# Patient Record
Sex: FEMALE | Race: BLACK OR AFRICAN AMERICAN | NOT HISPANIC OR LATINO | Employment: UNEMPLOYED | URBAN - METROPOLITAN AREA
[De-identification: names, ages, dates, MRNs, and addresses within clinical notes are randomized per-mention and may not be internally consistent; named-entity substitution may affect disease eponyms.]

---

## 2017-01-09 ENCOUNTER — TELEPHONE (OUTPATIENT)
Dept: INTERNAL MEDICINE | Facility: CLINIC | Age: 37
End: 2017-01-09

## 2017-01-09 NOTE — TELEPHONE ENCOUNTER
Please see message below. Patient states she was sick that day and it should have been billed as a sick visit??

## 2017-01-09 NOTE — TELEPHONE ENCOUNTER
Billed as 87908, which was appropriate for care she was given that day. Blood pressure was elevated and also had to be addressed.

## 2017-01-09 NOTE — TELEPHONE ENCOUNTER
----- Message from Jahaira Ely sent at 1/9/2017  2:23 PM CST -----  Contact: Pt  Pt request call from nurse regarding date of service on 12-02-16 that pt states was coded wrong and I referred pt to billing  and pt states billing transferred her back to us, please contact pt at [phone]

## 2017-02-18 ENCOUNTER — OFFICE VISIT (OUTPATIENT)
Dept: URGENT CARE | Facility: CLINIC | Age: 37
End: 2017-02-18
Payer: COMMERCIAL

## 2017-02-18 VITALS
BODY MASS INDEX: 34.75 KG/M2 | TEMPERATURE: 100 F | DIASTOLIC BLOOD PRESSURE: 80 MMHG | SYSTOLIC BLOOD PRESSURE: 126 MMHG | WEIGHT: 208.56 LBS | HEIGHT: 65 IN

## 2017-02-18 DIAGNOSIS — B34.9 ACUTE VIRAL SYNDROME: Primary | ICD-10-CM

## 2017-02-18 DIAGNOSIS — R59.1 LYMPHADENOPATHY: ICD-10-CM

## 2017-02-18 DIAGNOSIS — R50.9 FEVER AND CHILLS: ICD-10-CM

## 2017-02-18 DIAGNOSIS — R52 ACHES: ICD-10-CM

## 2017-02-18 LAB
CTP QC/QA: YES
CTP QC/QA: YES
FLUAV AG NPH QL: NEGATIVE
FLUBV AG NPH QL: NEGATIVE
S PYO RRNA THROAT QL PROBE: NEGATIVE

## 2017-02-18 PROCEDURE — 87880 STREP A ASSAY W/OPTIC: CPT | Mod: QW,S$GLB,, | Performed by: REGISTERED NURSE

## 2017-02-18 PROCEDURE — 3074F SYST BP LT 130 MM HG: CPT | Mod: S$GLB,,, | Performed by: REGISTERED NURSE

## 2017-02-18 PROCEDURE — 87804 INFLUENZA ASSAY W/OPTIC: CPT | Mod: QW,S$GLB,, | Performed by: REGISTERED NURSE

## 2017-02-18 PROCEDURE — 99213 OFFICE O/P EST LOW 20 MIN: CPT | Mod: 25,S$GLB,, | Performed by: REGISTERED NURSE

## 2017-02-18 PROCEDURE — 3079F DIAST BP 80-89 MM HG: CPT | Mod: S$GLB,,, | Performed by: REGISTERED NURSE

## 2017-02-18 PROCEDURE — 99999 PR PBB SHADOW E&M-EST. PATIENT-LVL III: CPT | Mod: PBBFAC,,, | Performed by: REGISTERED NURSE

## 2017-02-18 PROCEDURE — 96372 THER/PROPH/DIAG INJ SC/IM: CPT | Mod: S$GLB,,, | Performed by: REGISTERED NURSE

## 2017-02-18 RX ORDER — BETAMETHASONE SODIUM PHOSPHATE AND BETAMETHASONE ACETATE 3; 3 MG/ML; MG/ML
12 INJECTION, SUSPENSION INTRA-ARTICULAR; INTRALESIONAL; INTRAMUSCULAR; SOFT TISSUE
Status: COMPLETED | OUTPATIENT
Start: 2017-02-18 | End: 2017-02-18

## 2017-02-18 RX ADMIN — BETAMETHASONE SODIUM PHOSPHATE AND BETAMETHASONE ACETATE 12 MG: 3; 3 INJECTION, SUSPENSION INTRA-ARTICULAR; INTRALESIONAL; INTRAMUSCULAR; SOFT TISSUE at 11:02

## 2017-02-18 NOTE — PROGRESS NOTES
Per written order from Eleazar Wolfe, NP, pt given Celestone 12mg IM in the Left Ventrogluteal. Pt tolerated well. Pt instructed to wait 20 mins in the lobby. Pt stated an understanding.

## 2017-02-18 NOTE — MR AVS SNAPSHOT
"    [facility]  [address] [address] [place] [address]  Phone: [phone]  Fax: [phone]                  Bianca Amato   2017 10:00 AM   Office Visit    Description:  Female : 1980   Provider:  Eleazar Wolfe NP   Department:  [facility]           Reason for Visit     Cough           Diagnoses this Visit        Comments    Acute viral syndrome    -  Primary     Fever and chills         Lymphadenopathy         Aches                To Do List           Goals (5 Years of Data)     None      [facility]     [facility] Care [facility] -  Assistance  Registered nurses in the [facility] Center provide clinical advisement, health education, appointment booking, and other advisory services.  Call for this free service at [phone].             Medications           These medications were administered today        Dose Freq    betamethasone acetate-betamethasone sodium phosphate injection 12 mg 12 mg Clinic/HOD 1 time    Sig: Inject 2 mLs (12 mg total) into the muscle one time.    Class: Normal    Route: Intramuscular           Verify that the below list of medications is an accurate representation of the medications you are currently taking.  If none reported, the list may be blank. If incorrect, please contact your healthcare provider. Carry this list with you in case of emergency.           Current Medications     albuterol (PROVENTIL HFA) 90 mcg/actuation inhaler Inhale 2 puffs into the lungs every 6 (six) hours as needed.    amlodipine (NORVASC) 5 MG tablet Take 1 tablet (5 mg total) by mouth once daily.    methylPREDNISolone (MEDROL DOSEPACK) 4 mg tablet use as directed    benzonatate (TESSALON) 200 MG capsule Take 1 capsule (200 mg total) by mouth 3 (three) times daily as needed for Cough.           Clinical Reference Information           Your Vitals Were     BP Temp Height Weight BMI    126/80 100.2 °F (37.9 °C) 5' 5" (1.651 m) 94.6 kg (208 lb 8.9 " oz) 34.71 kg/m2      Blood Pressure          Most Recent Value    BP  126/80      Allergies as of 2/18/2017     Latex, Natural Rubber    Pcn [Penicillins]    Sulfa (Sulfonamide Antibiotics)      Immunizations Administered on Date of Encounter - 2/18/2017     None      Orders Placed During Today's Visit      Normal Orders This Visit    POCT Influenza A/B     POCT Rapid Strep A          2/18/2017 11:13 AM - Dami French LPN      Component Results     Component Value Flag Ref Range Units Status    Rapid Influenza A Ag Negative  Negative  Final    Rapid Influenza B Ag Negative  Negative  Final     Acceptable Yes    Final         2/18/2017 11:08 AM - Dami French LPN      Component Results     Component Value Flag Ref Range Units Status    Rapid Strep A Screen Negative  Negative  Final     Acceptable Yes    Final            [facility] Sign-Up     Activating your [facility] account is as easy as 1-2-3!     1) Visit [email or web address], select Sign Up Now, enter this activation code and your date of birth, then select Next.  [email or web address]  Expires: 4/4/2017 11:18 AM      2) Create a username and password to use when you visit [facility] in the future and select a security question in case you lose your password and select Next.    3) Enter your e-mail address and click Sign Up!    Additional Information  If you have questions, please e-mail [email or web address] or call [phone] to talk to our [facility] staff. Remember, [facility] is NOT to be used for urgent needs. For medical emergencies, dial 911.         Language Assistance Services     ATTENTION: Language assistance services are available, free of charge. Please call [phone].      ATENCIÓN: Si habla español, tiene a chiang disposición servicios gratuitos de asistencia lingüística. Llame al [phone].     CHÚ Ý: N?u b?n nói Ti?ng Vi?t, có các d?ch v? h? tr? ngôn ng? mi?n phí dành cho b?n. G?i s? [phone].          [facility] complies with applicable Federal civil rights laws and does not discriminate on the basis of race, color, national origin, age, disability, or sex.

## 2017-02-18 NOTE — PROGRESS NOTES
"Subjective:       Patient ID: Bianca Amato is a 37 y.o. female.    Chief Complaint: Cough (congestion/runnynose/chills)    URGENT CARE VISIT    HPI     Bianca is here today with c/o illness since yesterday, worse today.  Reports sweats, chills, aches and fatigue.  No meds taken.  Reports RN, NC, PND, nosebleed RT nare, ear pain/pressure, and sore lymph glands in neck.    Review of Systems   Constitutional: Positive for chills, diaphoresis, fatigue and fever.   HENT: Positive for congestion, ear pain, nosebleeds, postnasal drip, rhinorrhea and sore throat. Negative for sinus pressure and sneezing.    Eyes: Negative.    Respiratory: Negative.    Cardiovascular: Negative.    Gastrointestinal: Negative for abdominal pain, nausea and vomiting.   Musculoskeletal: Positive for myalgias.   Allergic/Immunologic: Negative for environmental allergies.   Neurological: Negative.    Hematological: Positive for adenopathy.       Objective:         Vitals:    02/18/17 1027   BP: 126/80   Temp: 100.2 °F (37.9 °C)   Weight: 94.6 kg (208 lb 8.9 oz)   Height: 5' 5" (1.651 m)       Physical Exam   Constitutional: She is oriented to person, place, and time. She appears well-developed and well-nourished. No distress.   Febrile.   HENT:   Head: Normocephalic and atraumatic.   Right Ear: Tympanic membrane, external ear and ear canal normal.   Left Ear: Tympanic membrane, external ear and ear canal normal.   Nose: Mucosal edema and rhinorrhea present. No sinus tenderness. No epistaxis. Right sinus exhibits no maxillary sinus tenderness and no frontal sinus tenderness. Left sinus exhibits no maxillary sinus tenderness and no frontal sinus tenderness.   Mouth/Throat: Oropharynx is clear and moist. No oropharyngeal exudate, posterior oropharyngeal edema or posterior oropharyngeal erythema.   Eyes: Conjunctivae and EOM are normal. Pupils are equal, round, and reactive to light. Right eye exhibits no discharge. Left eye exhibits no discharge. No " scleral icterus.   Cardiovascular: Normal rate, regular rhythm and normal heart sounds.  Exam reveals no gallop and no friction rub.    No murmur heard.  Pulmonary/Chest: Effort normal and breath sounds normal. No respiratory distress. She has no wheezes. She exhibits no tenderness.   Lymphadenopathy:     She has cervical adenopathy.   Neurological: She is alert and oriented to person, place, and time.   Skin: She is not diaphoretic.   Vitals reviewed.        Component      Latest Ref Rng & Units 2/18/2017   Rapid Influenza A Ag      Negative Negative   Rapid Influenza B Ag      Negative Negative    Acceptable       Yes       Component      Latest Ref Rng & Units 2/18/2017   Rapid Strep A Screen      Negative Negative    Acceptable       Yes       Assessment:       1. Acute viral syndrome    2. Fever and chills    3. Lymphadenopathy    4. Aches        Plan:       Bianca was seen today for cough.    Diagnoses and all orders for this visit:    Acute viral syndrome  -     betamethasone acetate-betamethasone sodium phosphate injection 12 mg; Inject 2 mLs (12 mg total) into the muscle one time.    Fever and chills  -     POCT Influenza A/B  -     POCT Rapid Strep A  -     betamethasone acetate-betamethasone sodium phosphate injection 12 mg; Inject 2 mLs (12 mg total) into the muscle one time.    Lymphadenopathy  -     POCT Influenza A/B  -     POCT Rapid Strep A  -     betamethasone acetate-betamethasone sodium phosphate injection 12 mg; Inject 2 mLs (12 mg total) into the muscle one time.    Aches  -     POCT Influenza A/B  -     POCT Rapid Strep A  -     betamethasone acetate-betamethasone sodium phosphate injection 12 mg; Inject 2 mLs (12 mg total) into the muscle one time.      Symptomatic care, rest, fluids, hydration.  Follow-up in UC or with Dr. Chadn in 2 to 3 days if symptoms worsen or persist.  Work note given to return on Monday 2/20/2017.

## 2017-11-29 ENCOUNTER — HOSPITAL ENCOUNTER (OUTPATIENT)
Dept: RADIOLOGY | Facility: HOSPITAL | Age: 37
Discharge: HOME | End: 2017-11-29
Attending: NURSE PRACTITIONER
Payer: COMMERCIAL

## 2017-11-29 ENCOUNTER — OFFICE VISIT (OUTPATIENT)
Dept: INTERNAL MEDICINE | Facility: CLINIC | Age: 37
End: 2017-11-29
Payer: COMMERCIAL

## 2017-11-29 VITALS
BODY MASS INDEX: 35.11 KG/M2 | TEMPERATURE: 98 F | OXYGEN SATURATION: 98 % | SYSTOLIC BLOOD PRESSURE: 142 MMHG | HEART RATE: 95 BPM | HEIGHT: 65 IN | WEIGHT: 210.75 LBS | DIASTOLIC BLOOD PRESSURE: 82 MMHG

## 2017-11-29 DIAGNOSIS — M79.89 PAIN AND SWELLING OF LOWER LEG, RIGHT: Primary | ICD-10-CM

## 2017-11-29 DIAGNOSIS — M79.661 PAIN AND SWELLING OF LOWER LEG, RIGHT: Primary | ICD-10-CM

## 2017-11-29 DIAGNOSIS — M79.89 PAIN AND SWELLING OF LOWER LEG, RIGHT: ICD-10-CM

## 2017-11-29 DIAGNOSIS — M79.661 PAIN AND SWELLING OF LOWER LEG, RIGHT: ICD-10-CM

## 2017-11-29 PROCEDURE — 73590 X-RAY EXAM OF LOWER LEG: CPT | Mod: 50,TC,PO

## 2017-11-29 PROCEDURE — 99214 OFFICE O/P EST MOD 30 MIN: CPT | Mod: 25,S$GLB,, | Performed by: NURSE PRACTITIONER

## 2017-11-29 PROCEDURE — 99999 PR PBB SHADOW E&M-EST. PATIENT-LVL IV: CPT | Mod: PBBFAC,,, | Performed by: NURSE PRACTITIONER

## 2017-11-29 PROCEDURE — 96372 THER/PROPH/DIAG INJ SC/IM: CPT | Mod: S$GLB,,, | Performed by: FAMILY MEDICINE

## 2017-11-29 PROCEDURE — 73590 X-RAY EXAM OF LOWER LEG: CPT | Mod: 26,50,, | Performed by: RADIOLOGY

## 2017-11-29 RX ORDER — NAPROXEN 500 MG/1
500 TABLET ORAL 2 TIMES DAILY WITH MEALS
Qty: 14 TABLET | Refills: 0 | Status: SHIPPED | OUTPATIENT
Start: 2017-11-29 | End: 2017-12-09

## 2017-11-29 RX ORDER — NAPROXEN 500 MG/1
500 TABLET ORAL 2 TIMES DAILY WITH MEALS
Qty: 14 TABLET | Refills: 0 | Status: SHIPPED | OUTPATIENT
Start: 2017-11-29 | End: 2017-11-29

## 2017-11-29 RX ORDER — KETOROLAC TROMETHAMINE 30 MG/ML
30 INJECTION, SOLUTION INTRAMUSCULAR; INTRAVENOUS
Status: COMPLETED | OUTPATIENT
Start: 2017-11-29 | End: 2017-11-29

## 2017-11-29 RX ADMIN — KETOROLAC TROMETHAMINE 30 MG: 30 INJECTION, SOLUTION INTRAMUSCULAR; INTRAVENOUS at 11:11

## 2017-11-29 NOTE — PATIENT INSTRUCTIONS
R.I.C.E.    R.I.C.E. stands for Rest, Ice, Compression, and Elevation. Doing these things helps limit pain and swelling after an injury. R.I.C.E. also helps injuries heal faster. Use R.I.C.E. for sprains, strains, and severe bruises or bumps. Follow the tips on this handout and begin R.I.C.E. as soon as possible after an injury.  ? Rest  Pain is your bodys way of telling you to rest an injured area. Whether you have hurt an elbow, hand, foot, or knee, limiting its use will prevent further injury and help you heal.  ? Ice  Applying ice right after an injury helps prevent swelling and reduce pain. Dont place ice directly on your skin.  · Wrap a cold pack or bag of ice in a thin cloth. Place it over the injured area.  · Ice for 10 minutes every 3 hours. Dont ice for more than 20 minutes at a time.  ? Compression  Putting pressure (compression) on an injury helps prevent swelling and provides support.  · Wrap the injured area firmly with an elastic bandage. If your hand or foot tingles, becomes discolored, or feels cold to the touch, the bandage may be too tight. Rewrap it more loosely.  · If your bandage becomes too loose, rewrap it.  · Do not wear an elastic bandage overnight.  ? Elevation  Keeping an injury elevated helps reduce swelling, pain, and throbbing. Elevation is most effective when the injury is kept elevated higher than the heart.     Call your healthcare provider if you notice any of the following:  · Skin looks shiny or tight  · Pain, swelling, or bruising worsens and is not improved with elevation   Date Last Reviewed: 9/3/2015  © 3677-9697 "[employer]. [address] [place] [address], [place] [address]. All rights reserved. This information is not intended as a substitute for professional medical care. Always follow your healthcare professional's instructions.

## 2017-11-30 NOTE — PROGRESS NOTES
"Subjective:       Patient ID: Bianca Amato is a 37 y.o. female.    Chief Complaint: Edema (right leg) and Hypertension (hasnt taken her medication in 2 days)    Pt presents with right lower anterior leg pain x 1-2 days. She does not recall any inciting events. She reports that "it just starting hurting and I was limping all yesterday". 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 She denies falls, fever, foot pain, numbness/tingling in the extremity, calf pain, or leg weakness. No hx of DVT.       Review of Systems   Constitutional: Positive for activity change. Negative for chills, fatigue and fever.   HENT: Negative for congestion, ear pain, postnasal drip, rhinorrhea, sinus pressure, sneezing and sore throat.    Eyes: Negative for photophobia, pain and visual disturbance.   Respiratory: Negative for cough, chest tightness and shortness of breath.    Cardiovascular: Negative for chest pain, palpitations and leg swelling.   Gastrointestinal: Negative for abdominal pain, constipation, diarrhea, nausea and vomiting.   Genitourinary: Negative for dysuria and frequency.   Musculoskeletal: Positive for gait problem and myalgias (right lower leg). Negative for arthralgias.   Neurological: Negative for dizziness, light-headedness and headaches.   Psychiatric/Behavioral: Negative for sleep disturbance.       Objective:      Physical Exam   Constitutional: She is oriented to person, place, and time.   Musculoskeletal:        Legs:  Limping noted    Neurological: She is alert and oriented to person, place, and time.   Skin: Skin is warm and dry.   Psychiatric: She has a normal mood and affect.       Assessment:       1. Pain and swelling of lower leg, right        Plan:   1. Xray of affected area with review to follow  2. RICE  3. toradol IM now, toradol PRN PO for home use- monitor BP  4. Work excuse x 2 days given  5. F/u with PCP if pain worsens or fails to improve within the next 3-4 days    "

## 2017-12-20 DIAGNOSIS — I10 ESSENTIAL HYPERTENSION: ICD-10-CM

## 2017-12-20 RX ORDER — AMLODIPINE BESYLATE 5 MG/1
TABLET ORAL
Qty: 30 TABLET | Refills: 0 | Status: SHIPPED | OUTPATIENT
Start: 2017-12-20 | End: 2018-08-24 | Stop reason: SDUPTHER

## 2018-01-04 ENCOUNTER — PATIENT OUTREACH (OUTPATIENT)
Dept: ADMINISTRATIVE | Facility: HOSPITAL | Age: 38
End: 2018-01-04

## 2018-01-17 ENCOUNTER — TELEPHONE (OUTPATIENT)
Dept: INTERNAL MEDICINE | Facility: CLINIC | Age: 38
End: 2018-01-17

## 2018-01-17 NOTE — TELEPHONE ENCOUNTER
01/17/2018. No answer, unable to leave message re: rescheduling// the clinic will be closed until 1 on 1.18.2018.

## 2018-02-15 DIAGNOSIS — I10 ESSENTIAL HYPERTENSION: ICD-10-CM

## 2018-02-15 RX ORDER — AMLODIPINE BESYLATE 5 MG/1
TABLET ORAL
Qty: 30 TABLET | Refills: 0 | OUTPATIENT
Start: 2018-02-15

## 2018-05-09 ENCOUNTER — PATIENT OUTREACH (OUTPATIENT)
Dept: ADMINISTRATIVE | Facility: HOSPITAL | Age: 38
End: 2018-05-09

## 2018-05-09 NOTE — PROGRESS NOTES
I have attempted without success to contact this patient to schedule an appointment for blood pressure recheck and other health maintenance. Patient not available, left voicemail.

## 2018-05-11 DIAGNOSIS — Z12.39 BREAST CANCER SCREENING: ICD-10-CM

## 2018-05-16 ENCOUNTER — NURSE TRIAGE (OUTPATIENT)
Dept: ADMINISTRATIVE | Facility: CLINIC | Age: 38
End: 2018-05-16

## 2018-05-16 NOTE — TELEPHONE ENCOUNTER
"    Reason for Disposition   Weakness of a leg or foot (e.g., unable to bear weight, dragging foot)     Per [facility] triage protocol, recommended ED now for evaluation of left leg weakness that has begun since last week, when Bianca began having left flank pain.  She states she is unable to lift that leg without a great deal of pain, and it is weak.  She states she has to work tonight.  Strongly recommended evaluation tonight.  Says she will try.  Message to Vivi Chand , pcp.  Please contact caller directly with any additional care advice.    Answer Assessment - Initial Assessment Questions  1. LOCATION: "Where does it hurt?" (e.g., left, right)      Left side    2. ONSET: "When did the pain start?"      Began Friday.    3. SEVERITY: "How bad is the pain?" (e.g., Scale 1-10; mild, moderate, or severe)    - MILD (1-3): doesn't interfere with normal activities     - MODERATE (4-7): interferes with normal activities or awakens from sleep     - SEVERE (8-10): excruciating pain and patient unable to do normal activities (stays in bed)        Pain 6-7 of 10.    4. PATTERN: "Does the pain come and go, or is it constant?"       It is constant.    5. CAUSE: "What do you think is causing the pain?"      I don't know.    6. OTHER SYMPTOMS:  "Do you have any other symptoms?" (e.g., fever, abdominal pain, vomiting, leg weakness, burning with urination, blood in urine)      No to all.    7. PREGNANCY:  "Is there any chance you are pregnant?" "When was your last menstrual period?"      No.  Tubes tied.    Protocols used: ST FLANK PAIN-A-AH      "

## 2018-08-24 ENCOUNTER — OFFICE VISIT (OUTPATIENT)
Dept: INTERNAL MEDICINE | Facility: CLINIC | Age: 38
End: 2018-08-24
Payer: COMMERCIAL

## 2018-08-24 ENCOUNTER — LAB VISIT (OUTPATIENT)
Dept: LAB | Facility: HOSPITAL | Age: 38
End: 2018-08-24
Attending: FAMILY MEDICINE
Payer: COMMERCIAL

## 2018-08-24 VITALS
TEMPERATURE: 98 F | DIASTOLIC BLOOD PRESSURE: 90 MMHG | BODY MASS INDEX: 32.28 KG/M2 | HEIGHT: 67 IN | HEART RATE: 89 BPM | SYSTOLIC BLOOD PRESSURE: 140 MMHG | WEIGHT: 205.69 LBS | OXYGEN SATURATION: 97 %

## 2018-08-24 DIAGNOSIS — Z13.29 THYROID DISORDER SCREEN: ICD-10-CM

## 2018-08-24 DIAGNOSIS — Z23 NEED FOR TDAP VACCINATION: ICD-10-CM

## 2018-08-24 DIAGNOSIS — D50.9 IRON DEFICIENCY ANEMIA, UNSPECIFIED IRON DEFICIENCY ANEMIA TYPE: ICD-10-CM

## 2018-08-24 DIAGNOSIS — I10 ESSENTIAL HYPERTENSION: ICD-10-CM

## 2018-08-24 DIAGNOSIS — J45.20 MILD INTERMITTENT ASTHMA WITHOUT COMPLICATION: ICD-10-CM

## 2018-08-24 DIAGNOSIS — Z13.220 SCREENING FOR LIPOID DISORDERS: ICD-10-CM

## 2018-08-24 DIAGNOSIS — Z00.00 ROUTINE GENERAL MEDICAL EXAMINATION AT A HEALTH CARE FACILITY: Primary | ICD-10-CM

## 2018-08-24 DIAGNOSIS — Z00.00 ROUTINE GENERAL MEDICAL EXAMINATION AT A HEALTH CARE FACILITY: ICD-10-CM

## 2018-08-24 DIAGNOSIS — B35.1 ONYCHOMYCOSIS OF TOENAIL: ICD-10-CM

## 2018-08-24 LAB
ALBUMIN SERPL BCP-MCNC: 3.7 G/DL
ALP SERPL-CCNC: 88 U/L
ALT SERPL W/O P-5'-P-CCNC: 13 U/L
ANION GAP SERPL CALC-SCNC: 7 MMOL/L
AST SERPL-CCNC: 17 U/L
BASOPHILS # BLD AUTO: 0.03 K/UL
BASOPHILS NFR BLD: 0.4 %
BILIRUB SERPL-MCNC: 0.4 MG/DL
BUN SERPL-MCNC: 11 MG/DL
CALCIUM SERPL-MCNC: 9.5 MG/DL
CHLORIDE SERPL-SCNC: 107 MMOL/L
CHOLEST SERPL-MCNC: 180 MG/DL
CHOLEST/HDLC SERPL: 3.8 {RATIO}
CO2 SERPL-SCNC: 25 MMOL/L
CREAT SERPL-MCNC: 0.7 MG/DL
DIFFERENTIAL METHOD: ABNORMAL
EOSINOPHIL # BLD AUTO: 0.2 K/UL
EOSINOPHIL NFR BLD: 2.6 %
ERYTHROCYTE [DISTWIDTH] IN BLOOD BY AUTOMATED COUNT: 16 %
EST. GFR  (AFRICAN AMERICAN): >60 ML/MIN/1.73 M^2
EST. GFR  (NON AFRICAN AMERICAN): >60 ML/MIN/1.73 M^2
FERRITIN SERPL-MCNC: 16 NG/ML
GLUCOSE SERPL-MCNC: 80 MG/DL
HCT VFR BLD AUTO: 37.6 %
HDLC SERPL-MCNC: 48 MG/DL
HDLC SERPL: 26.7 %
HGB BLD-MCNC: 11.7 G/DL
IMM GRANULOCYTES # BLD AUTO: 0.02 K/UL
IMM GRANULOCYTES NFR BLD AUTO: 0.3 %
IRON SERPL-MCNC: 46 UG/DL
LDLC SERPL CALC-MCNC: 102.6 MG/DL
LYMPHOCYTES # BLD AUTO: 3.2 K/UL
LYMPHOCYTES NFR BLD: 41.1 %
MCH RBC QN AUTO: 25.5 PG
MCHC RBC AUTO-ENTMCNC: 31.1 G/DL
MCV RBC AUTO: 82 FL
MONOCYTES # BLD AUTO: 0.5 K/UL
MONOCYTES NFR BLD: 5.8 %
NEUTROPHILS # BLD AUTO: 3.9 K/UL
NEUTROPHILS NFR BLD: 49.8 %
NONHDLC SERPL-MCNC: 132 MG/DL
NRBC BLD-RTO: 0 /100 WBC
PLATELET # BLD AUTO: 538 K/UL
PMV BLD AUTO: 9.7 FL
POTASSIUM SERPL-SCNC: 3.6 MMOL/L
PROT SERPL-MCNC: 7.1 G/DL
RBC # BLD AUTO: 4.58 M/UL
SATURATED IRON: 11 %
SODIUM SERPL-SCNC: 139 MMOL/L
T4 FREE SERPL-MCNC: 1 NG/DL
TOTAL IRON BINDING CAPACITY: 425 UG/DL
TRANSFERRIN SERPL-MCNC: 287 MG/DL
TRIGL SERPL-MCNC: 147 MG/DL
TSH SERPL DL<=0.005 MIU/L-ACNC: 0.2 UIU/ML
WBC # BLD AUTO: 7.73 K/UL

## 2018-08-24 PROCEDURE — 3080F DIAST BP >= 90 MM HG: CPT | Mod: CPTII,S$GLB,, | Performed by: FAMILY MEDICINE

## 2018-08-24 PROCEDURE — 85025 COMPLETE CBC W/AUTO DIFF WBC: CPT

## 2018-08-24 PROCEDURE — 80061 LIPID PANEL: CPT

## 2018-08-24 PROCEDURE — 84443 ASSAY THYROID STIM HORMONE: CPT

## 2018-08-24 PROCEDURE — 36415 COLL VENOUS BLD VENIPUNCTURE: CPT

## 2018-08-24 PROCEDURE — 90715 TDAP VACCINE 7 YRS/> IM: CPT | Mod: S$GLB,,, | Performed by: FAMILY MEDICINE

## 2018-08-24 PROCEDURE — 90471 IMMUNIZATION ADMIN: CPT | Mod: S$GLB,,, | Performed by: FAMILY MEDICINE

## 2018-08-24 PROCEDURE — 99999 PR PBB SHADOW E&M-EST. PATIENT-LVL IV: CPT | Mod: PBBFAC,,, | Performed by: FAMILY MEDICINE

## 2018-08-24 PROCEDURE — 99396 PREV VISIT EST AGE 40-64: CPT | Mod: 25,S$GLB,, | Performed by: FAMILY MEDICINE

## 2018-08-24 PROCEDURE — 82728 ASSAY OF FERRITIN: CPT

## 2018-08-24 PROCEDURE — 80053 COMPREHEN METABOLIC PANEL: CPT

## 2018-08-24 PROCEDURE — 3077F SYST BP >= 140 MM HG: CPT | Mod: CPTII,S$GLB,, | Performed by: FAMILY MEDICINE

## 2018-08-24 PROCEDURE — 83540 ASSAY OF IRON: CPT

## 2018-08-24 PROCEDURE — 84439 ASSAY OF FREE THYROXINE: CPT

## 2018-08-24 RX ORDER — AMLODIPINE BESYLATE 5 MG/1
5 TABLET ORAL DAILY
Qty: 90 TABLET | Refills: 1 | Status: SHIPPED | OUTPATIENT
Start: 2018-08-24 | End: 2018-12-14

## 2018-08-24 NOTE — PROGRESS NOTES
Subjective:       Patient ID: Bianca Amato is a 38 y.o. female.    Chief Complaint: Annual Exam    Patient presents to clinic today for annual physical exam. Patient has not been taking norvasc. She reports 5 lb weight loss with diet modifications. She requests referral to address toenail fungus.      Review of Systems   Constitutional: Negative for chills, fatigue, fever and unexpected weight change.   HENT: Negative for congestion, dental problem, ear pain, hearing loss, rhinorrhea and trouble swallowing.    Eyes: Negative for pain and visual disturbance.   Respiratory: Negative for cough and shortness of breath.    Cardiovascular: Negative for chest pain, palpitations and leg swelling.   Gastrointestinal: Negative for abdominal distention, abdominal pain, blood in stool, constipation, diarrhea, nausea and vomiting.   Genitourinary: Negative for difficulty urinating and vaginal discharge.   Musculoskeletal: Negative for arthralgias and myalgias.   Skin: Negative for rash.   Neurological: Negative for dizziness, weakness, numbness and headaches.   Hematological: Negative for adenopathy. Does not bruise/bleed easily.   Psychiatric/Behavioral: Negative for dysphoric mood and sleep disturbance. The patient is not nervous/anxious.        Objective:      Physical Exam   Constitutional: She is oriented to person, place, and time. Vital signs are normal. She appears well-developed and well-nourished. No distress.   HENT:   Head: Normocephalic and atraumatic.   Right Ear: Tympanic membrane, external ear and ear canal normal.   Left Ear: Tympanic membrane, external ear and ear canal normal.   Nose: Nose normal. No mucosal edema or rhinorrhea.   Mouth/Throat: Uvula is midline, oropharynx is clear and moist and mucous membranes are normal.   Eyes: Conjunctivae, EOM and lids are normal. Pupils are equal, round, and reactive to light.   Neck: Normal range of motion. Neck supple. No thyromegaly present.   Cardiovascular:  Normal rate and regular rhythm. Exam reveals no gallop and no friction rub.   No murmur heard.  Pulmonary/Chest: Effort normal and breath sounds normal. She has no wheezes. She has no rhonchi. She has no rales.   Abdominal: Soft. Normal appearance and bowel sounds are normal. She exhibits no distension and no mass. There is no hepatosplenomegaly. There is no tenderness.   Musculoskeletal: Normal range of motion.   Lymphadenopathy:     She has no cervical adenopathy.   Neurological: She is alert and oriented to person, place, and time. She has normal strength. No cranial nerve deficit or sensory deficit. Gait normal.   Reflex Scores:       Patellar reflexes are 2+ on the right side and 2+ on the left side.  Skin: Skin is warm and dry. No lesion and no rash noted. No cyanosis. Nails show no clubbing.   Psychiatric: She has a normal mood and affect.   Vitals reviewed.      Assessment:       1. Routine general medical examination at a health care facility    2. Essential hypertension    3. Iron deficiency anemia, unspecified iron deficiency anemia type    4. Mild intermittent asthma without complication    5. Screening for lipoid disorders    6. Thyroid disorder screen    7. Need for Tdap vaccination    8. Onychomycosis of toenail        Plan:     Problem List Items Addressed This Visit     Mild intermittent asthma without complication    Hypertension    Relevant Medications    amLODIPine (NORVASC) 5 MG tablet    Iron deficiency anemia    Relevant Orders    CBC auto differential    Iron and TIBC    Ferritin      Other Visit Diagnoses     Routine general medical examination at a health care facility    -  Primary    Relevant Orders    Comprehensive metabolic panel    Screening for lipoid disorders        Relevant Orders    Lipid panel    Thyroid disorder screen        Relevant Orders    TSH    Need for Tdap vaccination        Relevant Orders    Tdap Vaccine    Onychomycosis of toenail        Relevant Orders    Ambulatory  referral to Podiatry          Health Maintenance reviewed/updated. Advised to obtain flu vaccine this Fall.  Female health screenings per OB/GYN- Dr. Manish Gandhi, Release signed for records. She has appointment today.

## 2018-08-27 ENCOUNTER — TELEPHONE (OUTPATIENT)
Dept: INTERNAL MEDICINE | Facility: CLINIC | Age: 38
End: 2018-08-27

## 2018-08-27 DIAGNOSIS — D50.9 IRON DEFICIENCY ANEMIA, UNSPECIFIED IRON DEFICIENCY ANEMIA TYPE: Primary | ICD-10-CM

## 2018-08-27 DIAGNOSIS — R79.89 ELEVATED PLATELET COUNT: ICD-10-CM

## 2018-08-30 ENCOUNTER — OFFICE VISIT (OUTPATIENT)
Dept: PODIATRY | Facility: CLINIC | Age: 38
End: 2018-08-30
Payer: COMMERCIAL

## 2018-08-30 VITALS
WEIGHT: 205.69 LBS | BODY MASS INDEX: 32.28 KG/M2 | RESPIRATION RATE: 18 BRPM | HEART RATE: 97 BPM | HEIGHT: 67 IN | DIASTOLIC BLOOD PRESSURE: 94 MMHG | SYSTOLIC BLOOD PRESSURE: 142 MMHG

## 2018-08-30 DIAGNOSIS — B35.1 ONYCHOMYCOSIS: ICD-10-CM

## 2018-08-30 DIAGNOSIS — B35.1 ONYCHOMYCOSIS DUE TO DERMATOPHYTE: Primary | ICD-10-CM

## 2018-08-30 DIAGNOSIS — L74.513 HYPERHIDROSIS OF FEET: ICD-10-CM

## 2018-08-30 DIAGNOSIS — B35.3 TINEA PEDIS OF BOTH FEET: Primary | ICD-10-CM

## 2018-08-30 DIAGNOSIS — B35.1 ONYCHOMYCOSIS DUE TO DERMATOPHYTE: ICD-10-CM

## 2018-08-30 PROCEDURE — 99243 OFF/OP CNSLTJ NEW/EST LOW 30: CPT | Mod: S$GLB,,, | Performed by: PODIATRIST

## 2018-08-30 PROCEDURE — 99999 PR PBB SHADOW E&M-EST. PATIENT-LVL IV: CPT | Mod: PBBFAC,,, | Performed by: PODIATRIST

## 2018-08-30 PROCEDURE — 87107 FUNGI IDENTIFICATION MOLD: CPT

## 2018-08-30 PROCEDURE — 87101 SKIN FUNGI CULTURE: CPT

## 2018-08-30 RX ORDER — TERBINAFINE HYDROCHLORIDE 250 MG/1
250 TABLET ORAL DAILY
Qty: 21 TABLET | Refills: 0 | Status: SHIPPED | OUTPATIENT
Start: 2018-08-30 | End: 2018-09-20

## 2018-08-30 RX ORDER — KETOCONAZOLE 20 MG/G
CREAM TOPICAL 2 TIMES DAILY
Qty: 30 G | Refills: 2 | Status: SHIPPED | OUTPATIENT
Start: 2018-08-30 | End: 2020-01-03

## 2018-08-30 NOTE — LETTER
August 30, 2018      Vivi Chand MD  [address] [place] [address] [address] [place] [id number]           [facility]  [address] [place] [address] [place] [address]  Phone: [phone]  Fax: [phone]          Patient: Bianca Amato   MR Number: [id number]   YOB: 1980   Date of Visit: 8/30/2018       Dear Dr. Vivi Chand:    Thank you for referring Bianca Amato to me for evaluation. Attached you will find relevant portions of my assessment and plan of care.    If you have questions, please do not hesitate to call me. I look forward to following Bianca Amato along with you.    Sincerely,    Raffi Garcia, NOLAN    Enclosure  CC:  No Recipients    If you would like to receive this communication electronically, please contact [email or web address] or [phone] to request more information on [employer] [facility] access.    For providers and/or their staff who would like to refer a patient to [facility], please contact us through our one-stop-shop provider referral line, [facility], at [phone].    If you feel you have received this communication in error or would no longer like to receive these types of communications, please e-mail [email or web address]

## 2018-08-30 NOTE — PROGRESS NOTES
Subjective:       Patient ID: Bianca Amato is a 38 y.o. female.    Chief Complaint: Nail Problem (possible fungus on great toe, c/o soreness,  pain level 2/10, PCP Dr. Chand. )    HPI: Bianca Amato presents to the clinic today with the chief complaint of persistent pruritus and burning to the left and right foot at the webspaces. Patient states these symptoms have been on going for several weeks to a month or so. Patient has not tried OTC topical medications. States no prior evaluation by MD//DPM. 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 States malodor to the foot at days end, and/or after wearing socks/shoes/sneakers/boots for several hours. States red, dry and flaky skin as well. She does also state thickened, discolored, and malodorous bilateral great toenail plates.    Review of patient's allergies indicates:   Allergen Reactions    Latex, natural rubber     Pcn [penicillins]     Sulfa (sulfonamide antibiotics)        Past Medical History:   Diagnosis Date    Hypertension     Iron deficiency anemia     Mild intermittent asthma        Family History   Problem Relation Age of Onset    Diabetes Mother     Sarcoidosis Mother     COPD Father     Hypertension Father        Social History     Socioeconomic History    Marital status: Single     Spouse name: Not on file    Number of children: 4    Years of education: Not on file    Highest education level: Not on file   Social Needs    Financial resource strain: Not on file    Food insecurity - worry: Not on file    Food insecurity - inability: Not on file    Transportation needs - medical: Not on file    Transportation needs - non-medical: Not on file   Occupational History    Occupation:      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   Tobacco Use    Smoking status: Never Smoker    Smokeless tobacco: Never Used   Substance and Sexual Activity    Alcohol use: No    Drug use: No    Sexual activity: Yes     Partners: Male  "  Other Topics Concern    Not on file   Social History Narrative    Not on file       Past Surgical History:   Procedure Laterality Date    TUBAL LIGATION         Review of Systems   Constitutional: Negative for chills, fatigue and fever.   HENT: Negative for hearing loss.    Eyes: Negative for photophobia and visual disturbance.   Respiratory: Negative for cough, chest tightness, shortness of breath and wheezing.    Cardiovascular: Negative for chest pain and palpitations.   Gastrointestinal: Negative for constipation, diarrhea, nausea and vomiting.   Endocrine: Negative for cold intolerance and heat intolerance.   Genitourinary: Negative for flank pain.   Musculoskeletal: Negative for gait problem, neck pain and neck stiffness.   Skin: Positive for color change and rash.   Neurological: Negative for light-headedness, numbness and headaches.   Psychiatric/Behavioral: Negative for sleep disturbance.          Objective:   BP (!) 142/94   Pulse 97   Resp 18   Ht 5' 7" (1.702 m)   Wt 93.3 kg (205 lb 11 oz)   LMP 08/03/2018   BMI 32.22 kg/m²     LOWER EXTREMITY PHYSICAL EXAMINATION    Physical Exam    DERMATOLOGY:  Substantial flaky, erythematous, xerotic like lesions are noted to the bilateral plantar aspect of the foot as well as the 1st and 2nd web spaces bilaterally. There is substantial interdigital maceration malodor noted. As per the patient, the aforementioned areas are moderately pruritic. There are nail changes which are consistent with onychomycosis on the left and right foot. On the left and the right foot, the great toe nails are thickened, are dystrophic, with yellow discoloration, and with malodorous subungual debris. These thickened and dystrophic nails are painful to touch and palpation. The remaining nail plates are elongated, and are not suggestive of onychomycosis.      NEUROLOGY: Sensation to light touch is intact. Proprioception is intact. Upon palpation of the interspaces, there are no " neurological sensations stated that radiate proximal or distal. Upon compression of the metatarsal heads from medial to lateral, no neurological sensations or symptoms are stated.    ORTHOPEDIC: Manual Muscle Testing is 5/5 in all planes on the left and right, without pains, with and without resistance. No pains to palpation of the medial or lateral ankle ligaments. No discomfort to palpation of the posterior tibial tendon, peroneal tendon, Achilles tendon or the anterior ankle tendons.  Rectus foot type is noted. No pain upon range of motion of the midfoot or hindfoot joints. No crepitus upon range of motion and midfoot or hindfoot joints. No ankle pathology is noted. Gait pattern is non-antalgic.    VASCULAR: Pulses are palpable to the B/L lower extremity. The right dorsalis pedis pulse is 2/4 and the posterior tibial pulse is 2/4. The left dorsalis pedis pulse is 2/4 and the posterior tibial pulse is 2/4. Hair growth is noted on the dorsal foot and digits. Proximal to distal, warm to warm. Capillary refill time is WNL at less than 3s.    Assessment:     1. Tinea pedis of both feet    2. Hyperhidrosis of feet    3. Onychomycosis    4. Onychomycosis due to dermatophyte        Plan:     Tinea pedis of both feet  Hyperhidrosis of feet  Terbinafine HCl (LAMISIL) 250 mg tablet; Take 1 tablet (250 mg total) by mouth once daily. for 21 days  Dispense: 21 tablet; Refill: 0  Ketoconazole (NIZORAL) 2 % cream; Apply topically 2 (two) times daily.  Dispense: 30 g; Refill: 2    Recommend moisture wicking socks to alleviate the hyperhidrosis which makes one prone to recurrent tinea pedis.  I also recommend to alternate shoe gear, meaning, Monday, Wednesday, Friday, Saturday/Sunday and Tuesday, Thursday, Saturday/Sunday.  I do also recommend Tinactin antifungal spray to shoes daily.  After the aforementioned, put the shoes aside and allow to dry overnight and/or one full day.  Patient may purchase OTC Gold Bond Powder and use to  his/her shoes daily prior to putting them on. He/She may also sprinkle a slight amount of powder to the foot prior to putting on socks.    Onychomycosis  Onychomycosis due to dermatophyte  Fungal culture , skin, hair, or nails.    The nails as outlined in the examination portion of this note, which appear to be thickened, dystrophic, discolored and with subungual debris are biopsied and sampled. These nail clippings will be sent to the Pathologist for determination if fungal (onychomycotic) elements are present, and which genus is causative. Patient will follow up within in week for results and/or I or the RN will do a phone consultation/review.        Future Appointments   Date Time Provider Department Center   9/10/2018  9:00 AM INTERNAL MEDICINE NURSE, [facility]   9/17/2018 10:00 AM Rico Vazquez Jr., MD [facility] HEM [facility]   9/20/2018  1:30 PM Raffi Garcia DPM [facility]

## 2018-08-31 ENCOUNTER — OFFICE VISIT (OUTPATIENT)
Dept: INTERNAL MEDICINE | Facility: CLINIC | Age: 38
End: 2018-08-31
Payer: COMMERCIAL

## 2018-08-31 VITALS
WEIGHT: 205.25 LBS | SYSTOLIC BLOOD PRESSURE: 148 MMHG | HEIGHT: 67 IN | BODY MASS INDEX: 32.21 KG/M2 | OXYGEN SATURATION: 97 % | HEART RATE: 96 BPM | TEMPERATURE: 98 F | DIASTOLIC BLOOD PRESSURE: 90 MMHG

## 2018-08-31 DIAGNOSIS — R30.0 DYSURIA: ICD-10-CM

## 2018-08-31 DIAGNOSIS — R92.8 ABNORMAL MAMMOGRAM: Primary | ICD-10-CM

## 2018-08-31 LAB
BILIRUB UR QL STRIP: NEGATIVE
CLARITY UR: CLEAR
COLOR UR: YELLOW
GLUCOSE UR QL STRIP: NEGATIVE
HGB UR QL STRIP: NEGATIVE
KETONES UR QL STRIP: NEGATIVE
LEUKOCYTE ESTERASE UR QL STRIP: NEGATIVE
NITRITE UR QL STRIP: NEGATIVE
PH UR STRIP: 7 [PH] (ref 5–8)
PROT UR QL STRIP: NEGATIVE
SP GR UR STRIP: 1.01 (ref 1–1.03)
URN SPEC COLLECT METH UR: NORMAL

## 2018-08-31 PROCEDURE — 99999 PR PBB SHADOW E&M-EST. PATIENT-LVL IV: CPT | Mod: PBBFAC,,, | Performed by: NURSE PRACTITIONER

## 2018-08-31 PROCEDURE — 81002 URINALYSIS NONAUTO W/O SCOPE: CPT

## 2018-08-31 PROCEDURE — 3077F SYST BP >= 140 MM HG: CPT | Mod: CPTII,S$GLB,, | Performed by: NURSE PRACTITIONER

## 2018-08-31 PROCEDURE — 99214 OFFICE O/P EST MOD 30 MIN: CPT | Mod: S$GLB,,, | Performed by: NURSE PRACTITIONER

## 2018-08-31 PROCEDURE — 87086 URINE CULTURE/COLONY COUNT: CPT

## 2018-08-31 PROCEDURE — 3008F BODY MASS INDEX DOCD: CPT | Mod: CPTII,S$GLB,, | Performed by: NURSE PRACTITIONER

## 2018-08-31 PROCEDURE — 3080F DIAST BP >= 90 MM HG: CPT | Mod: CPTII,S$GLB,, | Performed by: NURSE PRACTITIONER

## 2018-08-31 NOTE — PROGRESS NOTES
"Subjective:       Patient ID: Bianca Amato is a 38 y.o. female.    Chief Complaint: discuss breast ultrasound    Patient presents for follow up of mammogram and breast ultrasound. She reports her gyn, Dr. Gandhi, ordered routine mammogram which was done 8/24 at [facility]. She had a bilateral breast ultrasound done today at [facility]. She reports she was told she had "enlarged lymph nodes" and wants to know the next step.    She also reports some discomfort with urination.       Dysuria    This is a new problem. The current episode started in the past 7 days. The problem occurs every urination. The problem has been unchanged. The quality of the pain is described as aching (itcing). The pain is mild. There has been no fever. She is sexually active. Associated symptoms include a discharge and frequency. Pertinent negatives include no chills, flank pain, hematuria, nausea, urgency, weight loss, constipation or withholding. She has tried nothing for the symptoms. The treatment provided no relief.     Review of Systems   Constitutional: Negative for chills, fever and weight loss.   Gastrointestinal: Negative for constipation and nausea.   Genitourinary: Positive for dysuria, frequency and vaginal discharge (occasional). Negative for decreased urine volume, flank pain, hematuria, menstrual problem, pelvic pain and urgency.   Musculoskeletal: Positive for back pain (occasional). Negative for neck pain.       Objective:      Physical Exam   Constitutional: She appears well-developed and well-nourished. No distress.   HENT:   Head: Normocephalic.   Cardiovascular: Normal rate and regular rhythm.   No murmur heard.  Pulmonary/Chest: Effort normal and breath sounds normal. No respiratory distress. She has no wheezes.   Abdominal: Bowel sounds are normal. She exhibits no distension and no mass. There is no tenderness. There is no rebound, no guarding and no CVA tenderness.   Neurological: She is alert.   Psychiatric: She has " a normal mood and affect.   Vitals reviewed.      Assessment:       1. Abnormal mammogram    2. Dysuria        Plan:   Abnormal mammogram  Comments:  requested records and will follow up with Katlyn Villela NP if needed    Dysuria  -     Urinalysis  -     Urine culture      Discussed with patient that since Dr. Gandhi ordered testing and it was done outside of [facility], I do not have access to imaging at this time. Will work with care coordinators to expedite records and with Dr. Gandhi's office to determine next step. Patient verbalized understanding and agreement.   UA appears normal.  Follow-up if symptoms worsen or fail to improve.

## 2018-09-01 LAB — BACTERIA UR CULT: NO GROWTH

## 2018-09-10 ENCOUNTER — CLINICAL SUPPORT (OUTPATIENT)
Dept: INTERNAL MEDICINE | Facility: CLINIC | Age: 38
End: 2018-09-10
Payer: COMMERCIAL

## 2018-09-10 NOTE — PROGRESS NOTES
Blood pressure was 140/104 at 9:10am.  Rechecked Bp at 9:23am and it is 140/96.    Scheduled pt 2 week follow up appt with mary castillo and informed to take medicine prior to visit. Pt verbalized understanding.

## 2018-09-17 ENCOUNTER — INITIAL CONSULT (OUTPATIENT)
Dept: HEMATOLOGY/ONCOLOGY | Facility: CLINIC | Age: 38
End: 2018-09-17
Payer: COMMERCIAL

## 2018-09-17 ENCOUNTER — PATIENT MESSAGE (OUTPATIENT)
Dept: PODIATRY | Facility: CLINIC | Age: 38
End: 2018-09-17

## 2018-09-17 ENCOUNTER — PATIENT MESSAGE (OUTPATIENT)
Dept: INTERNAL MEDICINE | Facility: CLINIC | Age: 38
End: 2018-09-17

## 2018-09-17 ENCOUNTER — LAB VISIT (OUTPATIENT)
Dept: LAB | Facility: HOSPITAL | Age: 38
End: 2018-09-17
Attending: INTERNAL MEDICINE
Payer: COMMERCIAL

## 2018-09-17 VITALS
RESPIRATION RATE: 18 BRPM | OXYGEN SATURATION: 98 % | SYSTOLIC BLOOD PRESSURE: 134 MMHG | TEMPERATURE: 98 F | HEART RATE: 89 BPM | DIASTOLIC BLOOD PRESSURE: 86 MMHG | HEIGHT: 67 IN | WEIGHT: 207.44 LBS | BODY MASS INDEX: 32.56 KG/M2

## 2018-09-17 DIAGNOSIS — D64.9 NORMOCYTIC ANEMIA: ICD-10-CM

## 2018-09-17 DIAGNOSIS — D50.9 IRON DEFICIENCY ANEMIA, UNSPECIFIED IRON DEFICIENCY ANEMIA TYPE: Primary | ICD-10-CM

## 2018-09-17 DIAGNOSIS — D50.9 IRON DEFICIENCY ANEMIA, UNSPECIFIED IRON DEFICIENCY ANEMIA TYPE: ICD-10-CM

## 2018-09-17 LAB
ALBUMIN SERPL BCP-MCNC: 3.6 G/DL
ALP SERPL-CCNC: 87 U/L
ALT SERPL W/O P-5'-P-CCNC: 16 U/L
ANION GAP SERPL CALC-SCNC: 5 MMOL/L
AST SERPL-CCNC: 16 U/L
BASOPHILS # BLD AUTO: 0.01 K/UL
BASOPHILS NFR BLD: 0.1 %
BILIRUB SERPL-MCNC: 0.2 MG/DL
BUN SERPL-MCNC: 10 MG/DL
CALCIUM SERPL-MCNC: 9 MG/DL
CHLORIDE SERPL-SCNC: 109 MMOL/L
CO2 SERPL-SCNC: 26 MMOL/L
CREAT SERPL-MCNC: 0.7 MG/DL
CRP SERPL-MCNC: 27.5 MG/L
DIFFERENTIAL METHOD: ABNORMAL
EOSINOPHIL # BLD AUTO: 0.3 K/UL
EOSINOPHIL NFR BLD: 3.6 %
ERYTHROCYTE [DISTWIDTH] IN BLOOD BY AUTOMATED COUNT: 15.6 %
EST. GFR  (AFRICAN AMERICAN): >60 ML/MIN/1.73 M^2
EST. GFR  (NON AFRICAN AMERICAN): >60 ML/MIN/1.73 M^2
FERRITIN SERPL-MCNC: 21 NG/ML
FOLATE SERPL-MCNC: 13.2 NG/ML
GLUCOSE SERPL-MCNC: 118 MG/DL
HCT VFR BLD AUTO: 34.1 %
HGB BLD-MCNC: 10.8 G/DL
IRON SERPL-MCNC: 20 UG/DL
LDH SERPL L TO P-CCNC: 125 U/L
LYMPHOCYTES # BLD AUTO: 3.3 K/UL
LYMPHOCYTES NFR BLD: 41.1 %
MCH RBC QN AUTO: 25.7 PG
MCHC RBC AUTO-ENTMCNC: 31.7 G/DL
MCV RBC AUTO: 81 FL
MONOCYTES # BLD AUTO: 0.4 K/UL
MONOCYTES NFR BLD: 4.8 %
NEUTROPHILS # BLD AUTO: 4 K/UL
NEUTROPHILS NFR BLD: 50.4 %
PLATELET # BLD AUTO: 419 K/UL
PMV BLD AUTO: 8.8 FL
POTASSIUM SERPL-SCNC: 3.5 MMOL/L
PROT SERPL-MCNC: 6.9 G/DL
RBC # BLD AUTO: 4.21 M/UL
RETICS/RBC NFR AUTO: 1.3 %
SATURATED IRON: 5 %
SODIUM SERPL-SCNC: 140 MMOL/L
TOTAL IRON BINDING CAPACITY: 398 UG/DL
TRANSFERRIN SERPL-MCNC: 269 MG/DL
VIT B12 SERPL-MCNC: 393 PG/ML
WBC # BLD AUTO: 8 K/UL

## 2018-09-17 PROCEDURE — 82746 ASSAY OF FOLIC ACID SERUM: CPT

## 2018-09-17 PROCEDURE — 85045 AUTOMATED RETICULOCYTE COUNT: CPT

## 2018-09-17 PROCEDURE — 85025 COMPLETE CBC W/AUTO DIFF WBC: CPT | Mod: PO

## 2018-09-17 PROCEDURE — 36415 COLL VENOUS BLD VENIPUNCTURE: CPT | Mod: PO

## 2018-09-17 PROCEDURE — 3008F BODY MASS INDEX DOCD: CPT | Mod: CPTII,S$GLB,, | Performed by: INTERNAL MEDICINE

## 2018-09-17 PROCEDURE — 3075F SYST BP GE 130 - 139MM HG: CPT | Mod: CPTII,S$GLB,, | Performed by: INTERNAL MEDICINE

## 2018-09-17 PROCEDURE — 83615 LACTATE (LD) (LDH) ENZYME: CPT | Mod: PO

## 2018-09-17 PROCEDURE — 80053 COMPREHEN METABOLIC PANEL: CPT | Mod: PO

## 2018-09-17 PROCEDURE — 82728 ASSAY OF FERRITIN: CPT

## 2018-09-17 PROCEDURE — 86140 C-REACTIVE PROTEIN: CPT

## 2018-09-17 PROCEDURE — 82607 VITAMIN B-12: CPT

## 2018-09-17 PROCEDURE — 99205 OFFICE O/P NEW HI 60 MIN: CPT | Mod: S$GLB,,, | Performed by: INTERNAL MEDICINE

## 2018-09-17 PROCEDURE — 3079F DIAST BP 80-89 MM HG: CPT | Mod: CPTII,S$GLB,, | Performed by: INTERNAL MEDICINE

## 2018-09-17 PROCEDURE — 83540 ASSAY OF IRON: CPT

## 2018-09-17 PROCEDURE — 99999 PR PBB SHADOW E&M-EST. PATIENT-LVL III: CPT | Mod: PBBFAC,,, | Performed by: INTERNAL MEDICINE

## 2018-09-17 RX ORDER — SODIUM CHLORIDE 0.9 % (FLUSH) 0.9 %
10 SYRINGE (ML) INJECTION
Status: CANCELLED | OUTPATIENT
Start: 2018-09-28

## 2018-09-17 RX ORDER — HEPARIN 100 UNIT/ML
500 SYRINGE INTRAVENOUS
Status: CANCELLED | OUTPATIENT
Start: 2018-09-17

## 2018-09-17 RX ORDER — HEPARIN 100 UNIT/ML
500 SYRINGE INTRAVENOUS
Status: CANCELLED | OUTPATIENT
Start: 2018-09-28

## 2018-09-17 RX ORDER — SODIUM CHLORIDE 0.9 % (FLUSH) 0.9 %
10 SYRINGE (ML) INJECTION
Status: CANCELLED | OUTPATIENT
Start: 2018-09-17

## 2018-09-17 RX ORDER — FERROUS SULFATE 325(65) MG
325 TABLET ORAL DAILY
Qty: 30 TABLET | Refills: 3 | Status: SHIPPED | OUTPATIENT
Start: 2018-09-17 | End: 2018-11-01

## 2018-09-17 NOTE — LETTER
September 17, 2018      Vivi Chand MD  [id number] [place] [address] [address] [place] [id number]           [facility] - Hematology Oncology  [address] [facility] [address] [place] [address]  Phone: [phone]  Fax: [phone]          Patient: Bianca Amato   MR Number: [id number]   YOB: 1980   Date of Visit: 9/17/2018       Dear Dr. Vivi Chand:    Thank you for referring Bianca Amato to me for evaluation. Attached you will find relevant portions of my assessment and plan of care.    If you have questions, please do not hesitate to call me. I look forward to following Bianca Amato along with you.    Sincerely,    Rico Vazquez Jr., MD    Enclosure  CC:  No Recipients    If you would like to receive this communication electronically, please contact [email or web address] or [phone] to request more information on [employer] access.    For providers and/or their staff who would like to refer a patient to [facility], please contact us through our one-stop-shop provider referral line, [facility], at [phone].    If you feel you have received this communication in error or would no longer like to receive these types of communications, please e-mail [email or web address]

## 2018-09-17 NOTE — PROGRESS NOTES
Hematology/Oncology Office Note    Reason for referral:  Iron deficiency anemia    CC:  Iron deficiency    Referred by:  Vivi Chand MD    Diagnosis:  Iron deficiency anemia    Treatment:  Oral iron supplementation with mixed results    History of present illness:  38-year-old female referred for iron deficiency anemia secondary to intermittent menorrhagia.  The patient reports that she has taking oral iron supplementation in the past with mixed results.  She was able to tolerate the oral iron supplementation but does not believe it was completely effective in repleting iron stores.  She denies melena, hematochezia, hematuria, or other forms of bleeding.    Past Medical History:   Diagnosis Date    Hypertension     Iron deficiency anemia     Mild intermittent asthma          Social History:  No tobacco, alcohol, or illicit drug    Family History: family history includes COPD in her father; Diabetes in her mother; Hypertension in her father; Sarcoidosis in her mother.        HPI  Review of Systems   Constitutional: Positive for activity change and fatigue. Negative for appetite change, fever and unexpected weight change.   HENT: Negative for congestion, dental problem, facial swelling, mouth sores, nosebleeds, rhinorrhea and sinus pressure.    Eyes: Negative.    Respiratory: Negative for apnea, cough, chest tightness and shortness of breath.    Cardiovascular: Negative for chest pain, palpitations and leg swelling.   Gastrointestinal: Negative for abdominal distention, blood in stool, constipation, diarrhea, nausea, rectal pain and vomiting.   Endocrine: Negative.    Genitourinary: Negative for difficulty urinating, dyspareunia, dysuria, enuresis, flank pain and frequency.   Musculoskeletal: Negative for arthralgias, joint swelling, myalgias and neck pain.        Intermittent leg cramps   Skin: Negative for color change, rash and wound.   Allergic/Immunologic: Negative.  Negative for environmental  "allergies.   Neurological: Negative for dizziness, seizures, syncope, facial asymmetry, speech difficulty, weakness, light-headedness and numbness.   Hematological: Negative for adenopathy. Does not bruise/bleed easily.   Psychiatric/Behavioral: Negative for agitation, behavioral problems, confusion, decreased concentration, dysphoric mood and hallucinations. The patient is not nervous/anxious.        Objective:       Vitals:    09/17/18 0958   BP: 134/86   Pulse: 89   Resp: 18   Temp: 98.1 °F (36.7 °C)   TempSrc: Oral   SpO2: 98%   Weight: 94.1 kg (207 lb 7.3 oz)   Height: 5' 7" (1.702 m)       Physical Exam   Constitutional: She is oriented to person, place, and time. She appears well-developed and well-nourished. No distress.   Well groomed   HENT:   Head: Normocephalic and atraumatic.   Nose: Nose normal.   Mouth/Throat: Oropharynx is clear and moist. No oropharyngeal exudate.   Eyes: Conjunctivae and EOM are normal. Pupils are equal, round, and reactive to light. No scleral icterus.   Neck: Normal range of motion. Neck supple. No JVD present. No tracheal deviation present. No thyromegaly present.   Cardiovascular: Normal rate, regular rhythm, normal heart sounds and intact distal pulses. Exam reveals no gallop and no friction rub.   No murmur heard.  Pulmonary/Chest: Effort normal and breath sounds normal. She has no wheezes. She has no rales. She exhibits no tenderness.   Abdominal: Soft. Bowel sounds are normal. She exhibits no distension and no mass. There is no tenderness. There is no rebound and no guarding.   Musculoskeletal: Normal range of motion. She exhibits no edema or tenderness.   Lymphadenopathy:     She has no cervical adenopathy.   Neurological: She is alert and oriented to person, place, and time. No cranial nerve deficit. She exhibits normal muscle tone. Coordination normal.   Skin: Skin is warm and dry. No rash noted. She is not diaphoretic. No erythema. No pallor.   Psychiatric: She has a " normal mood and affect. Her behavior is normal. Judgment and thought content normal.       Lab Results   Component Value Date    WBC 7.73 08/24/2018    HGB 11.7 (L) 08/24/2018    HCT 37.6 08/24/2018    MCV 82 08/24/2018     (H) 08/24/2018     Lab Results   Component Value Date    CREATININE 0.7 08/24/2018    BUN 11 08/24/2018     08/24/2018    K 3.6 08/24/2018     08/24/2018    CO2 25 08/24/2018     Lab Results   Component Value Date    ALT 13 08/24/2018    AST 17 08/24/2018    ALKPHOS 88 08/24/2018    BILITOT 0.4 08/24/2018         Assessment:       38-year-old female referred for significant iron deficiency anemia with secondary thrombocytosis.  Iron deficiency anemia secondary to intermittent menorrhagia.  We will repeat labs today to confirm iron deficiency and plan to replete iron with Injectafer 750 mg IV x2 doses.  We will plan to start ferrous sulfate 325 mg 1 p.o. Daily for maintenance of iron stores.  She will follow up in 1-2 weeks to discuss results and received 1st dose of Injectafer.      Iron deficiency anemia/thrombocytosis:  --CBC iron studies today  --Injectafer 750 mg IV x2 doses  --ferrous sulfate 325 mg p.o. daily to maintain iron stores  --

## 2018-09-18 ENCOUNTER — PATIENT MESSAGE (OUTPATIENT)
Dept: HEMATOLOGY/ONCOLOGY | Facility: CLINIC | Age: 38
End: 2018-09-18

## 2018-09-20 ENCOUNTER — PATIENT OUTREACH (OUTPATIENT)
Dept: ADMINISTRATIVE | Facility: HOSPITAL | Age: 38
End: 2018-09-20

## 2018-09-26 ENCOUNTER — OFFICE VISIT (OUTPATIENT)
Dept: INTERNAL MEDICINE | Facility: CLINIC | Age: 38
End: 2018-09-26
Payer: COMMERCIAL

## 2018-09-26 VITALS
TEMPERATURE: 98 F | HEIGHT: 67 IN | WEIGHT: 206.81 LBS | BODY MASS INDEX: 32.46 KG/M2 | HEART RATE: 94 BPM | SYSTOLIC BLOOD PRESSURE: 144 MMHG | OXYGEN SATURATION: 98 % | DIASTOLIC BLOOD PRESSURE: 80 MMHG

## 2018-09-26 DIAGNOSIS — I10 ESSENTIAL HYPERTENSION: Primary | ICD-10-CM

## 2018-09-26 PROCEDURE — 99999 PR PBB SHADOW E&M-EST. PATIENT-LVL III: CPT | Mod: PBBFAC,,, | Performed by: NURSE PRACTITIONER

## 2018-09-26 PROCEDURE — 99213 OFFICE O/P EST LOW 20 MIN: CPT | Mod: S$GLB,,, | Performed by: NURSE PRACTITIONER

## 2018-09-26 PROCEDURE — 3008F BODY MASS INDEX DOCD: CPT | Mod: CPTII,S$GLB,, | Performed by: NURSE PRACTITIONER

## 2018-09-26 PROCEDURE — 3077F SYST BP >= 140 MM HG: CPT | Mod: CPTII,S$GLB,, | Performed by: NURSE PRACTITIONER

## 2018-09-26 PROCEDURE — 3079F DIAST BP 80-89 MM HG: CPT | Mod: CPTII,S$GLB,, | Performed by: NURSE PRACTITIONER

## 2018-09-26 RX ORDER — HYDROCHLOROTHIAZIDE 12.5 MG/1
12.5 TABLET ORAL DAILY
Qty: 30 TABLET | Refills: 5 | Status: SHIPPED | OUTPATIENT
Start: 2018-09-26 | End: 2019-05-03 | Stop reason: SDUPTHER

## 2018-09-26 NOTE — PROGRESS NOTES
Bianca Amato  09/26/2018  [id number]    Vivi Chand MD  Patient Care Team:  Vivi Chand MD as PCP - General (Family Medicine)  Manish Gandhi MD (Obstetrics and Gynecology)  Has the patient seen any provider outside of the [facility] network since the last visit? (no). If yes, HIPPA forms completed and records requested.        Visit Type:a scheduled routine follow-up visit    Chief Complaint:  Chief Complaint   Patient presents with    Follow-up    Headache       History of Present Illness:    Patient presents for two week blood pressure recheck. She was started on norvasc 5 mg. She denies swelling, CP, SOB, HA, or vision changes.    History:  Past Medical History:   Diagnosis Date    Hypertension     Iron deficiency anemia     Mild intermittent asthma      Past Surgical History:   Procedure Laterality Date    TUBAL LIGATION       Family History   Problem Relation Age of Onset    Diabetes Mother     Sarcoidosis Mother     COPD Father     Hypertension Father      Social History     Socioeconomic History    Marital status: Single     Spouse name: Not on file    Number of children: 4    Years of education: Not on file    Highest education level: Not on file   Social Needs    Financial resource strain: Not on file    Food insecurity - worry: Not on file    Food insecurity - inability: Not on file    Transportation needs - medical: Not on file    Transportation needs - non-medical: Not on file   Occupational History    Occupation:      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   Tobacco Use    Smoking status: Never Smoker    Smokeless tobacco: Never Used   Substance and Sexual Activity    Alcohol use: No    Drug use: No    Sexual activity: Yes     Partners: Male   Other Topics Concern    Not on file   Social History Narrative    Not on file     Patient Active Problem List   Diagnosis    Mild intermittent asthma without complication    Hypertension    Iron deficiency anemia     Normocytic anemia     Review of patient's allergies indicates:   Allergen Reactions    Latex, natural rubber     Pcn [penicillins]     Sulfa (sulfonamide antibiotics)        The following were reviewed at this visit: active problem list, medication list, allergies, family history, social history, and health maintenance.    Medications:  Current Outpatient Medications on File Prior to Visit   Medication Sig Dispense Refill    albuterol (PROVENTIL HFA) 90 mcg/actuation inhaler Inhale 2 puffs into the lungs every 6 (six) hours as needed. 1 Inhaler 2    amLODIPine (NORVASC) 5 MG tablet Take 1 tablet (5 mg total) by mouth once daily. 90 tablet 1    ferrous sulfate (FEOSOL) 325 mg (65 mg iron) Tab tablet Take 1 tablet (325 mg total) by mouth once daily. 30 tablet 3    ketoconazole (NIZORAL) 2 % cream Apply topically 2 (two) times daily. 30 g 2     No current facility-administered medications on file prior to visit.        Medications have been reviewed and reconciled with patient at this visit.  Barriers to medications present (no)    Adverse reactions to current medications (no)    Over the counter medications reviewed (Yes ), and if needed added to active Medication list at this visit.     Exam:  Wt Readings from Last 3 Encounters:   09/26/18 93.8 kg (206 lb 12.7 oz)   09/17/18 94.1 kg (207 lb 7.3 oz)   08/31/18 93.1 kg (205 lb 4 oz)     Temp Readings from Last 3 Encounters:   09/26/18 97.8 °F (36.6 °C)   09/17/18 98.1 °F (36.7 °C) (Oral)   08/31/18 98 °F (36.7 °C) (Tympanic)     BP Readings from Last 3 Encounters:   09/26/18 (!) 144/80   09/17/18 134/86   08/31/18 (!) 148/90     Pulse Readings from Last 3 Encounters:   09/26/18 94   09/17/18 89   08/31/18 96     Body mass index is 32.39 kg/m².      Review of Systems   Constitutional: Positive for malaise/fatigue (followed by Dr. Vazquez for BARBARA).   Eyes: Negative for blurred vision.   Respiratory: Negative for shortness of breath.    Cardiovascular: Negative  for chest pain, palpitations, orthopnea and PND.   Musculoskeletal: Negative for neck pain.   Neurological: Negative for headaches.     Physical Exam   Constitutional: She appears well-developed and well-nourished. No distress.   HENT:   Head: Normocephalic and atraumatic.   Eyes: Conjunctivae are normal.   Cardiovascular: Normal rate and regular rhythm. Exam reveals no gallop and no friction rub.   No murmur heard.      Pulmonary/Chest: Effort normal and breath sounds normal.   Neurological: She is alert.   Skin: Skin is warm and dry.   Psychiatric: She exhibits a depressed mood.   Vitals reviewed.      Laboratory Reviewed ({Yes)  Lab Results   Component Value Date    WBC 8.00 09/17/2018    HGB 10.8 (L) 09/17/2018    HCT 34.1 (L) 09/17/2018     (H) 09/17/2018    CHOL 180 08/24/2018    TRIG 147 08/24/2018    HDL 48 08/24/2018    ALT 16 09/17/2018    AST 16 09/17/2018     09/17/2018    K 3.5 09/17/2018     09/17/2018    CREATININE 0.7 09/17/2018    BUN 10 09/17/2018    CO2 26 09/17/2018    TSH 0.195 (L) 08/24/2018       Bianca was seen today for follow-up and headache.    Diagnoses and all orders for this visit:    Essential hypertension  -     hydroCHLOROthiazide (HYDRODIURIL) 12.5 MG Tab; Take 1 tablet (12.5 mg total) by mouth once daily.          Followed by outside podiatry for foot fungal infection.      Care Plan/Goals: Reviewed  (N/A)  Goals     None          Follow up: Follow-up in about 2 weeks (around 10/10/2018), or if symptoms worsen or fail to improve, for BP recheck with me.    After visit summary was printed and given to patient upon discharge today.  Patient goals and care plan are included in After Visit Summary.    Answers for HPI/ROS submitted by the patient on 9/25/2018   Hypertension  Chronicity: recurrent  Onset: more than 1 year ago  Progression since onset: gradually improving  Condition status: controlled  anxiety: No  peripheral edema: No  sweats: No  Agents associated with  hypertension: no associated agents  CAD risks: no known risk factors  Compliance problems: diet, psychosocial issues  Improvement on treatment: mild

## 2018-09-27 ENCOUNTER — INFUSION (OUTPATIENT)
Dept: INFUSION THERAPY | Facility: HOSPITAL | Age: 38
End: 2018-09-27
Attending: INTERNAL MEDICINE
Payer: COMMERCIAL

## 2018-09-27 ENCOUNTER — OFFICE VISIT (OUTPATIENT)
Dept: HEMATOLOGY/ONCOLOGY | Facility: CLINIC | Age: 38
End: 2018-09-27
Payer: COMMERCIAL

## 2018-09-27 VITALS
WEIGHT: 197.06 LBS | HEIGHT: 65 IN | BODY MASS INDEX: 32.83 KG/M2 | HEART RATE: 97 BPM | SYSTOLIC BLOOD PRESSURE: 164 MMHG | TEMPERATURE: 99 F | DIASTOLIC BLOOD PRESSURE: 102 MMHG | OXYGEN SATURATION: 97 % | RESPIRATION RATE: 16 BRPM

## 2018-09-27 VITALS — DIASTOLIC BLOOD PRESSURE: 99 MMHG | SYSTOLIC BLOOD PRESSURE: 149 MMHG | HEART RATE: 84 BPM

## 2018-09-27 DIAGNOSIS — D50.9 IRON DEFICIENCY ANEMIA, UNSPECIFIED IRON DEFICIENCY ANEMIA TYPE: Primary | ICD-10-CM

## 2018-09-27 DIAGNOSIS — D64.9 NORMOCYTIC ANEMIA: ICD-10-CM

## 2018-09-27 DIAGNOSIS — D64.9 NORMOCYTIC ANEMIA: Primary | ICD-10-CM

## 2018-09-27 DIAGNOSIS — D50.9 IRON DEFICIENCY ANEMIA, UNSPECIFIED IRON DEFICIENCY ANEMIA TYPE: ICD-10-CM

## 2018-09-27 PROCEDURE — 63600175 PHARM REV CODE 636 W HCPCS: Performed by: INTERNAL MEDICINE

## 2018-09-27 PROCEDURE — 99214 OFFICE O/P EST MOD 30 MIN: CPT | Mod: S$GLB,,, | Performed by: INTERNAL MEDICINE

## 2018-09-27 PROCEDURE — 3008F BODY MASS INDEX DOCD: CPT | Mod: CPTII,S$GLB,, | Performed by: INTERNAL MEDICINE

## 2018-09-27 PROCEDURE — 99999 PR PBB SHADOW E&M-EST. PATIENT-LVL III: CPT | Mod: PBBFAC,,, | Performed by: INTERNAL MEDICINE

## 2018-09-27 PROCEDURE — 96375 TX/PRO/DX INJ NEW DRUG ADDON: CPT

## 2018-09-27 PROCEDURE — 96365 THER/PROPH/DIAG IV INF INIT: CPT

## 2018-09-27 PROCEDURE — 25000003 PHARM REV CODE 250: Performed by: INTERNAL MEDICINE

## 2018-09-27 PROCEDURE — 3080F DIAST BP >= 90 MM HG: CPT | Mod: CPTII,S$GLB,, | Performed by: INTERNAL MEDICINE

## 2018-09-27 PROCEDURE — 3077F SYST BP >= 140 MM HG: CPT | Mod: CPTII,S$GLB,, | Performed by: INTERNAL MEDICINE

## 2018-09-27 RX ORDER — METHYLPREDNISOLONE SOD SUCC 125 MG
125 VIAL (EA) INJECTION
Status: COMPLETED | OUTPATIENT
Start: 2018-09-27 | End: 2018-09-27

## 2018-09-27 RX ADMIN — FERRIC CARBOXYMALTOSE INJECTION 750 MG: 50 INJECTION, SOLUTION INTRAVENOUS at 02:09

## 2018-09-27 RX ADMIN — METHYLPREDNISOLONE SODIUM SUCCINATE 125 MG: 125 INJECTION, POWDER, FOR SOLUTION INTRAMUSCULAR; INTRAVENOUS at 02:09

## 2018-09-27 NOTE — PROGRESS NOTES
"Patient ID: Bianca Amato is a 38 y.o. female.    Chief Complaint: Abnormal Mammogram      HPI: Pt had a baseline mammogram on 8/24/2018 that revealed bilateral axillary adenopathy- bilateral ultrasound revealed multiple enlarged lymph nodes in the right and left axilla - all appear to have fatty nidus.  This is suggestive for benign adenopathy, however more aggressive process cannot be excluded and correlation with clinical and laboratory findings is necessary according to the report from the [facility]    Pt has noted a "knot on the left side of her breast." Was larger last week and has gone down and less tender.    2010- had "sweat gland" excised from under one of her arms- did not remember which one.   Risk factors identified:     Menarche at 12/14 y/o  G 4  P 4  First pregnancy at 15 y/o  LMP: 9/23/18  Estrogen: none  Radiation to the neck or chest wall- none  Prior breast biopsies or atypical hyperplasia- none     FH: paternal aunt breast cancer    Body mass index is 33.81 kg/m².    Review of Systems   Constitutional: Negative.  Negative for appetite change, chills, diaphoresis, fatigue, fever and unexpected weight change.   HENT: Negative.  Negative for congestion, postnasal drip and voice change.    Eyes: Negative.    Respiratory: Negative.  Negative for cough and shortness of breath.    Cardiovascular: Negative.  Negative for chest pain.   Gastrointestinal: Negative.         No reflux   Endocrine: Negative.    Genitourinary: Negative.    Musculoskeletal: Negative.    Skin: Negative.    Allergic/Immunologic: Negative.    Neurological: Negative.    Hematological: Negative.  Negative for adenopathy.   Psychiatric/Behavioral: Negative.    Breast: Pt denies any breast pain other than the nodule that appeared left lateral breast last week that has decreased in size. No drainage or redness. No nipple discharge or palpable mass. No prior trauma or bruising. No breast surgeries or abnormalities. Has had " "excision of a "sweat gland" many years ago.     Current Outpatient Medications   Medication Sig Dispense Refill    albuterol (PROVENTIL HFA) 90 mcg/actuation inhaler Inhale 2 puffs into the lungs every 6 (six) hours as needed. 1 Inhaler 2    amLODIPine (NORVASC) 5 MG tablet Take 1 tablet (5 mg total) by mouth once daily. 90 tablet 1    ferrous sulfate (FEOSOL) 325 mg (65 mg iron) Tab tablet Take 1 tablet (325 mg total) by mouth once daily. 30 tablet 3    hydroCHLOROthiazide (HYDRODIURIL) 12.5 MG Tab Take 1 tablet (12.5 mg total) by mouth once daily. 30 tablet 5    ketoconazole (NIZORAL) 2 % cream Apply topically 2 (two) times daily. 30 g 2     No current facility-administered medications for this visit.        Review of patient's allergies indicates:   Allergen Reactions    Latex, natural rubber     Pcn [penicillins]     Sulfa (sulfonamide antibiotics)        Past Medical History:   Diagnosis Date    Hypertension     Iron deficiency anemia     Mild intermittent asthma        Past Surgical History:   Procedure Laterality Date    TUBAL LIGATION         Family History   Problem Relation Age of Onset    Diabetes Mother     Sarcoidosis Mother     COPD Father     Hypertension Father        Social History     Socioeconomic History    Marital status: Single     Spouse name: Not on file    Number of children: 4    Years of education: Not on file    Highest education level: Not on file   Social Needs    Financial resource strain: Not on file    Food insecurity - worry: Not on file    Food insecurity - inability: Not on file    Transportation needs - medical: Not on file    Transportation needs - non-medical: Not on file   Occupational History    Occupation:      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   Tobacco Use    Smoking status: Never Smoker    Smokeless tobacco: Never Used   Substance and Sexual Activity    Alcohol use: No    Drug use: No    Sexual activity: Yes     Partners: " Male   Other Topics Concern    Not on file   Social History Narrative    Not on file       Vitals:    10/01/18 1608   BP: (!) 148/94   Pulse: 105       Physical Exam   Constitutional: She is oriented to person, place, and time. She appears well-developed and well-nourished.   HENT:   Head: Normocephalic and atraumatic.   Right Ear: External ear normal.   Left Ear: External ear normal.   Mouth/Throat: No oropharyngeal exudate.   Eyes: Conjunctivae and EOM are normal. Pupils are equal, round, and reactive to light. Right eye exhibits no discharge. Left eye exhibits no discharge. No scleral icterus.   Neck: Normal range of motion. Neck supple. No thyromegaly present.   Cardiovascular: Normal rate, regular rhythm and normal heart sounds.   Pulmonary/Chest: Effort normal and breath sounds normal. Right breast exhibits no inverted nipple, no mass, no nipple discharge, no skin change and no tenderness. Left breast exhibits no inverted nipple, no mass, no nipple discharge, no skin change and no tenderness.       Abdominal: Soft. Bowel sounds are normal.   Musculoskeletal: Normal range of motion. She exhibits no edema.        Right shoulder: She exhibits no crepitus and normal strength.   Lymphadenopathy:        Head (right side): No submental, no submandibular, no tonsillar, no preauricular, no posterior auricular and no occipital adenopathy present.        Head (left side): No submental, no submandibular, no tonsillar, no preauricular, no posterior auricular and no occipital adenopathy present.     She has no cervical adenopathy.        Right cervical: No superficial cervical and no posterior cervical adenopathy present.       Left cervical: No superficial cervical and no posterior cervical adenopathy present.     She has no axillary adenopathy.        Right: No supraclavicular adenopathy present.        Left: No supraclavicular adenopathy present.   Neurological: She is alert and oriented to person, place, and time. She  has normal reflexes.   Skin: Skin is warm and dry. No rash noted. No erythema. No pallor.   Psychiatric: She has a normal mood and affect. Her behavior is normal. Judgment and thought content normal.        LABS: 9/17/18- pt sees Dr. Vazquez for BARBARA and no other lab abnormality was noted.     Assessment & Plan:  Axillary adenopathy  -     Cytology Specimen-FNA-Radiology Assisted with Path Adequacy-Core BX    Epidermal cyst      1. Baseline mammo reveals bilateral axillary adenopathy on outside breast imaging.  Ultrasound reveals enlarged lymph nodes in the right and left axilla, all of which appear to have fatty nidus.  This is suggestive for benign adenopathy, however a more aggressive process cannot be excluded and correlation with clinical and laboratory findings is necessary.  2.  Negative clinical exam for palpable breast mass.  Bilateral axillary adenopathy was appreciated.  Right axilla large 3 cm mass noted, left axilla 2- 1-2 cm masses palpated.   3.  Discussed possible etiology of adenopathy - infection and inflammation. Possible lymphoma. Recommend ultrasound guided axillary mass biopsy. Will call pt with details- instructed to abstain from using nsaid products for the next week. Tylenol ok to use. Will call pt at [phone] results. If negative will continue to monitor the areas for change- if suspicious or abnormal will schedule f/u with oncology and surgeon.  4. Epidermal inclusion cyst left lateral breast getting smaller per pt report. Watch and monitor for redness or drainage- if occurs call.

## 2018-09-27 NOTE — PATIENT INSTRUCTIONS
[facility]  [address]  [address] [address]  [phone] phone     [phone] fax  Hours of Operation: Monday- Friday 8:00am- 5:00pm  After hours phone  [phone]  Hematology / Oncology Physicians on call      Dr. Chavo Vazquez                        Please call with any concerns regarding your appointment today.    Ferric carboxymaltose injection  What is this medicine?  FERRIC CARBOXYMALTOSE (ferr-ik car-box-ee-mol-toes) is an iron complex. Iron is used to make healthy red blood cells, which carry oxygen and nutrients throughout the body. This medicine is used to treat anemia in people with chronic kidney disease or people who cannot take iron by mouth.  How should I use this medicine?  This medicine is for infusion into a vein. It is given by a health care professional in a hospital or clinic setting.  Talk to your pediatrician regarding the use of this medicine in children. Special care may be needed.  What side effects may I notice from receiving this medicine?  Side effects that you should report to your doctor or health care professional as soon as possible:  · allergic reactions like skin rash, itching or hives, swelling of the face, lips, or tongue -breathing problems  · changes in blood pressure  · feeling faint or lightheaded, falls  · flushing, sweating, or hot feelings  Side effects that usually do not require medical attention (Report these to your doctor or health care professional if they continue or are bothersome.):  · changes in taste  · constipation  · dizziness  · headache  · nausea  · pain, redness, or irritation at site where injected  · vomiting  What may interact with this medicine?  Do not take this medicine with any of the following medications:  · deferoxamine  · dimercaprol  · other iron products  This medicine may also interact with the following medications:  · chloramphenicol  · deferasirox  What if I  miss a dose?  It is important not to miss your dose. Call your doctor or health care professional if you are unable to keep an appointment.  Where should I keep my medicine?  This drug is given in a hospital or clinic and will not be stored at home.  What should I tell my health care provider before I take this medicine?  They need to know if you have any of these conditions:  · anemia not caused by low iron levels  · high levels of iron in the blood  · liver disease  · an unusual or allergic reaction to iron, other medicines, foods, dyes, or preservatives  · pregnant or trying to get pregnant  · breast-feeding  What should I watch for while using this medicine?  Visit your doctor or health care professional regularly. Tell your doctor if your symptoms do not start to get better or if they get worse. You may need blood work done while you are taking this medicine.  You may need to follow a special diet. Talk to your doctor. Foods that contain iron include: whole grains/cereals, dried fruits, beans, or peas, leafy green vegetables, and organ meats (liver, kidney).  NOTE:This sheet is a summary. It may not cover all possible information. If you have questions about this medicine, talk to your doctor, pharmacist, or health care provider. Copyright© 2017 [employer]        Iron-Deficiency Anemia (Adult)  Red blood cells carry oxygen to the tissues of your body. Anemia is a condition in which you have too few red blood cells. You need iron to make red cells. Anemia makes you feel tired and run down. When anemia becomes severe, your skin becomes pale. You may feel short of breath after physical activity. Other symptoms include:  · Headaches  · Dizziness  · Leg cramps with physical activity  · Drowsiness  · Restless legs  Your anemia is caused by not having enough iron in your body. This may be because of:  · Loss of blood. This can be caused by heavy menstrual periods. It can also be caused by bleeding from the stomach  or intestines.  · Poor diet. You may not be eating enough foods that contain iron.  · Inability to absorb iron from the foods you eat  · Pregnancy  If your blood count is low enough, your healthcare provider may prescribe an iron supplement. It usually takes about 2 to 3 months of treatment with iron supplements to correct anemia. Severe cases of anemia need a blood transfusion to quickly ease symptoms and deliver more oxygen to the cells.  Home care  Follow these guidelines when caring for yourself at home:  · Eat foods high in iron. This will boost the amount of iron stored in your body. It is a natural way to build up the number of blood cells. Good sources of iron include beef, liver, spinach and other dark green leafy vegetables, whole grains, beans, and nuts.  · Do not overexert yourself.  · Talk with your healthcare provider before traveling by air or traveling to high altitudes.  Follow-up care  Follow up with your healthcare provider in 2 months, or as advised. This is to have another red blood cell count to be sure your anemia has been fixed.  When to seek medical advice  Call your healthcare provider right away if any of these occur:  · Shortness of breath or chest pain  · Dizziness or fainting  · Vomiting blood or passing red or black-colored stool   Date Last Reviewed: 2/25/2016  © 6032-0641 [employer]. [address] [place] [address], [place] [address]. All rights reserved. This information is not intended as a substitute for professional medical care. Always follow your healthcare professional's instructions.

## 2018-09-27 NOTE — PLAN OF CARE
Problem: Patient Care Overview  Goal: Plan of Care Review  Outcome: Ongoing (interventions implemented as appropriate)  I just feel tired

## 2018-09-27 NOTE — PROGRESS NOTES
Hematology/Oncology Office Note    Reason for referral:  Iron deficiency anemia    CC:  Iron deficiency    Referred by:  No ref. provider found    Diagnosis:  Iron deficiency anemia    Treatment:  Oral iron supplementation with mixed results    History of present illness:  38-year-old female referred for iron deficiency anemia secondary to intermittent menorrhagia.  The patient reports that she has taking oral iron supplementation in the past with mixed results.  She was able to tolerate the oral iron supplementation but does not believe it was completely effective in repleting iron stores.  She denies melena, hematochezia, hematuria, or other forms of bleeding.      I have reviewed and updated the HPI, ROS, PMHx, Social Hx, Family Hx and treatment history.    Today's visit:  Patient presents today to review results of lab work and proceed with 1st dose of Injectafer    Past Medical History:   Diagnosis Date    Hypertension     Iron deficiency anemia     Mild intermittent asthma          Social History:  No tobacco, alcohol, or illicit drug    Family History: family history includes COPD in her father; Diabetes in her mother; Hypertension in her father; Sarcoidosis in her mother.        HPI    Review of Systems   Constitutional: Positive for activity change and fatigue. Negative for appetite change, chills, fever and unexpected weight change.   HENT: Negative for congestion, dental problem, drooling, ear discharge, facial swelling, mouth sores, nosebleeds, rhinorrhea and sinus pressure.    Eyes: Negative.    Respiratory: Negative for apnea, cough, choking, chest tightness and shortness of breath.    Cardiovascular: Negative for chest pain, palpitations and leg swelling.   Gastrointestinal: Negative for abdominal distention, abdominal pain, blood in stool, constipation, diarrhea, nausea, rectal pain and vomiting.   Endocrine: Negative.    Genitourinary: Negative for difficulty urinating, dysuria, enuresis, flank  "pain and frequency.   Musculoskeletal: Negative for arthralgias, joint swelling, myalgias and neck pain.   Skin: Negative for color change, rash and wound.   Allergic/Immunologic: Negative.  Negative for environmental allergies.   Neurological: Negative for dizziness, seizures, syncope, facial asymmetry, speech difficulty, light-headedness, numbness and headaches.   Hematological: Negative for adenopathy. Does not bruise/bleed easily.   Psychiatric/Behavioral: Negative for agitation, behavioral problems, confusion, decreased concentration, dysphoric mood and hallucinations. The patient is not nervous/anxious.        Objective:       Vitals:    09/27/18 1328   BP: (!) 164/102   Pulse: 97   Resp: 16   Temp: 98.6 °F (37 °C)   TempSrc: Oral   SpO2: 97%   Weight: 89.4 kg (197 lb 1.5 oz)   Height: 5' 4.75" (1.645 m)       Physical Exam   Constitutional: She is oriented to person, place, and time. She appears well-developed and well-nourished. No distress.   Well groomed   HENT:   Head: Normocephalic and atraumatic.   Nose: Nose normal.   Mouth/Throat: Oropharynx is clear and moist. No oropharyngeal exudate.   Eyes: Conjunctivae and EOM are normal. Pupils are equal, round, and reactive to light. No scleral icterus.   Neck: Normal range of motion. Neck supple. No JVD present. No tracheal deviation present. No thyromegaly present.   Cardiovascular: Normal rate, regular rhythm, normal heart sounds and intact distal pulses. Exam reveals no gallop and no friction rub.   No murmur heard.  Pulmonary/Chest: Effort normal and breath sounds normal. She has no wheezes. She has no rales. She exhibits no tenderness.   Abdominal: Soft. Bowel sounds are normal. She exhibits no distension and no mass. There is no tenderness. There is no rebound and no guarding.   Musculoskeletal: Normal range of motion. She exhibits no edema or tenderness.   Lymphadenopathy:     She has no cervical adenopathy.   Neurological: She is alert and oriented to " person, place, and time. No cranial nerve deficit. She exhibits normal muscle tone. Coordination normal.   Skin: Skin is warm and dry. Capillary refill takes less than 2 seconds. No abrasion, no bruising, no ecchymosis and no rash noted. She is not diaphoretic. No cyanosis or erythema. No pallor. Nails show no clubbing.   Psychiatric: She has a normal mood and affect. Her behavior is normal. Judgment and thought content normal.       Lab Results   Component Value Date    WBC 8.00 09/17/2018    HGB 10.8 (L) 09/17/2018    HCT 34.1 (L) 09/17/2018    MCV 81 (L) 09/17/2018     (H) 09/17/2018     Lab Results   Component Value Date    CREATININE 0.7 09/17/2018    BUN 10 09/17/2018     09/17/2018    K 3.5 09/17/2018     09/17/2018    CO2 26 09/17/2018     Lab Results   Component Value Date    ALT 16 09/17/2018    AST 16 09/17/2018    ALKPHOS 87 09/17/2018    BILITOT 0.2 09/17/2018         Assessment:       38-year-old female referred for significant iron deficiency anemia with secondary thrombocytosis.  Iron deficiency anemia secondary to intermittent menorrhagia.    We will plan to start ferrous sulfate 325 mg 1 p.o. daily for maintenance of iron stores and give Injectafer 750 mg IV x2 doses.  She will follow up in 2-3 months with repeat CBC/iron studies to evaluate response to treatment.        Iron deficiency anemia/thrombocytosis:  --injectafer 750 mg IV x2 doses  --follow-up in 2 months with repeat CBC/iron studies to evaluate response to therapy

## 2018-10-01 ENCOUNTER — OFFICE VISIT (OUTPATIENT)
Dept: SURGERY | Facility: CLINIC | Age: 38
End: 2018-10-01
Payer: COMMERCIAL

## 2018-10-01 VITALS
DIASTOLIC BLOOD PRESSURE: 94 MMHG | HEART RATE: 105 BPM | BODY MASS INDEX: 33.63 KG/M2 | WEIGHT: 197 LBS | SYSTOLIC BLOOD PRESSURE: 148 MMHG | HEIGHT: 64 IN

## 2018-10-01 DIAGNOSIS — R59.0 AXILLARY ADENOPATHY: Primary | ICD-10-CM

## 2018-10-01 DIAGNOSIS — L72.0 EPIDERMAL CYST: ICD-10-CM

## 2018-10-01 PROCEDURE — 99204 OFFICE O/P NEW MOD 45 MIN: CPT | Mod: S$GLB,,, | Performed by: NURSE PRACTITIONER

## 2018-10-01 PROCEDURE — 99999 PR PBB SHADOW E&M-EST. PATIENT-LVL III: CPT | Mod: PBBFAC,,, | Performed by: NURSE PRACTITIONER

## 2018-10-01 PROCEDURE — 3077F SYST BP >= 140 MM HG: CPT | Mod: CPTII,S$GLB,, | Performed by: NURSE PRACTITIONER

## 2018-10-01 PROCEDURE — 3080F DIAST BP >= 90 MM HG: CPT | Mod: CPTII,S$GLB,, | Performed by: NURSE PRACTITIONER

## 2018-10-01 PROCEDURE — 3008F BODY MASS INDEX DOCD: CPT | Mod: CPTII,S$GLB,, | Performed by: NURSE PRACTITIONER

## 2018-10-01 NOTE — LETTER
October 1, 2018      Kalani Caicedo NP  [address] [place] [address] [address] [address]           [facility]  [address] [place] [address] Drive  [place] [address]  Phone: [phone]  Fax: [phone]          Patient: Bianca Amato   MR Number: [id number]   YOB: 1980   Date of Visit: 10/1/2018       Dear Kalani Caicedo:    Thank you for referring Bianca Amato to me for evaluation. Attached you will find relevant portions of my assessment and plan of care.    If you have questions, please do not hesitate to call me. I look forward to following Bianca Amato along with you.    Sincerely,    Tammy Villela NP    Enclosure  CC:  No Recipients    If you would like to receive this communication electronically, please contact [email or web address] or [phone] to request more information on [employer] access.    For providers and/or their staff who would like to refer a patient to [facility], please contact us through our one-stop-shop provider referral line, Velma Jackman, at [phone].    If you feel you have received this communication in error or would no longer like to receive these types of communications, please e-mail [email or web address]

## 2018-10-03 ENCOUNTER — PATIENT MESSAGE (OUTPATIENT)
Dept: PODIATRY | Facility: CLINIC | Age: 38
End: 2018-10-03

## 2018-10-03 LAB — FUNGUS BLD CULT: NORMAL

## 2018-10-05 ENCOUNTER — PATIENT MESSAGE (OUTPATIENT)
Dept: SURGERY | Facility: CLINIC | Age: 38
End: 2018-10-05

## 2018-10-05 ENCOUNTER — INFUSION (OUTPATIENT)
Dept: INFUSION THERAPY | Facility: HOSPITAL | Age: 38
End: 2018-10-05
Attending: INTERNAL MEDICINE
Payer: COMMERCIAL

## 2018-10-05 VITALS
DIASTOLIC BLOOD PRESSURE: 90 MMHG | OXYGEN SATURATION: 99 % | WEIGHT: 196.88 LBS | SYSTOLIC BLOOD PRESSURE: 126 MMHG | HEART RATE: 90 BPM | HEIGHT: 64 IN | RESPIRATION RATE: 16 BRPM | BODY MASS INDEX: 33.61 KG/M2 | TEMPERATURE: 98 F

## 2018-10-05 DIAGNOSIS — D50.9 IRON DEFICIENCY ANEMIA, UNSPECIFIED IRON DEFICIENCY ANEMIA TYPE: ICD-10-CM

## 2018-10-05 DIAGNOSIS — D64.9 NORMOCYTIC ANEMIA: Primary | ICD-10-CM

## 2018-10-05 PROCEDURE — 25000003 PHARM REV CODE 250: Performed by: INTERNAL MEDICINE

## 2018-10-05 PROCEDURE — 96365 THER/PROPH/DIAG IV INF INIT: CPT

## 2018-10-05 PROCEDURE — 96375 TX/PRO/DX INJ NEW DRUG ADDON: CPT

## 2018-10-05 PROCEDURE — 63600175 PHARM REV CODE 636 W HCPCS: Performed by: INTERNAL MEDICINE

## 2018-10-05 RX ORDER — METHYLPREDNISOLONE SOD SUCC 125 MG
125 VIAL (EA) INJECTION
Status: COMPLETED | OUTPATIENT
Start: 2018-10-05 | End: 2018-10-05

## 2018-10-05 RX ADMIN — METHYLPREDNISOLONE SODIUM SUCCINATE 125 MG: 125 INJECTION, POWDER, FOR SOLUTION INTRAMUSCULAR; INTRAVENOUS at 03:10

## 2018-10-05 RX ADMIN — SODIUM CHLORIDE: 9 INJECTION, SOLUTION INTRAVENOUS at 03:10

## 2018-10-05 RX ADMIN — FERRIC CARBOXYMALTOSE INJECTION 750 MG: 50 INJECTION, SOLUTION INTRAVENOUS at 04:10

## 2018-10-05 NOTE — PATIENT INSTRUCTIONS
[facility]  [address] [facility] [address]  [address] [address]  [phone] phone     [phone] fax  Hours of Operation: Monday- Friday 8:00am- 5:00pm  After hours phone  [phone]  Hematology / Oncology Physicians on call      Dr. Chavo Vazquez                        Please call with any concerns regarding your appointment today.      ANEMIA, Iron Deficiency [Adult]  Anemia is a condition where the size or number of red blood cells in the body is reduced. Iron is needed in the diet to make red cells. The red blood cells carry oxygen to all parts of the body. Anemia limits the delivery of oxygen to where it is needed. This causes a feeling of being tired and run down. When anemia becomes severe, the skin becomes pale and there is shortness of breath with exertion, headaches, dizziness, drowsiness and fatigue.  The cause of your anemia is lack of iron in your body. This may occur due to blood loss (for example, heavy menstrual periods or bleeding from the stomach or intestines) or a poor diet (not eating enough iron-containing foods), inability to absorb iron from your diet, or pregnancy.  If the blood count is low enough, an IRON SUPPLEMENT will be prescribed. It usually takes about 2-3 months of treatment with iron supplements to correct an anemia. Severe cases of anemia requires a blood transfusion to rapidly correct symptoms and deliver more oxygen to the cells.  HOME CARE:  1) Increase the iron stores in your body by eating foods high in iron content. This is a natural way of building your blood cells back up again. Beef, liver, spinach and other dark green leafy vegetables, whole grain products, beans and nuts are all natural sources of iron.  2) If you are having symptoms of anemia listed above:  -- Do not overexert yourself.  -- Talk to your doctor before flying on an airplane or travelling to high altitudes.  FOLLOW UP with your doctor in 2  months for a repeat red blood cell count, or as recommended by our staff, to be sure that the anemia has been corrected.  GET PROMPT MEDICAL ATTENTION if any of the following occur or worsen:  -- Shortness of breath or chest pain  -- Dizziness or fainting  -- Vomiting blood or passing red or black-colored stool  © 2000-2011 [address] [address]. All rights reserved. This information is not intended as a substitute for professional medical care. Always follow your healthcare professional's instructions.    FALL PREVENTION   Falls often occur due to slipping, tripping or losing your balance. Here are ways to reduce your risk of falling again.   Was there anything that caused your fall that can be fixed, removed or replaced?   Make your home safe by keeping walkways clear of objects you may trip over.   Use non-slip pads under rugs.   Do not walk in poorly lit areas.   Do not stand on chairs or wobbly ladders.   Use caution when reaching overhead or looking upward. This position can cause a loss of balance.   Be sure your shoes fit properly, have non-slip bottoms and are in good condition.   Be cautious when going up and down stairs, curbs, and when walking on uneven sidewalks.   If your balance is poor, consider using a cane or walker.   If your fall was related to alcohol use, stop or limit alcohol intake.   If your fall was related to use of sleeping medicines, talk to your doctor about this. You may need to reduce your dosage at bedtime if you awaken during the night to go to the bathroom.   To reduce the need for nighttime bathroom trips:   Avoid drinking fluids for several hours before going to bed   Empty your bladder before going to bed   Men can keep a urinal at the bedside   © 2000-2011 [facility], [address] [place] [address]. All rights reserved. This information is not intended as a substitute for professional medical care. Always follow your  healthcare professional's instructions.

## 2018-10-10 ENCOUNTER — DOCUMENTATION ONLY (OUTPATIENT)
Dept: HEMATOLOGY/ONCOLOGY | Facility: CLINIC | Age: 38
End: 2018-10-10

## 2018-10-10 NOTE — PROGRESS NOTES
Nurse called radiology and spoke with loretta and she stated she spoke with the radiologist and they are discussing if the procedure will be done at the hospital, she will contact me to let me know. Pt was notified

## 2018-10-10 NOTE — PROGRESS NOTES
"This message is from the radiology department at [facility] [place] - MsBrissaStephaniemyra Hewitt, Scheduling Coord.  Ms.Bianca Amato   Ms. Brown said, "I spoke with patient and she scheduled her US Biopsy Lymph Node Axilla for Monday 10/22/2018 @ 3:00pm as per her request.  We are working on getting imaging from [facility]. " Thanks  "

## 2018-10-10 NOTE — PROGRESS NOTES
Nurse called radiology at hospital on yesterday 10-9-2018 to schedule ms keller for her procedure, I spoke with aurelio and she stated she was going to speak with the radiologist and call me back.

## 2018-10-10 NOTE — PROGRESS NOTES
"Nurse received a call from hospital radiology requesting a disc of the images of her breast taken at [facility]. Radiology tech stated she will call for them. Once the disc is reviewed by radiologist she will be called to schedule as per radiologist per loretta. Pt notified of this update as well.   "

## 2018-10-15 NOTE — PLAN OF CARE
"Problem: Patient Care Overview  Goal: Plan of Care Review  Outcome: Ongoing (interventions implemented as appropriate)  Pt states "tired".      "

## 2018-10-16 DIAGNOSIS — B35.3 TINEA PEDIS OF BOTH FEET: ICD-10-CM

## 2018-10-16 RX ORDER — TERBINAFINE HYDROCHLORIDE 250 MG/1
250 TABLET ORAL DAILY
Qty: 21 TABLET | Refills: 0 | OUTPATIENT
Start: 2018-10-16 | End: 2018-11-06

## 2018-10-22 ENCOUNTER — HOSPITAL ENCOUNTER (OUTPATIENT)
Dept: RADIOLOGY | Facility: HOSPITAL | Age: 38
Discharge: HOME | End: 2018-10-22
Attending: NURSE PRACTITIONER
Payer: COMMERCIAL

## 2018-10-22 DIAGNOSIS — R59.0 AXILLARY ADENOPATHY: ICD-10-CM

## 2018-10-22 PROCEDURE — 88173 CYTOPATH EVAL FNA REPORT: CPT | Performed by: PATHOLOGY

## 2018-10-22 PROCEDURE — 88185 FLOWCYTOMETRY/TC ADD-ON: CPT | Mod: 59 | Performed by: PATHOLOGY

## 2018-10-22 PROCEDURE — 88173 CYTOPATH EVAL FNA REPORT: CPT | Mod: 26,,, | Performed by: PATHOLOGY

## 2018-10-22 PROCEDURE — 88189 FLOWCYTOMETRY/READ 16 & >: CPT | Mod: ,,, | Performed by: PATHOLOGY

## 2018-10-22 PROCEDURE — 38505 NEEDLE BIOPSY LYMPH NODES: CPT

## 2018-10-22 PROCEDURE — 88184 FLOWCYTOMETRY/ TC 1 MARKER: CPT | Performed by: PATHOLOGY

## 2018-10-23 LAB
FLOW CYTOMETRY ANTIBODIES ANALYZED - LYMPH NODE: NORMAL
FLOW CYTOMETRY COMMENT - LYMPH NODE: NORMAL
FLOW CYTOMETRY INTERPRETATION - LYMPH NODE: NORMAL

## 2018-10-25 ENCOUNTER — PATIENT MESSAGE (OUTPATIENT)
Dept: SURGERY | Facility: CLINIC | Age: 38
End: 2018-10-25

## 2018-11-01 ENCOUNTER — LAB VISIT (OUTPATIENT)
Dept: LAB | Facility: HOSPITAL | Age: 38
End: 2018-11-01
Attending: SURGERY
Payer: COMMERCIAL

## 2018-11-01 ENCOUNTER — CLINICAL SUPPORT (OUTPATIENT)
Dept: CARDIOLOGY | Facility: CLINIC | Age: 38
End: 2018-11-01
Payer: COMMERCIAL

## 2018-11-01 ENCOUNTER — TELEPHONE (OUTPATIENT)
Dept: INTERNAL MEDICINE | Facility: CLINIC | Age: 38
End: 2018-11-01

## 2018-11-01 ENCOUNTER — OFFICE VISIT (OUTPATIENT)
Dept: SURGERY | Facility: CLINIC | Age: 38
End: 2018-11-01
Payer: COMMERCIAL

## 2018-11-01 VITALS
WEIGHT: 209.69 LBS | BODY MASS INDEX: 35.99 KG/M2 | TEMPERATURE: 98 F | SYSTOLIC BLOOD PRESSURE: 138 MMHG | DIASTOLIC BLOOD PRESSURE: 97 MMHG | HEART RATE: 88 BPM

## 2018-11-01 DIAGNOSIS — R59.0 AXILLARY ADENOPATHY: Primary | ICD-10-CM

## 2018-11-01 DIAGNOSIS — Z01.818 PRE-OP EXAM: ICD-10-CM

## 2018-11-01 DIAGNOSIS — R59.0 AXILLARY LYMPHADENOPATHY: ICD-10-CM

## 2018-11-01 DIAGNOSIS — R59.0 AXILLARY ADENOPATHY: ICD-10-CM

## 2018-11-01 DIAGNOSIS — Z01.818 PRE-OP EXAM: Primary | ICD-10-CM

## 2018-11-01 LAB
ANION GAP SERPL CALC-SCNC: 6 MMOL/L
BASOPHILS # BLD AUTO: 0.03 K/UL
BASOPHILS NFR BLD: 0.4 %
BUN SERPL-MCNC: 11 MG/DL
CALCIUM SERPL-MCNC: 9.2 MG/DL
CHLORIDE SERPL-SCNC: 110 MMOL/L
CO2 SERPL-SCNC: 24 MMOL/L
CREAT SERPL-MCNC: 0.7 MG/DL
DIFFERENTIAL METHOD: ABNORMAL
EOSINOPHIL # BLD AUTO: 0.2 K/UL
EOSINOPHIL NFR BLD: 3 %
ERYTHROCYTE [DISTWIDTH] IN BLOOD BY AUTOMATED COUNT: 18.8 %
EST. GFR  (AFRICAN AMERICAN): >60 ML/MIN/1.73 M^2
EST. GFR  (NON AFRICAN AMERICAN): >60 ML/MIN/1.73 M^2
GLUCOSE SERPL-MCNC: 82 MG/DL
HCT VFR BLD AUTO: 38.8 %
HGB BLD-MCNC: 12.6 G/DL
IMM GRANULOCYTES # BLD AUTO: 0.03 K/UL
IMM GRANULOCYTES NFR BLD AUTO: 0.4 %
LYMPHOCYTES # BLD AUTO: 2.8 K/UL
LYMPHOCYTES NFR BLD: 37.1 %
MCH RBC QN AUTO: 27.8 PG
MCHC RBC AUTO-ENTMCNC: 32.5 G/DL
MCV RBC AUTO: 86 FL
MONOCYTES # BLD AUTO: 0.6 K/UL
MONOCYTES NFR BLD: 7.4 %
NEUTROPHILS # BLD AUTO: 3.9 K/UL
NEUTROPHILS NFR BLD: 51.7 %
NRBC BLD-RTO: 0 /100 WBC
PLATELET # BLD AUTO: 443 K/UL
PMV BLD AUTO: 9.9 FL
POTASSIUM SERPL-SCNC: 3.8 MMOL/L
RBC # BLD AUTO: 4.54 M/UL
SODIUM SERPL-SCNC: 140 MMOL/L
WBC # BLD AUTO: 7.61 K/UL

## 2018-11-01 PROCEDURE — 3075F SYST BP GE 130 - 139MM HG: CPT | Mod: CPTII,S$GLB,, | Performed by: SURGERY

## 2018-11-01 PROCEDURE — 36415 COLL VENOUS BLD VENIPUNCTURE: CPT

## 2018-11-01 PROCEDURE — 80048 BASIC METABOLIC PNL TOTAL CA: CPT

## 2018-11-01 PROCEDURE — 93000 ELECTROCARDIOGRAM COMPLETE: CPT | Mod: S$GLB,,, | Performed by: INTERNAL MEDICINE

## 2018-11-01 PROCEDURE — 3080F DIAST BP >= 90 MM HG: CPT | Mod: CPTII,S$GLB,, | Performed by: SURGERY

## 2018-11-01 PROCEDURE — 99204 OFFICE O/P NEW MOD 45 MIN: CPT | Mod: S$GLB,,, | Performed by: SURGERY

## 2018-11-01 PROCEDURE — 99999 PR PBB SHADOW E&M-EST. PATIENT-LVL IV: CPT | Mod: PBBFAC,,, | Performed by: SURGERY

## 2018-11-01 PROCEDURE — 85025 COMPLETE CBC W/AUTO DIFF WBC: CPT

## 2018-11-01 PROCEDURE — 3008F BODY MASS INDEX DOCD: CPT | Mod: CPTII,S$GLB,, | Performed by: SURGERY

## 2018-11-01 RX ORDER — SODIUM CHLORIDE 9 MG/ML
INJECTION, SOLUTION INTRAVENOUS CONTINUOUS
Status: CANCELLED | OUTPATIENT
Start: 2018-11-01

## 2018-11-01 RX ORDER — LIDOCAINE HYDROCHLORIDE 10 MG/ML
1 INJECTION, SOLUTION EPIDURAL; INFILTRATION; INTRACAUDAL; PERINEURAL ONCE
Status: DISCONTINUED | OUTPATIENT
Start: 2018-11-01 | End: 2019-02-28

## 2018-11-01 NOTE — PROGRESS NOTES
History & Physical  General Surgery      SUBJECTIVE:     Chief Complaint/Reason for Admission: Consult (axillary adenopathy)      History of Present Illness:  Patient is a 38 y.o. female presents with Consult (axillary adenopathy)    38-year-old female referred for bilateral axillary adenopathy found on screening mammogram.  The patient denies a personal history of breast cancer, leukemia or lymphoma, or skin cancer.  She does report paternal aunt with a history of breast cancer.  She denies any history of OCP use.  She underwent ultrasound-guided FNA of her left axillary adenopathy which was inconclusive.  She was therefore referred to me for evaluation of excisional biopsy.  She denies any fevers, night sweats, or recent weight loss.  She also denies nipple discharge, nipple retraction, overlying skin changes, or palpable breast masses.  She does have a history of excision of right axillary hidradenitis in 2010 at [facility].  She is concurrently undergoing workup for iron deficiency anemia.      Current Outpatient Medications:     albuterol (PROVENTIL HFA) 90 mcg/actuation inhaler, Inhale 2 puffs into the lungs every 6 (six) hours as needed., Disp: 1 Inhaler, Rfl: 2    amLODIPine (NORVASC) 5 MG tablet, Take 1 tablet (5 mg total) by mouth once daily., Disp: 90 tablet, Rfl: 1    hydroCHLOROthiazide (HYDRODIURIL) 12.5 MG Tab, Take 1 tablet (12.5 mg total) by mouth once daily., Disp: 30 tablet, Rfl: 5    ketoconazole (NIZORAL) 2 % cream, Apply topically 2 (two) times daily., Disp: 30 g, Rfl: 2    Current Facility-Administered Medications:     lidocaine (PF) 10 mg/ml (1%) injection 10 mg, 1 mL, Intradermal, Once, Alis Tian MD    Review of patient's allergies indicates:   Allergen Reactions    Latex, natural rubber     Pcn [penicillins]     Sulfa (sulfonamide antibiotics)        Past Medical History:   Diagnosis Date    Hypertension     Iron deficiency anemia     Mild intermittent asthma      Past  Surgical History:   Procedure Laterality Date    TUBAL LIGATION       Family History   Problem Relation Age of Onset    Diabetes Mother     Sarcoidosis Mother     COPD Father     Hypertension Father      Social History     Tobacco Use    Smoking status: Never Smoker    Smokeless tobacco: Never Used   Substance Use Topics    Alcohol use: No    Drug use: No        Review of Systems:  Review of Systems   Constitutional: Negative for activity change and fatigue.   Respiratory: Negative for chest tightness.    Cardiovascular: Negative for chest pain.   Musculoskeletal: Positive for joint swelling (right shoulder).   Skin: Negative for color change.   Hematological: Positive for adenopathy. Does not bruise/bleed easily.       OBJECTIVE:     Vital Signs (Most Recent)  BP (!) 138/97   Pulse 88   Temp 98.4 °F (36.9 °C) (Oral)   Wt 95.1 kg (209 lb 10.5 oz)   BMI 35.99 kg/m²     Physical Exam:   Physical Exam   Constitutional: She appears well-developed and well-nourished. No distress.   HENT:   Head: Normocephalic and atraumatic.   Eyes: EOM are normal.   Neck: Neck supple.   Cardiovascular: Normal rate and regular rhythm.   Pulmonary/Chest: Effort normal. Right breast exhibits no inverted nipple, no mass, no nipple discharge, no skin change and no tenderness. Left breast exhibits no inverted nipple, no mass, no nipple discharge, no skin change and no tenderness. Breasts are symmetrical.   Musculoskeletal: Normal range of motion.   Lymphadenopathy:     She has axillary adenopathy (bilateral palpable, R>L).   Skin: Skin is warm.   Psychiatric: She has a normal mood and affect.         ASSESSMENT/PLAN:     Axillary adenopathy  -     Case Request Operating Room: EXCISION, MASS, AXILLARY  -     Vital signs ; Standing  -     Insert peripheral IV; Standing  -     Verify beta-blocker dose taken within 24 hours if patient is prescribed beta-blocker; Standing  -     Verify discontinuation of antithrombotics; Standing  -      Verify consent; Standing  -     Chlorhexidine (CHG) 2% Wipes; Standing  -     Diet NPO Except for: Medication, Ice Chips, Sips with Medication; Standing  -     Basic metabolic panel; Future; Expected date: 11/01/2018  -     Full code; Standing  -     Place in Outpatient; Standing  -     Place sequential compression device; Standing    Axillary lymphadenopathy    Other orders  -     lidocaine (PF) 10 mg/ml (1%) injection 10 mg  -     0.9%  NaCl infusion  -     IP VTE LOW RISK PATIENT; Standing  -     clindamycin (CLEOCIN) 900 mg in dextrose 5 % 50 mL IVPB      Scheduled for right axillary excisional lymph node biopsy on 11/12/2018.    Risks, benefits, and alternatives including bleeding, scarring, infection, need for further surgery, and lymphedema  were discussed with the patient who agrees to proceed with surgery.     Alis Tian

## 2018-11-01 NOTE — TELEPHONE ENCOUNTER
----- Message from Bibi Hand sent at 11/1/2018  2:21 PM CDT -----  Contact: pt  Pt wants to know if [employer] sends out stool testing kit, she can be reached at [phone] Thanks

## 2018-11-01 NOTE — LETTER
November 1, 2018      Tammy Villela, CHANTAL  [address]  [facility] [address]           [facility]  [id number] [place] [id number]  Phone: [phone]  Fax: [phone]          Patient: Bianca Amato   MR Number: [id number]   YOB: 1980   Date of Visit: 11/1/2018       Dear Tammy Villela:    Thank you for referring Bianca Amato to me for evaluation. Attached you will find relevant portions of my assessment and plan of care.    If you have questions, please do not hesitate to call me. I look forward to following Bianca Amato along with you.    Sincerely,    Alis Tian MD    Enclosure  CC:  No Recipients    If you would like to receive this communication electronically, please contact [email or web address] or [phone] to request more information on [facility] access.    For providers and/or their staff who would like to refer a patient to [facility], please contact us through our one-stop-shop provider referral line, [facility], at [phone].    If you feel you have received this communication in error or would no longer like to receive these types of communications, please e-mail [email or web address]

## 2018-11-01 NOTE — H&P (VIEW-ONLY)
History & Physical  General Surgery      SUBJECTIVE:     Chief Complaint/Reason for Admission: Consult (axillary adenopathy)      History of Present Illness:  Patient is a 38 y.o. female presents with Consult (axillary adenopathy)    38-year-old female referred for bilateral axillary adenopathy found on screening mammogram.  The patient denies a personal history of breast cancer, leukemia or lymphoma, or skin cancer.  She does report paternal aunt with a history of breast cancer.  She denies any history of OCP use.  She underwent ultrasound-guided FNA of her left axillary adenopathy which was inconclusive.  She was therefore referred to me for evaluation of excisional biopsy.  She denies any fevers, night sweats, or recent weight loss.  She also denies nipple discharge, nipple retraction, overlying skin changes, or palpable breast masses.  She does have a history of excision of right axillary hidradenitis in 2010 at [facility].  She is concurrently undergoing workup for iron deficiency anemia.      Current Outpatient Medications:     albuterol (PROVENTIL HFA) 90 mcg/actuation inhaler, Inhale 2 puffs into the lungs every 6 (six) hours as needed., Disp: 1 Inhaler, Rfl: 2    amLODIPine (NORVASC) 5 MG tablet, Take 1 tablet (5 mg total) by mouth once daily., Disp: 90 tablet, Rfl: 1    hydroCHLOROthiazide (HYDRODIURIL) 12.5 MG Tab, Take 1 tablet (12.5 mg total) by mouth once daily., Disp: 30 tablet, Rfl: 5    ketoconazole (NIZORAL) 2 % cream, Apply topically 2 (two) times daily., Disp: 30 g, Rfl: 2    Current Facility-Administered Medications:     lidocaine (PF) 10 mg/ml (1%) injection 10 mg, 1 mL, Intradermal, Once, Alis Tian MD    Review of patient's allergies indicates:   Allergen Reactions    Latex, natural rubber     Pcn [penicillins]     Sulfa (sulfonamide antibiotics)        Past Medical History:   Diagnosis Date    Hypertension     Iron deficiency anemia     Mild intermittent asthma      Past  Surgical History:   Procedure Laterality Date    TUBAL LIGATION       Family History   Problem Relation Age of Onset    Diabetes Mother     Sarcoidosis Mother     COPD Father     Hypertension Father      Social History     Tobacco Use    Smoking status: Never Smoker    Smokeless tobacco: Never Used   Substance Use Topics    Alcohol use: No    Drug use: No        Review of Systems:  Review of Systems   Constitutional: Negative for activity change and fatigue.   Respiratory: Negative for chest tightness.    Cardiovascular: Negative for chest pain.   Musculoskeletal: Positive for joint swelling (right shoulder).   Skin: Negative for color change.   Hematological: Positive for adenopathy. Does not bruise/bleed easily.       OBJECTIVE:     Vital Signs (Most Recent)  BP (!) 138/97   Pulse 88   Temp 98.4 °F (36.9 °C) (Oral)   Wt 95.1 kg (209 lb 10.5 oz)   BMI 35.99 kg/m²     Physical Exam:   Physical Exam   Constitutional: She appears well-developed and well-nourished. No distress.   HENT:   Head: Normocephalic and atraumatic.   Eyes: EOM are normal.   Neck: Neck supple.   Cardiovascular: Normal rate and regular rhythm.   Pulmonary/Chest: Effort normal. Right breast exhibits no inverted nipple, no mass, no nipple discharge, no skin change and no tenderness. Left breast exhibits no inverted nipple, no mass, no nipple discharge, no skin change and no tenderness. Breasts are symmetrical.   Musculoskeletal: Normal range of motion.   Lymphadenopathy:     She has axillary adenopathy (bilateral palpable, R>L).   Skin: Skin is warm.   Psychiatric: She has a normal mood and affect.         ASSESSMENT/PLAN:     Axillary adenopathy  -     Case Request Operating Room: EXCISION, MASS, AXILLARY  -     Vital signs ; Standing  -     Insert peripheral IV; Standing  -     Verify beta-blocker dose taken within 24 hours if patient is prescribed beta-blocker; Standing  -     Verify discontinuation of antithrombotics; Standing  -      Verify consent; Standing  -     Chlorhexidine (CHG) 2% Wipes; Standing  -     Diet NPO Except for: Medication, Ice Chips, Sips with Medication; Standing  -     Basic metabolic panel; Future; Expected date: 11/01/2018  -     Full code; Standing  -     Place in Outpatient; Standing  -     Place sequential compression device; Standing    Axillary lymphadenopathy    Other orders  -     lidocaine (PF) 10 mg/ml (1%) injection 10 mg  -     0.9%  NaCl infusion  -     IP VTE LOW RISK PATIENT; Standing  -     clindamycin (CLEOCIN) 900 mg in dextrose 5 % 50 mL IVPB      Scheduled for right axillary excisional lymph node biopsy on 11/12/2018.    Risks, benefits, and alternatives including bleeding, scarring, infection, need for further surgery, and lymphedema  were discussed with the patient who agrees to proceed with surgery.     Alis Tian

## 2018-11-02 ENCOUNTER — PATIENT MESSAGE (OUTPATIENT)
Dept: SURGERY | Facility: HOSPITAL | Age: 38
End: 2018-11-02

## 2018-11-09 ENCOUNTER — ANESTHESIA EVENT (OUTPATIENT)
Dept: SURGERY | Facility: HOSPITAL | Age: 38
End: 2018-11-09
Payer: COMMERCIAL

## 2018-11-09 ENCOUNTER — TELEPHONE (OUTPATIENT)
Dept: INTERNAL MEDICINE | Facility: CLINIC | Age: 38
End: 2018-11-09

## 2018-11-09 NOTE — TELEPHONE ENCOUNTER
Please contact patient to confirm there is no family/personal history of colon polyps or colon cancer. She also may want to confirm need for this with insurance/employer as I'm not sure it would be a covered test due to age. Thank you.

## 2018-11-09 NOTE — TELEPHONE ENCOUNTER
----- Message from Kalani Caicedo NP sent at 11/8/2018  3:39 PM CST -----  Contact: pt   Why does patient need fitkit? We generally do not screen for colon cancer until age 50. I do not see where we discussed this with her  ----- Message -----  From: Jloynn Orr MA  Sent: 11/8/2018   3:08 PM  To: Kalani Caicedo NP        ----- Message -----  From: Qamar Chandelr  Sent: 11/8/2018   2:47 PM  To: Mannie OWEN Staff    Pt calling in reference to getting stool sample kit mailed out that was discussed earlier. pls return  Call.           ..[phone] (home)

## 2018-11-09 NOTE — PRE ADMISSION SCREENING
Pre op instructions reviewed with patient per phone:    To confirm, Your surgeon has instructed you:  Surgery is scheduled  11/12/18at 0815.      Please report to [facility] 1st floor main lobby by 0645.   Pre admit office to call afternoon prior to surgery with final arrival time      INSTRUCTIONS IMPORTANT!!!  ¨ Do not eat, drink, or smoke after 12 midnight-including water. OK to brush teeth, no gum, candy or mints!    ¨ Take only these medicines with a small swallow of water-morning of surgery.  N/A      ____  Do not wear makeup, including mascara.  ____  No powder, lotions or creams to surgical area.  ____  Please remove all jewelry, including piercings and leave at home.  ____  No money or valuables needed. Please leave at home.  ____  Please bring identification and insurance information to hospital.  ____  If going home the same day, arrange for a ride home. You will not be able to   drive if Anesthesia was used.  ____  Children, under 12 years old, must remain in the waiting room with an adult.  They are not allowed in patient areas.  ____  Wear loose fitting clothing. Allow for dressings, bandages.  ____  Stop Aspirin, Ibuprofen, Motrin and Aleve at least 5-7 days before surgery, unless otherwise instructed by your doctor, or the nurse.   You MAY use Tylenol/acetaminophen until day of surgery.  ____  If you take diabetic medication, do not take am of surgery unless instructed by   Doctor.  ____ Stop taking any Fish Oil supplement or any Vitamins that contain Vitamin E at least 5 days prior to surgery.          Bathing Instructions-- The night before surgery and the morning prior to coming to the hospital:   -Do not shave the surgical area.   -Shower and wash your hair and body as usual with anti-bacterial  soap and shampoo.   -Rinse your hair and body completely.   -Use one packet of hibiclens to wash the surgical site (using your hand) gently for 5 minutes.  Do not scrub you skin  too hard.   -Do not use hibiclens on your head, face, or genitals.   -Do not wash with anti-bacterial soap after you use the hibiclens.   -Rinse your body thoroughly.   -Dry with clean, soft towel.  Do not use lotion, cream, deodorant, or powders on   the surgical site.    Use antibacterial soap in place of hibiclens if your surgery is on the head, face or genitals.         Surgical Site Infection    Prevention of surgical site infections:     -Keep incisions clean and dry.   -Do not soak/submerge incisions in water until completely healed.   -Do not apply lotions, powders, creams, or deodorants to site.   -Always make sure hands are cleaned with antibacterial soap/ alcohol-based   prior to touching the surgical site.  (This includes doctors, nurses, staff, and yourself.)    Signs and symptoms:   -Redness and pain around the area where you had surgery   -Drainage of cloudy fluid from your surgical wound   -Fever over 100.4  I have read or had read and explained to me, and understand the above information.

## 2018-11-09 NOTE — TELEPHONE ENCOUNTER
Pt advised to confirm test with insurance/ employer due to age pt voiced understanding.    Pt confirms no personal/family history of colon cancer or polyps verbalized understanding.

## 2018-11-12 ENCOUNTER — HOSPITAL ENCOUNTER (OUTPATIENT)
Facility: HOSPITAL | Age: 38
Discharge: HOME | End: 2018-11-12
Attending: SURGERY | Admitting: SURGERY
Payer: COMMERCIAL

## 2018-11-12 ENCOUNTER — ANESTHESIA (OUTPATIENT)
Dept: SURGERY | Facility: HOSPITAL | Age: 38
End: 2018-11-12
Payer: COMMERCIAL

## 2018-11-12 DIAGNOSIS — R59.0 AXILLARY ADENOPATHY: ICD-10-CM

## 2018-11-12 DIAGNOSIS — R59.0 AXILLARY LYMPHADENOPATHY: Primary | ICD-10-CM

## 2018-11-12 LAB
B-HCG UR QL: NEGATIVE
CTP QC/QA: YES

## 2018-11-12 PROCEDURE — 63600175 PHARM REV CODE 636 W HCPCS: Performed by: NURSE ANESTHETIST, CERTIFIED REGISTERED

## 2018-11-12 PROCEDURE — 25000003 PHARM REV CODE 250: Performed by: NURSE ANESTHETIST, CERTIFIED REGISTERED

## 2018-11-12 PROCEDURE — 36000707: Performed by: SURGERY

## 2018-11-12 PROCEDURE — 88185 FLOWCYTOMETRY/TC ADD-ON: CPT | Performed by: PATHOLOGY

## 2018-11-12 PROCEDURE — 71000039 HC RECOVERY, EACH ADD'L HOUR: Performed by: SURGERY

## 2018-11-12 PROCEDURE — 88184 FLOWCYTOMETRY/ TC 1 MARKER: CPT | Performed by: PATHOLOGY

## 2018-11-12 PROCEDURE — 27201423 OPTIME MED/SURG SUP & DEVICES STERILE SUPPLY: Performed by: SURGERY

## 2018-11-12 PROCEDURE — S0077 INJECTION, CLINDAMYCIN PHOSP: HCPCS | Performed by: SURGERY

## 2018-11-12 PROCEDURE — 36000706: Performed by: SURGERY

## 2018-11-12 PROCEDURE — 25000003 PHARM REV CODE 250: Performed by: SURGERY

## 2018-11-12 PROCEDURE — 71000033 HC RECOVERY, INTIAL HOUR: Performed by: SURGERY

## 2018-11-12 PROCEDURE — S0020 INJECTION, BUPIVICAINE HYDRO: HCPCS | Performed by: SURGERY

## 2018-11-12 PROCEDURE — 38525 BIOPSY/REMOVAL LYMPH NODES: CPT | Mod: RT,,, | Performed by: SURGERY

## 2018-11-12 PROCEDURE — 37000009 HC ANESTHESIA EA ADD 15 MINS: Performed by: SURGERY

## 2018-11-12 PROCEDURE — 81025 URINE PREGNANCY TEST: CPT | Performed by: SURGERY

## 2018-11-12 PROCEDURE — 88305 TISSUE EXAM BY PATHOLOGIST: CPT | Mod: 26,,, | Performed by: PATHOLOGY

## 2018-11-12 PROCEDURE — 71000015 HC POSTOP RECOV 1ST HR: Performed by: SURGERY

## 2018-11-12 PROCEDURE — 37000008 HC ANESTHESIA 1ST 15 MINUTES: Performed by: SURGERY

## 2018-11-12 PROCEDURE — 88189 FLOWCYTOMETRY/READ 16 & >: CPT | Mod: ,,, | Performed by: PATHOLOGY

## 2018-11-12 PROCEDURE — 88305 TISSUE EXAM BY PATHOLOGIST: CPT | Performed by: PATHOLOGY

## 2018-11-12 RX ORDER — MORPHINE SULFATE 4 MG/ML
2 INJECTION, SOLUTION INTRAMUSCULAR; INTRAVENOUS EVERY 5 MIN PRN
Status: DISCONTINUED | OUTPATIENT
Start: 2018-11-12 | End: 2018-11-12 | Stop reason: HOSPADM

## 2018-11-12 RX ORDER — SODIUM CHLORIDE 0.9 % (FLUSH) 0.9 %
3 SYRINGE (ML) INJECTION
Status: DISCONTINUED | OUTPATIENT
Start: 2018-11-12 | End: 2018-11-12 | Stop reason: HOSPADM

## 2018-11-12 RX ORDER — BUPIVACAINE HYDROCHLORIDE 5 MG/ML
INJECTION, SOLUTION EPIDURAL; INTRACAUDAL
Status: DISCONTINUED | OUTPATIENT
Start: 2018-11-12 | End: 2018-11-12 | Stop reason: HOSPADM

## 2018-11-12 RX ORDER — OXYCODONE HYDROCHLORIDE 5 MG/1
5 TABLET ORAL
Status: DISCONTINUED | OUTPATIENT
Start: 2018-11-12 | End: 2018-11-12 | Stop reason: HOSPADM

## 2018-11-12 RX ORDER — ROCURONIUM BROMIDE 10 MG/ML
INJECTION, SOLUTION INTRAVENOUS
Status: DISCONTINUED | OUTPATIENT
Start: 2018-11-12 | End: 2018-11-12

## 2018-11-12 RX ORDER — FENTANYL CITRATE 50 UG/ML
INJECTION, SOLUTION INTRAMUSCULAR; INTRAVENOUS
Status: DISCONTINUED | OUTPATIENT
Start: 2018-11-12 | End: 2018-11-12

## 2018-11-12 RX ORDER — PROPOFOL 10 MG/ML
VIAL (ML) INTRAVENOUS
Status: DISCONTINUED | OUTPATIENT
Start: 2018-11-12 | End: 2018-11-12

## 2018-11-12 RX ORDER — METOCLOPRAMIDE HYDROCHLORIDE 5 MG/ML
10 INJECTION INTRAMUSCULAR; INTRAVENOUS EVERY 10 MIN PRN
Status: DISCONTINUED | OUTPATIENT
Start: 2018-11-12 | End: 2018-11-12 | Stop reason: HOSPADM

## 2018-11-12 RX ORDER — DEXAMETHASONE SODIUM PHOSPHATE 4 MG/ML
INJECTION, SOLUTION INTRA-ARTICULAR; INTRALESIONAL; INTRAMUSCULAR; INTRAVENOUS; SOFT TISSUE
Status: DISCONTINUED | OUTPATIENT
Start: 2018-11-12 | End: 2018-11-12

## 2018-11-12 RX ORDER — MEPERIDINE HYDROCHLORIDE 50 MG/ML
12.5 INJECTION INTRAMUSCULAR; INTRAVENOUS; SUBCUTANEOUS ONCE AS NEEDED
Status: DISCONTINUED | OUTPATIENT
Start: 2018-11-12 | End: 2018-11-12 | Stop reason: HOSPADM

## 2018-11-12 RX ORDER — SODIUM CHLORIDE 9 MG/ML
INJECTION, SOLUTION INTRAVENOUS CONTINUOUS
Status: DISCONTINUED | OUTPATIENT
Start: 2018-11-12 | End: 2018-11-12 | Stop reason: HOSPADM

## 2018-11-12 RX ORDER — SODIUM CHLORIDE, SODIUM LACTATE, POTASSIUM CHLORIDE, CALCIUM CHLORIDE 600; 310; 30; 20 MG/100ML; MG/100ML; MG/100ML; MG/100ML
INJECTION, SOLUTION INTRAVENOUS CONTINUOUS PRN
Status: DISCONTINUED | OUTPATIENT
Start: 2018-11-12 | End: 2018-11-12

## 2018-11-12 RX ORDER — MIDAZOLAM HYDROCHLORIDE 1 MG/ML
INJECTION, SOLUTION INTRAMUSCULAR; INTRAVENOUS
Status: DISCONTINUED | OUTPATIENT
Start: 2018-11-12 | End: 2018-11-12

## 2018-11-12 RX ORDER — LIDOCAINE HYDROCHLORIDE 10 MG/ML
INJECTION, SOLUTION EPIDURAL; INFILTRATION; INTRACAUDAL; PERINEURAL
Status: DISCONTINUED | OUTPATIENT
Start: 2018-11-12 | End: 2018-11-12 | Stop reason: HOSPADM

## 2018-11-12 RX ORDER — ONDANSETRON 2 MG/ML
INJECTION INTRAMUSCULAR; INTRAVENOUS
Status: DISCONTINUED | OUTPATIENT
Start: 2018-11-12 | End: 2018-11-12

## 2018-11-12 RX ORDER — CLINDAMYCIN PHOSPHATE 900 MG/50ML
900 INJECTION, SOLUTION INTRAVENOUS
Status: COMPLETED | OUTPATIENT
Start: 2018-11-12 | End: 2018-11-12

## 2018-11-12 RX ORDER — SUCCINYLCHOLINE CHLORIDE 20 MG/ML
INJECTION INTRAMUSCULAR; INTRAVENOUS
Status: DISCONTINUED | OUTPATIENT
Start: 2018-11-12 | End: 2018-11-12

## 2018-11-12 RX ORDER — HYDROCODONE BITARTRATE AND ACETAMINOPHEN 7.5; 325 MG/1; MG/1
1 TABLET ORAL EVERY 6 HOURS PRN
Qty: 14 TABLET | Refills: 0 | Status: SHIPPED | OUTPATIENT
Start: 2018-11-12 | End: 2018-11-16

## 2018-11-12 RX ORDER — SODIUM CHLORIDE 0.9 % (FLUSH) 0.9 %
3 SYRINGE (ML) INJECTION EVERY 8 HOURS
Status: DISCONTINUED | OUTPATIENT
Start: 2018-11-12 | End: 2018-11-12 | Stop reason: HOSPADM

## 2018-11-12 RX ORDER — LIDOCAINE HCL/PF 100 MG/5ML
SYRINGE (ML) INTRAVENOUS
Status: DISCONTINUED | OUTPATIENT
Start: 2018-11-12 | End: 2018-11-12

## 2018-11-12 RX ADMIN — SODIUM CHLORIDE, SODIUM LACTATE, POTASSIUM CHLORIDE, AND CALCIUM CHLORIDE: 600; 310; 30; 20 INJECTION, SOLUTION INTRAVENOUS at 08:11

## 2018-11-12 RX ADMIN — ROCURONIUM BROMIDE 5 MG: 10 INJECTION, SOLUTION INTRAVENOUS at 08:11

## 2018-11-12 RX ADMIN — MIDAZOLAM 2 MG: 1 INJECTION INTRAMUSCULAR; INTRAVENOUS at 08:11

## 2018-11-12 RX ADMIN — SUCCINYLCHOLINE CHLORIDE 100 MG: 20 INJECTION, SOLUTION INTRAMUSCULAR; INTRAVENOUS at 08:11

## 2018-11-12 RX ADMIN — ONDANSETRON 4 MG: 2 INJECTION, SOLUTION INTRAMUSCULAR; INTRAVENOUS at 08:11

## 2018-11-12 RX ADMIN — CLINDAMYCIN IN 5 PERCENT DEXTROSE 900 MG: 18 INJECTION, SOLUTION INTRAVENOUS at 08:11

## 2018-11-12 RX ADMIN — FENTANYL CITRATE 50 MCG: 50 INJECTION, SOLUTION INTRAMUSCULAR; INTRAVENOUS at 08:11

## 2018-11-12 RX ADMIN — DEXAMETHASONE SODIUM PHOSPHATE 4 MG: 4 INJECTION, SOLUTION INTRA-ARTICULAR; INTRALESIONAL; INTRAMUSCULAR; INTRAVENOUS; SOFT TISSUE at 08:11

## 2018-11-12 RX ADMIN — LIDOCAINE HYDROCHLORIDE 50 MG: 20 INJECTION, SOLUTION INTRAVENOUS at 08:11

## 2018-11-12 RX ADMIN — PROPOFOL 200 MG: 10 INJECTION, EMULSION INTRAVENOUS at 08:11

## 2018-11-12 NOTE — INTERVAL H&P NOTE
The patient has been examined and the H&P has been reviewed:    I concur with the findings and no changes have occurred since H&P was written.   Right axillary adenopathy remains clinically palpable and tender.     Anesthesia/Surgery risks, benefits and alternative options discussed and understood by patient/family.          Active Hospital Problems    Diagnosis  POA    Axillary lymphadenopathy [R59.0]  Yes      Resolved Hospital Problems   No resolved problems to display.

## 2018-11-12 NOTE — DISCHARGE INSTRUCTIONS
What to expect during recovery    Pain  · You will experience some level of pain after surgery.  Pain medication should help with the pain, but may not be able to eliminate it entirely.  Pain will decrease with time, and most pain will be gone by 4 to 6 weeks after surgery.  · Ice packs may help with pain and can reduce swelling.  · Your prescription pain medication may contain acetaminophen (Tylenol).  If so, you should not take additional acetaminophen (Tylenol) at the same time as your pain medication.   · Do not drive, operate machinery or power tools, or sign legal papers for 24 hours or as long as you are on your postoperative pain medication.   · Prescription pain medication should be taken only as directed.  We are not able to replace pain medication that has been lost or stolen.    Nausea/vomiting  · You may experience nausea or vomiting as a result of anesthesia or pain medication.  · If you experience severe nausea or you are unable to keep fluids down, contact your doctor.    Bleeding  · A small amount of clear or reddish drainage from the incision is normal after surgery.  · For mild bleeding from the incision, apply pressure for five minutes.  · If bleeding is severe or does not stop with pressure, contact your doctor.    Signs of infection  · Notify your doctor if you have the following signs of infection:  · Fever over 101 degrees  · Worsening redness around incsion  · Thick drainage from incision  · Foul smell from incision  · You may experience low fever/chills, this is normal after surgery.    Other post-operative symptoms  · It is safe to take over-the-counter medications for constipation, heartburn, sleep, or itching if needed.    · You may experience light-headedness, dizziness, and sleepiness following surgery. Please do not stay alone. A responsible adult should be with you for this 24 hour period.     Activity  · Try to rest and avoid strenuous activity, but also get out of bed regularly  unless your doctor has ordered strict bedrest.  · Several times every hour while you are awake, pump and flex your feet 5-6 times and do foot circles. This will help prevent blood clots.  · Several times every hour while you are awake, take 2 to 3 deep breaths and cough. If you had stomach surgery, hold a pillow or rolled towel firmly against your stomach before you cough. This will help with any pain the cough might cause.  · Do not smoke after surgery, it decreases your ability to heal and increases the risk of infection and pneumonia.    Nozin: Nasal   · Nozin reduces nasal germs to help decrease the risk of infection after surgery.  · Continue Nozin provided at discharge twice daily for 7 days or until the incision is healed.    · Place 4 drops to cotton swab tip and swab both nostril rims 6 times in each direction.  · See pamphlet for more information.     Diet  · Drink lots of fluids after surgery, unless otherwise instructed.  · You might not have much appetite at first.  Progress slowly to a normal diet unless given other specific diet instructions by your doctor.  Begin with liquids, then soup and crackers, working up to solid foods.  · Do not drink alcoholic beverages including beer for 24 hours or as long as you are on post-operative pain medication.    Follow-up after surgery  · You can contact your doctor through the patient portal using the [employer] [facility] or at [email or web address].  · You can also contact your doctor at any time by calling [phone] for the [facility] on [facility], or [phone] for the [facility] on [facility].  · A nurse will be calling you sometime after surgery. Do not be alarmed. This is our way of finding out how you are doing.

## 2018-11-12 NOTE — ANESTHESIA POSTPROCEDURE EVALUATION
"Anesthesia Post Evaluation    Patient: Bianca Amato    Procedure(s) Performed: Procedure(s) (LRB):  EXCISION, MASS, AXILLARY (Right)    Final Anesthesia Type: general  Patient location during evaluation: PACU  Patient participation: Yes- Able to Participate  Level of consciousness: awake and alert  Post-procedure vital signs: reviewed and stable  Pain management: adequate  Airway patency: patent  PONV status at discharge: No PONV  Anesthetic complications: no      Cardiovascular status: blood pressure returned to baseline  Respiratory status: unassisted  Hydration status: euvolemic  Follow-up not needed.        Visit Vitals  /71   Pulse 89   Temp 36.7 °C (98 °F) (Temporal)   Resp 18   Ht 5' 5" (1.651 m)   Wt 92.5 kg (203 lb 14.8 oz)   LMP 10/26/2018 (Approximate)   SpO2 96%   Breastfeeding? No   BMI 33.93 kg/m²       Pain/Artem Score: Pain Assessment Performed: Yes (11/12/2018 11:30 AM)  Presence of Pain: complains of pain/discomfort (11/12/2018 11:30 AM)  Artem Score: 10 (11/12/2018 11:00 AM)        "

## 2018-11-12 NOTE — DISCHARGE SUMMARY
[facility]  Discharge Note  Short Stay    Admit Date: 11/12/2018    Discharge Date and Time: 11/12/2018    Attending Physician: Alis Tian MD     Discharge Provider: Alis Tian    Diagnoses:  Active Hospital Problems    Diagnosis  POA    Axillary lymphadenopathy [R59.0]  Yes      Resolved Hospital Problems   No resolved problems to display.       Discharged Condition: stable    Hospital Course: Patient was admitted for an outpatient procedure and tolerated the procedure well with no complications.    Final Diagnoses: Same as principal problem.    Disposition: Home or Self Care    Follow up/Patient Instructions:    Medications:  Reconciled Home Medications:      Medication List      START taking these medications    HYDROcodone-acetaminophen 7.5-325 mg per tablet  Commonly known as:  NORCO  Take 1 tablet by mouth every 6 (six) hours as needed for Pain.        CHANGE how you take these medications    amLODIPine 5 MG tablet  Commonly known as:  NORVASC  Take 1 tablet (5 mg total) by mouth once daily.  What changed:  when to take this     hydroCHLOROthiazide 12.5 MG Tab  Commonly known as:  HYDRODIURIL  Take 1 tablet (12.5 mg total) by mouth once daily.  What changed:  when to take this        CONTINUE taking these medications    albuterol 90 mcg/actuation inhaler  Commonly known as:  PROVENTIL HFA  Inhale 2 puffs into the lungs every 6 (six) hours as needed.     ketoconazole 2 % cream  Commonly known as:  NIZORAL  Apply topically 2 (two) times daily.          Discharge Procedure Orders   Diet Adult Regular     Lifting restrictions   Scheduling Instructions: No lifting > 10 lbs for 2 weeks with right arm     Notify your health care provider if you experience any of the following:  temperature >100.4     Notify your health care provider if you experience any of the following:  severe uncontrolled pain     Notify your health care provider if you experience any of the following:  redness,  tenderness, or signs of infection (pain, swelling, redness, odor or green/yellow discharge around incision site)     No dressing needed     Activity as tolerated     Follow-up Information     Alis Tian MD In 2 weeks.    Specialty:  General Surgery  Contact information:  [address] [address] [phone]                   Discharge Procedure Orders (must include Diet, Follow-up, Activity):   Discharge Procedure Orders (must include Diet, Follow-up, Activity)   Diet Adult Regular     Lifting restrictions   Scheduling Instructions: No lifting > 10 lbs for 2 weeks with right arm     Notify your health care provider if you experience any of the following:  temperature >100.4     Notify your health care provider if you experience any of the following:  severe uncontrolled pain     Notify your health care provider if you experience any of the following:  redness, tenderness, or signs of infection (pain, swelling, redness, odor or green/yellow discharge around incision site)     No dressing needed     Activity as tolerated

## 2018-11-12 NOTE — OP NOTE
[facility] -   General Surgery  Operative Note    SUMMARY     Date of Procedure: 11/12/2018     Procedure: Procedure(s) (LRB):  EXCISION, MASS, AXILLARY (Right)       Surgeon(s) and Role:     * Alis Tian MD - Primary    Assisting Surgeon: None    Pre-Operative Diagnosis:  Bilateral Axillary adenopathy [R59.0]    Post-Operative Diagnosis: Post-Op Diagnosis Codes:     * bilateral Axillary adenopathy [R59.0]    Anesthesia: General    Drains, Packs, Catheters:  None    Estimated Blood Loss (EBL): 5 cc           Implants: * No implants in log *    Specimens:   Specimen (12h ago, onward)    Start     Ordered    11/12/18 0851  Specimen to Pathology - Surgery  Once     Comments:  1.) Right axillary lymph nodes (PERM).DX: Lymphadenopathy.     Start Status   11/12/18 0851 Collected (11/12/18 0854)       11/12/18 0854         Description of the Findings of the Procedure:  Normal appearing axillary tissue with no bulky adenopathy    Significant Surgical Tasks Conducted by the Assistant(s), if Applicable:  None    Complications: No    Indications for Procedure:  38-year-old female went for screening mammogram was noted to have bilateral benign-appearing axillary lymphadenopathy.  Ultrasound-guided FNA was inconclusive therefore risks and benefits of a lymph node biopsy were discussed in detail and decision was made to proceed with operative intervention.  Consents were obtained.    Procedure in detail:  The patient was taken the operating room then on the operating table in supine position.  General endotracheal anesthesia was administered.  As her right axilla was prepped and draped in sterile fashion with ChloraPrep solution. A time-out was performed verifying patient identification, correct surgical site, IV antibiotic administration, and other pertinent details the case. An ultrasound probe was laid it over the right axillary tissue. Adenopathy was seen with a normal fatty hilum.  The previously  appreciated 2 cm lymph node was now smaller in size which was concurrent with today's preop exam.  An incision was made just under the hair-bearing area of the right axilla.  Dissection was carried down through subcutaneous tissue until the axillary fat pad was accessed.  The area was palpated and there was noted to be no obvious abnormal bulky adenopathy.  Small lymph nodes were taken EN bloc with axillary tissue. Dissection was in a superficial plane so as to not cause damage to underlying vasculature or nervous structures. Again the ultrasound was placed overlying the axillary skin to reconfirm no bulky disease that was not appreciated by palpation.  The specimen was sent for lymphoma protocol and permanent pathology. The cavity was irrigated and hemostasis was achieved. The site was anesthetized with 0.25% Marcaine. It was closed in 2 layers and dressed with Dermabond. The patient awakened returned recovery room in stable condition.    Condition: Stable    Disposition: PACU - hemodynamically stable.    Attestation: I was present and scrubbed for the entire procedure.    Alis Tian

## 2018-11-12 NOTE — PLAN OF CARE
Pt resting on stretcher stating pain level is tolerable. Pt meets d/c criteria. Will cont to monitor. See flow sheet for detailed assessment.

## 2018-11-12 NOTE — ANESTHESIA RELEASE NOTE
"Anesthesia Release from PACU Note    Patient: Bianca Amato    Procedure(s) Performed: Procedure(s) (LRB):  EXCISION, MASS, AXILLARY (Right)    Anesthesia type: general    Post pain: Adequate analgesia    Post assessment: no apparent anesthetic complications, tolerated procedure well and no evidence of recall    Last Vitals:   Visit Vitals  BP (!) 144/98 (BP Location: Right arm, Patient Position: Sitting)   Pulse 88   Temp 36.2 °C (97.2 °F) (Temporal)   Resp 20   Ht 5' 5" (1.651 m)   Wt 92.5 kg (203 lb 14.8 oz)   LMP 10/26/2018 (Approximate)   SpO2 97%   Breastfeeding? No   BMI 33.93 kg/m²       Post vital signs: stable    Level of consciousness: responds to stimulation    Nausea/Vomiting: no nausea/no vomiting    Complications: none    Airway Patency: patent    Respiratory: unassisted    Cardiovascular: stable and blood pressure at baseline    Hydration: euvolemic     "

## 2018-11-12 NOTE — TRANSFER OF CARE
"Anesthesia Transfer of Care Note    Patient: Bianca Amato    Procedure(s) Performed: Procedure(s) (LRB):  EXCISION, MASS, AXILLARY (Right)    Patient location: PACU    Anesthesia Type: general    Transport from OR: Transported from OR on room air with adequate spontaneous ventilation    Post pain: adequate analgesia    Post assessment: no apparent anesthetic complications    Post vital signs: stable    Level of consciousness: responds to stimulation    Nausea/Vomiting: no nausea/vomiting    Complications: none    Transfer of care protocol was followed      Last vitals:   Visit Vitals  BP (!) 144/98 (BP Location: Right arm, Patient Position: Sitting)   Pulse 88   Temp 36.2 °C (97.2 °F) (Temporal)   Resp 20   Ht 5' 5" (1.651 m)   Wt 92.5 kg (203 lb 14.8 oz)   LMP 10/26/2018 (Approximate)   SpO2 97%   Breastfeeding? No   BMI 33.93 kg/m²     "

## 2018-11-12 NOTE — ANESTHESIA PREPROCEDURE EVALUATION
11/12/2018  Bianca Amato is a 38 y.o., female.    Pre-op Assessment    I have reviewed the Patient Summary Reports.     I have reviewed the Nursing Notes.      Review of Systems  Anesthesia Hx:  No problems with previous Anesthesia  Denies Family Hx of Anesthesia complications.   Denies Personal Hx of Anesthesia complications.   Social:  Non-Smoker    Cardiovascular:   Hypertension    Pulmonary:   Asthma        Physical Exam  General:  Obesity    Airway/Jaw/Neck:  Airway Findings: Tongue: Normal Mallampati: II       Chest/Lungs:  Chest/Lungs Findings: Normal Respiratory Rate     Heart/Vascular:  Heart Findings: Normal            Anesthesia Plan  Type of Anesthesia, risks & benefits discussed:  Anesthesia Type:  general  Patient's Preference:   Intra-op Monitoring Plan:   Intra-op Monitoring Plan Comments:   Post Op Pain Control Plan:   Post Op Pain Control Plan Comments:   Induction:   IV  Beta Blocker:  Patient is not currently on a Beta-Blocker (No further documentation required).       Informed Consent: Patient understands risks and agrees with Anesthesia plan.  Questions answered. Anesthesia consent signed with patient.  ASA Score: 2     Day of Surgery Review of History & Physical: I have interviewed and examined the patient. I have reviewed the patient's H&P dated:  There are no significant changes.

## 2018-11-14 ENCOUNTER — PATIENT MESSAGE (OUTPATIENT)
Dept: SURGERY | Facility: CLINIC | Age: 38
End: 2018-11-14

## 2018-11-14 NOTE — TELEPHONE ENCOUNTER
[facility] information called to find out if she would be out the 8 weeks. Patient states that they will call around when she comes in for her follow-up to find out if she would be taking the full 8 weeks off

## 2018-11-15 ENCOUNTER — TELEPHONE (OUTPATIENT)
Dept: SURGERY | Facility: CLINIC | Age: 38
End: 2018-11-15

## 2018-11-15 ENCOUNTER — PATIENT MESSAGE (OUTPATIENT)
Dept: SURGERY | Facility: CLINIC | Age: 38
End: 2018-11-15

## 2018-11-15 VITALS
SYSTOLIC BLOOD PRESSURE: 116 MMHG | OXYGEN SATURATION: 96 % | TEMPERATURE: 98 F | WEIGHT: 203.94 LBS | BODY MASS INDEX: 33.98 KG/M2 | DIASTOLIC BLOOD PRESSURE: 71 MMHG | RESPIRATION RATE: 18 BRPM | HEIGHT: 65 IN | HEART RATE: 89 BPM

## 2018-11-27 ENCOUNTER — OFFICE VISIT (OUTPATIENT)
Dept: SURGERY | Facility: CLINIC | Age: 38
End: 2018-11-27
Payer: COMMERCIAL

## 2018-11-27 VITALS
WEIGHT: 207.44 LBS | BODY MASS INDEX: 34.52 KG/M2 | DIASTOLIC BLOOD PRESSURE: 96 MMHG | TEMPERATURE: 98 F | SYSTOLIC BLOOD PRESSURE: 140 MMHG | HEART RATE: 101 BPM

## 2018-11-27 DIAGNOSIS — Z12.39 SCREENING FOR MALIGNANT NEOPLASM OF BREAST: ICD-10-CM

## 2018-11-27 DIAGNOSIS — R20.2 NUMBNESS AND TINGLING OF RIGHT ARM: Primary | ICD-10-CM

## 2018-11-27 DIAGNOSIS — R20.0 NUMBNESS AND TINGLING OF RIGHT ARM: Primary | ICD-10-CM

## 2018-11-27 DIAGNOSIS — R59.0 AXILLARY LYMPHADENOPATHY: ICD-10-CM

## 2018-11-27 PROCEDURE — 99024 POSTOP FOLLOW-UP VISIT: CPT | Mod: S$GLB,,, | Performed by: SURGERY

## 2018-11-27 PROCEDURE — 99999 PR PBB SHADOW E&M-EST. PATIENT-LVL IV: CPT | Mod: PBBFAC,,, | Performed by: SURGERY

## 2018-11-27 NOTE — PROGRESS NOTES
General Surgery     Postop Visit Note      Bianca RITCHIE Lima  38 y.o.    Review of patient's allergies indicates:   Allergen Reactions    Latex, natural rubber Hives    Pcn [penicillins] Hives    Sulfa (sulfonamide antibiotics) Hives and Swelling       Vitals:    11/27/18 0926   BP: (!) 140/96   Pulse: 101   Temp: 98.3 °F (36.8 °C)       SUBJECTIVE:  38 y.o. female presents s/p right axillary lymph node biopsy.    She denies axillary pain, swelling, incision drainage or warmth.  Patient still complains of right forearm and hand numbness which has been occurring for approximately 4 months.    OBJECTIVE:  incision healing well, no erythema, no drainage, no tenderness with palpation.  No evidence of hematoma or seroma   Palpable right radial pulse     Pathology- reviewed    ASSESSMENT:    Numbness and tingling of right arm  -     Ambulatory Referral to Orthopedics    Axillary lymphadenopathy    Screening for malignant neoplasm of breast  -     Mammo Digital Screening Bilat w/ Marc; Future; Expected date: 08/27/2019    Patient with benign appearing bilateral adenopathy and benign pathology on percutaneous biopsy.  Intraoperatively no appreciable adenopathy and no lymph nodes within the selective axillary tissue specimen.  I discussed this with the patient and given her prior benign biopsy there is no need for reoperation.   Follow-up mammogram in 1 year.  All disability paperwork provided.    Follow-up if symptoms worsen or fail to improve.    Alis Tian

## 2018-12-04 ENCOUNTER — TELEPHONE (OUTPATIENT)
Dept: ORTHOPEDICS | Facility: CLINIC | Age: 38
End: 2018-12-04

## 2018-12-04 NOTE — TELEPHONE ENCOUNTER
----- Message from Gabbie Wu sent at 12/4/2018  9:23 AM CST -----  Contact: pt  Pt called in regarding a appointment she states she has on the 12/05/18. Pt states she spoke with someone regarding an appointment on that date. Im currently not showing that appointment. Pt asked that nurse please return call.

## 2018-12-07 DIAGNOSIS — R52 PAIN: Primary | ICD-10-CM

## 2018-12-10 ENCOUNTER — HOSPITAL ENCOUNTER (OUTPATIENT)
Dept: RADIOLOGY | Facility: HOSPITAL | Age: 38
Discharge: HOME | End: 2018-12-10
Attending: FAMILY MEDICINE
Payer: COMMERCIAL

## 2018-12-10 ENCOUNTER — OFFICE VISIT (OUTPATIENT)
Dept: ORTHOPEDICS | Facility: CLINIC | Age: 38
End: 2018-12-10
Payer: COMMERCIAL

## 2018-12-10 VITALS
DIASTOLIC BLOOD PRESSURE: 100 MMHG | BODY MASS INDEX: 34.49 KG/M2 | HEART RATE: 86 BPM | RESPIRATION RATE: 18 BRPM | WEIGHT: 207 LBS | SYSTOLIC BLOOD PRESSURE: 139 MMHG | HEIGHT: 65 IN

## 2018-12-10 DIAGNOSIS — R20.0 NUMBNESS AND TINGLING OF RIGHT ARM: Primary | ICD-10-CM

## 2018-12-10 DIAGNOSIS — R20.2 NUMBNESS AND TINGLING OF RIGHT ARM: Primary | ICD-10-CM

## 2018-12-10 DIAGNOSIS — R52 PAIN: ICD-10-CM

## 2018-12-10 PROCEDURE — 99999 PR PBB SHADOW E&M-EST. PATIENT-LVL III: CPT | Mod: PBBFAC,,, | Performed by: FAMILY MEDICINE

## 2018-12-10 PROCEDURE — 73060 X-RAY EXAM OF HUMERUS: CPT | Mod: TC,RT

## 2018-12-10 PROCEDURE — 73060 X-RAY EXAM OF HUMERUS: CPT | Mod: 26,RT,, | Performed by: RADIOLOGY

## 2018-12-10 PROCEDURE — 99214 OFFICE O/P EST MOD 30 MIN: CPT | Mod: S$PBB,,, | Performed by: FAMILY MEDICINE

## 2018-12-10 PROCEDURE — 73130 X-RAY EXAM OF HAND: CPT | Mod: 26,RT,, | Performed by: RADIOLOGY

## 2018-12-10 PROCEDURE — 73130 X-RAY EXAM OF HAND: CPT | Mod: TC,RT

## 2018-12-10 RX ORDER — MELOXICAM 7.5 MG/1
7.5 TABLET ORAL DAILY
Qty: 30 TABLET | Refills: 0 | Status: SHIPPED | OUTPATIENT
Start: 2018-12-10 | End: 2019-09-09

## 2018-12-10 NOTE — PATIENT INSTRUCTIONS
Carpal Tunnel Syndrome    Carpal tunnel syndrome is a painful condition of the wrist and arm. It is caused by pressure on the median nerve.  The median nerve is one of the nerves that give feeling and movement to the hand. It passes through a tunnel in the wrist called the carpal tunnel. This tunnel is made up of bones and ligaments. Narrowing of this tunnel or swelling of the tissues inside the tunnel puts pressure on the median nerve. This causes numbness, pins and needles, or electric shooting pains in your hand and forearm. Often the pain is worse at night and may wake you when you are asleep.  Carpal tunnel syndrome may occur during pregnancy and with use of birth control pills. It is more common in workers who must often bend their wrists. It is also common in people who work with power tools that cause strong vibrations.  Home care  · Rest the painful wrist. Avoid repeated bending of the wrist back and forth. This puts pressure on the median nerve. Avoid using power tools with strong vibrations.  · If you were given a splint, wear it at night while you sleep. You may also wear it during the day for comfort.  · Move your fingers and wrists often to avoid stiffness.  · Elevate your arms on pillows when you lie down.  · Try using the unaffected hand more.  · Try not to hold your wrists in a bent, downward position.  · Sometimes changes in the work place may ease symptoms. If you type most of the day, it may help to change the position of your keyboard or add a wrist support. Your wrist should be in a neutral position and not bent back when typing.  · You may use over-the-counter pain medicine to treat pain and inflammation, unless another medicine was prescribed. Anti-inflammatory pain medicines, such as ibuprofen or naproxen may be more effective than acetaminophen, which treats pain, but not inflammation. If you have chronic liver or kidney disease or ever had a stomach ulcer or GI bleeding, talk with your  doctor before using these medicines.  · Opioid pain medicine will only give temporary relief and does not treat the problem. If pain continues, you may need a shot of a steroid drug into your wrist.  · If the above methods fail, you may need surgery. This will open the carpal tunnel and release the pressure on the trapped nerve.  Follow-up care  Follow up with your healthcare provider, or as advised, if the pain doesnt begin to improve within the next week.  If X-rays were taken, you will be notified of any new findings that may affect your care.  When to seek medical advice  Call your healthcare provider right away if any of these occur:  · Pain not improving with the above treatment  · Fingers or hand become cold, blue, numb, or tingly  · Your whole arm becomes swollen or weak  Date Last Reviewed: 11/23/2015  © 8585-6475 [employer]. [address] [place] [address]. All rights reserved. This information is not intended as a substitute for professional medical care. Always follow your healthcare professional's instructions.        What Is Cubital Tunnel Syndrome?  Cubital tunnel syndrome is a set of symptoms that may occur if the ulnar nerve in your elbow gets pinched. This may happen if you bend or lean on your elbows often.    Your cubital tunnel  The cubital tunnel is a groove in a bone near your elbow. This narrow groove provides a passage for the ulnar nerve, one of the main nerves in your arm. The ulnar nerve can cause funny bone pain if your elbow gets bumped. Your cubital tunnel helps protect this nerve as it passes through your elbow and down to your fingers.  Compressing the ulnar nerve  Bending your elbow compresses the ulnar nerve inside the cubital tunnel. The nerve can get inflamed (irritated) after constant bending and pinching or after getting hurt. Over time, this can lead to pain or numbness. The pain is often felt in your ring fingers and little fingers.     Bending  your elbow as you hold a phone can cause problems over time.    What are its symptoms?  · Numbness or tingling in the ring fingers and little fingers  · Loss of finger or hand strength  · Inability to straighten fingers  · Sharp, sudden pain when elbow is touched  The road to healing  You can keep cubital tunnel syndrome from flaring up. Avoid pinching the ulnar nerve by keeping your arm straight as much as you can, even while sleeping. And use phone headsets and elbow pads. If you still have pain, tell your doctor.   Date Last Reviewed: 9/8/2015  © 3229-7174 [employer]. [address] [place] [address], [place] [address]. All rights reserved. This information is not intended as a substitute for professional medical care. Always follow your healthcare professional's instructions.

## 2018-12-10 NOTE — PROGRESS NOTES
Subjective:     Patient ID: Bianca Amato is a 38 y.o. female.    Chief Complaint: Pain of the Right Elbow    Patient is a 38-year-old female right-hand dominant with asthma, hypertension, iron deficiency anemia, and axillary lymphadenopathy who presents to clinic today complaining of right elbow to hand pain tingling and numbness.  Patient states that the symptoms started about 2 months ago.  Patient states that she had axillary lymph node dissection which she thought was contributing to the pain and numbness so went to her surgeon.  Her surgeon evaluated her for axillary source, but felt that was more likely coming from her elbow and/or wrist.  Patient states that the numbness and tingling is becoming more persistent.  States is happening throughout the day and is worse at night.  States that she has to shake can straighten out her arm to make the numbness go away.  Patient denies any previous cortisone injections, physical therapy, imaging, injuries, or surgeries in the past.        Past Medical History:   Diagnosis Date    Hypertension     Iron deficiency anemia     Mild intermittent asthma      Past Surgical History:   Procedure Laterality Date    EXCISION, MASS, AXILLARY Right 11/12/2018    Performed by Alis Tian MD at [facility] OR    SURGICAL REMOVAL OF MASS OF AXILLA Right 11/12/2018    Procedure: EXCISION, MASS, AXILLARY;  Surgeon: Alis Tian MD;  Location: [facility] OR;  Service: General;  Laterality: Right;    TUBAL LIGATION       Family History   Problem Relation Age of Onset    Diabetes Mother     Sarcoidosis Mother     COPD Father     Hypertension Father      Social History     Socioeconomic History    Marital status: Single     Spouse name: Not on file    Number of children: 4    Years of education: Not on file    Highest education level: Not on file   Social Needs    Financial resource strain: Not on file    Food insecurity - worry: Not on file    Food insecurity -  inability: Not on file    Transportation needs - medical: Not on file    Transportation needs - non-medical: Not on file   Occupational History    Occupation:      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   Tobacco Use    Smoking status: Never Smoker    Smokeless tobacco: Never Used   Substance and Sexual Activity    Alcohol use: No    Drug use: No    Sexual activity: Yes     Partners: Male   Other Topics Concern    Not on file   Social History Narrative    Not on file        Medication List           Accurate as of 12/10/18  9:22 AM. If you have any questions, ask your nurse or doctor.               START taking these medications    meloxicam 7.5 MG tablet  Commonly known as:  MOBIC  Take 1 tablet (7.5 mg total) by mouth once daily.  Started by:  Maximo Olguin MD        CHANGE how you take these medications    amLODIPine 5 MG tablet  Commonly known as:  NORVASC  Take 1 tablet (5 mg total) by mouth once daily.  What changed:  when to take this     hydroCHLOROthiazide 12.5 MG Tab  Commonly known as:  HYDRODIURIL  Take 1 tablet (12.5 mg total) by mouth once daily.  What changed:  when to take this        CONTINUE taking these medications    albuterol 90 mcg/actuation inhaler  Commonly known as:  PROVENTIL HFA  Inhale 2 puffs into the lungs every 6 (six) hours as needed.     ketoconazole 2 % cream  Commonly known as:  NIZORAL  Apply topically 2 (two) times daily.           Where to Get Your Medications      These medications were sent to [facility] [address] [facility] [id number]    Hours:  Mon-Fri, 8a-5:30p Phone:  [phone]   · meloxicam 7.5 MG tablet       Review of patient's allergies indicates:   Allergen Reactions    Latex, natural rubber Hives    Pcn [penicillins] Hives    Sulfa (sulfonamide antibiotics) Hives and Swelling     Review of Systems   Constitutional: Negative for chills and fever.   Cardiovascular: Negative for leg swelling.  "  Gastrointestinal: Negative for nausea and vomiting.   Musculoskeletal: Positive for joint pain. Negative for back pain, falls and myalgias.   Skin: Negative for rash.   Neurological: Positive for tingling, sensory change and focal weakness. Negative for weakness.        Objective:   Body mass index is 34.45 kg/m².  Vitals:    12/10/18 0821   BP: (!) 139/100   Pulse: 86   Resp: 18   Weight: 93.9 kg (207 lb)   Height: 5' 5" (1.651 m)   PainSc:   3   PainLoc: Elbow           General    Nursing note and vitals reviewed.  Constitutional: She is oriented to person, place, and time. She appears well-developed and well-nourished. No distress.   Eyes: Conjunctivae are normal. No scleral icterus.   Pulmonary/Chest: Effort normal.   Neurological: She is alert and oriented to person, place, and time.   Psychiatric: She has a normal mood and affect. Her behavior is normal. Judgment and thought content normal.             Right Hand/Wrist Exam     Inspection   Effusion: Wrist - absent   Deformity: Wrist - deformity     Range of Motion   The patient has normal right hand/wrist ROM.    Tests   Phalens sign: positive  Tinel's sign (median nerve): positive      Other     Neuorologic Exam    Median Distribution: normal  Ulnar Distribution: normal  Radial Distribution: normal      Right Elbow Exam     Inspection   Effusion: absent  Deformity: absent    Pain   The patient exhibits pain of the extensor musculature, lateral epicondyle and ulnar collateral ligament    Range of Motion   The patient has normal right elbow ROM.    Tests   Tinel's sign (cubital tunnel): positive  Tennis Elbow: moderate  Golfer's Elbow: negative    Other   Sensation: normal          Narrative     EXAMINATION:  XR HAND COMPLETE 3 VIEW RIGHT    CLINICAL HISTORY:  Pain, unspecified    TECHNIQUE:  AP, lateral, and oblique views of the right hand were performed.    COMPARISON:  None    FINDINGS:  There is no radiographic evidence of acute osseous, articular, or " soft tissue abnormality.  Joint spaces are preserved.  No erosive changes demonstrated.      Impression       No acute findings      Electronically signed by: Maximo Coreas MD  Date: 12/10/2018  Time: 08:22        Narrative     EXAMINATION:  XR HUMERUS 2 VIEW RIGHT    CLINICAL HISTORY:  Pain, unspecified    TECHNIQUE:  Two views were obtained of the right humerus.    COMPARISON:  None    FINDINGS:  There is no radiographic evidence of acute osseous, articular, or soft tissue abnormality.      Impression       No acute findings      Electronically signed by: Maximo Coreas MD  Date: 12/10/2018  Time: 08:17           Bianca was seen today for pain.    Diagnoses and all orders for this visit:    Numbness and tingling of right arm  -     Nerve conduction test; Future  -     meloxicam (MOBIC) 7.5 MG tablet; Take 1 tablet (7.5 mg total) by mouth once daily.    -due to the worsening persistent nature of the patient's numbness and tingling, will order a EMG and nerve conduction study.  -will give the patient nighttime splint for her right wrist as well as a tennis elbow strap brace  -Xray images were independently viewed and read by me showing    Right humerus two view:  No acute fractures dislocations.  Elbow joint seems well preserved with no arthritic changes.   Right hand three view complete:  No acute fractures dislocations.  Mild arthritic changes.  Mild osteopenic changes.  No erosive changes noted.  -Formal read by radiologist is as described above  -Discussed findings with patient  -Treatment options and alternatives were discussed with the patient. Patient expressed understanding. Patient was given the opportunity to ask questions and be an active participant in their medical care. Patient had no further questions or concerns at this time.   -Patient is an overall moderate risk for health complications from their medical conditions.

## 2018-12-14 ENCOUNTER — OFFICE VISIT (OUTPATIENT)
Dept: INTERNAL MEDICINE | Facility: CLINIC | Age: 38
End: 2018-12-14

## 2018-12-14 ENCOUNTER — LAB VISIT (OUTPATIENT)
Dept: LAB | Facility: HOSPITAL | Age: 38
End: 2018-12-14
Attending: FAMILY MEDICINE
Payer: COMMERCIAL

## 2018-12-14 VITALS
DIASTOLIC BLOOD PRESSURE: 86 MMHG | OXYGEN SATURATION: 98 % | TEMPERATURE: 98 F | WEIGHT: 207.44 LBS | BODY MASS INDEX: 34.56 KG/M2 | HEIGHT: 65 IN | SYSTOLIC BLOOD PRESSURE: 136 MMHG | HEART RATE: 104 BPM

## 2018-12-14 DIAGNOSIS — R35.0 URINE FREQUENCY: Primary | ICD-10-CM

## 2018-12-14 DIAGNOSIS — E66.9 OBESITY (BMI 30.0-34.9): ICD-10-CM

## 2018-12-14 DIAGNOSIS — I10 ESSENTIAL HYPERTENSION: ICD-10-CM

## 2018-12-14 DIAGNOSIS — M62.830 MUSCLE SPASM OF BACK: ICD-10-CM

## 2018-12-14 DIAGNOSIS — R35.0 URINE FREQUENCY: ICD-10-CM

## 2018-12-14 LAB
BILIRUB UR QL STRIP: NEGATIVE
CLARITY UR: CLEAR
COLOR UR: YELLOW
GLUCOSE UR QL STRIP: NEGATIVE
HGB UR QL STRIP: NEGATIVE
KETONES UR QL STRIP: NEGATIVE
LEUKOCYTE ESTERASE UR QL STRIP: NEGATIVE
NITRITE UR QL STRIP: NEGATIVE
PH UR STRIP: 6 [PH] (ref 5–8)
PROT UR QL STRIP: NEGATIVE
SP GR UR STRIP: 1.02 (ref 1–1.03)
URN SPEC COLLECT METH UR: NORMAL

## 2018-12-14 PROCEDURE — 90686 IIV4 VACC NO PRSV 0.5 ML IM: CPT | Mod: PBBFAC,PO

## 2018-12-14 PROCEDURE — 99999 PR PBB SHADOW E&M-EST. PATIENT-LVL III: CPT | Mod: PBBFAC,,, | Performed by: FAMILY MEDICINE

## 2018-12-14 PROCEDURE — 81003 URINALYSIS AUTO W/O SCOPE: CPT | Mod: PO

## 2018-12-14 PROCEDURE — 99214 OFFICE O/P EST MOD 30 MIN: CPT | Mod: S$PBB,,, | Performed by: FAMILY MEDICINE

## 2018-12-14 RX ORDER — METHOCARBAMOL 750 MG/1
750 TABLET, FILM COATED ORAL 3 TIMES DAILY PRN
Qty: 30 TABLET | Refills: 0 | Status: SHIPPED | OUTPATIENT
Start: 2018-12-14 | End: 2018-12-27

## 2018-12-14 NOTE — PROGRESS NOTES
"Subjective:       Patient ID: Bianca Amato is a 38 y.o. female.    Chief Complaint: Back Pain    38-year-old  female patient with Patient Active Problem List:     Mild intermittent asthma without complication     Hypertension     Iron deficiency anemia     Normocytic anemia     Axillary lymphadenopathy  Reports that she has been having right back pain radiating to the abdomen and also noted to start increased urinary frequency for the past 2-3 days, reports right flank pain as 8 to 9/10 for which she took some ibuprofen 2 tablets yesterday with some relief.   Patient was prescribed meloxicam recently for shoulder pain but did not  the prescription  Denies any fever with chills, burning sensation with urination, nausea vomiting  Denies any injury or, trauma, lifting heavy weights      Review of Systems   Constitutional: Negative for fatigue and fever.   Eyes: Negative for visual disturbance.   Respiratory: Negative for shortness of breath.    Cardiovascular: Negative for chest pain and leg swelling.   Gastrointestinal: Negative for abdominal pain, nausea and vomiting.   Genitourinary: Positive for flank pain and frequency. Negative for dysuria, hematuria and urgency.   Musculoskeletal: Positive for back pain and myalgias.   Skin: Negative for rash.   Neurological: Negative for light-headedness and headaches.   Psychiatric/Behavioral: Negative for sleep disturbance.         /86 (BP Location: Right arm, Patient Position: Sitting)   Pulse 104   Temp 97.9 °F (36.6 °C) (Oral)   Ht 5' 5" (1.651 m)   Wt 94.1 kg (207 lb 7.3 oz)   LMP 12/10/2018   SpO2 98%   BMI 34.52 kg/m²   Objective:      Physical Exam   Constitutional: She is oriented to person, place, and time. She appears well-developed and well-nourished.   HENT:   Head: Normocephalic and atraumatic.   Mouth/Throat: Oropharynx is clear and moist.   Cardiovascular: Normal rate, regular rhythm and normal heart sounds.   No murmur " heard.  Pulmonary/Chest: Effort normal and breath sounds normal. She has no wheezes.   Abdominal: Soft. Bowel sounds are normal. There is no tenderness.   No CVA tenderness noted bilaterally   Musculoskeletal: She exhibits tenderness. She exhibits no edema.   Noted minimal tenderness in the right paraspinal lumbar muscles consistent with muscle spasm   Neurological: She is alert and oriented to person, place, and time.   Skin: Skin is warm and dry. No rash noted.   Psychiatric: She has a normal mood and affect.         Assessment:       1. Urine frequency    2. Muscle spasm of back    3. Essential hypertension    4. Obesity (BMI 30.0-34.9)        Plan:   Urine frequency  -     Urinalysis; Future; Expected date: 12/14/2018  Muscle spasm of back  -     Urinalysis; Future; Expected date: 12/14/2018  Will check urinalysis and if positive for infection will treat accordingly  Differential includes muscle spasm causing right flank pain  Patient was advised to take meloxicam as prescribed or can take over-the-counter NSAIDs as needed but not together  If negative for UTI will consider sending muscle relaxants after reviewing test results  Warm compresses recommended for symptomatic relief    Essential hypertension-blood pressure stable today currently on hydrochlorothiazide 12.5 mg daily    Obesity (BMI 30.0-34.9)-Lifestyle modifications recommended to lose weight with BMI 34

## 2018-12-18 ENCOUNTER — PATIENT MESSAGE (OUTPATIENT)
Dept: INTERNAL MEDICINE | Facility: CLINIC | Age: 38
End: 2018-12-18

## 2018-12-19 DIAGNOSIS — I10 ESSENTIAL HYPERTENSION: Primary | ICD-10-CM

## 2019-01-03 ENCOUNTER — OFFICE VISIT (OUTPATIENT)
Dept: PHYSICAL MEDICINE AND REHAB | Facility: CLINIC | Age: 39
End: 2019-01-03
Payer: COMMERCIAL

## 2019-01-03 ENCOUNTER — TELEPHONE (OUTPATIENT)
Dept: ORTHOPEDICS | Facility: CLINIC | Age: 39
End: 2019-01-03

## 2019-01-03 VITALS
HEART RATE: 78 BPM | SYSTOLIC BLOOD PRESSURE: 157 MMHG | WEIGHT: 207 LBS | BODY MASS INDEX: 34.49 KG/M2 | RESPIRATION RATE: 14 BRPM | DIASTOLIC BLOOD PRESSURE: 109 MMHG | HEIGHT: 65 IN

## 2019-01-03 DIAGNOSIS — G56.03 BILATERAL CARPAL TUNNEL SYNDROME: ICD-10-CM

## 2019-01-03 PROCEDURE — 3008F PR BODY MASS INDEX (BMI) DOCUMENTED: ICD-10-PCS | Mod: CPTII,S$GLB,, | Performed by: PHYSICAL MEDICINE & REHABILITATION

## 2019-01-03 PROCEDURE — 99204 PR OFFICE/OUTPT VISIT, NEW, LEVL IV, 45-59 MIN: ICD-10-PCS | Mod: 25,S$GLB,, | Performed by: PHYSICAL MEDICINE & REHABILITATION

## 2019-01-03 PROCEDURE — 99999 PR PBB SHADOW E&M-EST. PATIENT-LVL III: CPT | Mod: PBBFAC,,, | Performed by: PHYSICAL MEDICINE & REHABILITATION

## 2019-01-03 PROCEDURE — 3080F DIAST BP >= 90 MM HG: CPT | Mod: CPTII,S$GLB,, | Performed by: PHYSICAL MEDICINE & REHABILITATION

## 2019-01-03 PROCEDURE — 95912 NRV CNDJ TEST 11-12 STUDIES: CPT | Mod: S$GLB,,, | Performed by: PHYSICAL MEDICINE & REHABILITATION

## 2019-01-03 PROCEDURE — 3077F SYST BP >= 140 MM HG: CPT | Mod: CPTII,S$GLB,, | Performed by: PHYSICAL MEDICINE & REHABILITATION

## 2019-01-03 PROCEDURE — 99999 PR PBB SHADOW E&M-EST. PATIENT-LVL III: ICD-10-PCS | Mod: PBBFAC,,, | Performed by: PHYSICAL MEDICINE & REHABILITATION

## 2019-01-03 PROCEDURE — 3008F BODY MASS INDEX DOCD: CPT | Mod: CPTII,S$GLB,, | Performed by: PHYSICAL MEDICINE & REHABILITATION

## 2019-01-03 PROCEDURE — 95912 PR NERVE CONDUCTION STUDY; 11 -12 STUDIES: ICD-10-PCS | Mod: S$GLB,,, | Performed by: PHYSICAL MEDICINE & REHABILITATION

## 2019-01-03 PROCEDURE — 99204 OFFICE O/P NEW MOD 45 MIN: CPT | Mod: 25,S$GLB,, | Performed by: PHYSICAL MEDICINE & REHABILITATION

## 2019-01-03 PROCEDURE — 3077F PR MOST RECENT SYSTOLIC BLOOD PRESSURE >= 140 MM HG: ICD-10-PCS | Mod: CPTII,S$GLB,, | Performed by: PHYSICAL MEDICINE & REHABILITATION

## 2019-01-03 PROCEDURE — 3080F PR MOST RECENT DIASTOLIC BLOOD PRESSURE >= 90 MM HG: ICD-10-PCS | Mod: CPTII,S$GLB,, | Performed by: PHYSICAL MEDICINE & REHABILITATION

## 2019-01-03 NOTE — TELEPHONE ENCOUNTER
Called pt stated she will be dropping out paperwork from work to be complete and fax. Pt will  original copy at next appointment

## 2019-01-03 NOTE — PATIENT INSTRUCTIONS
Carpal Tunnel Syndrome Prevention Tips  Some repetitive hand activities put you at higher risk for carpal tunnel syndrome (CTS). But you can reduce your risk. Learn how to change the way you use your hands. Below are tips for at home and on the job. Be sure to also follow the hand and wrist safety policies at your workplace.      Keep your wrist in a neutral (straight) position when exercising.      Keep your wrist in neutral  Keep a neutral (straight) wrist position as often as you can. Dont use your wrist in a bent (flexed) position for long periods of time. This includes extended or twisted positions.  Watch your   Dont just use your thumb and index finger to grasp or lift. This can put stress on your wrist. When you can, use your whole hand and all its fingers to grasp an object.  Minimize repetition  Dont move your arms or hands or hold an object in the same way for long periods of time. Even simple, light tasks can cause injury this way. Instead, alternate tasks or switch hands.  Rest your hands  Give your hands a break from time to time with a rest. Even a few minutes once an hour can help.  Reduce speed and force  Slow down the speed in which you do a forceful, repetitive motion. This gives your wrist time to recover from the effort. Use power tools to help reduce the force.  Strengthen the muscles  Weak muscles may lead to a poor wrist or arm position. Exercises will make your hand and arm muscles stronger. This can help you keep a better position.  Date Last Reviewed: 9/11/2015  © 2496-4106 [employer]. [address] [place] [address], [place] [address]. All rights reserved. This information is not intended as a substitute for professional medical care. Always follow your healthcare professional's instructions.        Understanding Carpal Tunnel Syndrome    The carpal tunnel is a narrow space inside the wrist. It is ringed by bone and a band of tough tissue called the transverse carpal  ligament. A major nerve called the median nerve runs from the forearm into the hand through the carpal tunnel. Tendons also run through the carpal tunnel.  With carpal tunnel syndrome, the tendons or nearby tissues within the carpal tunnel may swell or thicken. Or the transverse carpal ligament may harden and shorten. This narrows the space in the carpal tunnel and puts pressure on the median nerve. This pressure leads to tingling and numbness of the hand and wrist. In time, the condition can make even simple tasks hard to do.  What causes carpal tunnel syndrome?  Doctors arent entirely clear why the condition occurs. Certain things may make a person more likely to have it. These include:  · Being female  · Being pregnant  · Being overweight  · Having diabetes or rheumatoid arthritis  Symptoms of carpal tunnel syndrome  Symptoms often come and go. At first, symptoms may occur mainly at night. Later, they may be noticed during the day as well. They may get worse with activities such as driving, reading, typing, or holding a phone. Symptoms can include:  · Tingling and numbness in the hand or wrist  · Sharp pain that shoots up the arm or down to the fingers  · Hand stiffness or cramping, especially in the morning  · Trouble making a fist  · Hand weakness and clumsiness  Treatment for carpal tunnel syndrome  Certain treatments help reduce the pressure on the median nerve and relieve symptoms. Choices for treatment may include one or more of the following:  · Wrist splint. This involves wearing a special brace on the wrist and hand. The splint holds the wrist straight, in a neutral position. This helps keep the carpal tunnel as open as possible.  · Cortisone shots. Cortisone is a medicine that helps reduce swelling. It is injected directly into the wrist. It helps shrink tissues inside the carpal tunnel. This relieves symptoms for a time.  · Pain medicines. You may take over-the-counter or prescription medicines to  help reduce swelling and relieve symptoms.  · Surgery. If the condition doesnt respond to other treatments and doesnt go away on its own, you may need surgery. During surgery, the surgeon cuts the transverse carpal ligament to relieve pressure on the median nerve.     When to call your healthcare provider  Call your healthcare provider right away if you have any of these:  · Fever of 100.4°F (38°C) or higher, or as directed  · Symptoms that dont get better, or get worse  · New symptoms   Date Last Reviewed: 3/10/2016  © 5108-2768 [employer]. [address] [place] [address]. All rights reserved. This information is not intended as a substitute for professional medical care. Always follow your healthcare professional's instructions.        Carpal Tunnel Syndrome    Carpal tunnel syndrome is a painful condition of the wrist and arm. It is caused by pressure on the median nerve.  The median nerve is one of the nerves that give feeling and movement to the hand. It passes through a tunnel in the wrist called the carpal tunnel. This tunnel is made up of bones and ligaments. Narrowing of this tunnel or swelling of the tissues inside the tunnel puts pressure on the median nerve. This causes numbness, pins and needles, or electric shooting pains in your hand and forearm. Often the pain is worse at night and may wake you when you are asleep.  Carpal tunnel syndrome may occur during pregnancy and with use of birth control pills. It is more common in workers who must often bend their wrists. It is also common in people who work with power tools that cause strong vibrations.  Home care  · Rest the painful wrist. Avoid repeated bending of the wrist back and forth. This puts pressure on the median nerve. Avoid using power tools with strong vibrations.  · If you were given a splint, wear it at night while you sleep. You may also wear it during the day for comfort.  · Move your fingers and wrists often to  avoid stiffness.  · Elevate your arms on pillows when you lie down.  · Try using the unaffected hand more.  · Try not to hold your wrists in a bent, downward position.  · Sometimes changes in the work place may ease symptoms. If you type most of the day, it may help to change the position of your keyboard or add a wrist support. Your wrist should be in a neutral position and not bent back when typing.  · You may use over-the-counter pain medicine to treat pain and inflammation, unless another medicine was prescribed. Anti-inflammatory pain medicines, such as ibuprofen or naproxen may be more effective than acetaminophen, which treats pain, but not inflammation. If you have chronic liver or kidney disease or ever had a stomach ulcer or GI bleeding, talk with your doctor before using these medicines.  · Opioid pain medicine will only give temporary relief and does not treat the problem. If pain continues, you may need a shot of a steroid drug into your wrist.  · If the above methods fail, you may need surgery. This will open the carpal tunnel and release the pressure on the trapped nerve.  Follow-up care  Follow up with your healthcare provider, or as advised, if the pain doesnt begin to improve within the next week.  If X-rays were taken, you will be notified of any new findings that may affect your care.  When to seek medical advice  Call your healthcare provider right away if any of these occur:  · Pain not improving with the above treatment  · Fingers or hand become cold, blue, numb, or tingly  · Your whole arm becomes swollen or weak  Date Last Reviewed: 11/23/2015  © 3655-2157 [employer]. [address] [place] [address], [place] [address]. All rights reserved. This information is not intended as a substitute for professional medical care. Always follow your healthcare professional's instructions.

## 2019-01-03 NOTE — TELEPHONE ENCOUNTER
----- Message from Bibi Hand sent at 1/3/2019  8:16 AM CST -----  Contact: pt  Pt stated she calling about getting some paper work signed, she can be reached at [phone] Thanks

## 2019-01-03 NOTE — PROGRESS NOTES
[facility] [address] [place] [address]  Phone: [phone]          Full Name: Biacna Amato YOB: 1980  Patient ID: [id number]      Visit Date: 1/3/2019 07:57  Age: 38 Years 11 Months Old  Examining Physician: Kelly Rubin M.D.  Referring Physician: Dr. Olguin   Reason for Referral: right arm numbness            Chief Complaint   Patient presents with    Numbness     right hand     HPI: This is a 38 y.o.  female being seen in clinic today for evaluation of right hand numbness/tingling over the past 7-8 months.  She has some difficulty with fine motor control and has increased pain with arm usage.  She denies dropping items, but feels weakness with increased discomfort.  Resting her hands or wearing a brace provides some relief.     History obtained from patient    Past family, medical, social, and surgical history reviewed in chart    Review of Systems:     General- denies lethargy, weight change, fever, chills  Head/neck- denies swallowing difficulties  ENT- denies hearing changes  Cardiovascular-denies chest pain  Pulmonary- denies shortness of breath  GI- denies constipation or bowel incontinence  - denies bladder incontinence  Skin- denies wounds or rashes  Musculoskeletal- denies weakness, +pain  Neurologic- + numbness and tingling  Psychiatric- denies depressive or psychotic features, denies anxiety  Lymphatic-denies swelling  Endocrine- denies hypoglycemic symptoms/DM history  All other pertinent systems negative     Physical Examination:  General: Well developed, well nourished female, NAD  HEENT:NCAT EOMI bilaterally   Pulmonary:Normal respirations    Spinal Examination: CERVICAL  Active ROM is within normal limits.  Inspection: No deformity of spinal alignment.  Palpation: No vertebral tenderness to percussion.      Spinal Examination: LUMBAR or THORACIC  Active ROM is within normal limits.  Inspection: No deformity of spinal alignment.    Palpation: No  vertebral tenderness to percussion.      Musculoskeletal Tests:  Phalen: neg  Elbow compression (ulnar): neg  Tinels at wrist: + on right    Bilateral Upper and Lower Extremities:  Pulses are 2+ at radial bilaterally.  Shoulder/Elbow/Wrist/Hand ROM wnl  Hip/Knee/Ankle ROM   Bilateral Extremities show normal capillary refill.  No signs of cyanosis, rubor, edema, skin changes, or dysvascular changes of appendages.  Nails appear intact.    Neurological Exam:  Cranial Nerves:  II-XII grossly intact    Manual Muscle Testing: (Motor 5=normal)  5/5 strength bilateral upper extremities    No focal atrophy is noted of either upper extremity.    Bilateral Reflexes:1+bic tri br  Jones's response is absent bilaterally.  Sensation: tested to light touch  - intact in arms except dec at right 3rd digit  Gait: Narrow base and good arm swing.      Entire procedure explained to patient prior to proceeding.  Verbal consent obtained      SNC      Nerve / Sites Rec. Site Onset Lat Peak Lat Amp Segments Distance Velocity     ms ms µV  mm m/s   R Median - Digit II (Antidromic)      Wrist Dig II 3.0 3.5 14.2 Wrist - Dig  47   L Median - Digit II (Antidromic)      Wrist Dig II 2.8 3.6 38.4 Wrist - Dig  51   R Ulnar - Digit V (Antidromic)      Wrist Dig V 2.8 3.5 23.1 Wrist - Dig V 140 50   R Radial - Anatomical snuff box (Forearm)      Forearm Wrist 1.8 2.3 17.5 Forearm - Wrist 100 56       CSI      Nerve / Sites Rec. Site Peak Lat NP Amp Segments Peak Diff     ms µV  ms   R Median - CSI      Median Thumb 2.9 31.8 Median - Radial 0.6      Radial Thumb 2.2 10.4 Median - Ulnar 0.4      Median Ring 3.6 20.7 Median palm - Ulnar palm       Ulnar Ring 3.2 13.0        CSI    CSI 1.0   L Median - CSI      Median Thumb 2.8 7.2 Median - Radial 0.6      Radial Thumb 2.2 10.4 Median - Ulnar 0.7      Median Ring 3.9 22.4 Median palm - Ulnar palm       Ulnar Ring 3.2 18.4        CSI    CSI 1.3       MNC      Nerve / Sites Muscle Latency  Amplitude Duration Rel Amp Segments Distance Lat Diff Velocity     ms mV ms %  mm ms m/s   R Median - APB      Wrist APB 3.5 8.6 6.4 100 Wrist - APB 80        Elbow APB 8.1 8.5 6.8 98.9 Elbow - Wrist 230 4.6 50   L Median - APB      Wrist APB 3.8 6.6 7.5 100 Wrist - APB 80        Elbow APB 7.9 6.6 8.0 99.8 Elbow - Wrist 240 4.1 58   R Ulnar - ADM      Wrist ADM 2.6 9.1 5.7 100 Wrist - ADM 80        B.Elbow ADM 6.8 9.0 6.0 99.6 B.Elbow - Wrist 230 4.2 55      A.Elbow ADM 9.0 9.0 6.1 99.7 A.Elbow - B.Elbow 130 2.2 58         A.Elbow - Wrist  6.4                                   INTERPRETATION  -Bilateral median motor nerve conduction study showed normal latency, amplitude, and conduction velocity  -Bilateral median sensory nerve conduction study showed normal peak latency and amplitude  -Right ulnar motor nerve conduction study showed normal latency, amplitude, and conduction velocity  -Right ulnar sensory nerve conduction study showed normal peak latency and amplitude  -Right radial sensory nerve conduction study showed normal peak latency and amplitude  -Bilateral combined sensory index showed a significant latency difference of 1.4 msec and 1.1 msec     IMPRESSION  1. ABNORMAL study   2. There is electrodiagnostic evidence of a MILD demyelinating median neuropathy (carpal tunnel syndrome) across BILATERAL wrists     PLAN  1. Follow up with referring provider: Dr. Maximo Vela*  2. Handouts on CTS provided. Cont wrist braces and consider OT hand therapy and low dose gabapentin  3. This study is good for one year. If symptoms worsen or do not improve, please re-consult.    Kelly Rubin M.D.  Physical Medicine and Rehab

## 2019-01-03 NOTE — LETTER
January 3, 2019      Maximo Olguin MD  [address] [facility] [address]           [facility]  [address] [facility] [address]  Phone: [phone]  Fax: [phone]          Patient: Bianca Amato   MR Number: [id number]   YOB: 1980   Date of Visit: 1/3/2019       Dear Dr. Maximo Olguin:    Thank you for referring Bianca Amato to me for evaluation. Attached you will find relevant portions of my assessment and plan of care.    If you have questions, please do not hesitate to call me. I look forward to following Bianca Amato along with you.    Sincerely,    Kelly Rubin MD    Enclosure  CC:  No Recipients    If you would like to receive this communication electronically, please contact [email or web address] or [phone] to request more information on [employer] access.    For providers and/or their staff who would like to refer a patient to [facility], please contact us through our one-stop-shop provider referral line, [facility], at [phone].    If you feel you have received this communication in error or would no longer like to receive these types of communications, please e-mail [email or web address]          Fine mood, not internally pre-occupied

## 2019-01-04 ENCOUNTER — PATIENT MESSAGE (OUTPATIENT)
Dept: ORTHOPEDICS | Facility: CLINIC | Age: 39
End: 2019-01-04

## 2019-01-07 ENCOUNTER — TELEPHONE (OUTPATIENT)
Dept: ORTHOPEDICS | Facility: CLINIC | Age: 39
End: 2019-01-07

## 2019-01-07 NOTE — TELEPHONE ENCOUNTER
----- Message from Alvaro Silva sent at 1/7/2019 12:18 PM CST -----  Contact: self [phone]  Returning call, please call back at [phone].  Md Adolfo

## 2019-01-15 ENCOUNTER — OFFICE VISIT (OUTPATIENT)
Dept: ORTHOPEDICS | Facility: CLINIC | Age: 39
End: 2019-01-15
Payer: COMMERCIAL

## 2019-01-15 VITALS
SYSTOLIC BLOOD PRESSURE: 150 MMHG | DIASTOLIC BLOOD PRESSURE: 98 MMHG | WEIGHT: 207 LBS | BODY MASS INDEX: 34.49 KG/M2 | HEIGHT: 65 IN | HEART RATE: 77 BPM

## 2019-01-15 DIAGNOSIS — I10 ESSENTIAL HYPERTENSION: ICD-10-CM

## 2019-01-15 DIAGNOSIS — R20.0 NUMBNESS AND TINGLING OF RIGHT UPPER EXTREMITY: Primary | ICD-10-CM

## 2019-01-15 DIAGNOSIS — R59.0 AXILLARY LYMPHADENOPATHY: ICD-10-CM

## 2019-01-15 DIAGNOSIS — D64.9 NORMOCYTIC ANEMIA: ICD-10-CM

## 2019-01-15 DIAGNOSIS — R20.2 NUMBNESS AND TINGLING OF RIGHT UPPER EXTREMITY: Primary | ICD-10-CM

## 2019-01-15 DIAGNOSIS — J45.20 MILD INTERMITTENT ASTHMA WITHOUT COMPLICATION: ICD-10-CM

## 2019-01-15 PROCEDURE — 3008F BODY MASS INDEX DOCD: CPT | Mod: CPTII,S$GLB,, | Performed by: FAMILY MEDICINE

## 2019-01-15 PROCEDURE — 3080F PR MOST RECENT DIASTOLIC BLOOD PRESSURE >= 90 MM HG: ICD-10-PCS | Mod: CPTII,S$GLB,, | Performed by: FAMILY MEDICINE

## 2019-01-15 PROCEDURE — 3077F PR MOST RECENT SYSTOLIC BLOOD PRESSURE >= 140 MM HG: ICD-10-PCS | Mod: CPTII,S$GLB,, | Performed by: FAMILY MEDICINE

## 2019-01-15 PROCEDURE — 3008F PR BODY MASS INDEX (BMI) DOCUMENTED: ICD-10-PCS | Mod: CPTII,S$GLB,, | Performed by: FAMILY MEDICINE

## 2019-01-15 PROCEDURE — 99999 PR PBB SHADOW E&M-EST. PATIENT-LVL III: ICD-10-PCS | Mod: PBBFAC,,, | Performed by: FAMILY MEDICINE

## 2019-01-15 PROCEDURE — 99999 PR PBB SHADOW E&M-EST. PATIENT-LVL III: CPT | Mod: PBBFAC,,, | Performed by: FAMILY MEDICINE

## 2019-01-15 PROCEDURE — 99214 OFFICE O/P EST MOD 30 MIN: CPT | Mod: S$GLB,,, | Performed by: FAMILY MEDICINE

## 2019-01-15 PROCEDURE — 3077F SYST BP >= 140 MM HG: CPT | Mod: CPTII,S$GLB,, | Performed by: FAMILY MEDICINE

## 2019-01-15 PROCEDURE — 3080F DIAST BP >= 90 MM HG: CPT | Mod: CPTII,S$GLB,, | Performed by: FAMILY MEDICINE

## 2019-01-15 PROCEDURE — 99214 PR OFFICE/OUTPT VISIT, EST, LEVL IV, 30-39 MIN: ICD-10-PCS | Mod: S$GLB,,, | Performed by: FAMILY MEDICINE

## 2019-01-15 NOTE — PROGRESS NOTES
Subjective:     Patient ID: Bianca Amato is a 38 y.o. female.    Chief Complaint: Follow-up    Patient is a 38-year-old female right-hand dominant with asthma, hypertension, iron deficiency anemia, and axillary lymphadenopathy who presents to clinic today for follow-up of right elbow to hand pain tingling and numbness.  Patient states that she is no longer having any pain or weakness in her right arm, however her numbness is still present.  Patient states her symptoms have not worsened.  States that she had this numbness before her surgery.      From previous note:   Patient states that the symptoms started about 2 months ago.  Patient states that she had axillary lymph node dissection which she thought was contributing to the pain and numbness so went to her surgeon.  Her surgeon evaluated her for axillary source, but felt that was more likely coming from her elbow and/or wrist.  Patient states that the numbness and tingling is becoming more persistent.  States is happening throughout the day and is worse at night.  States that she has to shake can straighten out her arm to make the numbness go away.  Patient denies any previous cortisone injections, physical therapy, imaging, injuries, or surgeries in the past.        Past Medical History:   Diagnosis Date    Hypertension     Iron deficiency anemia     Mild intermittent asthma      Past Surgical History:   Procedure Laterality Date    EXCISION, MASS, AXILLARY Right 11/12/2018    [facility]    TUBAL LIGATION       Family History   Problem Relation Age of Onset    Diabetes Mother     Sarcoidosis Mother     COPD Father     Hypertension Father      Social History     Socioeconomic History    Marital status: Single     Spouse name: Not on file    Number of children: 4    Years of education: Not on file    Highest education level: Not on file   Social Needs    Financial resource strain: Not on file    Food insecurity  "- worry: Not on file    Food insecurity - inability: Not on file    Transportation needs - medical: Not on file    Transportation needs - non-medical: Not on file   Occupational History    Occupation:      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 If you have any questions, ask your nurse or doctor.               CHANGE how you take these medications    hydroCHLOROthiazide 12.5 MG Tab  Commonly known as:  HYDRODIURIL  Take 1 tablet (12.5 mg total) by mouth once daily.  What changed:  when to take this        CONTINUE taking these medications    albuterol 90 mcg/actuation inhaler  Commonly known as:  PROVENTIL HFA  Inhale 2 puffs into the lungs every 6 (six) hours as needed.     ketoconazole 2 % cream  Commonly known as:  NIZORAL  Apply topically 2 (two) times daily.     meloxicam 7.5 MG tablet  Commonly known as:  MOBIC  Take 1 tablet (7.5 mg total) by mouth once daily.          Review of patient's allergies indicates:   Allergen Reactions    Latex, natural rubber Hives    Pcn [penicillins] Hives    Sulfa (sulfonamide antibiotics) Hives and Swelling     Review of Systems   Constitutional: Negative for chills and fever.   Cardiovascular: Negative for leg swelling.   Gastrointestinal: Negative for nausea and vomiting.   Musculoskeletal: Negative for back pain, falls, joint pain and myalgias.   Skin: Negative for rash.   Neurological: Positive for tingling and sensory change. Negative for weakness.        Objective:   Body mass index is 34.45 kg/m².  Vitals:    01/15/19 0721   BP: (!) 150/98   Pulse: 77   Weight: 93.9 kg (207 lb)   Height: 5' 5" (1.651 m)   PainSc: " 0-No pain           General    Nursing note and vitals reviewed.  Constitutional: She is oriented to person, place, and time. She appears well-developed and well-nourished. No distress.   Eyes: Conjunctivae are normal. No scleral icterus.   Pulmonary/Chest: Effort normal.   Neurological: She is alert and oriented to person, place, and time.   Psychiatric: She has a normal mood and affect. Her behavior is normal. Judgment and thought content normal.             Right Hand/Wrist Exam     Inspection   Effusion: Wrist - absent   Deformity: Wrist - deformity     Range of Motion   The patient has normal right hand/wrist ROM.    Tests   Phalens sign: positive  Tinel's sign (median nerve): positive      Other     Neuorologic Exam    Median Distribution: normal  Ulnar Distribution: normal  Radial Distribution: normal      Right Elbow Exam     Inspection   Effusion: absent  Deformity: absent    Range of Motion   The patient has normal right elbow ROM.    Tests   Tinel's sign (cubital tunnel): positive  Tennis Elbow: negative  Golfer's Elbow: negative    Other   Sensation: normal          Narrative     EXAMINATION:  XR HAND COMPLETE 3 VIEW RIGHT    CLINICAL HISTORY:  Pain, unspecified    TECHNIQUE:  AP, lateral, and oblique views of the right hand were performed.    COMPARISON:  None    FINDINGS:  There is no radiographic evidence of acute osseous, articular, or soft tissue abnormality.  Joint spaces are preserved.  No erosive changes demonstrated.      Impression       No acute findings      Electronically signed by: Maximo Coreas MD  Date: 12/10/2018  Time: 08:22        Narrative     EXAMINATION:  XR HUMERUS 2 VIEW RIGHT    CLINICAL HISTORY:  Pain, unspecified    TECHNIQUE:  Two views were obtained of the right humerus.    COMPARISON:  None    FINDINGS:  There is no radiographic evidence of acute osseous, articular, or soft tissue abnormality.      Impression       No acute findings      Electronically signed by: Maximo  MD Joss  Date: 12/10/2018  Time: 08:17           Bianca was seen today for follow-up.    Diagnoses and all orders for this visit:    Numbness and tingling of right upper extremity    Mild intermittent asthma without complication    Essential hypertension    Normocytic anemia    Axillary lymphadenopathy       -reviewed patient's results from her nerve conduction study.  Advised her that she only had mild evidence of carpal tunnel which I would not recommend orthopedic surgery at this time.  Would recommend patient be re-evaluated by her primary care physician for other causes of neuropathy (diabetes vs spinal vs MS).   -will give the patient nighttime splint for her right wrist as well as a tennis elbow strap brace  -Xray images were independently viewed and read by me showing    Right humerus two view:  No acute fractures dislocations.  Elbow joint seems well preserved with no arthritic changes.   Right hand three view complete:  No acute fractures dislocations.  Mild arthritic changes.  Mild osteopenic changes.  No erosive changes noted.  -Formal read by radiologist is as described above  -Discussed findings with patient  -Treatment options and alternatives were discussed with the patient. Patient expressed understanding. Patient was given the opportunity to ask questions and be an active participant in their medical care. Patient had no further questions or concerns at this time.   -Patient is an overall moderate risk for health complications from their medical conditions.

## 2019-01-22 ENCOUNTER — LAB VISIT (OUTPATIENT)
Dept: LAB | Facility: HOSPITAL | Age: 39
End: 2019-01-22
Attending: INTERNAL MEDICINE
Payer: COMMERCIAL

## 2019-01-22 DIAGNOSIS — D50.9 IRON DEFICIENCY ANEMIA, UNSPECIFIED IRON DEFICIENCY ANEMIA TYPE: ICD-10-CM

## 2019-01-22 DIAGNOSIS — D64.9 NORMOCYTIC ANEMIA: ICD-10-CM

## 2019-01-22 LAB
ALBUMIN SERPL BCP-MCNC: 4.1 G/DL
ALP SERPL-CCNC: 91 U/L
ALT SERPL W/O P-5'-P-CCNC: 19 U/L
ANION GAP SERPL CALC-SCNC: 8 MMOL/L
AST SERPL-CCNC: 16 U/L
BASOPHILS # BLD AUTO: 0.01 K/UL
BASOPHILS NFR BLD: 0.1 %
BILIRUB SERPL-MCNC: 0.2 MG/DL
BUN SERPL-MCNC: 12 MG/DL
CALCIUM SERPL-MCNC: 10 MG/DL
CHLORIDE SERPL-SCNC: 107 MMOL/L
CO2 SERPL-SCNC: 25 MMOL/L
CREAT SERPL-MCNC: 0.7 MG/DL
DIFFERENTIAL METHOD: ABNORMAL
EOSINOPHIL # BLD AUTO: 0.2 K/UL
EOSINOPHIL NFR BLD: 2.4 %
ERYTHROCYTE [DISTWIDTH] IN BLOOD BY AUTOMATED COUNT: 14.5 %
EST. GFR  (AFRICAN AMERICAN): >60 ML/MIN/1.73 M^2
EST. GFR  (NON AFRICAN AMERICAN): >60 ML/MIN/1.73 M^2
FERRITIN SERPL-MCNC: 291 NG/ML
GLUCOSE SERPL-MCNC: 88 MG/DL
HCT VFR BLD AUTO: 40.9 %
HGB BLD-MCNC: 13.3 G/DL
IRON SERPL-MCNC: 41 UG/DL
LDH SERPL L TO P-CCNC: 139 U/L
LYMPHOCYTES # BLD AUTO: 3.4 K/UL
LYMPHOCYTES NFR BLD: 44 %
MCH RBC QN AUTO: 29 PG
MCHC RBC AUTO-ENTMCNC: 32.5 G/DL
MCV RBC AUTO: 89 FL
MONOCYTES # BLD AUTO: 0.6 K/UL
MONOCYTES NFR BLD: 7.4 %
NEUTROPHILS # BLD AUTO: 3.6 K/UL
NEUTROPHILS NFR BLD: 46.1 %
PLATELET # BLD AUTO: 430 K/UL
PMV BLD AUTO: 8.9 FL
POTASSIUM SERPL-SCNC: 4.3 MMOL/L
PROT SERPL-MCNC: 7.3 G/DL
RBC # BLD AUTO: 4.59 M/UL
RETICS/RBC NFR AUTO: 1.3 %
SATURATED IRON: 14 %
SODIUM SERPL-SCNC: 140 MMOL/L
TOTAL IRON BINDING CAPACITY: 297 UG/DL
TRANSFERRIN SERPL-MCNC: 201 MG/DL
WBC # BLD AUTO: 7.8 K/UL

## 2019-01-22 PROCEDURE — 83540 ASSAY OF IRON: CPT

## 2019-01-22 PROCEDURE — 80053 COMPREHEN METABOLIC PANEL: CPT

## 2019-01-22 PROCEDURE — 36415 COLL VENOUS BLD VENIPUNCTURE: CPT

## 2019-01-22 PROCEDURE — 85045 AUTOMATED RETICULOCYTE COUNT: CPT

## 2019-01-22 PROCEDURE — 83615 LACTATE (LD) (LDH) ENZYME: CPT

## 2019-01-22 PROCEDURE — 85025 COMPLETE CBC W/AUTO DIFF WBC: CPT

## 2019-01-22 PROCEDURE — 82728 ASSAY OF FERRITIN: CPT

## 2019-02-05 ENCOUNTER — OFFICE VISIT (OUTPATIENT)
Dept: HEMATOLOGY/ONCOLOGY | Facility: CLINIC | Age: 39
End: 2019-02-05
Payer: COMMERCIAL

## 2019-02-05 VITALS
TEMPERATURE: 98 F | OXYGEN SATURATION: 99 % | HEART RATE: 79 BPM | HEIGHT: 65 IN | WEIGHT: 206.81 LBS | BODY MASS INDEX: 34.46 KG/M2 | SYSTOLIC BLOOD PRESSURE: 138 MMHG | RESPIRATION RATE: 18 BRPM | DIASTOLIC BLOOD PRESSURE: 96 MMHG

## 2019-02-05 DIAGNOSIS — D64.9 NORMOCYTIC ANEMIA: Primary | ICD-10-CM

## 2019-02-05 DIAGNOSIS — D50.0 IRON DEFICIENCY ANEMIA DUE TO CHRONIC BLOOD LOSS: ICD-10-CM

## 2019-02-05 PROCEDURE — 99999 PR PBB SHADOW E&M-EST. PATIENT-LVL IV: CPT | Mod: PBBFAC,,, | Performed by: INTERNAL MEDICINE

## 2019-02-05 PROCEDURE — 3075F SYST BP GE 130 - 139MM HG: CPT | Mod: CPTII,S$GLB,, | Performed by: INTERNAL MEDICINE

## 2019-02-05 PROCEDURE — 3080F PR MOST RECENT DIASTOLIC BLOOD PRESSURE >= 90 MM HG: ICD-10-PCS | Mod: CPTII,S$GLB,, | Performed by: INTERNAL MEDICINE

## 2019-02-05 PROCEDURE — 99999 PR PBB SHADOW E&M-EST. PATIENT-LVL IV: ICD-10-PCS | Mod: PBBFAC,,, | Performed by: INTERNAL MEDICINE

## 2019-02-05 PROCEDURE — 99214 PR OFFICE/OUTPT VISIT, EST, LEVL IV, 30-39 MIN: ICD-10-PCS | Mod: S$GLB,,, | Performed by: INTERNAL MEDICINE

## 2019-02-05 PROCEDURE — 3008F BODY MASS INDEX DOCD: CPT | Mod: CPTII,S$GLB,, | Performed by: INTERNAL MEDICINE

## 2019-02-05 PROCEDURE — 3008F PR BODY MASS INDEX (BMI) DOCUMENTED: ICD-10-PCS | Mod: CPTII,S$GLB,, | Performed by: INTERNAL MEDICINE

## 2019-02-05 PROCEDURE — 3075F PR MOST RECENT SYSTOLIC BLOOD PRESS GE 130-139MM HG: ICD-10-PCS | Mod: CPTII,S$GLB,, | Performed by: INTERNAL MEDICINE

## 2019-02-05 PROCEDURE — 99214 OFFICE O/P EST MOD 30 MIN: CPT | Mod: S$GLB,,, | Performed by: INTERNAL MEDICINE

## 2019-02-05 PROCEDURE — 3080F DIAST BP >= 90 MM HG: CPT | Mod: CPTII,S$GLB,, | Performed by: INTERNAL MEDICINE

## 2019-02-05 RX ORDER — FERROUS SULFATE 325(65) MG
325 TABLET ORAL DAILY
Qty: 30 TABLET | Refills: 11 | Status: SHIPPED | OUTPATIENT
Start: 2019-02-05 | End: 2020-02-05

## 2019-02-05 NOTE — PROGRESS NOTES
Hematology/Oncology Office Note    Reason for referral:  Iron deficiency anemia    CC:  Iron deficiency    Referred by:  No ref. provider found    Diagnosis:  Iron deficiency anemia    Treatment:  Oral iron supplementation with mixed results    History of present illness:  38-year-old female referred for iron deficiency anemia secondary to intermittent menorrhagia.  The patient reports that she has taking oral iron supplementation in the past with mixed results.  She was able to tolerate the oral iron supplementation but does not believe it was completely effective in repleting iron stores.  She denies melena, hematochezia, hematuria, or other forms of bleeding.      I have reviewed and updated the HPI, ROS, PMHx, Social Hx, Family Hx and treatment history.    Today's visit:  Patient presents today for routine visit/follow up on iron studies.  She discontinue oral iron supplementation for reasons that are unclear to me.  She continues to have menorrhagia without shortness of breath, dizziness, palpitations, or chest pain.    Past Medical History:   Diagnosis Date    Hypertension     Iron deficiency anemia     Mild intermittent asthma          Social History:  No tobacco, alcohol, or illicit drug    Family History: family history includes COPD in her father; Diabetes in her mother; Hypertension in her father; Sarcoidosis in her mother.        HPI    Review of Systems   Constitutional: Positive for activity change and fatigue. Negative for appetite change, chills, fever and unexpected weight change.   HENT: Negative for congestion, dental problem, drooling, ear discharge, ear pain, facial swelling, mouth sores, nosebleeds, rhinorrhea, sinus pressure, sneezing, sore throat and tinnitus.    Eyes: Negative.    Respiratory: Negative for apnea, cough, choking, shortness of breath, wheezing and stridor.    Cardiovascular: Negative for chest pain, palpitations and leg swelling.   Gastrointestinal: Negative for abdominal  "distention, abdominal pain, anal bleeding, blood in stool, constipation and diarrhea.   Endocrine: Negative.    Genitourinary: Negative for difficulty urinating, dyspareunia, dysuria, enuresis, flank pain and frequency.   Musculoskeletal: Negative for arthralgias, back pain, gait problem and joint swelling.   Skin: Negative for color change and pallor.   Allergic/Immunologic: Negative.  Negative for environmental allergies.   Neurological: Negative for dizziness, seizures, facial asymmetry, light-headedness and headaches.   Hematological: Negative for adenopathy. Does not bruise/bleed easily.   Psychiatric/Behavioral: Negative for confusion, decreased concentration, dysphoric mood and hallucinations. The patient is not nervous/anxious.        Objective:       Vitals:    02/05/19 0858 02/05/19 0902   BP: (!) 157/104 (!) 138/96   Pulse: 82 79   Resp: 18    Temp: 97.6 °F (36.4 °C)    TempSrc: Oral    SpO2: 99%    Weight: 93.8 kg (206 lb 12.7 oz)    Height: 5' 5" (1.651 m)        Physical Exam   Constitutional: She is oriented to person, place, and time. She appears well-developed and well-nourished. No distress.   Well groomed   HENT:   Head: Normocephalic and atraumatic.   Nose: Nose normal.   Mouth/Throat: Oropharynx is clear and moist. No oropharyngeal exudate.   Eyes: Conjunctivae and EOM are normal. Pupils are equal, round, and reactive to light. No scleral icterus.   Neck: Normal range of motion. Neck supple. No JVD present. No tracheal deviation present. No thyromegaly present.   Cardiovascular: Normal rate, regular rhythm, normal heart sounds and intact distal pulses. Exam reveals no gallop and no friction rub.   No murmur heard.  Pulmonary/Chest: Effort normal and breath sounds normal. She has no wheezes. She has no rales. She exhibits no tenderness.   Abdominal: Soft. Bowel sounds are normal. She exhibits no distension and no mass. There is no tenderness. There is no rebound and no guarding. "   Musculoskeletal: Normal range of motion. She exhibits no edema or tenderness.   Lymphadenopathy:     She has no cervical adenopathy.   Neurological: She is alert and oriented to person, place, and time. No cranial nerve deficit. She exhibits normal muscle tone. Coordination normal.   Skin: Skin is warm and dry. Capillary refill takes less than 2 seconds. No abrasion and no rash noted.   Psychiatric: She has a normal mood and affect. Her behavior is normal. Judgment and thought content normal.       Lab Results   Component Value Date    WBC 7.80 01/22/2019    HGB 13.3 01/22/2019    HCT 40.9 01/22/2019    MCV 89 01/22/2019     (H) 01/22/2019     Lab Results   Component Value Date    CREATININE 0.7 01/22/2019    BUN 12 01/22/2019     01/22/2019    K 4.3 01/22/2019     01/22/2019    CO2 25 01/22/2019     Lab Results   Component Value Date    ALT 19 01/22/2019    AST 16 01/22/2019    ALKPHOS 91 01/22/2019    BILITOT 0.2 01/22/2019         Assessment:       38-year-old female referred for significant iron deficiency anemia with secondary thrombocytosis.  Iron deficiency anemia secondary to intermittent menorrhagia.      Patient received Injectafer 750 mg IV x2 doses in 09/2019, however, she discontinued maintenance oral iron supplementation for reasons which are not clear to me.  Labs demonstrate mild iron deficiency with slight thrombocytosis.  I have strongly encouraged the patient to continue oral iron supplementation with ferrous sulfate 325 mg p.o. daily.  She will follow up in 3-4 months with repeat CBC/iron studies.    Iron deficiency anemia/thrombocytosis:  --status post injectafer 750 mg IV x2 doses in 09/2018  --restart ferrous sulfate 325 mg p.o. Daily  --follow-up 3-4 months with repeat labs including CBC/iron studies

## 2019-02-08 ENCOUNTER — PATIENT MESSAGE (OUTPATIENT)
Dept: INTERNAL MEDICINE | Facility: CLINIC | Age: 39
End: 2019-02-08

## 2019-02-08 DIAGNOSIS — J45.20 MILD INTERMITTENT ASTHMA, UNCOMPLICATED: ICD-10-CM

## 2019-02-08 RX ORDER — ALBUTEROL SULFATE 90 UG/1
2 AEROSOL, METERED RESPIRATORY (INHALATION) EVERY 6 HOURS PRN
Qty: 1 INHALER | Refills: 2 | Status: SHIPPED | OUTPATIENT
Start: 2019-02-08 | End: 2020-04-07 | Stop reason: SDUPTHER

## 2019-02-09 RX ORDER — AMLODIPINE BESYLATE 5 MG/1
5 TABLET ORAL DAILY
Qty: 90 TABLET | Refills: 1 | Status: SHIPPED | OUTPATIENT
Start: 2019-02-09 | End: 2019-09-13 | Stop reason: SDUPTHER

## 2019-02-10 NOTE — PROGRESS NOTES
Please notify patient I sent refill of norvasc to [facility] as she did not respond with desired pharmacy. She needs to schedule BP follow up.

## 2019-02-16 ENCOUNTER — TELEPHONE (OUTPATIENT)
Dept: INTERNAL MEDICINE | Facility: CLINIC | Age: 39
End: 2019-02-16

## 2019-02-28 ENCOUNTER — OFFICE VISIT (OUTPATIENT)
Dept: INTERNAL MEDICINE | Facility: CLINIC | Age: 39
End: 2019-02-28
Payer: COMMERCIAL

## 2019-02-28 VITALS
OXYGEN SATURATION: 97 % | BODY MASS INDEX: 34.74 KG/M2 | DIASTOLIC BLOOD PRESSURE: 100 MMHG | SYSTOLIC BLOOD PRESSURE: 140 MMHG | WEIGHT: 208.75 LBS | TEMPERATURE: 98 F | HEART RATE: 90 BPM

## 2019-02-28 DIAGNOSIS — I10 ESSENTIAL HYPERTENSION: ICD-10-CM

## 2019-02-28 DIAGNOSIS — L02.91 ABSCESS: Primary | ICD-10-CM

## 2019-02-28 PROCEDURE — 99999 PR PBB SHADOW E&M-EST. PATIENT-LVL IV: ICD-10-PCS | Mod: PBBFAC,,, | Performed by: FAMILY MEDICINE

## 2019-02-28 PROCEDURE — 3008F PR BODY MASS INDEX (BMI) DOCUMENTED: ICD-10-PCS | Mod: CPTII,S$GLB,, | Performed by: FAMILY MEDICINE

## 2019-02-28 PROCEDURE — 99213 PR OFFICE/OUTPT VISIT, EST, LEVL III, 20-29 MIN: ICD-10-PCS | Mod: 25,S$GLB,, | Performed by: FAMILY MEDICINE

## 2019-02-28 PROCEDURE — 99999 PR PBB SHADOW E&M-EST. PATIENT-LVL IV: CPT | Mod: PBBFAC,,, | Performed by: FAMILY MEDICINE

## 2019-02-28 PROCEDURE — 99213 OFFICE O/P EST LOW 20 MIN: CPT | Mod: 25,S$GLB,, | Performed by: FAMILY MEDICINE

## 2019-02-28 PROCEDURE — 3080F DIAST BP >= 90 MM HG: CPT | Mod: CPTII,S$GLB,, | Performed by: FAMILY MEDICINE

## 2019-02-28 PROCEDURE — 3080F PR MOST RECENT DIASTOLIC BLOOD PRESSURE >= 90 MM HG: ICD-10-PCS | Mod: CPTII,S$GLB,, | Performed by: FAMILY MEDICINE

## 2019-02-28 PROCEDURE — 10060 I&D ABSCESS SIMPLE/SINGLE: CPT | Mod: S$GLB,,, | Performed by: FAMILY MEDICINE

## 2019-02-28 PROCEDURE — 3077F PR MOST RECENT SYSTOLIC BLOOD PRESSURE >= 140 MM HG: ICD-10-PCS | Mod: CPTII,S$GLB,, | Performed by: FAMILY MEDICINE

## 2019-02-28 PROCEDURE — 10060 PR DRAIN SKIN ABSCESS SIMPLE: ICD-10-PCS | Mod: S$GLB,,, | Performed by: FAMILY MEDICINE

## 2019-02-28 PROCEDURE — 3008F BODY MASS INDEX DOCD: CPT | Mod: CPTII,S$GLB,, | Performed by: FAMILY MEDICINE

## 2019-02-28 PROCEDURE — 3077F SYST BP >= 140 MM HG: CPT | Mod: CPTII,S$GLB,, | Performed by: FAMILY MEDICINE

## 2019-02-28 RX ORDER — LIDOCAINE HYDROCHLORIDE 10 MG/ML
1 INJECTION, SOLUTION EPIDURAL; INFILTRATION; INTRACAUDAL; PERINEURAL
Status: DISCONTINUED | OUTPATIENT
Start: 2019-02-28 | End: 2019-02-28

## 2019-02-28 RX ORDER — LIDOCAINE HYDROCHLORIDE 10 MG/ML
1 INJECTION INFILTRATION; PERINEURAL ONCE
Status: DISCONTINUED | OUTPATIENT
Start: 2019-02-28 | End: 2019-02-28

## 2019-02-28 RX ORDER — LIDOCAINE HYDROCHLORIDE 10 MG/ML
1 INJECTION INFILTRATION; PERINEURAL ONCE
Status: COMPLETED | OUTPATIENT
Start: 2019-02-28 | End: 2019-02-28

## 2019-02-28 RX ADMIN — LIDOCAINE HYDROCHLORIDE 1 ML: 10 INJECTION INFILTRATION; PERINEURAL at 03:02

## 2019-02-28 NOTE — PATIENT INSTRUCTIONS
Abscess, Incision and Drainage (Child)  An abscess is an area of skin where bacteria have caused fluid (pus) to form. Bacteria normally live on the skin and dont cause harm. But sometimes bacteria enter the skin through a hair root, or cut or scrape in the skin. If bacteria become trapped under the skin, an abscess can form. An abscess can be caused by an ingrown hair, puncture wound, or insect bite. It can also be caused by a blocked oil gland, pimple, or cyst. Abscesses often occur on skin that is hairy or exposed to friction and sweat. An abscess near a hair root is called a boil.  At first, an abscess is red, raised, firm, and sore to the touch. The area can also feel warm. Then the area will then collect pus.  In some cases, an abscess will be cut and the pus drained out. This is known as incision and drainage, or I and D. It is also sometimes called lancing. A baby may need to stay in the hospital overnight for this procedure. After the procedure, your child may be given antibiotics to help cure the infection. The abscess will likely drain for several days before it dries up. It can take several weeks to heal.  Home care  Your healthcare provider may prescribe an oral or topical antibiotic for your child. Pain medicine may also be prescribed. Follow all instructions when using these medicines with your child.  General care  For babies  · Apply a warm, moist compress to the abscess for 20 minutes up to 3 times a day, or as advised by the doctor. This may help the abscess come to a head, soften, and drain on its own.  · Don't soak the abscess in bath water. This can spread infection. Instead, gently wash the area with soap and warm water.  · Dont cut, pop, or squeeze the abscess. This can be very painful and spread infection.  · If the abscess drains pus on its own, cover the area with a nonstick gauze bandage. Use as little tape as possible to avoid irritating the babys skin. Then call your babys doctor  and follow his or her instructions. Abscesses may drain pus for several days. They need to stay covered during this time. Carefully discard all soiled bandages. They can infect others.  · Change your babys clothes daily. Change sheets and blankets if they are soiled by pus. Wash all clothing and linens in hot water, including cloth diapers. If your babys abscess is on the buttocks, carefully discard diaper wipes and disposable diapers. Dont share any linens with other family members.  For children  · Keep the area covered with a nonstick gauze bandage, as instructed.  · Be careful to prevent the infection from spreading. Wash your hands before and after caring for your child. Wash in hot water any clothes, bedding, and towels that come into contact with the pus. Dont let other family members share unwashed clothes, bedding, or towels.  · Have your child wear clean clothes daily.  · Change the bandage if you see pus in it. Wash the area gently with soap and warm water or as instructed by the healthcare provider. Carefully discard all soiled bandages.  · Dont have your child sit in bath water. This can spread the infection. Have your child take a shower instead of a bath. Or gently wash the area with soap and warm water.  Follow-up care  Follow up with your childs healthcare provider, or as advised.  Special note to parents  Take care to prevent the infection from spreading. Wash your hands with soap and warm water before and after caring for the abscess. Make sure your child or other family members don't touch the abscess. Contact your healthcare provider if other family members have symptoms.  When to seek medical advice  Call your child's healthcare provider right away if any of these occur:  · Fever of 100.4°F (38°C) or higher, or as directed by your child's healthcare provider  · The abscess gets bigger  · The abscess comes back  · Redness and swelling get worse  · Pain doesnt go away, or gets worse. In  babies, pain may show up as fussing that cant be soothed.  · Foul-smelling fluid leaking from the area  · Red streaks in the skin around the area  · Reaction to the medicine  Date Last Reviewed: 12/1/2016  © 9060-3862 [employer]. [address] [place] [address]. All rights reserved. This information is not intended as a substitute for professional medical care. Always follow your healthcare professional's instructions.

## 2019-02-28 NOTE — PROGRESS NOTES
Subjective:       Patient ID: Bianca Amato is a 39 y.o. female.    Chief Complaint: knot under Rt. arm    38 yo female here with right underarm abscess; she is on amlodipine and hctz but stopped both due to drug recalls in the news. Neither of her meds have been recalled. She has an rx waiting at the pharmacy.       She recently had a lymph node biopsy under her right axilla.  The surgical wound is well healed.  She has since noticed a boil forming in a separate area fairly far away from her surgical excision.  She is noted tenderness to the area.  No drainage or bleeding.  No prior history of abscesses.  No surrounding redness or warmth.  No fever chills or systemic symptoms.      does not have any pertinent problems on file.  Past Medical History:   Diagnosis Date    Carpal tunnel syndrome     bilateral    Hypertension     Iron deficiency anemia     Mild intermittent asthma      Past Surgical History:   Procedure Laterality Date    EXCISION, MASS, AXILLARY Right 11/12/2018    Performed by [facility]is [facility] at [facility]    TUBAL LIGATION       Family History   Problem Relation Age of Onset    Diabetes Mother     Sarcoidosis Mother     COPD Father     Hypertension Father      Social History     Socioeconomic History    Marital status: Single     Spouse name: Not on file    Number of children: 4    Years of education: Not on file    Highest education level: Not on file   Social Needs    Financial resource strain: Not on file    Food insecurity - worry: Not on file    Food insecurity - inability: Not on file    Transportation needs - medical: Not on file    Transportation needs - non-medical: Not on file   Occupational History    Occupation:      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   Skin: Negative for rash and wound.         right axilla   Neurological: Negative for dizziness, syncope, weakness, light-headedness, numbness and headaches.   Psychiatric/Behavioral: The patient is not nervous/anxious and is not hyperactive.    All other systems reviewed and are negative.      Objective:     Vitals:    02/28/19 1131   BP: (!) 140/100   Pulse: 90   Temp: 97.9 °F (36.6 °C)        Physical Exam   Constitutional: She is oriented to person, place, and time. She appears well-developed and well-nourished.   HENT:   Head: Normocephalic and atraumatic.   Right Ear: External ear normal.   Left Ear: External ear normal.   Nose: Nose normal.   Eyes: Conjunctivae and EOM are normal. Pupils are equal, round, and reactive to light. No scleral icterus.   Neck: Normal range of motion. Neck supple.   Cardiovascular: Normal rate, regular rhythm, normal heart sounds and intact distal pulses.   No murmur heard.  Neurological: She is alert and oriented to person, place, and time. She displays normal reflexes. No cranial nerve deficit.   Normal gait. No speech abnormality   Skin: Skin is warm and dry.   Palpable abscess to right axilla.  No surrounding cellulitis.  Small area of fluctuance and induration   Psychiatric: She has a normal mood and affect. Her behavior is normal. Judgment and thought content normal.   Nursing note and vitals reviewed.      Assessment:       1. Abscess    2. Essential hypertension        Plan:           Problem List Items Addressed This Visit        Cardiac/Vascular    Essential hypertension    Current Assessment & Plan     Encouraged to resume medications and follow up in 1-2 weeks for blood pressure check with me or her regular primary care physician.           Other Visit Diagnoses     Abscess    -  Primary    Verbal  consent obtained.  Incision and drainage in office today.  Tolerated well.  See procedure note    Relevant Medications    lidocaine HCL 10 mg/ml (1%) injection 1 mL (Completed)

## 2019-02-28 NOTE — PROGRESS NOTES
SUBJECTIVE:   Bianca Amato is a 39 y.o. female who presents for lesion removal. We have already discussed this procedure, including option of not performing surgery, technique of surgery and potential for scarring at a recent visit.    OBJECTIVE:   Patient appears well. Vitals are normal.  Skin: Palpable abscess to right axilla.  No surrounding cellulitis.  Small area of fluctuance and induration    ASSESSMENT:   Palpable abscess to right axilla.  No surrounding cellulitis.  Small area of fluctuance and induration    PLAN:   After informed consent was obtained, using Betadine for cleansing and 1% Lidocaine   without epinephrine for anesthetic, with sterile technique, incision and drainage of abscess was performed. Antibiotic dressing is applied, and wound care instructions provided.  Be alert for any signs of cutaneous infection. The procedure was well tolerated without complications. Follow up: the patient may return prn.

## 2019-02-28 NOTE — ASSESSMENT & PLAN NOTE
Encouraged to resume medications and follow up in 1-2 weeks for blood pressure check with me or her regular primary care physician.

## 2019-05-03 DIAGNOSIS — I10 ESSENTIAL HYPERTENSION: ICD-10-CM

## 2019-05-03 RX ORDER — HYDROCHLOROTHIAZIDE 12.5 MG/1
TABLET ORAL
Qty: 30 TABLET | Refills: 4 | Status: SHIPPED | OUTPATIENT
Start: 2019-05-03 | End: 2020-01-17

## 2019-05-17 ENCOUNTER — PATIENT MESSAGE (OUTPATIENT)
Dept: INTERNAL MEDICINE | Facility: CLINIC | Age: 39
End: 2019-05-17

## 2019-09-05 ENCOUNTER — PATIENT MESSAGE (OUTPATIENT)
Dept: INTERNAL MEDICINE | Facility: CLINIC | Age: 39
End: 2019-09-05

## 2019-09-09 ENCOUNTER — LAB VISIT (OUTPATIENT)
Dept: LAB | Facility: HOSPITAL | Age: 39
End: 2019-09-09
Payer: COMMERCIAL

## 2019-09-09 ENCOUNTER — OFFICE VISIT (OUTPATIENT)
Dept: INTERNAL MEDICINE | Facility: CLINIC | Age: 39
End: 2019-09-09
Payer: COMMERCIAL

## 2019-09-09 VITALS
HEIGHT: 65 IN | HEART RATE: 91 BPM | BODY MASS INDEX: 34.78 KG/M2 | SYSTOLIC BLOOD PRESSURE: 138 MMHG | OXYGEN SATURATION: 98 % | TEMPERATURE: 97 F | DIASTOLIC BLOOD PRESSURE: 88 MMHG | WEIGHT: 208.75 LBS

## 2019-09-09 DIAGNOSIS — R35.0 URINARY FREQUENCY: ICD-10-CM

## 2019-09-09 DIAGNOSIS — M54.50 ACUTE BILATERAL LOW BACK PAIN WITHOUT SCIATICA: Primary | ICD-10-CM

## 2019-09-09 LAB
BILIRUB UR QL STRIP: NEGATIVE
CLARITY UR: CLEAR
COLOR UR: YELLOW
GLUCOSE UR QL STRIP: NEGATIVE
HGB UR QL STRIP: NEGATIVE
KETONES UR QL STRIP: NEGATIVE
LEUKOCYTE ESTERASE UR QL STRIP: NEGATIVE
NITRITE UR QL STRIP: NEGATIVE
PH UR STRIP: 6 [PH] (ref 5–8)
PROT UR QL STRIP: NEGATIVE
SP GR UR STRIP: 1.01 (ref 1–1.03)
URN SPEC COLLECT METH UR: NORMAL

## 2019-09-09 PROCEDURE — 99999 PR PBB SHADOW E&M-EST. PATIENT-LVL IV: CPT | Mod: PBBFAC,,, | Performed by: NURSE PRACTITIONER

## 2019-09-09 PROCEDURE — 96372 THER/PROPH/DIAG INJ SC/IM: CPT | Mod: S$GLB,,, | Performed by: NURSE PRACTITIONER

## 2019-09-09 PROCEDURE — 3008F BODY MASS INDEX DOCD: CPT | Mod: CPTII,S$GLB,, | Performed by: NURSE PRACTITIONER

## 2019-09-09 PROCEDURE — 3075F PR MOST RECENT SYSTOLIC BLOOD PRESS GE 130-139MM HG: ICD-10-PCS | Mod: CPTII,S$GLB,, | Performed by: NURSE PRACTITIONER

## 2019-09-09 PROCEDURE — 3008F PR BODY MASS INDEX (BMI) DOCUMENTED: ICD-10-PCS | Mod: CPTII,S$GLB,, | Performed by: NURSE PRACTITIONER

## 2019-09-09 PROCEDURE — 99999 PR PBB SHADOW E&M-EST. PATIENT-LVL IV: ICD-10-PCS | Mod: PBBFAC,,, | Performed by: NURSE PRACTITIONER

## 2019-09-09 PROCEDURE — 3079F PR MOST RECENT DIASTOLIC BLOOD PRESSURE 80-89 MM HG: ICD-10-PCS | Mod: CPTII,S$GLB,, | Performed by: NURSE PRACTITIONER

## 2019-09-09 PROCEDURE — 96372 PR INJECTION,THERAP/PROPH/DIAG2ST, IM OR SUBCUT: ICD-10-PCS | Mod: S$GLB,,, | Performed by: NURSE PRACTITIONER

## 2019-09-09 PROCEDURE — 87086 URINE CULTURE/COLONY COUNT: CPT

## 2019-09-09 PROCEDURE — 99214 OFFICE O/P EST MOD 30 MIN: CPT | Mod: 25,S$GLB,, | Performed by: NURSE PRACTITIONER

## 2019-09-09 PROCEDURE — 81003 URINALYSIS AUTO W/O SCOPE: CPT

## 2019-09-09 PROCEDURE — 3079F DIAST BP 80-89 MM HG: CPT | Mod: CPTII,S$GLB,, | Performed by: NURSE PRACTITIONER

## 2019-09-09 PROCEDURE — 3075F SYST BP GE 130 - 139MM HG: CPT | Mod: CPTII,S$GLB,, | Performed by: NURSE PRACTITIONER

## 2019-09-09 PROCEDURE — 99214 PR OFFICE/OUTPT VISIT, EST, LEVL IV, 30-39 MIN: ICD-10-PCS | Mod: 25,S$GLB,, | Performed by: NURSE PRACTITIONER

## 2019-09-09 RX ORDER — METHOCARBAMOL 500 MG/1
500 TABLET, FILM COATED ORAL 4 TIMES DAILY
Qty: 20 TABLET | Refills: 0 | Status: SHIPPED | OUTPATIENT
Start: 2019-09-09 | End: 2019-09-13 | Stop reason: SDUPTHER

## 2019-09-09 RX ORDER — KETOROLAC TROMETHAMINE 30 MG/ML
30 INJECTION, SOLUTION INTRAMUSCULAR; INTRAVENOUS
Status: COMPLETED | OUTPATIENT
Start: 2019-09-09 | End: 2019-09-09

## 2019-09-09 RX ORDER — KETOROLAC TROMETHAMINE 10 MG/1
10 TABLET, FILM COATED ORAL EVERY 8 HOURS PRN
Qty: 21 TABLET | Refills: 0 | Status: SHIPPED | OUTPATIENT
Start: 2019-09-09 | End: 2019-09-16

## 2019-09-09 RX ADMIN — KETOROLAC TROMETHAMINE 30 MG: 30 INJECTION, SOLUTION INTRAMUSCULAR; INTRAVENOUS at 08:09

## 2019-09-09 NOTE — PROGRESS NOTES
Subjective:       Patient ID: Bianca Amato is a 39 y.o. female.    Chief Complaint: Back Pain    HPI    Pt reports low back pain and urinary frequency x 1-2 days  Pain is 8/10. No inciting event  No recurrent UTIs, no fever, hematuria, kidney stone hx, STD s/s, or any other acute issues  Took aleve which helped  Feels occasional shooting pain in both legs, no weakness/tingling/numbness, no bowel/bladder issues    Past Medical History:   Diagnosis Date    Carpal tunnel syndrome     bilateral    Hypertension     Iron deficiency anemia     Mild intermittent asthma      Past Surgical History:   Procedure Laterality Date    EXCISION, MASS, AXILLARY Right 11/12/2018    [facility]    TUBAL LIGATION       Social History     Socioeconomic History    Marital status: Single     Spouse name: Not on file    Number of children: 4    Years of education: Not on file    Highest education level: Not on file   Occupational History    Occupation:      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   Social Needs    Financial resource strain: Not on file    Food insecurity:     Worry: Not on file     Inability: Not on file    Transportation needs:     Medical: Not on file     Non-medical: Not on file   Tobacco Use    Smoking status: Never Smoker    Smokeless tobacco: Never Used   Substance and Sexual Activity    Alcohol use: No    Drug use: No    Sexual activity: Yes     Partners: Male   Lifestyle    Physical activity:     Days per week: Not on file     Minutes per session: Not on file    Stress: Not on file   Relationships    Social connections:     Talks on phone: Not on file     Gets together: Not on file     Attends Evangelical service: Not on file     Active member of club or organization: Not on file     Attends meetings of clubs or organizations: Not on file     Relationship status: Not on file   Other Topics Concern    Not on file   Social History Narrative    Not on  file     Review of patient's allergies indicates:   Allergen Reactions    Latex, natural rubber Hives    Pcn [penicillins] Hives    Sulfa (sulfonamide antibiotics) Hives and Swelling     Current Outpatient Medications   Medication Sig    albuterol (PROVENTIL HFA) 90 mcg/actuation inhaler Inhale 2 puffs into the lungs every 6 (six) hours as needed.    amLODIPine (NORVASC) 5 MG tablet Take 1 tablet (5 mg total) by mouth once daily.    ferrous sulfate (FEOSOL) 325 mg (65 mg iron) Tab tablet Take 1 tablet (325 mg total) by mouth once daily.    hydroCHLOROthiazide (HYDRODIURIL) 12.5 MG Tab TAKE 1 TABLET BY MOUTH EVERY DAY    ketoconazole (NIZORAL) 2 % cream Apply topically 2 (two) times daily.    ketorolac (TORADOL) 10 mg tablet Take 1 tablet (10 mg total) by mouth every 8 (eight) hours as needed for Pain.    methocarbamol (ROBAXIN) 500 MG Tab Take 1 tablet (500 mg total) by mouth 4 (four) times daily. for 5 days     No current facility-administered medications for this visit.            Review of Systems   Constitutional: Negative for activity change, chills, diaphoresis, fatigue, fever and unexpected weight change.   HENT: Negative for congestion, ear pain, postnasal drip, rhinorrhea, sinus pressure, sinus pain, sneezing, sore throat, tinnitus, trouble swallowing and voice change.    Eyes: Negative for photophobia, pain and visual disturbance.   Respiratory: Negative for cough, chest tightness, shortness of breath and wheezing.    Cardiovascular: Negative for chest pain, palpitations and leg swelling.   Gastrointestinal: Negative for abdominal distention, abdominal pain, constipation, diarrhea, nausea and vomiting.   Genitourinary: Positive for frequency. Negative for decreased urine volume, difficulty urinating, dysuria, flank pain, hematuria and urgency.   Musculoskeletal: Positive for back pain. Negative for arthralgias, joint swelling, neck pain and neck stiffness.   Allergic/Immunologic: Negative for  immunocompromised state.   Neurological: Negative for dizziness, tremors, seizures, syncope, facial asymmetry, speech difficulty, weakness, light-headedness, numbness and headaches.   Hematological: Negative for adenopathy. Does not bruise/bleed easily.   Psychiatric/Behavioral: Negative for confusion and sleep disturbance.       Objective:      Physical Exam   Constitutional: She is oriented to person, place, and time.   HENT:   Head: Normocephalic and atraumatic.   Neck: Normal range of motion. Neck supple.   Cardiovascular: Normal rate, regular rhythm and normal heart sounds.   Pulmonary/Chest: Effort normal and breath sounds normal.   Abdominal: Soft. Bowel sounds are normal.   Musculoskeletal:        Lumbar back: She exhibits decreased range of motion, tenderness, pain and spasm. She exhibits no bony tenderness, no swelling, no edema, no deformity, no laceration and normal pulse.   Neurological: She is alert and oriented to person, place, and time.   Skin: Skin is warm and dry.   Psychiatric: She has a normal mood and affect.       Assessment:     Vitals:    09/09/19 0832   BP: 138/88   Pulse: 91   Temp: 97.3 °F (36.3 °C)         1. Acute bilateral low back pain without sciatica    2. Urinary frequency        Plan:   Acute bilateral low back pain without sciatica  -     ketorolac injection 30 mg    Urinary frequency  -     Urinalysis; Future; Expected date: 09/09/2019  -     Urine culture; Future; Expected date: 09/09/2019    Other orders  -     methocarbamol (ROBAXIN) 500 MG Tab; Take 1 tablet (500 mg total) by mouth 4 (four) times daily. for 5 days  Dispense: 20 tablet; Refill: 0  -     ketorolac (TORADOL) 10 mg tablet; Take 1 tablet (10 mg total) by mouth every 8 (eight) hours as needed for Pain.  Dispense: 21 tablet; Refill: 0          As above  Heat/ice/rest  Work excuse for today

## 2019-09-09 NOTE — LETTER
September 9, 2019      [facility] Medicine  [address]  [place] [address]  Phone: [phone]  Fax: [phone]       Patient: Bianca Amato   YOB: 1980  Date of Visit: 09/09/2019    To Whom It May Concern:    Emerita Amato  was at [facility] on 09/09/2019. She may return to work/school on 09/10/19 With/no restrictions. If you have any questions or concerns, or if I can be of further assistance, please do not hesitate to contact me.    Sincerely,          Bing Dominique MA

## 2019-09-10 LAB
BACTERIA UR CULT: NORMAL
BACTERIA UR CULT: NORMAL

## 2019-09-13 ENCOUNTER — HOSPITAL ENCOUNTER (OUTPATIENT)
Dept: RADIOLOGY | Facility: HOSPITAL | Age: 39
Discharge: HOME | End: 2019-09-13
Attending: NURSE PRACTITIONER
Payer: COMMERCIAL

## 2019-09-13 ENCOUNTER — OFFICE VISIT (OUTPATIENT)
Dept: INTERNAL MEDICINE | Facility: CLINIC | Age: 39
End: 2019-09-13
Payer: COMMERCIAL

## 2019-09-13 VITALS
DIASTOLIC BLOOD PRESSURE: 82 MMHG | TEMPERATURE: 98 F | WEIGHT: 207.25 LBS | HEIGHT: 65 IN | BODY MASS INDEX: 34.53 KG/M2 | OXYGEN SATURATION: 98 % | SYSTOLIC BLOOD PRESSURE: 138 MMHG | HEART RATE: 100 BPM

## 2019-09-13 DIAGNOSIS — R59.0 AXILLARY LYMPHADENOPATHY: ICD-10-CM

## 2019-09-13 DIAGNOSIS — M54.41 ACUTE MIDLINE LOW BACK PAIN WITH BILATERAL SCIATICA: ICD-10-CM

## 2019-09-13 DIAGNOSIS — I10 ESSENTIAL HYPERTENSION: ICD-10-CM

## 2019-09-13 DIAGNOSIS — L30.9 ECZEMA, UNSPECIFIED TYPE: ICD-10-CM

## 2019-09-13 DIAGNOSIS — L73.9 FOLLICULITIS: ICD-10-CM

## 2019-09-13 DIAGNOSIS — M54.41 ACUTE MIDLINE LOW BACK PAIN WITH BILATERAL SCIATICA: Primary | ICD-10-CM

## 2019-09-13 DIAGNOSIS — M54.42 ACUTE MIDLINE LOW BACK PAIN WITH BILATERAL SCIATICA: ICD-10-CM

## 2019-09-13 DIAGNOSIS — M54.42 ACUTE MIDLINE LOW BACK PAIN WITH BILATERAL SCIATICA: Primary | ICD-10-CM

## 2019-09-13 PROCEDURE — 72080 XR THORACOLUMBAR SPINE AP LATERAL: ICD-10-PCS | Mod: 26,,, | Performed by: RADIOLOGY

## 2019-09-13 PROCEDURE — 72080 X-RAY EXAM THORACOLMB 2/> VW: CPT | Mod: 26,,, | Performed by: RADIOLOGY

## 2019-09-13 PROCEDURE — 3079F PR MOST RECENT DIASTOLIC BLOOD PRESSURE 80-89 MM HG: ICD-10-PCS | Mod: CPTII,S$GLB,, | Performed by: NURSE PRACTITIONER

## 2019-09-13 PROCEDURE — 99214 OFFICE O/P EST MOD 30 MIN: CPT | Mod: S$GLB,,, | Performed by: NURSE PRACTITIONER

## 2019-09-13 PROCEDURE — 3075F PR MOST RECENT SYSTOLIC BLOOD PRESS GE 130-139MM HG: ICD-10-PCS | Mod: CPTII,S$GLB,, | Performed by: NURSE PRACTITIONER

## 2019-09-13 PROCEDURE — 3008F PR BODY MASS INDEX (BMI) DOCUMENTED: ICD-10-PCS | Mod: CPTII,S$GLB,, | Performed by: NURSE PRACTITIONER

## 2019-09-13 PROCEDURE — 3008F BODY MASS INDEX DOCD: CPT | Mod: CPTII,S$GLB,, | Performed by: NURSE PRACTITIONER

## 2019-09-13 PROCEDURE — 72080 X-RAY EXAM THORACOLMB 2/> VW: CPT | Mod: TC

## 2019-09-13 PROCEDURE — 3079F DIAST BP 80-89 MM HG: CPT | Mod: CPTII,S$GLB,, | Performed by: NURSE PRACTITIONER

## 2019-09-13 PROCEDURE — 99214 PR OFFICE/OUTPT VISIT, EST, LEVL IV, 30-39 MIN: ICD-10-PCS | Mod: S$GLB,,, | Performed by: NURSE PRACTITIONER

## 2019-09-13 PROCEDURE — 3075F SYST BP GE 130 - 139MM HG: CPT | Mod: CPTII,S$GLB,, | Performed by: NURSE PRACTITIONER

## 2019-09-13 PROCEDURE — 99999 PR PBB SHADOW E&M-EST. PATIENT-LVL IV: CPT | Mod: PBBFAC,,, | Performed by: NURSE PRACTITIONER

## 2019-09-13 PROCEDURE — 99999 PR PBB SHADOW E&M-EST. PATIENT-LVL IV: ICD-10-PCS | Mod: PBBFAC,,, | Performed by: NURSE PRACTITIONER

## 2019-09-13 RX ORDER — MUPIROCIN 20 MG/G
OINTMENT TOPICAL 3 TIMES DAILY
Qty: 22 G | Refills: 0 | Status: SHIPPED | OUTPATIENT
Start: 2019-09-13 | End: 2019-10-10 | Stop reason: ALTCHOICE

## 2019-09-13 RX ORDER — AMLODIPINE BESYLATE 5 MG/1
5 TABLET ORAL DAILY
Qty: 90 TABLET | Refills: 1 | Status: SHIPPED | OUTPATIENT
Start: 2019-09-13 | End: 2020-07-20 | Stop reason: SDUPTHER

## 2019-09-13 RX ORDER — METHOCARBAMOL 500 MG/1
500 TABLET, FILM COATED ORAL 4 TIMES DAILY
Qty: 20 TABLET | Refills: 0 | Status: SHIPPED | OUTPATIENT
Start: 2019-09-13 | End: 2019-09-18

## 2019-09-13 NOTE — PROGRESS NOTES
"Bianca Amato 39 y.o. female     Chief Complaint:  Chief Complaint   Patient presents with    Back Pain       History of Present Illness:    Pt c/o mid lower back pain x 1 week with radiation down bilateral legs and intermittint tingling to both feet, exacerbated by sitting, better when standing.  Urinating often, - UA when seen by internal medicine on 9/9 (4 days ago). She has taken robaxin with some relief.  Also c/o abdominal pain a few days ago which is slowly improved, denies incontinent episodes. Also concerned about "lumps" under right axilla present for about a week.  Hx of bilateral lymph node biopsies, negative.  Denies fever or chills, but tender.  Also c/o "spot" to left lateral upper arm "may be ring worm."  Denies itching.      Answers for HPI/ROS submitted by the patient on 9/12/2019   Back pain  Chronicity: new  Onset: in the past 7 days  Frequency: rarely  Progression since onset: unchanged  Pain location: sacro-iliac  Pain quality: aching  Radiates to: does not radiate  Pain - numeric: 8/10  Pain is: the same all the time  Aggravated by: bending, coughing, position, lying down, sitting, standing, twisting  Stiffness is present: all day  bladder incontinence: No  bowel incontinence: Yes  leg pain: No  numbness: Yes  paresis: No  paresthesias: Yes  pelvic pain: No  perianal numbness: No  genital pain: No  Risk factors: obesity  Pain severity: moderate  Treatments tried: analgesics  Improvement on treatment: no relief      Exam:  Review of Systems   Constitutional: Negative for chills, diaphoresis, fever, malaise/fatigue and weight loss.   Eyes: Negative for blurred vision.   Respiratory: Negative for cough.    Cardiovascular: Negative for chest pain and palpitations.   Gastrointestinal: Positive for abdominal pain. Negative for constipation, diarrhea, nausea and vomiting.   Genitourinary: Positive for frequency. Negative for dysuria and hematuria.   Musculoskeletal: Positive for back pain. Negative " for falls.   Skin: Negative for itching and rash.   Neurological: Negative for tingling, weakness and headaches.     Physical Exam   Constitutional: She is oriented to person, place, and time. Vital signs are normal. She appears well-developed and well-nourished. No distress.   HENT:   Head: Normocephalic and atraumatic.   Eyes: Pupils are equal, round, and reactive to light. Conjunctivae and EOM are normal. Right eye exhibits no discharge. Left eye exhibits no discharge. No scleral icterus.   Neck: Normal range of motion. No tracheal deviation present.   Cardiovascular: Normal rate, regular rhythm and normal heart sounds.   Pulmonary/Chest: Effort normal and breath sounds normal. No stridor. No respiratory distress. She has no wheezes. She has no rales. She exhibits no tenderness.   Abdominal: Soft. Normal appearance and bowel sounds are normal. She exhibits no distension and no ascites. There is no tenderness.   Musculoskeletal: She exhibits no edema.        Right shoulder: She exhibits no swelling.        Lumbar back: She exhibits decreased range of motion, tenderness and pain. She exhibits no bony tenderness, no swelling, no edema, no deformity and no laceration.   Negative straight leg raises bilaterally    Lymphadenopathy:     She has no axillary adenopathy.   Unable to palpate axillary lymph nodes due to adipose interference    Neurological: She is alert and oriented to person, place, and time.   Skin: Skin is warm and dry. Capillary refill takes less than 2 seconds. No abrasion, no bruising, no burn, no ecchymosis, no laceration, no lesion, no petechiae and no rash noted. Rash is not vesicular. She is not diaphoretic. No erythema.        Flat dry patch to posterior FRANKLIN    Tender pustule <1x1 cm to lateral left axilla    Psychiatric: She has a normal mood and affect. Her behavior is normal. Judgment and thought content normal.   Nursing note and vitals reviewed.      Most Recent Laboratory Results Reviewed  ({Yes)  Lab Results   Component Value Date    WBC 7.80 01/22/2019    HGB 13.3 01/22/2019    HCT 40.9 01/22/2019     (H) 01/22/2019    CHOL 180 08/24/2018    TRIG 147 08/24/2018    HDL 48 08/24/2018    ALT 19 01/22/2019    AST 16 01/22/2019     01/22/2019    K 4.3 01/22/2019     01/22/2019    CREATININE 0.7 01/22/2019    BUN 12 01/22/2019    CO2 25 01/22/2019    TSH 0.195 (L) 08/24/2018       Assessment     ICD-10-CM ICD-9-CM   1. Acute midline low back pain with bilateral sciatica M54.42 724.2    M54.41 724.3   2. Essential hypertension I10 401.9   3. Eczema, unspecified type L30.9 692.9   4. Folliculitis L73.9 704.8   5. Axillary lymphadenopathy R59.0 785.6        Plan   Acute midline low back pain with bilateral sciatica  -     X-Ray Thoracolumbar Spine AP Lateral - no significant abnormalities   -     methocarbamol (ROBAXIN) 500 MG Tab; Take 1 tablet (500 mg total) by mouth 4 (four) times daily. for 5 days  Dispense: 20 tablet; Refill: 0  - instructed on heating pad use, NSAIDS/tylenol     Essential hypertension  -     amLODIPine (NORVASC) 5 MG tablet; Take 1 tablet (5 mg total) by mouth once daily.  Dispense: 90 tablet; Refill: 1    Eczema, unspecified type  - avoid perfumes/frangrances   - aquafor topically     Folliculitis  -     mupirocin (BACTROBAN) 2 % ointment; Apply topically 3 (three) times daily.  Dispense: 22 g; Refill: 0  - call if does not improve     Axillary lymphadenopathy  - this dx influenced clinical decision making          Follow up: No follow-ups on file.

## 2019-09-13 NOTE — LETTER
/  September 13, 2019      [facility]  [address] [place] [address]  Phone: [phone]  Fax: [phone]       Patient: Bianca Amato   YOB: 1980  Date of Visit: 09/13/2019    To Whom It May Concern:    Emerita Amato  was at [facility] on 09/13/2019. She may return to work/school on 09/16/19 with no  restrictions. If you have any questions or concerns, or if I can be of further assistance, please do not hesitate to contact me.    Sincerely,    CHANTAL Paige LPN

## 2019-09-13 NOTE — PATIENT INSTRUCTIONS
Atopic Dermatitis (Adult)  Atopic dermatitis is a dry, itchy, red rash. Its also called eczema. The rash is chronic, or ongoing. It can come and go over time. The disease is often passed down in families. It causes a problem with the skin barrier that makes the skin more sensitive to the environment and other factors. The increased skin sensitivity causes an itch, which causes scratching. Scratching can worsen the itching or also break the skin. This can put the skin at risk of infection.  The condition is most common in people with asthma, hay fever, hives, or dry or sensitive skin. The rash may be caused by extreme heat or heavy sweating. Skin irritants can cause the rash to flare up. These can include wool or silk clothing, grease, oils, some medicines, and harsh soaps and detergents. Emotional stress can also be a trigger.  Treatment is done to relieve the itching and inflammation of the skin.  Home care  Follow these tips to care for your condition:  · Keep the areas of rash clean by bathing at least every other day. Use lukewarm water to bathe. Dont use hot water, which can dry out the skin.  · Dont use soaps with strong detergents. Use mild soaps made for sensitive skin.  · Apply a cream or ointment to damp skin right after bathing.  · Avoid things that irritate your skin. Wear absorbent, soft fabrics next to the skin rather than rough or scratchy materials.  · Use mild laundry soap free of scents and perfumes. Make sure to rinse all the soap out of your clothes.  · Treat any skin infection as directed.  · Use oral diphenhydramine to help reduce itching. This is an antihistamine you can buy at drug and grocery stores. It can make you sleepy, so use lower doses during the daytime. Or you can use loratadine. This is an antihistamine that will not make you sleepy. Do not use diphenhydramine if you have glaucoma or have trouble urinating due to an enlarged prostate.  Follow-up care  See your healthcare  provider, or as advised. If your symptoms dont get better or if they get worse in the next 7 days, make an appointment with your healthcare provider.  When to seek medical advice  Call your healthcare provider right away  if any of these occur:  · Increasing area of redness or pain in the skin  · Yellow crusts or wet drainage from the rash  · Fever of 100.4°F (38°C) or higher, or as directed by your healthcare provider  Date Last Reviewed: 9/1/2016 © 2000-2017 [employer]. [address] [place] [address]. All rights reserved. This information is not intended as a substitute for professional medical care. Always follow your healthcare professional's instructions.        Possible Causes of Low Back or Leg Pain    The symptoms in your back or leg may be due to pressure on a nerve. This pressure may be caused by a damaged disk or by abnormal bone growth. Either way, you may feel pain, burning, tingling, or numbness. If you have pressure on a nerve that connects to the sciatic nerve, pain may shoot down your leg.    Pressure from the disk  Constant wear and tear can weaken a disk over time and cause back pain. The disk can then be damaged by a sudden movement or injury. If its soft center begins to bulge, the disk may press on a nerve. Or the outside of the disk may tear, and the soft center may squeeze through and pinch a nerve.    Pressure from bone  As a disk wears out, the vertebrae right above and below the disk begin to touch. This can put pressure on a nerve. Often, abnormal bone (called bone spurs) grows where the vertebrae rub against each other. This can cause the foramen or the spinal canal to narrow (called stenosis) and press against a nerve.  Date Last Reviewed: 10/4/2015  © 0600-4373 [employer]. [address] [place] [address]. All rights reserved. This information is not intended as a substitute for professional medical care. Always follow your healthcare  professional's instructions.        Folliculitis  Folliculitis is an inflammation of a hair follicle. A hair follicle is the little pocket where a hair grows out of the skin. Bacteria normally live on the skin. But sometimes bacteria can get trapped in a follicle and cause infection. This causes a bumpy rash. The area over the follicles is red and raised. It may itch or be painful. The bumps may have fluid (pus) inside. The pus may leak and then form crusts. Sores can spread to other areas of the body. Once it goes away, folliculitis can come back at any time. Severe cases may cause permanent hair loss and scarring.  Folliculitis can happen anywhere on the body where hair grows. It can be caused by rubbing from tight clothing. Ingrown hairs can cause it. Soaking in a hot tub or swimming pool that has bacteria in the water can cause it. It may also occur if a hair follicle is blocked by a bandage.  Sores often go away in a few days with no treatment. In some cases, medicine may be given. A small piece of skin or pus may be taken to find the type of bacteria causing the infection.  Home care  The healthcare provider may prescribe an antibiotic cream or ointment.  Oral antibiotics may also be prescribed. Or you may be told to use an over-the-counter antibiotic cream. Follow all instructions when using any of these medicines.  General care:  · Apply warm, moist compresses to the sores for 20 minutes up to 3 times a day. You can make a compress by soaking a cloth in warm water. Squeeze out excess water.  · Dont cut, poke, or squeeze the sores. This can be painful and spread infection.  · Dont scratch the affected area. Scratching can delay healing.  · Dont shave the areas affected by folliculitis.  · If the sores leak fluid, cover the area with a nonstick gauze bandage. Use as little tape as possible. Carefully discard all soiled bandages.  · Dress in loose cotton clothing.  · Change sheets and blankets if they are  soiled by pus. Wash all clothes, towels, sheets, and cloth diapers in soap and hot water. Do not share clothes, towels, or sheets with other family members.  · Do not soak the sores in bath water. This can spread infection. Instead, keep the area clean by gently washing sores with soap and warm water.  · Wash your hands or use antibacterial gels often to prevent spreading the bacteria.  Follow-up care  Follow up with your healthcare provider, or as advised.  When to seek medical advice  Call your healthcare provider right away if any of these occur:  · Fever of 100.4°F (38°C) or higher  · Spreading of the rash  · Rash does not get better with treatment  · Redness or swelling that gets worse  · Rash becomes more painful  · Foul-smelling fluid leaking from the skin  · Rash improves, but then comes back   Date Last Reviewed: 11/1/2016  © 4122-7906 [employer]. [address] [place] [address], [place] [address]. All rights reserved. This information is not intended as a substitute for professional medical care. Always follow your healthcare professional's instructions.

## 2019-09-16 ENCOUNTER — PATIENT MESSAGE (OUTPATIENT)
Dept: INTERNAL MEDICINE | Facility: CLINIC | Age: 39
End: 2019-09-16

## 2019-10-10 ENCOUNTER — OFFICE VISIT (OUTPATIENT)
Dept: INTERNAL MEDICINE | Facility: CLINIC | Age: 39
End: 2019-10-10
Payer: COMMERCIAL

## 2019-10-10 VITALS
BODY MASS INDEX: 35.4 KG/M2 | WEIGHT: 212.5 LBS | HEART RATE: 80 BPM | DIASTOLIC BLOOD PRESSURE: 78 MMHG | TEMPERATURE: 98 F | SYSTOLIC BLOOD PRESSURE: 130 MMHG | OXYGEN SATURATION: 98 % | HEIGHT: 65 IN

## 2019-10-10 DIAGNOSIS — L30.9 ECZEMA, UNSPECIFIED TYPE: ICD-10-CM

## 2019-10-10 DIAGNOSIS — H61.23 BILATERAL IMPACTED CERUMEN: Primary | ICD-10-CM

## 2019-10-10 DIAGNOSIS — J45.20 MILD INTERMITTENT ASTHMA WITHOUT COMPLICATION: ICD-10-CM

## 2019-10-10 PROCEDURE — 90686 FLU VACCINE (QUAD) GREATER THAN OR EQUAL TO 3YO PRESERVATIVE FREE IM: ICD-10-PCS | Mod: S$GLB,,, | Performed by: NURSE PRACTITIONER

## 2019-10-10 PROCEDURE — 3075F PR MOST RECENT SYSTOLIC BLOOD PRESS GE 130-139MM HG: ICD-10-PCS | Mod: CPTII,S$GLB,, | Performed by: NURSE PRACTITIONER

## 2019-10-10 PROCEDURE — 3075F SYST BP GE 130 - 139MM HG: CPT | Mod: CPTII,S$GLB,, | Performed by: NURSE PRACTITIONER

## 2019-10-10 PROCEDURE — 3078F PR MOST RECENT DIASTOLIC BLOOD PRESSURE < 80 MM HG: ICD-10-PCS | Mod: CPTII,S$GLB,, | Performed by: NURSE PRACTITIONER

## 2019-10-10 PROCEDURE — 3008F BODY MASS INDEX DOCD: CPT | Mod: CPTII,S$GLB,, | Performed by: NURSE PRACTITIONER

## 2019-10-10 PROCEDURE — 90471 IMMUNIZATION ADMIN: CPT | Mod: S$GLB,,, | Performed by: NURSE PRACTITIONER

## 2019-10-10 PROCEDURE — 99999 PR PBB SHADOW E&M-EST. PATIENT-LVL IV: ICD-10-PCS | Mod: PBBFAC,,, | Performed by: NURSE PRACTITIONER

## 2019-10-10 PROCEDURE — 99213 OFFICE O/P EST LOW 20 MIN: CPT | Mod: 25,S$GLB,, | Performed by: NURSE PRACTITIONER

## 2019-10-10 PROCEDURE — 90686 IIV4 VACC NO PRSV 0.5 ML IM: CPT | Mod: S$GLB,,, | Performed by: NURSE PRACTITIONER

## 2019-10-10 PROCEDURE — 99999 PR PBB SHADOW E&M-EST. PATIENT-LVL IV: CPT | Mod: PBBFAC,,, | Performed by: NURSE PRACTITIONER

## 2019-10-10 PROCEDURE — 90471 FLU VACCINE (QUAD) GREATER THAN OR EQUAL TO 3YO PRESERVATIVE FREE IM: ICD-10-PCS | Mod: S$GLB,,, | Performed by: NURSE PRACTITIONER

## 2019-10-10 PROCEDURE — 3078F DIAST BP <80 MM HG: CPT | Mod: CPTII,S$GLB,, | Performed by: NURSE PRACTITIONER

## 2019-10-10 PROCEDURE — 99213 PR OFFICE/OUTPT VISIT, EST, LEVL III, 20-29 MIN: ICD-10-PCS | Mod: 25,S$GLB,, | Performed by: NURSE PRACTITIONER

## 2019-10-10 PROCEDURE — 3008F PR BODY MASS INDEX (BMI) DOCUMENTED: ICD-10-PCS | Mod: CPTII,S$GLB,, | Performed by: NURSE PRACTITIONER

## 2019-10-10 NOTE — PATIENT INSTRUCTIONS
Debrox drops (over the counter)   F/u with ENT   Aquafor for dry skin   Avoid fragrances in lotions/soaps/detergents

## 2019-10-10 NOTE — PROGRESS NOTES
Bianca Amato 39 y.o. female     Chief Complaint:  Chief Complaint   Patient presents with    hearing loss       History of Present Illness:  C/o diffuculty hearing over last week.  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  No ear pain/drainage, HA, dizzness.      Exam:  Review of Systems   Constitutional: Negative for chills, diaphoresis, fever, malaise/fatigue and weight loss.   HENT: Positive for hearing loss. Negative for congestion, ear discharge, ear pain, nosebleeds, sinus pain, sore throat and tinnitus.    Eyes: Negative for blurred vision.   Respiratory: Negative for cough, sputum production and shortness of breath.    Cardiovascular: Negative for chest pain and palpitations.   Gastrointestinal: Negative for abdominal pain, constipation, diarrhea, nausea and vomiting.   Genitourinary: Positive for frequency. Negative for dysuria and hematuria.   Skin: Negative for itching and rash.   Neurological: Negative for tingling, weakness and headaches.     Physical Exam   Constitutional: She is oriented to person, place, and time. She appears well-developed and well-nourished. She is cooperative. No distress.   HENT:   Head: Normocephalic and atraumatic.   Right Ear: External ear normal. No drainage. Decreased hearing is noted.   Left Ear: External ear normal. No drainage. Decreased hearing is noted.   Mouth/Throat: No oropharyngeal exudate.   Bilateral cerumen impaction occludingTMs    Eyes: Pupils are equal, round, and reactive to light. Conjunctivae and EOM are normal. Right eye exhibits no discharge. Left eye exhibits no discharge. No scleral icterus.   Neck: Normal range of motion. No tracheal deviation present.   Cardiovascular: Normal rate and regular rhythm.   Pulmonary/Chest: Effort normal and breath sounds normal. No stridor. No respiratory distress. She has no wheezes. She has no rales. She exhibits no tenderness.   Abdominal: Soft. Bowel sounds are normal. She exhibits no  distension. There is no tenderness. There is no guarding.   Musculoskeletal: Normal range of motion. She exhibits no edema.   Neurological: She is alert and oriented to person, place, and time.   Skin: Skin is warm and dry. No rash noted. She is not diaphoretic. No erythema.   Psychiatric: She has a normal mood and affect. Her behavior is normal. Judgment and thought content normal.   Nursing note and vitals reviewed.      Most Recent Laboratory Results Reviewed ({Yes)  Lab Results   Component Value Date    WBC 7.80 01/22/2019    HGB 13.3 01/22/2019    HCT 40.9 01/22/2019     (H) 01/22/2019    CHOL 180 08/24/2018    TRIG 147 08/24/2018    HDL 48 08/24/2018    ALT 19 01/22/2019    AST 16 01/22/2019     01/22/2019    K 4.3 01/22/2019     01/22/2019    CREATININE 0.7 01/22/2019    BUN 12 01/22/2019    CO2 25 01/22/2019    TSH 0.195 (L) 08/24/2018       Assessment     ICD-10-CM ICD-9-CM   1. Bilateral impacted cerumen H61.23 380.4   2. Mild intermittent asthma without complication J45.20 493.90   3. Eczema, unspecified type L30.9 692.9        Plan   Bilateral impacted cerumen  -     Ambulatory Referral to ENT  - attempted to remove, but could not get it all   - instructed on OTC debrox drops     Mild intermittent asthma without complication  - reports controlled, does not use maintnence meds, rescue inhaler only   -     Influenza - Quadrivalent (PF)    Eczema, unspecified type  - avoid fragranced lotions/soaps/detergents   - aquafor daily   - avoid prolonged/hot showers/baths      Follow up if symptoms worsen or fail to improve.  Future Appointments     Date Provider Specialty Appt Notes    10/14/2019 Miryam Griffith PA-C Otolaryngology impacted cerumen    10/22/2019 VENESSA Davidson MD Obstetrics and Gynecology Abdominal pain

## 2019-10-14 ENCOUNTER — OFFICE VISIT (OUTPATIENT)
Dept: OTOLARYNGOLOGY | Facility: CLINIC | Age: 39
End: 2019-10-14
Payer: COMMERCIAL

## 2019-10-14 VITALS
WEIGHT: 212.06 LBS | BODY MASS INDEX: 35.29 KG/M2 | SYSTOLIC BLOOD PRESSURE: 164 MMHG | DIASTOLIC BLOOD PRESSURE: 102 MMHG | TEMPERATURE: 98 F | HEART RATE: 89 BPM

## 2019-10-14 DIAGNOSIS — H61.23 BILATERAL HEARING LOSS DUE TO CERUMEN IMPACTION: Primary | ICD-10-CM

## 2019-10-14 PROCEDURE — 99999 PR PBB SHADOW E&M-EST. PATIENT-LVL III: CPT | Mod: PBBFAC,,, | Performed by: PHYSICIAN ASSISTANT

## 2019-10-14 PROCEDURE — 99499 NO LOS: ICD-10-PCS | Mod: S$GLB,,, | Performed by: PHYSICIAN ASSISTANT

## 2019-10-14 PROCEDURE — 99999 PR PBB SHADOW E&M-EST. PATIENT-LVL III: ICD-10-PCS | Mod: PBBFAC,,, | Performed by: PHYSICIAN ASSISTANT

## 2019-10-14 PROCEDURE — 69210 REMOVE IMPACTED EAR WAX UNI: CPT | Mod: S$GLB,,, | Performed by: PHYSICIAN ASSISTANT

## 2019-10-14 PROCEDURE — 69210 PR REMOVAL IMPACTED CERUMEN REQUIRING INSTRUMENTATION, UNILATERAL: ICD-10-PCS | Mod: S$GLB,,, | Performed by: PHYSICIAN ASSISTANT

## 2019-10-14 PROCEDURE — 99499 UNLISTED E&M SERVICE: CPT | Mod: S$GLB,,, | Performed by: PHYSICIAN ASSISTANT

## 2019-10-14 NOTE — LETTER
October 14, 2019      [place]  [id number] [place] [address] [address] [place] [address]           [facility]  [address] [place] [address] [address] [place] [address]  Phone: [phone]  Fax: [phone]          Patient: Bianca Amato   MR Number: [id number]   YOB: 1980   Date of Visit: 10/14/2019       Dear Marti Sexton:    Thank you for referring Bianca Amato to me for evaluation. Attached you will find relevant portions of my assessment and plan of care.    If you have questions, please do not hesitate to call me. I look forward to following Bianca Amato along with you.    Sincerely,    Miryam Griffith PA-C    Enclosure  CC:  No Recipients    If you would like to receive this communication electronically, please contact [email or web address] or [phone] to request more information on [employer] Link access.    For providers and/or their staff who would like to refer a patient to [facility], please contact us through our one-stop-shop provider referral line, [facility] , at [phone].    If you feel you have received this communication in error or would no longer like to receive these types of communications, please e-mail [email or web address]

## 2019-10-22 ENCOUNTER — OFFICE VISIT (OUTPATIENT)
Dept: OBSTETRICS AND GYNECOLOGY | Facility: CLINIC | Age: 39
End: 2019-10-22
Payer: COMMERCIAL

## 2019-10-22 VITALS
WEIGHT: 217.38 LBS | DIASTOLIC BLOOD PRESSURE: 94 MMHG | BODY MASS INDEX: 36.22 KG/M2 | SYSTOLIC BLOOD PRESSURE: 146 MMHG | HEIGHT: 65 IN

## 2019-10-22 DIAGNOSIS — N93.8 DUB (DYSFUNCTIONAL UTERINE BLEEDING): Primary | ICD-10-CM

## 2019-10-22 DIAGNOSIS — Z12.39 BREAST SCREENING: ICD-10-CM

## 2019-10-22 PROCEDURE — 3080F PR MOST RECENT DIASTOLIC BLOOD PRESSURE >= 90 MM HG: ICD-10-PCS | Mod: CPTII,S$GLB,, | Performed by: OBSTETRICS & GYNECOLOGY

## 2019-10-22 PROCEDURE — 99999 PR PBB SHADOW E&M-EST. PATIENT-LVL III: CPT | Mod: PBBFAC,,, | Performed by: OBSTETRICS & GYNECOLOGY

## 2019-10-22 PROCEDURE — 3008F BODY MASS INDEX DOCD: CPT | Mod: CPTII,S$GLB,, | Performed by: OBSTETRICS & GYNECOLOGY

## 2019-10-22 PROCEDURE — 88305 TISSUE EXAM BY PATHOLOGIST: CPT | Performed by: PATHOLOGY

## 2019-10-22 PROCEDURE — 99204 PR OFFICE/OUTPT VISIT, NEW, LEVL IV, 45-59 MIN: ICD-10-PCS | Mod: 25,S$GLB,, | Performed by: OBSTETRICS & GYNECOLOGY

## 2019-10-22 PROCEDURE — 88305 TISSUE SPECIMEN TO PATHOLOGY, OBSTETRICS/GYNECOLOGY: ICD-10-PCS | Mod: 26,,, | Performed by: PATHOLOGY

## 2019-10-22 PROCEDURE — 3077F PR MOST RECENT SYSTOLIC BLOOD PRESSURE >= 140 MM HG: ICD-10-PCS | Mod: CPTII,S$GLB,, | Performed by: OBSTETRICS & GYNECOLOGY

## 2019-10-22 PROCEDURE — 3008F PR BODY MASS INDEX (BMI) DOCUMENTED: ICD-10-PCS | Mod: CPTII,S$GLB,, | Performed by: OBSTETRICS & GYNECOLOGY

## 2019-10-22 PROCEDURE — 88305 TISSUE EXAM BY PATHOLOGIST: CPT | Mod: 26,,, | Performed by: PATHOLOGY

## 2019-10-22 PROCEDURE — 87491 CHLMYD TRACH DNA AMP PROBE: CPT

## 2019-10-22 PROCEDURE — 58100 BIOPSY OF UTERUS LINING: CPT | Mod: S$GLB,,, | Performed by: OBSTETRICS & GYNECOLOGY

## 2019-10-22 PROCEDURE — 3077F SYST BP >= 140 MM HG: CPT | Mod: CPTII,S$GLB,, | Performed by: OBSTETRICS & GYNECOLOGY

## 2019-10-22 PROCEDURE — 99204 OFFICE O/P NEW MOD 45 MIN: CPT | Mod: 25,S$GLB,, | Performed by: OBSTETRICS & GYNECOLOGY

## 2019-10-22 PROCEDURE — 58100 PR BIOPSY OF UTERUS LINING: ICD-10-PCS | Mod: S$GLB,,, | Performed by: OBSTETRICS & GYNECOLOGY

## 2019-10-22 PROCEDURE — 3080F DIAST BP >= 90 MM HG: CPT | Mod: CPTII,S$GLB,, | Performed by: OBSTETRICS & GYNECOLOGY

## 2019-10-22 PROCEDURE — 99999 PR PBB SHADOW E&M-EST. PATIENT-LVL III: ICD-10-PCS | Mod: PBBFAC,,, | Performed by: OBSTETRICS & GYNECOLOGY

## 2019-10-22 RX ORDER — NORETHINDRONE 5 MG/1
5 TABLET ORAL DAILY
Qty: 12 TABLET | Refills: 11 | Status: ON HOLD | OUTPATIENT
Start: 2019-10-22 | End: 2019-12-07 | Stop reason: HOSPADM

## 2019-10-22 NOTE — PATIENT INSTRUCTIONS
Dysfunctional Uterine Bleeding    Dysfunctional uterine bleeding is a condition in which bleeding is abnormal and occurs at unexpected times of the month. This happens because of changes in the hormones that help control a womans menstrual cycle each month.  The bleeding may be heavier or lighter than normal. If you have heavy bleeding often, this can lead to a problem called anemia. With anemia, your red blood cell count is too low. Red blood cells are needed because they help carry oxygen throughout your body. Severe anemia may cause you to look pale and feel very weak or tired. You might also become short of breath easily.  To treat dysfunctional uterine bleeding, medicines are often tried first. If these dont help, further testing and treatments may be needed. Discuss all of your options with your provider.  Home care  Medicines  If youre prescribed medicines, be sure to take them as directed. Some of the more common medicines you may be prescribed include:  · Hormone therapy (Options include most methods of hormonal birth control such as pills, shots, or a hormone-releasing IUD)  · Nonsteroidal anti-inflammatory drugs (NSAIDs), such as ibuprofen  · Iron supplements, if you have anemia     General care  · Get plenty of rest if you tire easily. Avoid heavy exertion.  · To help relieve pain or cramping that may occur with bleeding, try using a heating pad on the lower belly or back. A warm bath may also help.  Follow-up care  Follow up with your healthcare provider as directed.  When to seek medical advice  Call your healthcare provider right away if:  · Bleeding becomes heavy (soaking 1 pad or tampon every hour for 3 hours)  · Increased abdominal pain  · Irregular bleeding worsens or does not get better even with treatment  · Fever of 100.4ºF (38ºC) or higher, or as directed by your provider  · Signs of anemia, such as pale skin, extreme fatigue or weakness, or shortness of breath  · Dizziness or  fainting   Date Last Reviewed: 6/11/2015  © 6669-9439 [employer]. [address] [place] [address], [place] [address]. All rights reserved. This information is not intended as a substitute for professional medical care. Always follow your healthcare professional's instructions.        Endometrial Biopsy    Endometrial biopsy is a procedure used to study the endometrium (lining of the uterus). It is usually done in your health care providers office. During the biopsy, small tissue samples are taken from inside the uterus. These are then sent to a lab for study. If any problems are found, you and your health care provider will discuss treatment options. The biopsy usually takes only a few minutes, and you can often go back to your normal routine as soon as the procedure is over.  Reasons for the procedure  Endometrial biopsy may help pinpoint the cause of certain problems. These include:  · Bleeding after menopause  · Heavy or irregular menstrual periods  · Bleeding associated with hormone therapy  · Prolonged bleeding  · Abnormal Pap test results  · Having certain types of cancer  · Trouble getting pregnant (fertility problems)  What are the risks?  Problems with endometrial biopsy are rare, but can include:  · Bleeding  · Infection  · Damage to the uterine wall (very rare)  Getting ready for the procedure  Your health care provider will ask about your health and any medicines you take, like blood thinners. Before your biopsy, you may have tests to make sure youre not pregnant or have an infection. You may also be asked to sign a consent form. A day or 2 before the procedure:   · Avoid using creams or other vaginal medicines.  · Avoid douching.  · Ask your health care provider if you should take pain medicines shortly before the test.  During the biopsy  During the biopsy, you will likely experience the following:  · You will be asked to lie on an exam table with your knees bent, just as you do for a Pap  test.  · You may have a brief pelvic exam. An instrument called a speculum is then inserted into the vagina to hold it open.  · An antiseptic solution may be applied to the cervix. The cervix may also be numbed with an anesthetic or dilated to widen the opening.  · A small tube is passed through the cervix into the uterus.  · It is normal to feel some cramping when the tube is inserted. But tell your health care provider if you have severe cramping or are very uncomfortable.  · Using mild suction, samples are taken from the uterine lining. You may feel pinching or additional cramping when this is done.  · The tube and speculum are then removed and the samples are sent to a lab for study.  After the procedure  After the procedure, you may experience the following:  · If you feel lightheaded or dizzy, you can rest on the table until youre ready to get dressed.  · For a few hours, you may feel some mild cramping. This can usually be relieved with over-the-counter pain medicines.  · You may have some bleeding for a few days. Use pads instead of tampons.  · Dont douche or use any vaginal medicines unless your health care provider says its OK.  · Ask your health care provider when its OK to have sex again.  Follow-up care  It will take about a week for the biopsy results to come back from the lab. Then you and your health care provider can discuss the results. These may show that no treatment is required. Or, you may be scheduled for a follow-up appointment and more tests. If your biopsy was done for fertility problems, be sure to record the day when your next period begins.     Call your health care provider   Contact your health care provider if you have any of the following:  · Heavy bleeding (more than a pad an hour for 2 hours).  · Severe cramping or increasing pain.  · Fever over 100.4°F (38.0°C)  · Foul-smelling or unusual vaginal discharge.   Date Last Reviewed: 5/10/2015  © 2297-8045 [employer] [address] [place] [address]. All rights reserved. This information is not intended as a substitute for professional medical care. Always follow your healthcare professional's instructions.

## 2019-10-22 NOTE — PROGRESS NOTES
Subjective:       Patient ID: Bianca Amato is a 39 y.o. female.    Chief Complaint:  Pelvic Pain (c/o lower abdominal pain, vaginal discharge x 1 month, irregular periods x 4 months.)      History of Present Illness  HPI  Having irregular and some heavy vaginal bleeding with cramping  Pap normal in 2018   Pelvic organs are intact.   Mammogram due.     Health Maintenance   Topic Date Due    Mammogram  2020    Pap Smear with HPV Cotest  2021    TETANUS VACCINE  2028    Lipid Panel  Completed     GYN & OB History  Patient's last menstrual period was 10/13/2019 (exact date).   Date of Last Pap: No result found    OB History    Para Term  AB Living   4 4 4     4   SAB TAB Ectopic Multiple Live Births                  # Outcome Date GA Lbr Ashwin/2nd Weight Sex Delivery Anes PTL Lv   4 Term            3 Term            2 Term            1 Term                Review of Systems  Review of Systems        Objective:   Physical Exam:   Constitutional: She appears well-developed and well-nourished. No distress.      Neck: No JVD present. No thyroid mass and no thyromegaly present.    Cardiovascular: Normal rate and regular rhythm.     Pulmonary/Chest: Right breast exhibits no inverted nipple, no mass, no nipple discharge, no skin change, no tenderness, no bleeding and no swelling. Left breast exhibits no inverted nipple, no mass, no nipple discharge, no skin change, no tenderness, no bleeding and no swelling.        Abdominal: Soft. Normal appearance and bowel sounds are normal. There is no hepatosplenomegaly. No hernia. Hernia confirmed negative in the ventral area, confirmed negative in the right inguinal area and confirmed negative in the left inguinal area.     Genitourinary: Rectum normal, vagina normal and uterus normal. There is no rash, tenderness, lesion or injury on the right labia. There is no rash, tenderness, lesion or injury on the left labia. Uterus is not deviated, not  enlarged, not fixed, not tender and not experiencing uterine prolapse. Cervix is normal. Right adnexum displays no mass, no tenderness and no fullness. Left adnexum displays no mass, no tenderness and no fullness. No erythema, tenderness or bleeding in the vagina. No foreign body in the vagina. No vaginal discharge found. Labial bartholins normal.Cervix exhibits no motion tenderness, no discharge and no friability.                   DNA probe done of the cervix     Endometrial biopsy    Verbal consent for biopsy done, explained risk of uterine perforation and level of discomfort from the procedure. No alternative available.     Cervix visualized,   Anterior cervical lip grasped with tenaculum   Pipelle passed to 9 cm without resistance   Adequate tissue removed and sent to pathology for evaluation  Instruments remove  Less than 1 cc blood loss     Patient tolerated the procedure well.        Assessment:        1. DUB (dysfunctional uterine bleeding)    2. Breast screening                Plan:            Bianca was seen today for pelvic pain.    Diagnoses and all orders for this visit:    DUB (dysfunctional uterine bleeding)  Comments:  Will follow up on biopsy and ultrasound.  Trial of Aygestin if benign   Discussed surgical options if no improvement   Orders:  -     Tissue Specimen To Pathology, Obstetrics/Gynecology  -     Endometrial biopsy; Future  -     US Pelvis Complete Non OB; Future  -     C. trachomatis/N. gonorrhoeae by AMP DNA  -     norethindrone (AYGESTIN) 5 mg Tab; Take 1 tablet (5 mg total) by mouth once daily. for 12 days    Breast screening  -     Mammo Digital Screening Bilat; Future

## 2019-10-23 ENCOUNTER — TELEPHONE (OUTPATIENT)
Dept: FAMILY MEDICINE | Facility: CLINIC | Age: 39
End: 2019-10-23

## 2019-10-23 LAB
C TRACH DNA SPEC QL NAA+PROBE: NOT DETECTED
N GONORRHOEA DNA SPEC QL NAA+PROBE: NOT DETECTED

## 2019-10-23 NOTE — TELEPHONE ENCOUNTER
Patient said she needs a biometric screening completed for her job by October 31st. I advised patient to schedule via [facility] after office hours, or she can call on the morning that she would like to come in to request a same-day appointment. She verbally verified understanding.    ----- Message from Mallika Benson sent at 10/23/2019  1:54 PM CDT -----  ..Type:  Sooner Apoointment Request    Caller is requesting a sooner appointment.  Caller declined first available appointment listed below.  Caller will not accept being placed on the waitlist and is requesting a message be sent to doctor.  Name of Caller:patient   When is the first available appointment?12/09  Symptoms:physical  Would the patient rather a call back or a response via [facility]?  Best Call Back Number:.[phone] (home)     Additional Information:

## 2019-10-29 ENCOUNTER — OFFICE VISIT (OUTPATIENT)
Dept: INTERNAL MEDICINE | Facility: CLINIC | Age: 39
End: 2019-10-29
Payer: COMMERCIAL

## 2019-10-29 VITALS
BODY MASS INDEX: 35.37 KG/M2 | TEMPERATURE: 98 F | SYSTOLIC BLOOD PRESSURE: 140 MMHG | HEART RATE: 100 BPM | OXYGEN SATURATION: 98 % | WEIGHT: 212.31 LBS | HEIGHT: 65 IN | DIASTOLIC BLOOD PRESSURE: 100 MMHG

## 2019-10-29 DIAGNOSIS — D50.9 IRON DEFICIENCY ANEMIA, UNSPECIFIED IRON DEFICIENCY ANEMIA TYPE: ICD-10-CM

## 2019-10-29 DIAGNOSIS — Z00.00 ANNUAL PHYSICAL EXAM: Primary | ICD-10-CM

## 2019-10-29 DIAGNOSIS — R59.0 AXILLARY LYMPHADENOPATHY: ICD-10-CM

## 2019-10-29 DIAGNOSIS — J45.20 MILD INTERMITTENT ASTHMA WITHOUT COMPLICATION: ICD-10-CM

## 2019-10-29 DIAGNOSIS — G56.03 BILATERAL CARPAL TUNNEL SYNDROME: ICD-10-CM

## 2019-10-29 DIAGNOSIS — I10 ESSENTIAL HYPERTENSION: ICD-10-CM

## 2019-10-29 PROCEDURE — 99999 PR PBB SHADOW E&M-EST. PATIENT-LVL IV: ICD-10-PCS | Mod: PBBFAC,,, | Performed by: NURSE PRACTITIONER

## 2019-10-29 PROCEDURE — 3077F PR MOST RECENT SYSTOLIC BLOOD PRESSURE >= 140 MM HG: ICD-10-PCS | Mod: CPTII,S$GLB,, | Performed by: NURSE PRACTITIONER

## 2019-10-29 PROCEDURE — 99396 PREV VISIT EST AGE 40-64: CPT | Mod: S$GLB,,, | Performed by: NURSE PRACTITIONER

## 2019-10-29 PROCEDURE — 3080F DIAST BP >= 90 MM HG: CPT | Mod: CPTII,S$GLB,, | Performed by: NURSE PRACTITIONER

## 2019-10-29 PROCEDURE — 3080F PR MOST RECENT DIASTOLIC BLOOD PRESSURE >= 90 MM HG: ICD-10-PCS | Mod: CPTII,S$GLB,, | Performed by: NURSE PRACTITIONER

## 2019-10-29 PROCEDURE — 3077F SYST BP >= 140 MM HG: CPT | Mod: CPTII,S$GLB,, | Performed by: NURSE PRACTITIONER

## 2019-10-29 PROCEDURE — 99396 PR PREVENTIVE VISIT,EST,40-64: ICD-10-PCS | Mod: S$GLB,,, | Performed by: NURSE PRACTITIONER

## 2019-10-29 PROCEDURE — 99999 PR PBB SHADOW E&M-EST. PATIENT-LVL IV: CPT | Mod: PBBFAC,,, | Performed by: NURSE PRACTITIONER

## 2019-10-29 NOTE — PROGRESS NOTES
Bianca Amato 39 y.o. female     Chief Complaint:  Chief Complaint   Patient presents with    wellness form for work     PCP Bozena Chand       History of Present Illness:  Pt presents for wellness (needs labs and paperwork faxed)   Did not take MARISOL meds today - 140/100  Will return tomorrow for fasting labs and BP recheck   Saw ENT since LOV and had cerum impactions removed     Exam:  Review of Systems   Constitutional: Negative for chills, diaphoresis, fever, malaise/fatigue and weight loss.   HENT: Negative for congestion, ear discharge, ear pain, hearing loss, nosebleeds, sinus pain, sore throat and tinnitus.    Eyes: Negative for blurred vision and discharge.   Respiratory: Negative for cough, sputum production, shortness of breath and wheezing.    Cardiovascular: Negative for chest pain and palpitations.   Gastrointestinal: Negative for abdominal pain, blood in stool, constipation, diarrhea, nausea and vomiting.   Genitourinary: Negative for dysuria, frequency and hematuria.   Musculoskeletal: Positive for back pain (chronic, but improving with PT ). Negative for neck pain.   Skin: Negative for itching and rash.   Neurological: Negative for tingling, weakness and headaches.   Endo/Heme/Allergies: Negative for polydipsia.     Physical Exam   Constitutional: She is oriented to person, place, and time. She appears well-developed and well-nourished. She is cooperative.  Non-toxic appearance. She does not have a sickly appearance. She does not appear ill. No distress.   HENT:   Head: Normocephalic and atraumatic.   Right Ear: Hearing, tympanic membrane, external ear and ear canal normal. No drainage. No decreased hearing is noted.   Left Ear: Hearing, tympanic membrane, external ear and ear canal normal. No drainage. No decreased hearing is noted.   Nose: Nose normal.   Mouth/Throat: Uvula is midline, oropharynx is clear and moist and mucous membranes are normal. No oropharyngeal exudate.   Eyes: Pupils are equal,  round, and reactive to light. Conjunctivae and EOM are normal. Right eye exhibits no discharge. Left eye exhibits no discharge. No scleral icterus.   Neck: Normal range of motion. No tracheal deviation present.   Cardiovascular: Normal rate, regular rhythm and normal heart sounds.   Pulmonary/Chest: Effort normal and breath sounds normal. No stridor. No respiratory distress. She has no wheezes. She has no rales. She exhibits no tenderness.   Abdominal: Soft. Bowel sounds are normal. She exhibits no distension and no mass. There is no tenderness. There is no rebound and no guarding.   Musculoskeletal: Normal range of motion. She exhibits no edema.   Neurological: She is alert and oriented to person, place, and time.   Skin: Skin is warm and dry. No rash noted. She is not diaphoretic. No erythema.   Psychiatric: She has a normal mood and affect. Her behavior is normal. Judgment and thought content normal.   Nursing note and vitals reviewed.      Most Recent Laboratory Results Reviewed ({Yes)  Lab Results   Component Value Date    WBC 7.71 10/30/2019    HGB 13.4 10/30/2019    HCT 40.5 10/30/2019     (H) 10/30/2019    CHOL 172 10/30/2019    TRIG 102 10/30/2019    HDL 49 10/30/2019    ALT 21 10/30/2019    AST 18 10/30/2019     10/30/2019    K 3.5 10/30/2019     10/30/2019    CREATININE 0.7 10/30/2019    BUN 9 10/30/2019    CO2 25 10/30/2019    TSH 0.195 (L) 08/24/2018       Assessment     ICD-10-CM ICD-9-CM   1. Annual physical exam Z00.00 V70.0   2. Essential hypertension I10 401.9   3. Mild intermittent asthma without complication J45.20 493.90   4. Bilateral carpal tunnel syndrome G56.03 354.0   5. Iron deficiency anemia, unspecified iron deficiency anemia type D50.9 280.9   6. Axillary lymphadenopathy R59.0 785.6        Plan   Annual physical exam  -     CBC auto differential; Future; Expected date: 10/29/2019  -     Lipid panel pending     Essential hypertension  -     Comprehensive metabolic  panel; Future; Expected date: 10/29/2019  - elevated today, but has not taken meds     Mild intermittent asthma without complication  - controlled, denies need for meds     Bilateral carpal tunnel syndrome  - tolerable     Iron deficiency anemia, unspecified iron deficiency anemia type  -     CBC auto differential; Future; Expected date: 10/29/2019    Axillary lymphadenopathy  - reports resolved          Follow up in about 1 year (around 10/29/2020) for nurse BP check , PCP follow up.  Future Appointments     Date Specialty Appt Notes    11/5/2019 Radiology     12/18/2019 Radiology

## 2019-10-30 ENCOUNTER — LAB VISIT (OUTPATIENT)
Dept: LAB | Facility: HOSPITAL | Age: 39
End: 2019-10-30
Payer: COMMERCIAL

## 2019-10-30 ENCOUNTER — CLINICAL SUPPORT (OUTPATIENT)
Dept: INTERNAL MEDICINE | Facility: CLINIC | Age: 39
End: 2019-10-30
Payer: COMMERCIAL

## 2019-10-30 ENCOUNTER — PATIENT MESSAGE (OUTPATIENT)
Dept: INTERNAL MEDICINE | Facility: CLINIC | Age: 39
End: 2019-10-30

## 2019-10-30 VITALS — DIASTOLIC BLOOD PRESSURE: 86 MMHG | SYSTOLIC BLOOD PRESSURE: 134 MMHG

## 2019-10-30 DIAGNOSIS — I10 ESSENTIAL HYPERTENSION: ICD-10-CM

## 2019-10-30 DIAGNOSIS — Z00.00 ANNUAL PHYSICAL EXAM: ICD-10-CM

## 2019-10-30 DIAGNOSIS — D50.9 IRON DEFICIENCY ANEMIA, UNSPECIFIED IRON DEFICIENCY ANEMIA TYPE: ICD-10-CM

## 2019-10-30 LAB
ALBUMIN SERPL BCP-MCNC: 4 G/DL (ref 3.5–5.2)
ALP SERPL-CCNC: 89 U/L (ref 55–135)
ALT SERPL W/O P-5'-P-CCNC: 21 U/L (ref 10–44)
ANION GAP SERPL CALC-SCNC: 10 MMOL/L (ref 8–16)
AST SERPL-CCNC: 18 U/L (ref 10–40)
BASOPHILS # BLD AUTO: 0.04 K/UL (ref 0–0.2)
BASOPHILS NFR BLD: 0.5 % (ref 0–1.9)
BILIRUB SERPL-MCNC: 0.5 MG/DL (ref 0.1–1)
BUN SERPL-MCNC: 9 MG/DL (ref 6–20)
CALCIUM SERPL-MCNC: 9.7 MG/DL (ref 8.7–10.5)
CHLORIDE SERPL-SCNC: 101 MMOL/L (ref 95–110)
CHOLEST SERPL-MCNC: 172 MG/DL (ref 120–199)
CHOLEST/HDLC SERPL: 3.5 {RATIO} (ref 2–5)
CO2 SERPL-SCNC: 25 MMOL/L (ref 23–29)
CREAT SERPL-MCNC: 0.7 MG/DL (ref 0.5–1.4)
DIFFERENTIAL METHOD: ABNORMAL
EOSINOPHIL # BLD AUTO: 0.4 K/UL (ref 0–0.5)
EOSINOPHIL NFR BLD: 5.7 % (ref 0–8)
ERYTHROCYTE [DISTWIDTH] IN BLOOD BY AUTOMATED COUNT: 13.9 % (ref 11.5–14.5)
EST. GFR  (AFRICAN AMERICAN): >60 ML/MIN/1.73 M^2
EST. GFR  (NON AFRICAN AMERICAN): >60 ML/MIN/1.73 M^2
GLUCOSE SERPL-MCNC: 85 MG/DL (ref 70–110)
HCT VFR BLD AUTO: 40.5 % (ref 37–48.5)
HDLC SERPL-MCNC: 49 MG/DL (ref 40–75)
HDLC SERPL: 28.5 % (ref 20–50)
HGB BLD-MCNC: 13.4 G/DL (ref 12–16)
IMM GRANULOCYTES # BLD AUTO: 0.02 K/UL (ref 0–0.04)
IMM GRANULOCYTES NFR BLD AUTO: 0.3 % (ref 0–0.5)
LDLC SERPL CALC-MCNC: 102.6 MG/DL (ref 63–159)
LYMPHOCYTES # BLD AUTO: 2.1 K/UL (ref 1–4.8)
LYMPHOCYTES NFR BLD: 27 % (ref 18–48)
MCH RBC QN AUTO: 28.9 PG (ref 27–31)
MCHC RBC AUTO-ENTMCNC: 33.1 G/DL (ref 32–36)
MCV RBC AUTO: 88 FL (ref 82–98)
MONOCYTES # BLD AUTO: 0.9 K/UL (ref 0.3–1)
MONOCYTES NFR BLD: 11.3 % (ref 4–15)
NEUTROPHILS # BLD AUTO: 4.3 K/UL (ref 1.8–7.7)
NEUTROPHILS NFR BLD: 55.2 % (ref 38–73)
NONHDLC SERPL-MCNC: 123 MG/DL
NRBC BLD-RTO: 0 /100 WBC
PLATELET # BLD AUTO: 452 K/UL (ref 150–350)
PMV BLD AUTO: 10.1 FL (ref 9.2–12.9)
POTASSIUM SERPL-SCNC: 3.5 MMOL/L (ref 3.5–5.1)
PROT SERPL-MCNC: 7.4 G/DL (ref 6–8.4)
RBC # BLD AUTO: 4.63 M/UL (ref 4–5.4)
SODIUM SERPL-SCNC: 136 MMOL/L (ref 136–145)
TRIGL SERPL-MCNC: 102 MG/DL (ref 30–150)
WBC # BLD AUTO: 7.71 K/UL (ref 3.9–12.7)

## 2019-10-30 PROCEDURE — 85025 COMPLETE CBC W/AUTO DIFF WBC: CPT

## 2019-10-30 PROCEDURE — 99999 PR PBB SHADOW E&M-EST. PATIENT-LVL III: ICD-10-PCS | Mod: PBBFAC,,,

## 2019-10-30 PROCEDURE — 80061 LIPID PANEL: CPT

## 2019-10-30 PROCEDURE — 36415 COLL VENOUS BLD VENIPUNCTURE: CPT

## 2019-10-30 PROCEDURE — 80053 COMPREHEN METABOLIC PANEL: CPT

## 2019-10-30 PROCEDURE — 99999 PR PBB SHADOW E&M-EST. PATIENT-LVL III: CPT | Mod: PBBFAC,,,

## 2019-10-30 NOTE — PROGRESS NOTES
Patient in for blood pressure check. Was seen in the clinic yesterday and blood pressure was 140/100. She had not taken her medication. She took her medication about midnight and this morning her pressure is 134/86. She did her labs for her wellness form with Ms. Sexton.

## 2019-10-31 ENCOUNTER — PATIENT MESSAGE (OUTPATIENT)
Dept: INTERNAL MEDICINE | Facility: CLINIC | Age: 39
End: 2019-10-31

## 2019-11-04 ENCOUNTER — TELEPHONE (OUTPATIENT)
Dept: RADIOLOGY | Facility: HOSPITAL | Age: 39
End: 2019-11-04

## 2019-11-05 ENCOUNTER — HOSPITAL ENCOUNTER (OUTPATIENT)
Dept: RADIOLOGY | Facility: HOSPITAL | Age: 39
Discharge: HOME | End: 2019-11-05
Attending: OBSTETRICS & GYNECOLOGY
Payer: COMMERCIAL

## 2019-11-05 DIAGNOSIS — N93.8 DUB (DYSFUNCTIONAL UTERINE BLEEDING): ICD-10-CM

## 2019-11-05 PROCEDURE — 76830 US PELVIS COMP WITH TRANSVAG NON-OB (XPD): ICD-10-PCS | Mod: 26,,, | Performed by: RADIOLOGY

## 2019-11-05 PROCEDURE — 76856 US EXAM PELVIC COMPLETE: CPT | Mod: 26,,, | Performed by: RADIOLOGY

## 2019-11-05 PROCEDURE — 76830 TRANSVAGINAL US NON-OB: CPT | Mod: 26,,, | Performed by: RADIOLOGY

## 2019-11-05 PROCEDURE — 76856 US PELVIS COMP WITH TRANSVAG NON-OB (XPD): ICD-10-PCS | Mod: 26,,, | Performed by: RADIOLOGY

## 2019-11-05 PROCEDURE — 76830 TRANSVAGINAL US NON-OB: CPT | Mod: TC

## 2019-11-06 ENCOUNTER — PATIENT MESSAGE (OUTPATIENT)
Dept: OBSTETRICS AND GYNECOLOGY | Facility: CLINIC | Age: 39
End: 2019-11-06

## 2019-11-07 ENCOUNTER — PATIENT MESSAGE (OUTPATIENT)
Dept: OBSTETRICS AND GYNECOLOGY | Facility: CLINIC | Age: 39
End: 2019-11-07

## 2019-11-13 ENCOUNTER — PATIENT MESSAGE (OUTPATIENT)
Dept: OBSTETRICS AND GYNECOLOGY | Facility: CLINIC | Age: 39
End: 2019-11-13

## 2019-11-15 ENCOUNTER — PATIENT MESSAGE (OUTPATIENT)
Dept: ADMINISTRATIVE | Facility: OTHER | Age: 39
End: 2019-11-15

## 2019-11-15 ENCOUNTER — TELEPHONE (OUTPATIENT)
Dept: OBSTETRICS AND GYNECOLOGY | Facility: CLINIC | Age: 39
End: 2019-11-15

## 2019-11-15 DIAGNOSIS — N93.8 DUB (DYSFUNCTIONAL UTERINE BLEEDING): Primary | ICD-10-CM

## 2019-12-04 ENCOUNTER — CLINICAL SUPPORT (OUTPATIENT)
Dept: CARDIOLOGY | Facility: CLINIC | Age: 39
End: 2019-12-04
Payer: COMMERCIAL

## 2019-12-04 ENCOUNTER — HOSPITAL ENCOUNTER (OUTPATIENT)
Dept: PREADMISSION TESTING | Facility: HOSPITAL | Age: 39
Discharge: HOME | End: 2019-12-04
Attending: OBSTETRICS & GYNECOLOGY
Payer: COMMERCIAL

## 2019-12-04 ENCOUNTER — OFFICE VISIT (OUTPATIENT)
Dept: OBSTETRICS AND GYNECOLOGY | Facility: CLINIC | Age: 39
End: 2019-12-04
Payer: COMMERCIAL

## 2019-12-04 VITALS
HEIGHT: 65 IN | BODY MASS INDEX: 35.37 KG/M2 | SYSTOLIC BLOOD PRESSURE: 134 MMHG | WEIGHT: 212.31 LBS | DIASTOLIC BLOOD PRESSURE: 82 MMHG

## 2019-12-04 DIAGNOSIS — N94.6 DYSMENORRHEA: ICD-10-CM

## 2019-12-04 DIAGNOSIS — D25.1 INTRAMURAL LEIOMYOMA OF UTERUS: Primary | ICD-10-CM

## 2019-12-04 DIAGNOSIS — N92.0 MENORRHAGIA WITH REGULAR CYCLE: ICD-10-CM

## 2019-12-04 DIAGNOSIS — Z01.818 PREOP TESTING: ICD-10-CM

## 2019-12-04 DIAGNOSIS — Z01.818 PREOP TESTING: Primary | ICD-10-CM

## 2019-12-04 PROCEDURE — 93010 ELECTROCARDIOGRAM REPORT: CPT | Mod: S$GLB,,, | Performed by: INTERNAL MEDICINE

## 2019-12-04 PROCEDURE — 99499 NO LOS: ICD-10-PCS | Mod: S$GLB,,, | Performed by: OBSTETRICS & GYNECOLOGY

## 2019-12-04 PROCEDURE — 99499 UNLISTED E&M SERVICE: CPT | Mod: S$GLB,,, | Performed by: OBSTETRICS & GYNECOLOGY

## 2019-12-04 PROCEDURE — 99999 PR PBB SHADOW E&M-EST. PATIENT-LVL III: CPT | Mod: PBBFAC,,, | Performed by: OBSTETRICS & GYNECOLOGY

## 2019-12-04 PROCEDURE — 93010 EKG 12-LEAD: ICD-10-PCS | Mod: S$GLB,,, | Performed by: INTERNAL MEDICINE

## 2019-12-04 PROCEDURE — 99999 PR PBB SHADOW E&M-EST. PATIENT-LVL III: ICD-10-PCS | Mod: PBBFAC,,, | Performed by: OBSTETRICS & GYNECOLOGY

## 2019-12-04 RX ORDER — SODIUM CHLORIDE, SODIUM LACTATE, POTASSIUM CHLORIDE, CALCIUM CHLORIDE 600; 310; 30; 20 MG/100ML; MG/100ML; MG/100ML; MG/100ML
INJECTION, SOLUTION INTRAVENOUS CONTINUOUS
Status: CANCELLED | OUTPATIENT
Start: 2019-12-04

## 2019-12-04 RX ORDER — ACETAMINOPHEN 325 MG/1
1000 TABLET ORAL
Status: CANCELLED | OUTPATIENT
Start: 2019-12-06 | End: 2019-12-06

## 2019-12-04 NOTE — H&P (VIEW-ONLY)
Subjective:       Patient ID: Bianca Amato is a 39 y.o. female.    Chief Complaint:  Pre-op Exam      History of Present Illness  HPI  Heavy, painful menses with no improvement with progestin therapy  Ultrasound with multiple small myomas.   EMBx and pap are benign.   For definitive therapy     GYN & OB History  Patient's last menstrual period was 2019 (approximate).   Date of Last Pap: No result found    OB History    Para Term  AB Living   4 4 4     4   SAB TAB Ectopic Multiple Live Births                  # Outcome Date GA Lbr Ashwin/2nd Weight Sex Delivery Anes PTL Lv   4 Term            3 Term            2 Term            1 Term              Past Medical History:   Diagnosis Date    Carpal tunnel syndrome     bilateral    Hypertension     Iron deficiency anemia     Mild intermittent asthma      Past Surgical History:   Procedure Laterality Date    EYE FOREIGN BODY REMOVAL Right     SURGICAL REMOVAL OF MASS OF AXILLA Right 2018    Procedure: EXCISION, MASS, AXILLARY;  Surgeon: Alis Tian MD;  Location: [facility];  Service: General;  Laterality: Right;    TUBAL LIGATION       Family History   Problem Relation Age of Onset    Diabetes Mother     Sarcoidosis Mother     COPD Father     Hypertension Father     Breast cancer Neg Hx     Colon cancer Neg Hx     Ovarian cancer Neg Hx     Thrombosis Neg Hx      Social History     Tobacco Use    Smoking status: Never Smoker    Smokeless tobacco: Never Used   Substance Use Topics    Alcohol use: No     Frequency: Never     Drinks per session: Patient refused     Binge frequency: Never    Drug use: No       (Not in a hospital admission)  Review of patient's allergies indicates:   Allergen Reactions    Latex, natural rubber Hives    Pcn [penicillins] Hives    Sulfa (sulfonamide antibiotics) Hives and Swelling     Current Outpatient Medications   Medication Sig    albuterol (PROVENTIL HFA) 90 mcg/actuation inhaler Inhale  2 puffs into the lungs every 6 (six) hours as needed.    amLODIPine (NORVASC) 5 MG tablet Take 1 tablet (5 mg total) by mouth once daily.    carbamide peroxide (DEBROX) 6.5 % otic solution 2 drops as needed.    ferrous sulfate (FEOSOL) 325 mg (65 mg iron) Tab tablet Take 1 tablet (325 mg total) by mouth once daily.    hydroCHLOROthiazide (HYDRODIURIL) 12.5 MG Tab TAKE 1 TABLET BY MOUTH EVERY DAY    ketoconazole (NIZORAL) 2 % cream Apply topically 2 (two) times daily.    norethindrone (AYGESTIN) 5 mg Tab Take 1 tablet (5 mg total) by mouth once daily. for 12 days     No current facility-administered medications for this visit.      Review of Systems  Review of Systems   Constitutional: Negative for appetite change, chills, fatigue, fever and unexpected weight change.   HENT: Negative.    Eyes: Negative for visual disturbance.   Respiratory: Negative for shortness of breath and wheezing.    Cardiovascular: Negative for chest pain, palpitations and leg swelling.   Gastrointestinal: Negative for abdominal pain, bloating, blood in stool, constipation, diarrhea, nausea and vomiting.   Endocrine: Negative for hair loss, hot flashes and hypothyroidism.   Genitourinary: Positive for dyspareunia and menorrhagia. Negative for dysmenorrhea, dysuria, flank pain, frequency, genital sores, hematuria, menstrual problem, pelvic pain, urgency, vaginal bleeding, vaginal discharge, urinary incontinence and vaginal odor.   Musculoskeletal: Negative for back pain, joint swelling and myalgias.   Integumentary:  Negative for rash, hair changes, breast mass, nipple discharge and breast skin changes.   Neurological: Negative for syncope and headaches.   Hematological: Negative for adenopathy. Does not bruise/bleed easily.   Psychiatric/Behavioral: Negative for depression and sleep disturbance. The patient is not nervous/anxious.    Breast: Negative for mass, mastodynia, nipple discharge and skin changes          Objective:    Physical  Exam:   Constitutional: She is oriented to person, place, and time. Vital signs are normal. She appears well-developed and well-nourished. No distress.    HENT:   Head: Normocephalic and atraumatic.   Right Ear: External ear normal.   Left Ear: External ear normal.   Nose: Nose normal.    Eyes: Pupils are equal, round, and reactive to light. Conjunctivae are normal.    Neck: Trachea normal, normal range of motion and full passive range of motion without pain. Neck supple. No JVD present. No thyroid mass and no thyromegaly present.    Cardiovascular: Normal rate and regular rhythm.     Pulmonary/Chest: Effort normal and breath sounds normal. No respiratory distress.        Abdominal: Soft. Normal appearance and bowel sounds are normal. She exhibits no distension and no mass. There is no hepatosplenomegaly. There is no tenderness. No hernia. Hernia confirmed negative in the ventral area, confirmed negative in the right inguinal area and confirmed negative in the left inguinal area.     Genitourinary: Rectum normal and vagina normal. Rectal exam shows no external hemorrhoid and anal tone normal. There is no rash, tenderness or lesion on the right labia. There is no rash, tenderness or lesion on the left labia. Uterus is enlarged and hosting fibroids. Uterus is not deviated, not tender and not experiencing uterine prolapse. Cervix is normal. Right adnexum displays no mass, no tenderness and no fullness. Left adnexum displays no mass, no tenderness and no fullness. No tenderness, bleeding, rectocele, cystocele or unspecified prolapse of vaginal walls in the vagina. No foreign body in the vagina. No vaginal discharge found. Labial bartholins normal.Cervix exhibits no motion tenderness, no discharge and no friability.        Uterus Size: 8 cm   Musculoskeletal: Normal range of motion.       Neurological: She is alert and oriented to person, place, and time. She has normal reflexes.    Skin: Skin is warm, dry and intact.  She is not diaphoretic.    Psychiatric: She has a normal mood and affect. Her speech is normal and behavior is normal. Thought content normal.          Assessment:        1. Intramural leiomyoma of uterus    2. Dysmenorrhea    3. Menorrhagia with regular cycle                Plan:      Bianca was seen today for pre-op exam.    Diagnoses and all orders for this visit:    Intramural leiomyoma of uterus  Comments:  Robotic Hysterectomy salpingectomy     Dysmenorrhea    Menorrhagia with regular cycle

## 2019-12-04 NOTE — H&P
Subjective:       Patient ID: Bianca Amato is a 39 y.o. female.    Chief Complaint:  Pre-op Exam      History of Present Illness  HPI  Heavy, painful menses with no improvement with progestin therapy  Ultrasound with multiple small myomas.   EMBx and pap are benign.   For definitive therapy     GYN & OB History  Patient's last menstrual period was 2019 (approximate).   Date of Last Pap: No result found    OB History    Para Term  AB Living   4 4 4     4   SAB TAB Ectopic Multiple Live Births                  # Outcome Date GA Lbr Ashwin/2nd Weight Sex Delivery Anes PTL Lv   4 Term            3 Term            2 Term            1 Term              Past Medical History:   Diagnosis Date    Carpal tunnel syndrome     bilateral    Hypertension     Iron deficiency anemia     Mild intermittent asthma      Past Surgical History:   Procedure Laterality Date    EYE FOREIGN BODY REMOVAL Right     SURGICAL REMOVAL OF MASS OF AXILLA Right 2018    Procedure: EXCISION, MASS, AXILLARY;  Surgeon: Alis Tian MD;  Location: [facility];  Service: General;  Laterality: Right;    TUBAL LIGATION       Family History   Problem Relation Age of Onset    Diabetes Mother     Sarcoidosis Mother     COPD Father     Hypertension Father     Breast cancer Neg Hx     Colon cancer Neg Hx     Ovarian cancer Neg Hx     Thrombosis Neg Hx      Social History     Tobacco Use    Smoking status: Never Smoker    Smokeless tobacco: Never Used   Substance Use Topics    Alcohol use: No     Frequency: Never     Drinks per session: Patient refused     Binge frequency: Never    Drug use: No       (Not in a hospital admission)  Review of patient's allergies indicates:   Allergen Reactions    Latex, natural rubber Hives    Pcn [penicillins] Hives    Sulfa (sulfonamide antibiotics) Hives and Swelling     Current Outpatient Medications   Medication Sig    albuterol (PROVENTIL HFA) 90 mcg/actuation inhaler Inhale  2 puffs into the lungs every 6 (six) hours as needed.    amLODIPine (NORVASC) 5 MG tablet Take 1 tablet (5 mg total) by mouth once daily.    carbamide peroxide (DEBROX) 6.5 % otic solution 2 drops as needed.    ferrous sulfate (FEOSOL) 325 mg (65 mg iron) Tab tablet Take 1 tablet (325 mg total) by mouth once daily.    hydroCHLOROthiazide (HYDRODIURIL) 12.5 MG Tab TAKE 1 TABLET BY MOUTH EVERY DAY    ketoconazole (NIZORAL) 2 % cream Apply topically 2 (two) times daily.    norethindrone (AYGESTIN) 5 mg Tab Take 1 tablet (5 mg total) by mouth once daily. for 12 days     No current facility-administered medications for this visit.      Review of Systems  Review of Systems   Constitutional: Negative for appetite change, chills, fatigue, fever and unexpected weight change.   HENT: Negative.    Eyes: Negative for visual disturbance.   Respiratory: Negative for shortness of breath and wheezing.    Cardiovascular: Negative for chest pain, palpitations and leg swelling.   Gastrointestinal: Negative for abdominal pain, bloating, blood in stool, constipation, diarrhea, nausea and vomiting.   Endocrine: Negative for hair loss, hot flashes and hypothyroidism.   Genitourinary: Positive for dyspareunia and menorrhagia. Negative for dysmenorrhea, dysuria, flank pain, frequency, genital sores, hematuria, menstrual problem, pelvic pain, urgency, vaginal bleeding, vaginal discharge, urinary incontinence and vaginal odor.   Musculoskeletal: Negative for back pain, joint swelling and myalgias.   Integumentary:  Negative for rash, hair changes, breast mass, nipple discharge and breast skin changes.   Neurological: Negative for syncope and headaches.   Hematological: Negative for adenopathy. Does not bruise/bleed easily.   Psychiatric/Behavioral: Negative for depression and sleep disturbance. The patient is not nervous/anxious.    Breast: Negative for mass, mastodynia, nipple discharge and skin changes          Objective:    Physical  Exam:   Constitutional: She is oriented to person, place, and time. Vital signs are normal. She appears well-developed and well-nourished. No distress.    HENT:   Head: Normocephalic and atraumatic.   Right Ear: External ear normal.   Left Ear: External ear normal.   Nose: Nose normal.    Eyes: Pupils are equal, round, and reactive to light. Conjunctivae are normal.    Neck: Trachea normal, normal range of motion and full passive range of motion without pain. Neck supple. No JVD present. No thyroid mass and no thyromegaly present.    Cardiovascular: Normal rate and regular rhythm.     Pulmonary/Chest: Effort normal and breath sounds normal. No respiratory distress.        Abdominal: Soft. Normal appearance and bowel sounds are normal. She exhibits no distension and no mass. There is no hepatosplenomegaly. There is no tenderness. No hernia. Hernia confirmed negative in the ventral area, confirmed negative in the right inguinal area and confirmed negative in the left inguinal area.     Genitourinary: Rectum normal and vagina normal. Rectal exam shows no external hemorrhoid and anal tone normal. There is no rash, tenderness or lesion on the right labia. There is no rash, tenderness or lesion on the left labia. Uterus is enlarged and hosting fibroids. Uterus is not deviated, not tender and not experiencing uterine prolapse. Cervix is normal. Right adnexum displays no mass, no tenderness and no fullness. Left adnexum displays no mass, no tenderness and no fullness. No tenderness, bleeding, rectocele, cystocele or unspecified prolapse of vaginal walls in the vagina. No foreign body in the vagina. No vaginal discharge found. Labial bartholins normal.Cervix exhibits no motion tenderness, no discharge and no friability.        Uterus Size: 8 cm   Musculoskeletal: Normal range of motion.       Neurological: She is alert and oriented to person, place, and time. She has normal reflexes.    Skin: Skin is warm, dry and intact.  She is not diaphoretic.    Psychiatric: She has a normal mood and affect. Her speech is normal and behavior is normal. Thought content normal.          Assessment:        1. Intramural leiomyoma of uterus    2. Dysmenorrhea    3. Menorrhagia with regular cycle                Plan:      Bianca was seen today for pre-op exam.    Diagnoses and all orders for this visit:    Intramural leiomyoma of uterus  Comments:  Robotic Hysterectomy salpingectomy     Dysmenorrhea    Menorrhagia with regular cycle

## 2019-12-04 NOTE — DISCHARGE INSTRUCTIONS
To confirm, Your doctor has instructed you that surgery is scheduled for 12/06/19 at  11:45 a.m.       Please report to [facility], [address], 1st floor, main lobby by 10:15 a.m.  Pre admit office will call afternoon prior to surgery with final arrival time    INSTRUCTIONS IMPORTANT!!!   Do not eat, drink, or smoke after 12 midnight, may have water/apple juice 3 hours prior to surgery. OK to brush teeth, no gum, candy or mints!    ¨ Take only these medicines with a small swallow of water-morning of surgery.  Amlodipine      Do not shave pubic hair 7 days prior to surgery  Do not shave underarms or legs for 3 days prior to surgery  Pre operative instructions:  Please review the Pre-Operative Instruction booklet that you were given.        Bathing Instructions--See page 6 in the Pre-operative booklet.      Prevention of surgical site infections:     -Keep incisions clean and dry.   -Do not soak/submerge incisions in water until completely healed.   -Do not apply lotions, powders, creams, or deodorants to site.   -Always make sure hands are cleaned with antibacterial soap/ alcohol-based                 prior to touching the surgical site.  (This includes doctors,                 nurses, staff, and yourself.)    Signs and symptoms:   -Redness and pain around the area where you had surgery   -Drainage of cloudy fluid from your surgical wound   -Fever over 100.4       I have read or had read and explained to me, and understand the above information.  Additional comments or instructions:  Received a copy of Pre-operative instructions booklet, FAQ surgical site infection sheet, and packets of hibiclens (if indicated).

## 2019-12-06 ENCOUNTER — ANESTHESIA EVENT (OUTPATIENT)
Dept: SURGERY | Facility: HOSPITAL | Age: 39
End: 2019-12-06
Payer: COMMERCIAL

## 2019-12-06 ENCOUNTER — ANESTHESIA (OUTPATIENT)
Dept: SURGERY | Facility: HOSPITAL | Age: 39
End: 2019-12-06
Payer: COMMERCIAL

## 2019-12-06 ENCOUNTER — HOSPITAL ENCOUNTER (OUTPATIENT)
Facility: HOSPITAL | Age: 39
Discharge: HOME | End: 2019-12-07
Attending: OBSTETRICS & GYNECOLOGY | Admitting: OBSTETRICS & GYNECOLOGY
Payer: COMMERCIAL

## 2019-12-06 ENCOUNTER — PATIENT MESSAGE (OUTPATIENT)
Dept: INTERNAL MEDICINE | Facility: CLINIC | Age: 39
End: 2019-12-06

## 2019-12-06 DIAGNOSIS — Z90.710 STATUS POST LAPAROSCOPIC HYSTERECTOMY: Primary | ICD-10-CM

## 2019-12-06 DIAGNOSIS — D25.1 INTRAMURAL LEIOMYOMA OF UTERUS: ICD-10-CM

## 2019-12-06 LAB
B-HCG UR QL: NEGATIVE
CTP QC/QA: YES
POCT GLUCOSE: 77 MG/DL (ref 70–110)

## 2019-12-06 PROCEDURE — 25000003 PHARM REV CODE 250: Performed by: OBSTETRICS & GYNECOLOGY

## 2019-12-06 PROCEDURE — 88307 TISSUE EXAM BY PATHOLOGIST: CPT | Mod: 26,,, | Performed by: PATHOLOGY

## 2019-12-06 PROCEDURE — 36000712 HC OR TIME LEV V 1ST 15 MIN: Performed by: OBSTETRICS & GYNECOLOGY

## 2019-12-06 PROCEDURE — 58571 PR LAPAROSCOPY W TOT HYSTERECTUTERUS <=250 GRAM  W TUBE/OVARY: ICD-10-PCS | Mod: ,,, | Performed by: OBSTETRICS & GYNECOLOGY

## 2019-12-06 PROCEDURE — 27201423 OPTIME MED/SURG SUP & DEVICES STERILE SUPPLY: Performed by: OBSTETRICS & GYNECOLOGY

## 2019-12-06 PROCEDURE — 36000713 HC OR TIME LEV V EA ADD 15 MIN: Performed by: OBSTETRICS & GYNECOLOGY

## 2019-12-06 PROCEDURE — 37000009 HC ANESTHESIA EA ADD 15 MINS: Performed by: OBSTETRICS & GYNECOLOGY

## 2019-12-06 PROCEDURE — 63600175 PHARM REV CODE 636 W HCPCS: Performed by: OBSTETRICS & GYNECOLOGY

## 2019-12-06 PROCEDURE — 88307 PR  SURG PATH,LEVEL V: ICD-10-PCS | Mod: 26,,, | Performed by: PATHOLOGY

## 2019-12-06 PROCEDURE — 25000003 PHARM REV CODE 250: Performed by: NURSE ANESTHETIST, CERTIFIED REGISTERED

## 2019-12-06 PROCEDURE — 25000003 PHARM REV CODE 250: Performed by: PHYSICIAN ASSISTANT

## 2019-12-06 PROCEDURE — S5010 5% DEXTROSE AND 0.45% SALINE: HCPCS | Performed by: OBSTETRICS & GYNECOLOGY

## 2019-12-06 PROCEDURE — 99900035 HC TECH TIME PER 15 MIN (STAT)

## 2019-12-06 PROCEDURE — 81025 URINE PREGNANCY TEST: CPT | Performed by: OBSTETRICS & GYNECOLOGY

## 2019-12-06 PROCEDURE — C2628 CATHETER, OCCLUSION: HCPCS | Performed by: OBSTETRICS & GYNECOLOGY

## 2019-12-06 PROCEDURE — 37000008 HC ANESTHESIA 1ST 15 MINUTES: Performed by: OBSTETRICS & GYNECOLOGY

## 2019-12-06 PROCEDURE — 88307 TISSUE EXAM BY PATHOLOGIST: CPT | Performed by: PATHOLOGY

## 2019-12-06 PROCEDURE — S0077 INJECTION, CLINDAMYCIN PHOSP: HCPCS | Performed by: OBSTETRICS & GYNECOLOGY

## 2019-12-06 PROCEDURE — 71000033 HC RECOVERY, INTIAL HOUR: Performed by: OBSTETRICS & GYNECOLOGY

## 2019-12-06 PROCEDURE — 94799 UNLISTED PULMONARY SVC/PX: CPT

## 2019-12-06 PROCEDURE — 58571 TLH W/T/O 250 G OR LESS: CPT | Mod: ,,, | Performed by: OBSTETRICS & GYNECOLOGY

## 2019-12-06 PROCEDURE — 71000039 HC RECOVERY, EACH ADD'L HOUR: Performed by: OBSTETRICS & GYNECOLOGY

## 2019-12-06 PROCEDURE — 63600175 PHARM REV CODE 636 W HCPCS: Performed by: NURSE ANESTHETIST, CERTIFIED REGISTERED

## 2019-12-06 RX ORDER — SODIUM CHLORIDE, SODIUM LACTATE, POTASSIUM CHLORIDE, CALCIUM CHLORIDE 600; 310; 30; 20 MG/100ML; MG/100ML; MG/100ML; MG/100ML
INJECTION, SOLUTION INTRAVENOUS CONTINUOUS
Status: DISCONTINUED | OUTPATIENT
Start: 2019-12-06 | End: 2019-12-06

## 2019-12-06 RX ORDER — ONDANSETRON 8 MG/1
8 TABLET, ORALLY DISINTEGRATING ORAL EVERY 8 HOURS PRN
Status: DISCONTINUED | OUTPATIENT
Start: 2019-12-06 | End: 2019-12-07 | Stop reason: HOSPADM

## 2019-12-06 RX ORDER — IBUPROFEN 400 MG/1
800 TABLET ORAL EVERY 8 HOURS
Status: DISCONTINUED | OUTPATIENT
Start: 2019-12-07 | End: 2019-12-07 | Stop reason: HOSPADM

## 2019-12-06 RX ORDER — DIPHENHYDRAMINE HCL 25 MG
25 CAPSULE ORAL EVERY 4 HOURS PRN
Status: DISCONTINUED | OUTPATIENT
Start: 2019-12-06 | End: 2019-12-07 | Stop reason: HOSPADM

## 2019-12-06 RX ORDER — ROCURONIUM BROMIDE 10 MG/ML
INJECTION, SOLUTION INTRAVENOUS
Status: DISCONTINUED | OUTPATIENT
Start: 2019-12-06 | End: 2019-12-06

## 2019-12-06 RX ORDER — PROMETHAZINE HYDROCHLORIDE 25 MG/1
25 TABLET ORAL EVERY 6 HOURS PRN
Status: DISCONTINUED | OUTPATIENT
Start: 2019-12-06 | End: 2019-12-07 | Stop reason: HOSPADM

## 2019-12-06 RX ORDER — GLYCOPYRROLATE 0.2 MG/ML
INJECTION INTRAMUSCULAR; INTRAVENOUS
Status: DISCONTINUED | OUTPATIENT
Start: 2019-12-06 | End: 2019-12-06

## 2019-12-06 RX ORDER — SUCCINYLCHOLINE CHLORIDE 20 MG/ML
INJECTION INTRAMUSCULAR; INTRAVENOUS
Status: DISCONTINUED | OUTPATIENT
Start: 2019-12-06 | End: 2019-12-06

## 2019-12-06 RX ORDER — ACETAMINOPHEN 500 MG
1000 TABLET ORAL
Status: COMPLETED | OUTPATIENT
Start: 2019-12-06 | End: 2019-12-06

## 2019-12-06 RX ORDER — DEXTROSE MONOHYDRATE AND SODIUM CHLORIDE 5; .45 G/100ML; G/100ML
INJECTION, SOLUTION INTRAVENOUS CONTINUOUS
Status: DISCONTINUED | OUTPATIENT
Start: 2019-12-06 | End: 2019-12-07 | Stop reason: HOSPADM

## 2019-12-06 RX ORDER — ACETAMINOPHEN 500 MG
1000 TABLET ORAL
Status: DISCONTINUED | OUTPATIENT
Start: 2019-12-06 | End: 2019-12-06

## 2019-12-06 RX ORDER — AMLODIPINE BESYLATE 5 MG/1
5 TABLET ORAL DAILY
Status: DISCONTINUED | OUTPATIENT
Start: 2019-12-07 | End: 2019-12-07 | Stop reason: HOSPADM

## 2019-12-06 RX ORDER — DEXAMETHASONE SODIUM PHOSPHATE 4 MG/ML
INJECTION, SOLUTION INTRA-ARTICULAR; INTRALESIONAL; INTRAMUSCULAR; INTRAVENOUS; SOFT TISSUE
Status: DISCONTINUED | OUTPATIENT
Start: 2019-12-06 | End: 2019-12-06

## 2019-12-06 RX ORDER — HYDROCODONE BITARTRATE AND ACETAMINOPHEN 5; 325 MG/1; MG/1
1 TABLET ORAL EVERY 4 HOURS PRN
Status: DISCONTINUED | OUTPATIENT
Start: 2019-12-06 | End: 2019-12-07 | Stop reason: HOSPADM

## 2019-12-06 RX ORDER — LIDOCAINE HYDROCHLORIDE 10 MG/ML
INJECTION, SOLUTION EPIDURAL; INFILTRATION; INTRACAUDAL; PERINEURAL
Status: DISCONTINUED | OUTPATIENT
Start: 2019-12-06 | End: 2019-12-06

## 2019-12-06 RX ORDER — HYDROCODONE BITARTRATE AND ACETAMINOPHEN 5; 325 MG/1; MG/1
1 TABLET ORAL EVERY 6 HOURS PRN
Qty: 15 TABLET | Refills: 0 | Status: SHIPPED | OUTPATIENT
Start: 2019-12-06 | End: 2019-12-07 | Stop reason: SDUPTHER

## 2019-12-06 RX ORDER — HYDROMORPHONE HYDROCHLORIDE 1 MG/ML
1 INJECTION, SOLUTION INTRAMUSCULAR; INTRAVENOUS; SUBCUTANEOUS EVERY 4 HOURS PRN
Status: DISCONTINUED | OUTPATIENT
Start: 2019-12-06 | End: 2019-12-07 | Stop reason: HOSPADM

## 2019-12-06 RX ORDER — NEOSTIGMINE METHYLSULFATE 1 MG/ML
INJECTION, SOLUTION INTRAVENOUS
Status: DISCONTINUED | OUTPATIENT
Start: 2019-12-06 | End: 2019-12-06

## 2019-12-06 RX ORDER — HYDROCHLOROTHIAZIDE 12.5 MG/1
12.5 TABLET ORAL DAILY
Status: DISCONTINUED | OUTPATIENT
Start: 2019-12-06 | End: 2019-12-07 | Stop reason: HOSPADM

## 2019-12-06 RX ORDER — MIDAZOLAM HYDROCHLORIDE 1 MG/ML
INJECTION, SOLUTION INTRAMUSCULAR; INTRAVENOUS
Status: DISCONTINUED | OUTPATIENT
Start: 2019-12-06 | End: 2019-12-06

## 2019-12-06 RX ORDER — CLINDAMYCIN PHOSPHATE 900 MG/50ML
900 INJECTION, SOLUTION INTRAVENOUS
Status: COMPLETED | OUTPATIENT
Start: 2019-12-06 | End: 2019-12-06

## 2019-12-06 RX ORDER — DOCUSATE SODIUM 100 MG/1
100 CAPSULE, LIQUID FILLED ORAL 2 TIMES DAILY
Status: DISCONTINUED | OUTPATIENT
Start: 2019-12-06 | End: 2019-12-07 | Stop reason: HOSPADM

## 2019-12-06 RX ORDER — KETOROLAC TROMETHAMINE 30 MG/ML
30 INJECTION, SOLUTION INTRAMUSCULAR; INTRAVENOUS EVERY 6 HOURS
Status: COMPLETED | OUTPATIENT
Start: 2019-12-06 | End: 2019-12-07

## 2019-12-06 RX ORDER — FENTANYL CITRATE 50 UG/ML
INJECTION, SOLUTION INTRAMUSCULAR; INTRAVENOUS
Status: DISCONTINUED | OUTPATIENT
Start: 2019-12-06 | End: 2019-12-06

## 2019-12-06 RX ORDER — PROPOFOL 10 MG/ML
VIAL (ML) INTRAVENOUS
Status: DISCONTINUED | OUTPATIENT
Start: 2019-12-06 | End: 2019-12-06

## 2019-12-06 RX ORDER — ONDANSETRON 2 MG/ML
INJECTION INTRAMUSCULAR; INTRAVENOUS
Status: DISCONTINUED | OUTPATIENT
Start: 2019-12-06 | End: 2019-12-06

## 2019-12-06 RX ADMIN — LIDOCAINE HYDROCHLORIDE 50 MG: 10 INJECTION, SOLUTION EPIDURAL; INFILTRATION; INTRACAUDAL; PERINEURAL at 12:12

## 2019-12-06 RX ADMIN — SUCCINYLCHOLINE CHLORIDE 100 MG: 20 INJECTION, SOLUTION INTRAMUSCULAR; INTRAVENOUS at 12:12

## 2019-12-06 RX ADMIN — ROCURONIUM BROMIDE 35 MG: 10 INJECTION, SOLUTION INTRAVENOUS at 12:12

## 2019-12-06 RX ADMIN — FENTANYL CITRATE 50 MCG: 50 INJECTION, SOLUTION INTRAMUSCULAR; INTRAVENOUS at 01:12

## 2019-12-06 RX ADMIN — NEOSTIGMINE METHYLSULFATE 5 MG: 1 INJECTION INTRAVENOUS at 01:12

## 2019-12-06 RX ADMIN — DEXAMETHASONE SODIUM PHOSPHATE 4 MG: 4 INJECTION, SOLUTION INTRA-ARTICULAR; INTRALESIONAL; INTRAMUSCULAR; INTRAVENOUS; SOFT TISSUE at 01:12

## 2019-12-06 RX ADMIN — PROPOFOL 180 MG: 10 INJECTION, EMULSION INTRAVENOUS at 12:12

## 2019-12-06 RX ADMIN — ROCURONIUM BROMIDE 5 MG: 10 INJECTION, SOLUTION INTRAVENOUS at 12:12

## 2019-12-06 RX ADMIN — CLINDAMYCIN PHOSPHATE 900 MG: 18 INJECTION, SOLUTION INTRAVENOUS at 12:12

## 2019-12-06 RX ADMIN — SODIUM CHLORIDE, SODIUM LACTATE, POTASSIUM CHLORIDE, AND CALCIUM CHLORIDE: 600; 310; 30; 20 INJECTION, SOLUTION INTRAVENOUS at 12:12

## 2019-12-06 RX ADMIN — ONDANSETRON 4 MG: 2 INJECTION, SOLUTION INTRAMUSCULAR; INTRAVENOUS at 01:12

## 2019-12-06 RX ADMIN — HYDROCHLOROTHIAZIDE 12.5 MG: 12.5 TABLET ORAL at 05:12

## 2019-12-06 RX ADMIN — GENTAMICIN SULFATE 363.5 MG: 40 INJECTION, SOLUTION INTRAMUSCULAR; INTRAVENOUS at 12:12

## 2019-12-06 RX ADMIN — DOCUSATE SODIUM 100 MG: 100 CAPSULE, LIQUID FILLED ORAL at 08:12

## 2019-12-06 RX ADMIN — DEXTROSE AND SODIUM CHLORIDE: 5; .45 INJECTION, SOLUTION INTRAVENOUS at 11:12

## 2019-12-06 RX ADMIN — FENTANYL CITRATE 50 MCG: 50 INJECTION, SOLUTION INTRAMUSCULAR; INTRAVENOUS at 12:12

## 2019-12-06 RX ADMIN — MIDAZOLAM 2 MG: 1 INJECTION INTRAMUSCULAR; INTRAVENOUS at 12:12

## 2019-12-06 RX ADMIN — DEXTROSE AND SODIUM CHLORIDE: 5; .45 INJECTION, SOLUTION INTRAVENOUS at 05:12

## 2019-12-06 RX ADMIN — HYDROMORPHONE HYDROCHLORIDE 1 MG: 1 INJECTION, SOLUTION INTRAMUSCULAR; INTRAVENOUS; SUBCUTANEOUS at 07:12

## 2019-12-06 RX ADMIN — KETOROLAC TROMETHAMINE 30 MG: 30 INJECTION, SOLUTION INTRAMUSCULAR; INTRAVENOUS at 11:12

## 2019-12-06 RX ADMIN — KETOROLAC TROMETHAMINE 30 MG: 30 INJECTION, SOLUTION INTRAMUSCULAR; INTRAVENOUS at 05:12

## 2019-12-06 RX ADMIN — ACETAMINOPHEN 1000 MG: 500 TABLET ORAL at 11:12

## 2019-12-06 RX ADMIN — ROBINUL 0.8 MG: 0.2 INJECTION INTRAMUSCULAR; INTRAVENOUS at 01:12

## 2019-12-06 RX ADMIN — FENTANYL CITRATE 50 MCG: 50 INJECTION, SOLUTION INTRAMUSCULAR; INTRAVENOUS at 02:12

## 2019-12-06 NOTE — PLAN OF CARE
Pt AAOX3, ambulated w/o difficulty, no s/s pain/discomfort. Pre-op checklist done. Side effects of anesthesia and expectations during recovery discussed with pt and sister. Both verbalized understanding. Questions answered. Will cont to monitor.

## 2019-12-06 NOTE — TELEPHONE ENCOUNTER
Please contact patient. It appears Dr. Pruett ordered this EKG, but I don't see any office visit with him. It appears it was done for preop for Dr. Davidson.     Her ekg shows nonspecific abnormalities. I suggest she discuss with ordering physician. Thank you.    If she has any problems please follow up.

## 2019-12-06 NOTE — ANESTHESIA PREPROCEDURE EVALUATION
12/06/2019  Bianca Amato is a 39 y.o., female.    Pre-op Assessment    I have reviewed the Patient Summary Reports.    I have reviewed the Nursing Notes.   I have reviewed the Medications.     Review of Systems  Anesthesia Hx:  No problems with previous Anesthesia Denies Hx of Anesthetic complications  Denies Family Hx of Anesthesia complications.   Denies Personal Hx of Anesthesia complications.   Social:  Non-Smoker, No Alcohol Use    Cardiovascular:   Hypertension ECG has been reviewed.    Pulmonary:   Asthma    Renal/:  Renal/ Normal     Hepatic/GI:  Hepatic/GI Normal    Neurological:  Neurology Normal    Endocrine:  Endocrine Normal    Psych:  Psychiatric Normal         Patient Active Problem List   Diagnosis    Mild intermittent asthma without complication    Essential hypertension    Iron deficiency anemia    Normocytic anemia    Axillary lymphadenopathy    Bilateral carpal tunnel syndrome    Intramural leiomyoma of uterus    Dysmenorrhea    Menorrhagia with regular cycle         Physical Exam  General:  Well nourished    Airway/Jaw/Neck:  Airway Findings: Mouth Opening: Normal Tongue: Normal  General Airway Assessment: Adult  Mallampati: II  TM Distance: 4 - 6 cm  Jaw/Neck Findings:  Neck ROM: Normal ROM      Dental:  Dental Findings: In tact, Upper front caps, Lower front caps   Chest/Lungs:  Chest/Lungs Findings: Clear to auscultation, Normal Respiratory Rate     Heart/Vascular:  Heart Findings: Rate: Normal  Rhythm: Regular Rhythm  Sounds: Normal        Mental Status:  Mental Status Findings:  Cooperative, Alert and Oriented         Chemistry        Component Value Date/Time     12/04/2019 1510    K 3.6 12/04/2019 1510     12/04/2019 1510    CO2 25 12/04/2019 1510    BUN 12 12/04/2019 1510    CREATININE 0.7 12/04/2019 1510    GLU 78 12/04/2019 1510        Component  Value Date/Time    CALCIUM 10.4 12/04/2019 1510    ALKPHOS 89 10/30/2019 0750    AST 18 10/30/2019 0750    ALT 21 10/30/2019 0750    BILITOT 0.5 10/30/2019 0750    ESTGFRAFRICA >60.0 12/04/2019 1510    EGFRNONAA >60.0 12/04/2019 1510        Lab Results   Component Value Date    WBC 9.70 12/04/2019    HGB 14.1 12/04/2019    HCT 43.9 12/04/2019    MCV 89 12/04/2019     (H) 12/04/2019           Anesthesia Plan  Type of Anesthesia, risks & benefits discussed:  Anesthesia Type:  general  Patient's Preference:   Intra-op Monitoring Plan: standard ASA monitors  Intra-op Monitoring Plan Comments:   Post Op Pain Control Plan: per primary service following discharge from PACU  Post Op Pain Control Plan Comments:   Induction:   IV  Beta Blocker:  Patient is not currently on a Beta-Blocker (No further documentation required).       Informed Consent: Patient understands risks and agrees with Anesthesia plan.  Questions answered. Anesthesia consent signed with patient.  ASA Score: 2     Day of Surgery Review of History & Physical: I have interviewed and examined the patient. I have reviewed the patient's H&P dated:  There are no significant changes.  H&P update referred to the surgeon.

## 2019-12-06 NOTE — H&P
[facility]  Obstetrics & Gynecology  Pre op note     Patient Name: Bianca Amato  MRN: [id number]  Admission Date: 12/6/2019  Primary Care Provider: Vivi Chand MD    Subjective:     Chief Complaint/Reason for Admission: Painful heavy menses     History of Present Illness:  Heavy, painful menses with no improvement with progestin therapy  Ultrasound with multiple small myomas.   EMBx and pap are benign.   For definitive therapy     No new subjective & objective note has been filed under this hospital service since the last note was generated.    Assessment/Plan:     * Intramural leiomyoma of uterus  Robotic Hysterectomy salpingectomy     Menorrhagia with regular cycle  Hysterectomy     Dysmenorrhea  Hysterectomy     Essential hypertension  Manage medically and monitor         F Rudy Davidson MD  Obstetrics & Gynecology  [facility]

## 2019-12-06 NOTE — HPI
Heavy, painful menses with no improvement with progestin therapy  Ultrasound with multiple small myomas.   EMBx and pap are benign.   For definitive therapy

## 2019-12-06 NOTE — ANESTHESIA RELEASE NOTE
"Anesthesia Release from PACU Note    Patient: Bianca Amato    Procedure(s) Performed: Procedure(s) (LRB):  XI ROBOTIC HYSTERECTOMY (N/A)  XI ROBOTIC SALPINGECTOMY (Bilateral)    Anesthesia type: general    Post pain: Adequate analgesia    Post assessment: no apparent anesthetic complications, tolerated procedure well and no evidence of recall    Last Vitals:   Visit Vitals  BP (!) 129/92   Pulse 108   Temp 36.6 °C (97.9 °F) (Temporal)   Resp 18   Ht 5' 5" (1.651 m)   Wt 94.2 kg (207 lb 9 oz)   LMP 11/07/2019 (Approximate)   SpO2 98%   Breastfeeding? No   BMI 34.54 kg/m²       Post vital signs: stable    Level of consciousness: responds to stimulation    Nausea/Vomiting: no nausea/no vomiting    Complications: none    Airway Patency: patent    Respiratory: unassisted    Cardiovascular: stable and blood pressure at baseline    Hydration: euvolemic       "

## 2019-12-06 NOTE — OP NOTE
[facility]  Surgery Department  Operative Note    SUMMARY     Date of Procedure: 12/6/2019     Procedure: Procedure(s) (LRB):  XI ROBOTIC HYSTERECTOMY (N/A)  XI ROBOTIC SALPINGECTOMY (Bilateral)     Surgeon(s) and Role:     * VENESSA Davidson MD - Primary    Assisting Surgeon: Marcelle LIN     Pre-Operative Diagnosis: DUB (dysfunctional uterine bleeding) [N93.8]  Leiomyoma  Hypermenorrhea   Post-Operative Diagnosis: Post-Op Diagnosis Codes:     * DUB (dysfunctional uterine bleeding) [N93.8]  Leiomyoma  Hypermenorrhea     Anesthesia: General    Technical Procedures Used: Tinychat Robotic System     Description of the Findings of the Procedure:     Procedure in Detail/Findings:  Operative findings    Upper abdomen normal liver , no adhesions, no bowel abnormalities    Pelvis:  Uterus enlarged to 10 cm, myomas noted intramural ,  Ovaries and tubes post ligation ,                  No Adhesions, No endometriosis                 Ureters in there normal retroperitoneal position through the pelvis                 Bladder Flap with no adhesions         The patient was taken to the surgical suite.  General              intubation  anesthesia is induced.                                                                   The patient was placed in supine lithotomy  position  with low Bob stirrups                                                                   The vagina was prepped with Betadine.  A Titus     catheter, EVA manipulator with  KOH colpotomizer placed.    Abdomen prepped   with  chlorhexidine solution                                                                Sterile drapes applied     Time-out taken with all members of the operating team.       Veress  needle placed through the umbilicus, abdomen elevated with towel clamps.     CO2 gas insufflation to 15 mmHg.          Trochar placement as follows:       8 mm bladeless trochars:      4 trochars, transverse line 2 cm above the umbilicus,  8 cm apart with direct visualization after the initial placement                     The da Kaushik robotic XI   system docked from the right side of the patient.     .    Instrument placement as follows   Camera:  Right midline port   Unipolar Scissors:  Right lateral port   [place] Bipolar Forceps:  Left midline port   Fenestrated Forceps:  Left lateral port    The surgeon moved to the robotic console and the first assistant remained at the bedside for uterine manipulation and utilization of instruments placed through the left lateral trochar                                                                              Prior to starting the hysterectomy, the anatomical landmarks of the pelvis and the pelvic course of the ureters right and left are identified.   The [place] bipolar is used for cautery and the scissors used for transection of the pedicles .  The  round ligaments are  Transected after cautery on each side and dissection of the broad ligament and development of the bladder flap is accomplished  with the unipolar scissors.   The mesosalpinx of the right and left fallopian tubes are cauterized with the [place] forceps and incised with scissors to the level of the uterus     The anterior and posterior vagina entered over the colpotomizer with unipolar scissors and the colpotomy is extended over the colpotomizer anterior and posterior to the ascending uterine bilateral uterine arteries.    The Uterine vessels are then cauterized with the [place] instrument and transected.     With the uterus free of blood supply and vaginal connection, it is removed with the cervix and bilateral tubes through the vagina.    Bleeding of the uterine pedicle or vaginal cuff is cauterized.   Closure of the vaginal cuff done with Zero Vicryl suture with Vicryl Clip starting at the right and left vaginal cuff angle and running lock to the center of the cuff with tie to each other in the center        All needles used in the  abdomen removed through the vagina or  the 8 mm trocar on the left are  accounted for.       Good hemostasis was  Appreciated in the operative field, any bleeding noted cauterized with bipolar instrument     Any excess fluid was suctioned free.    After the instruments were removed, the da Kaushik robotic system was undocked        All trochar  sites closed with 4-0 Monocryl subcuticular and Dermabond.       The specimen removed from the vagina.  Vagina examined for any tears.       The  patient taken to Recovery in stable condition with nice catheter in place              Significant Surgical Tasks Conducted by the Assistant(s), if Applicable: Bedside assistance and trochar site closure     Complications: No    Estimated Blood Loss (EBL): 20cc           Implants: * No implants in log *    Specimens:      Uterus, cervix, right and left fallopian tube             Condition: Good    Disposition: PACU - hemodynamically stable.    Attestation: I was present and scrubbed for the entire procedure.

## 2019-12-06 NOTE — TRANSFER OF CARE
"Anesthesia Transfer of Care Note    Patient: Bianca Amato    Procedure(s) Performed: Procedure(s) (LRB):  XI ROBOTIC HYSTERECTOMY (N/A)  XI ROBOTIC SALPINGECTOMY (Bilateral)    Patient location: PACU    Anesthesia Type: general    Transport from OR: Transported from OR on room air with adequate spontaneous ventilation    Post pain: adequate analgesia    Post assessment: no apparent anesthetic complications    Post vital signs: stable    Level of consciousness: responds to stimulation    Nausea/Vomiting: no nausea/vomiting    Complications: none    Transfer of care protocol was followed      Last vitals:   Visit Vitals  BP (!) 129/92   Pulse 108   Temp 36.6 °C (97.9 °F) (Temporal)   Resp 18   Ht 5' 5" (1.651 m)   Wt 94.2 kg (207 lb 9 oz)   LMP 11/07/2019 (Approximate)   SpO2 98%   Breastfeeding? No   BMI 34.54 kg/m²     "

## 2019-12-06 NOTE — INTERVAL H&P NOTE
The patient has been examined and the H&P has been reviewed:    I concur with the findings and no changes have occurred since H&P was written.    Anesthesia/Surgery risks, benefits and alternative options discussed and understood by patient/family.          Active Hospital Problems    Diagnosis  POA    Intramural leiomyoma of uterus [D25.1]  Yes      Resolved Hospital Problems   No resolved problems to display.

## 2019-12-07 VITALS
SYSTOLIC BLOOD PRESSURE: 129 MMHG | HEIGHT: 65 IN | RESPIRATION RATE: 18 BRPM | BODY MASS INDEX: 34.6 KG/M2 | WEIGHT: 207.69 LBS | OXYGEN SATURATION: 100 % | HEART RATE: 82 BPM | DIASTOLIC BLOOD PRESSURE: 85 MMHG | TEMPERATURE: 98 F

## 2019-12-07 PROBLEM — D25.1 INTRAMURAL LEIOMYOMA OF UTERUS: Status: RESOLVED | Noted: 2019-12-04 | Resolved: 2019-12-07

## 2019-12-07 PROBLEM — N94.6 DYSMENORRHEA: Status: RESOLVED | Noted: 2019-12-04 | Resolved: 2019-12-07

## 2019-12-07 PROBLEM — N92.0 MENORRHAGIA WITH REGULAR CYCLE: Status: RESOLVED | Noted: 2019-12-04 | Resolved: 2019-12-07

## 2019-12-07 PROCEDURE — 25000003 PHARM REV CODE 250: Performed by: PHYSICIAN ASSISTANT

## 2019-12-07 PROCEDURE — 94799 UNLISTED PULMONARY SVC/PX: CPT

## 2019-12-07 PROCEDURE — 25000003 PHARM REV CODE 250: Performed by: OBSTETRICS & GYNECOLOGY

## 2019-12-07 PROCEDURE — 63600175 PHARM REV CODE 636 W HCPCS: Performed by: OBSTETRICS & GYNECOLOGY

## 2019-12-07 RX ORDER — CHLORHEXIDINE GLUCONATE ORAL RINSE 1.2 MG/ML
10 SOLUTION DENTAL 2 TIMES DAILY
Status: DISCONTINUED | OUTPATIENT
Start: 2019-12-07 | End: 2019-12-07 | Stop reason: HOSPADM

## 2019-12-07 RX ORDER — HYDROCODONE BITARTRATE AND ACETAMINOPHEN 5; 325 MG/1; MG/1
1 TABLET ORAL EVERY 6 HOURS PRN
Qty: 15 TABLET | Refills: 0 | Status: SHIPPED | OUTPATIENT
Start: 2019-12-07 | End: 2019-12-14

## 2019-12-07 RX ORDER — IBUPROFEN 600 MG/1
600 TABLET ORAL EVERY 8 HOURS PRN
Qty: 30 TABLET | Refills: 1 | Status: SHIPPED | OUTPATIENT
Start: 2019-12-07 | End: 2020-01-03

## 2019-12-07 RX ADMIN — KETOROLAC TROMETHAMINE 30 MG: 30 INJECTION, SOLUTION INTRAMUSCULAR; INTRAVENOUS at 12:12

## 2019-12-07 RX ADMIN — HYDROCODONE BITARTRATE AND ACETAMINOPHEN 1 TABLET: 5; 325 TABLET ORAL at 04:12

## 2019-12-07 RX ADMIN — DOCUSATE SODIUM 100 MG: 100 CAPSULE, LIQUID FILLED ORAL at 09:12

## 2019-12-07 RX ADMIN — HYDROCHLOROTHIAZIDE 12.5 MG: 12.5 TABLET ORAL at 09:12

## 2019-12-07 RX ADMIN — AMLODIPINE BESYLATE 5 MG: 5 TABLET ORAL at 09:12

## 2019-12-07 RX ADMIN — KETOROLAC TROMETHAMINE 30 MG: 30 INJECTION, SOLUTION INTRAMUSCULAR; INTRAVENOUS at 05:12

## 2019-12-07 NOTE — PLAN OF CARE
No issues overnight. SS CDI. Denies vaginal bleeding. Pain controlled. Voiding spontaneously. Tolerating diet. Afebrile. Chart reviewed. Will monitor.

## 2019-12-07 NOTE — PLAN OF CARE
Patient post op day 1 hysterectomy.  Patient ambulated to bathroom with nurse and voided.  She complains of pain during movement.  Pain meds given per MAR.  IV fluids started  Iv site intact no redness or signs of infiltration noted.  Appetite poor  No other concerns voiced at this time.

## 2019-12-07 NOTE — PROGRESS NOTES
"Went over discharge instructions with patient and son.   Stressed importance of making and keeping all follow ups.   Patient verbalized understanding and has no questions in regards to discharge.  IV removed, catheter intact.  Patient wheeled out by staff to waiting vehicle.No s/s of distress noted.  /85 (BP Location: Left arm, Patient Position: Lying)   Pulse 82   Temp 98.1 °F (36.7 °C) (Oral)   Resp 18   Ht 5' 5" (1.651 m)   Wt 94.2 kg (207 lb 10.8 oz)   LMP 11/07/2019 (Approximate)   SpO2 100%   Breastfeeding? No   BMI 34.56 kg/m²       "

## 2019-12-07 NOTE — DISCHARGE SUMMARY
[facility] -   Obstetrics & Gynecology  Discharge Summary    Patient Name: Bianca Amato  MRN: [id number]  Admission Date: 12/6/2019  Hospital Length of Stay: 0 days  Discharge Date and Time:  12/07/2019 12:25 PM  Attending Physician: VENESSA Davidson MD   Discharging Provider: Lazara Erazo MD  Primary Care Provider: Vivi Chand MD    HPI:  Heavy, painful menses with no improvement with progestin therapy  Ultrasound with multiple small myomas.   EMBx and pap are benign.   For definitive therapy      Hospital Course:  Patient presented for robotic assisted laparoscopic hysterectomy bilateral salpingectomy for fibroids and dysfunctional uterine bleeding. Patient progressed well postoperatively without complication, was voiding spontaneously, pain well controlled with po medications, and no post op nausea and vomiting tolerating po well. Reviewed discharge instructions including restricting heavy lifting, pelvic rest.         Procedure(s) (LRB):  XI ROBOTIC HYSTERECTOMY (N/A)  XI ROBOTIC SALPINGECTOMY (Bilateral)         Significant Diagnostic Studies:      Pending Diagnostic Studies:     Procedure Component Value Units Date/Time    Specimen to Pathology, Surgery Gynecology and Obstetrics [[id number]] Collected:  12/06/19 1335    Order Status:  Sent Lab Status:  In process Updated:  12/06/19 1448        Final Active Diagnoses:    Diagnosis Date Noted POA    PRINCIPAL PROBLEM:  Status post laparoscopic hysterectomy [Z90.710] 12/06/2019 No    Essential hypertension [I10]  Yes    Mild intermittent asthma without complication [J45.20]  Yes      Problems Resolved During this Admission:    Diagnosis Date Noted Date Resolved POA    Intramural leiomyoma of uterus [D25.1] 12/04/2019 12/07/2019 Yes    Menorrhagia with regular cycle [N92.0] 12/04/2019 12/07/2019 Yes    Dysmenorrhea [N94.6] 12/04/2019 12/07/2019 Yes        Discharged Condition: good    Disposition: Home or Self Care    Follow  Up:  Follow-up Information     Follow up In 1 month.               Patient Instructions:      Call MD for:  temperature >100.4     Call MD for:  persistent nausea and vomiting or diarrhea     Call MD for:  severe uncontrolled pain     Call MD for:  redness, tenderness, or signs of infection (pain, swelling, redness, odor or green/yellow discharge around incision site)     Call MD for:  difficulty breathing or increased cough     Call MD for:  severe persistent headache     Call MD for:  persistent dizziness, light-headedness, or visual disturbances     Call MD for:  increased confusion or weakness     Call MD for:   Order Comments: Return if any heavy bleeding that is more than the following:   soaking of 2 regular pads in an hours, 2 hours in a row.     Medications:  Reconciled Home Medications:      Medication List      START taking these medications    HYDROcodone-acetaminophen 5-325 mg per tablet  Commonly known as:  NORCO  Take 1 tablet by mouth every 6 (six) hours as needed for Pain.     ibuprofen 600 MG tablet  Commonly known as:  ADVIL,MOTRIN  Take 1 tablet (600 mg total) by mouth every 8 (eight) hours as needed for Pain (take with food, and may alternate with norco).        CONTINUE taking these medications    albuterol 90 mcg/actuation inhaler  Commonly known as:  Proventil HFA  Inhale 2 puffs into the lungs every 6 (six) hours as needed.     amLODIPine 5 MG tablet  Commonly known as:  NORVASC  Take 1 tablet (5 mg total) by mouth once daily.     carbamide peroxide 6.5 % otic solution  Commonly known as:  DEBROX  Place 2 drops into both ears as needed.     ferrous sulfate 325 mg (65 mg iron) Tab tablet  Commonly known as:  FEOSOL  Take 1 tablet (325 mg total) by mouth once daily.     hydroCHLOROthiazide 12.5 MG Tab  Commonly known as:  HYDRODIURIL  TAKE 1 TABLET BY MOUTH EVERY DAY     ketoconazole 2 % cream  Commonly known as:  NIZORAL  Apply topically 2 (two) times daily.        STOP taking these  medications    norethindrone 5 mg Tab  Commonly known as:  AYGESTIN            Lazara Erazo MD  Obstetrics & Gynecology  [facility]

## 2019-12-07 NOTE — SUBJECTIVE & OBJECTIVE
Interval History: Patient reports she is doing well. She is ambulating well. Voiding well. No nausea or vomiting, no flatus as yet. She is tolerating clear liquid diet. Pain is well controlled. No vaginal bleeding.      Scheduled Meds:   [START ON 12/7/2019] amLODIPine  5 mg Oral Daily    docusate sodium  100 mg Oral BID    hydroCHLOROthiazide  12.5 mg Oral Daily    ketorolac  30 mg Intravenous Q6H    Followed by    [START ON 12/7/2019] ibuprofen  800 mg Oral Q8H    nozaseptin   Each Nostril BID     Continuous Infusions:   dextrose 5 % and 0.45 % NaCl 125 mL/hr at 12/06/19 1704     PRN Meds:diphenhydrAMINE, HYDROcodone-acetaminophen, HYDROmorphone, ondansetron, promethazine    Review of patient's allergies indicates:   Allergen Reactions    Latex, natural rubber Hives    Pcn [penicillins] Hives    Sulfa (sulfonamide antibiotics) Hives and Swelling       Objective:     Vital Signs (Most Recent):  Temp: 98.6 °F (37 °C) (12/06/19 1535)  Pulse: 94 (12/06/19 1535)  Resp: 16 (12/06/19 1535)  BP: 125/85 (12/06/19 1535)  SpO2: 96 % (12/06/19 1535) Vital Signs (24h Range):  Temp:  [97.9 °F (36.6 °C)-98.6 °F (37 °C)] 98.6 °F (37 °C)  Pulse:  [] 94  Resp:  [13-29] 16  SpO2:  [91 %-99 %] 96 %  BP: (121-143)/(69-92) 125/85     Weight: 94.2 kg (207 lb 10.8 oz)  Body mass index is 34.56 kg/m².  Patient's last menstrual period was 11/07/2019 (approximate).    I&O (Last 24H):    Intake/Output Summary (Last 24 hours) at 12/6/2019 1812  Last data filed at 12/6/2019 1515  Gross per 24 hour   Intake 900 ml   Output 100 ml   Net 800 ml       Physical Exam:   Constitutional: She is oriented to person, place, and time. She appears well-developed and well-nourished. No distress.       Cardiovascular: Normal rate, regular rhythm and normal heart sounds.    No murmur heard.   Pulmonary/Chest: Effort normal and breath sounds normal. No respiratory distress. She has no wheezes. She has no rales.        Abdominal: Soft. Bowel  sounds are normal. She exhibits abdominal incision (clean, dry, intact). She exhibits no distension. There is no tenderness. There is no guarding.     Genitourinary: Vagina normal.           Musculoskeletal: She exhibits no edema.   No calf tenderness       Neurological: She is alert and oriented to person, place, and time.    Skin: Skin is warm and dry. No rash noted. She is not diaphoretic.        Laboratory:  I have personallly reviewed all pertinent lab results from the last 24 hours.    Diagnostic Results:  Labs: Reviewed

## 2019-12-07 NOTE — SUBJECTIVE & OBJECTIVE
Scheduled Meds:   amLODIPine  5 mg Oral Daily    docusate sodium  100 mg Oral BID    hydroCHLOROthiazide  12.5 mg Oral Daily    ketorolac  30 mg Intravenous Q6H    Followed by    ibuprofen  800 mg Oral Q8H    nozaseptin   Each Nostril BID     Continuous Infusions:   dextrose 5 % and 0.45 % NaCl 125 mL/hr at 12/06/19 2357     PRN Meds:diphenhydrAMINE, HYDROcodone-acetaminophen, HYDROmorphone, ondansetron, promethazine    Review of patient's allergies indicates:   Allergen Reactions    Latex, natural rubber Hives    Pcn [penicillins] Hives    Sulfa (sulfonamide antibiotics) Hives and Swelling       Objective:     Vital Signs (Most Recent):  Temp: 98 °F (36.7 °C) (12/07/19 0757)  Pulse: 89 (12/07/19 0757)  Resp: 18 (12/07/19 0757)  BP: 135/89 (12/07/19 0757)  SpO2: 98 % (12/07/19 0757) Vital Signs (24h Range):  Temp:  [98 °F (36.7 °C)-98.6 °F (37 °C)] 98 °F (36.7 °C)  Pulse:  [] 89  Resp:  [13-29] 18  SpO2:  [91 %-99 %] 98 %  BP: (121-143)/(69-89) 135/89     Weight: 94.2 kg (207 lb 10.8 oz)  Body mass index is 34.56 kg/m².  Patient's last menstrual period was 11/07/2019 (approximate).    I&O (Last 24H):    Intake/Output Summary (Last 24 hours) at 12/7/2019 1209  Last data filed at 12/7/2019 0317  Gross per 24 hour   Intake 2177.08 ml   Output 100 ml   Net 2077.08 ml       Physical Exam:   Constitutional: She is oriented to person, place, and time. She appears well-developed and well-nourished.        Pulmonary/Chest: Effort normal and breath sounds normal. She has no wheezes.        Abdominal: Soft. Bowel sounds are normal. She exhibits abdominal incision (CDI). She exhibits no distension. There is tenderness (appropriate for postop state). There is no guarding.             Musculoskeletal: Normal range of motion and moves all extremeties. She exhibits no edema.       Neurological: She is alert and oriented to person, place, and time.    Skin: Skin is warm and dry.    Psychiatric: She has a normal  mood and affect. Her behavior is normal.

## 2019-12-07 NOTE — PROGRESS NOTES
[facility] -   Obstetrics & Gynecology  Progress Note    Patient Name: Bianca Amato  MRN: [id number]  Admission Date: 12/6/2019  Primary Care Provider: Vivi Chand MD  Principal Problem: Intramural leiomyoma of uterus    Subjective:     HPI:  Heavy, painful menses with no improvement with progestin therapy  Ultrasound with multiple small myomas.   EMBx and pap are benign.   For definitive therapy           Scheduled Meds:   amLODIPine  5 mg Oral Daily    docusate sodium  100 mg Oral BID    hydroCHLOROthiazide  12.5 mg Oral Daily    ketorolac  30 mg Intravenous Q6H    Followed by    ibuprofen  800 mg Oral Q8H    nozaseptin   Each Nostril BID     Continuous Infusions:   dextrose 5 % and 0.45 % NaCl 125 mL/hr at 12/06/19 2357     PRN Meds:diphenhydrAMINE, HYDROcodone-acetaminophen, HYDROmorphone, ondansetron, promethazine    Review of patient's allergies indicates:   Allergen Reactions    Latex, natural rubber Hives    Pcn [penicillins] Hives    Sulfa (sulfonamide antibiotics) Hives and Swelling       Objective:     Vital Signs (Most Recent):  Temp: 98 °F (36.7 °C) (12/07/19 0757)  Pulse: 89 (12/07/19 0757)  Resp: 18 (12/07/19 0757)  BP: 135/89 (12/07/19 0757)  SpO2: 98 % (12/07/19 0757) Vital Signs (24h Range):  Temp:  [98 °F (36.7 °C)-98.6 °F (37 °C)] 98 °F (36.7 °C)  Pulse:  [] 89  Resp:  [13-29] 18  SpO2:  [91 %-99 %] 98 %  BP: (121-143)/(69-89) 135/89     Weight: 94.2 kg (207 lb 10.8 oz)  Body mass index is 34.56 kg/m².  Patient's last menstrual period was 11/07/2019 (approximate).    I&O (Last 24H):    Intake/Output Summary (Last 24 hours) at 12/7/2019 1209  Last data filed at 12/7/2019 0317  Gross per 24 hour   Intake 2177.08 ml   Output 100 ml   Net 2077.08 ml       Physical Exam:   Constitutional: She is oriented to person, place, and time. She appears well-developed and well-nourished.        Pulmonary/Chest: Effort normal and breath sounds normal. She has no wheezes.         Abdominal: Soft. Bowel sounds are normal. She exhibits abdominal incision (CDI). She exhibits no distension. There is tenderness (appropriate for postop state). There is no guarding.             Musculoskeletal: Normal range of motion and moves all extremeties. She exhibits no edema.       Neurological: She is alert and oriented to person, place, and time.    Skin: Skin is warm and dry.    Psychiatric: She has a normal mood and affect. Her behavior is normal.            Assessment/Plan:     Status post laparoscopic hysterectomy  POD #0 s/p RALH  Pt doing well.  Proceed with routine postoperative care, encouraged ambulation, aware ok to shower.  Pt counseled on management plan and discharge goals. Anticipate discharge in AM    POD #1 doing well. Discharge home           Essential hypertension  Manage medically and monitor     Blood pressures normal posteratively.    Mild intermittent asthma without complication  Stable, no flares during this admission. Normal pulmonary exam after surgery.        Lazara Erazo MD  Obstetrics & Gynecology  [facility]

## 2019-12-07 NOTE — ASSESSMENT & PLAN NOTE
POD #0 s/p RALH  Pt doing well.  Proceed with routine postoperative care, encouraged ambulation, aware ok to shower.  Pt counseled on management plan and discharge goals. Anticipate discharge in AM    POD #1 doing well. Discharge home

## 2019-12-07 NOTE — PROGRESS NOTES
[facility] -   Obstetrics & Gynecology  Progress Note    Patient Name: Bianca Amato  MRN: [id number]  Admission Date: 12/6/2019  Primary Care Provider: Vivi Chand MD  Principal Problem: Intramural leiomyoma of uterus    Subjective:     HPI:  Heavy, painful menses with no improvement with progestin therapy  Ultrasound with multiple small myomas.   EMBx and pap are benign.   For definitive therapy     Interval History: Patient reports she is doing well. She is ambulating well. Voiding well. No nausea or vomiting, no flatus as yet. She is tolerating clear liquid diet. Pain is well controlled. No vaginal bleeding.      Scheduled Meds:   [START ON 12/7/2019] amLODIPine  5 mg Oral Daily    docusate sodium  100 mg Oral BID    hydroCHLOROthiazide  12.5 mg Oral Daily    ketorolac  30 mg Intravenous Q6H    Followed by    [START ON 12/7/2019] ibuprofen  800 mg Oral Q8H    nozaseptin   Each Nostril BID     Continuous Infusions:   dextrose 5 % and 0.45 % NaCl 125 mL/hr at 12/06/19 1704     PRN Meds:diphenhydrAMINE, HYDROcodone-acetaminophen, HYDROmorphone, ondansetron, promethazine    Review of patient's allergies indicates:   Allergen Reactions    Latex, natural rubber Hives    Pcn [penicillins] Hives    Sulfa (sulfonamide antibiotics) Hives and Swelling       Objective:     Vital Signs (Most Recent):  Temp: 98.6 °F (37 °C) (12/06/19 1535)  Pulse: 94 (12/06/19 1535)  Resp: 16 (12/06/19 1535)  BP: 125/85 (12/06/19 1535)  SpO2: 96 % (12/06/19 1535) Vital Signs (24h Range):  Temp:  [97.9 °F (36.6 °C)-98.6 °F (37 °C)] 98.6 °F (37 °C)  Pulse:  [] 94  Resp:  [13-29] 16  SpO2:  [91 %-99 %] 96 %  BP: (121-143)/(69-92) 125/85     Weight: 94.2 kg (207 lb 10.8 oz)  Body mass index is 34.56 kg/m².  Patient's last menstrual period was 11/07/2019 (approximate).    I&O (Last 24H):    Intake/Output Summary (Last 24 hours) at 12/6/2019 1812  Last data filed at 12/6/2019 1515  Gross per 24 hour   Intake  900 ml   Output 100 ml   Net 800 ml       Physical Exam:   Constitutional: She is oriented to person, place, and time. She appears well-developed and well-nourished. No distress.       Cardiovascular: Normal rate, regular rhythm and normal heart sounds.    No murmur heard.   Pulmonary/Chest: Effort normal and breath sounds normal. No respiratory distress. She has no wheezes. She has no rales.        Abdominal: Soft. Bowel sounds are normal. She exhibits abdominal incision (clean, dry, intact). She exhibits no distension. There is no tenderness. There is no guarding.     Genitourinary: Vagina normal.           Musculoskeletal: She exhibits no edema.   No calf tenderness       Neurological: She is alert and oriented to person, place, and time.    Skin: Skin is warm and dry. No rash noted. She is not diaphoretic.        Laboratory:  I have personallly reviewed all pertinent lab results from the last 24 hours.    Diagnostic Results:  Labs: Reviewed    Assessment/Plan:     * Intramural leiomyoma of uterus  S/p Robotic Hysterectomy salpingectomy     Status post laparoscopic hysterectomy  POD #0 s/p RALH  Pt doing well.  Proceed with routine postoperative care, encouraged ambulation, aware ok to shower.  Pt counseled on management plan and discharge goals. Anticipate discharge in AM           Menorrhagia with regular cycle  S/p Hysterectomy     Dysmenorrhea  Hysterectomy     Essential hypertension  Manage medically and monitor         Niya Diaz PA-C  Obstetrics & Gynecology  [facility]

## 2019-12-07 NOTE — HOSPITAL COURSE
Patient presented for robotic assisted laparoscopic hysterectomy bilateral salpingectomy for fibroids and dysfunctional uterine bleeding. Patient progressed well postoperatively without complication, was voiding spontaneously, pain well controlled with po medications, and no post op nausea and vomiting tolerating po well. Reviewed discharge instructions including restricting heavy lifting, pelvic rest.

## 2019-12-07 NOTE — ASSESSMENT & PLAN NOTE
POD #0 s/p RALH  Pt doing well.  Proceed with routine postoperative care, encouraged ambulation, aware ok to shower.  Pt counseled on management plan and discharge goals. Anticipate discharge in AM

## 2019-12-09 NOTE — ANESTHESIA POSTPROCEDURE EVALUATION
Anesthesia Post Evaluation    Patient: Bianca Amato    Procedure(s) Performed: Procedure(s) (LRB):  XI ROBOTIC HYSTERECTOMY (N/A)  XI ROBOTIC SALPINGECTOMY (Bilateral)    Final Anesthesia Type: general    Patient location during evaluation: PACU  Patient participation: Yes- Able to Participate  Level of consciousness: awake and alert  Post-procedure vital signs: reviewed and stable  Pain management: adequate  Airway patency: patent    PONV status at discharge: No PONV  Anesthetic complications: no      Cardiovascular status: hemodynamically stable  Respiratory status: spontaneous ventilation  Hydration status: euvolemic  Follow-up not needed.          Vitals Value Taken Time   /85 12/7/2019 12:36 PM   Temp 36.7 °C (98.1 °F) 12/7/2019 12:36 PM   Pulse 82 12/7/2019 12:36 PM   Resp 18 12/7/2019 12:36 PM   SpO2 100 % 12/7/2019 12:36 PM         Event Time     Out of Recovery 15:20:40          Pain/Artem Score: Pain Rating Prior to Med Admin: 2 (12/7/2019 12:23 PM)  Pain Rating Post Med Admin: 0 (12/7/2019  5:50 AM)

## 2019-12-13 ENCOUNTER — PATIENT MESSAGE (OUTPATIENT)
Dept: OBSTETRICS AND GYNECOLOGY | Facility: CLINIC | Age: 39
End: 2019-12-13

## 2019-12-13 NOTE — TELEPHONE ENCOUNTER
Pt c/o lower abdominal pain with bowel movements.  Pt informed this is normal one week after surgery, should improve on its own, continue to use stool softeners, call for worsening symptoms.  Pt voiced understanding.  VB, LPN

## 2019-12-15 ENCOUNTER — NURSE TRIAGE (OUTPATIENT)
Dept: ADMINISTRATIVE | Facility: CLINIC | Age: 39
End: 2019-12-15

## 2019-12-15 NOTE — TELEPHONE ENCOUNTER
Reason for call:cough and running  Cough main symptom    Reason for Disposition   Cough    Additional Information   Negative: Severe difficulty breathing (e.g., struggling for each breath, speaks in single words)   Negative: Bluish (or gray) lips or face now   Negative: [1] Rapid onset of cough AND [2] has hives   Negative: Coughing started suddenly after medicine, an allergic food or bee sting   Negative: [1] Difficulty breathing AND [2] exposure to flames, smoke, or fumes   Negative: [1] Stridor AND [2] difficulty breathing   Negative: Choked on object of food that could be caught in the throat   Negative: Chest pain is main symptom   Negative: [1] Previous asthma attacks AND [2] this feels like asthma attack   Negative: Cough lasts > 3 weeks   Negative: Wet (productive) cough (i.e., white-yellow, yellow, green, or braden colored sputum)   Negative: Chest pain  (Exception: MILD central chest pain, present only when coughing)   Negative: Difficulty breathing   Negative: Patient sounds very sick or weak to the triager   Negative: [1] Coughed up blood AND [2] > 1 tablespoon (15 ml) (Exception: blood-tinged sputum)   Negative: Fever > 103 F (39.4 C)   Negative: [1] Fever > 100.0 F (37.8 C) AND [2] diabetes mellitus or weak immune system (e.g., HIV positive, cancer chemo, splenectomy, chronic steroids)   Negative: [1] Fever > 100.0 F (37.8 C) AND [2] bedridden (e.g., nursing home patient, CVA, chronic illness, recovering from surgery)   Negative: [1] Fever > 101 F (38.3 C) AND [2] age > 60   Negative: Wheezing is present   Negative: [1] Ankle swelling AND [2] swelling is increasing   Negative: [1] Continuous (nonstop) coughing interferes with work or school AND [2] no improvement using cough treatment per protocol   Negative: Fever present > 3 days (72 hours)   Negative: [1] Fever returns after gone for over 24 hours AND [2] symptoms worse or not improved   Negative: [1] Using nasal washes and  pain medicine > 24 hours AND [2] sinus pain (around cheekbone or eye) persists   Negative: Earache is present   Negative: Cough has been present for > 3 weeks   Negative: [1] Nasal discharge AND [2] present > 10 days   Negative: [1] Coughed up blood-tinged sputum AND [2] more than once   Negative: [1] Patient also has allergy symptoms (e.g., itchy eyes, clear nasal discharge, postnasal drip) AND [2] they are acting up   Negative: Exposure to TB (Tuberculosis)    Protocols used: COUGH - ACUTE NON-PRODUCTIVE-A-AH

## 2019-12-16 ENCOUNTER — PATIENT MESSAGE (OUTPATIENT)
Dept: INTERNAL MEDICINE | Facility: CLINIC | Age: 39
End: 2019-12-16

## 2019-12-17 NOTE — TELEPHONE ENCOUNTER
Please contact patient to find out what symptoms she is having. I assume she is having shortness of breath or wheezing since she is requesting albuterol nebs. The concern is that she is at risk for PE/DVT and pneumonia given recent surgery. Also she has asthma and may require steroids for treatment. I strongly advise that she be evaluated clinically to determine best course of treatment. She had 2 refills on her albuterol inhaler that was sent earlier this year, does she have her inhaler?

## 2019-12-17 NOTE — TELEPHONE ENCOUNTER
Spoke with pt states she has dry cough and runny nose, states at times she gets sob and wheezing.   States she has been using her inhaler without much help.   Notified she needs to be seen to rule out pneumonia and or pe/dvt.  Pt voiced understanding, states transportation is not an issue and will call back to schedule an appt.

## 2019-12-17 NOTE — TELEPHONE ENCOUNTER
Please contact patient, she needs to be seen given h/o asthma and recent surgery. She can be seen at [facility] if that is a more easily accessible location.

## 2019-12-18 LAB
FINAL PATHOLOGIC DIAGNOSIS: NORMAL
GROSS: NORMAL

## 2020-01-03 ENCOUNTER — OFFICE VISIT (OUTPATIENT)
Dept: OBSTETRICS AND GYNECOLOGY | Facility: CLINIC | Age: 40
End: 2020-01-03
Payer: COMMERCIAL

## 2020-01-03 VITALS
DIASTOLIC BLOOD PRESSURE: 108 MMHG | SYSTOLIC BLOOD PRESSURE: 156 MMHG | BODY MASS INDEX: 35.52 KG/M2 | HEIGHT: 65 IN | WEIGHT: 213.19 LBS

## 2020-01-03 DIAGNOSIS — Z90.710 STATUS POST LAPAROSCOPIC HYSTERECTOMY: Primary | ICD-10-CM

## 2020-01-03 PROCEDURE — 99024 PR POST-OP FOLLOW-UP VISIT: ICD-10-PCS | Mod: S$GLB,,, | Performed by: OBSTETRICS & GYNECOLOGY

## 2020-01-03 PROCEDURE — 99999 PR PBB SHADOW E&M-EST. PATIENT-LVL III: ICD-10-PCS | Mod: PBBFAC,,, | Performed by: OBSTETRICS & GYNECOLOGY

## 2020-01-03 PROCEDURE — 99024 POSTOP FOLLOW-UP VISIT: CPT | Mod: S$GLB,,, | Performed by: OBSTETRICS & GYNECOLOGY

## 2020-01-03 PROCEDURE — 99999 PR PBB SHADOW E&M-EST. PATIENT-LVL III: CPT | Mod: PBBFAC,,, | Performed by: OBSTETRICS & GYNECOLOGY

## 2020-01-03 NOTE — PROGRESS NOTES
"Subjective:      Bianca Amato is a 39 y.o. female who presents to the clinic 4 weeks status post Davinci assisted hysterectomy and bilateral salpingectomy for abnormal uterine bleeding. Eating a regular diet without difficulty. Bowel movements are normal. The patient is not having any pain.    The following portions of the patient's history were reviewed and updated as appropriate: allergies, current medications, past family history, past medical history, past social history, past surgical history and problem list.    Review of Systems  Pertinent items are noted in HPI.      Objective:     BP (!) 156/108   Ht 5' 5" (1.651 m)   Wt 96.7 kg (213 lb 3 oz)   LMP 11/07/2019 (Approximate)   BMI 35.48 kg/m²   General:  alert, appears stated age and cooperative   Abdomen: soft, bowel sounds active, non-tender   Incision:   healing well, no drainage, no erythema, no hernia, no seroma, no swelling, no dehiscence, incision well approximated     Assessment:      Doing well postoperatively.  Operative findings again reviewed. Pathology report discussed.      Plan:      1. Continue any current medications.  2. Wound care discussed.  3. Activity restrictions: Pelvic rest for 8 weeks total post op   4. Anticipated return to work: now.  5. Follow up: 1 year     Pap not indicated in the future   "

## 2020-01-07 ENCOUNTER — PATIENT MESSAGE (OUTPATIENT)
Dept: OBSTETRICS AND GYNECOLOGY | Facility: CLINIC | Age: 40
End: 2020-01-07

## 2020-01-10 ENCOUNTER — HOSPITAL ENCOUNTER (OUTPATIENT)
Dept: RADIOLOGY | Facility: HOSPITAL | Age: 40
Discharge: HOME | End: 2020-01-10
Attending: OBSTETRICS & GYNECOLOGY
Payer: COMMERCIAL

## 2020-01-10 VITALS — WEIGHT: 213.19 LBS | BODY MASS INDEX: 35.52 KG/M2 | HEIGHT: 65 IN

## 2020-01-10 DIAGNOSIS — Z12.39 BREAST SCREENING: ICD-10-CM

## 2020-01-10 PROCEDURE — 77067 SCR MAMMO BI INCL CAD: CPT | Mod: 26,,, | Performed by: RADIOLOGY

## 2020-01-10 PROCEDURE — 77067 SCR MAMMO BI INCL CAD: CPT | Mod: TC

## 2020-01-10 PROCEDURE — 77067 MAMMO DIGITAL SCREENING BILAT WITH CAD: ICD-10-PCS | Mod: 26,,, | Performed by: RADIOLOGY

## 2020-01-15 ENCOUNTER — TELEPHONE (OUTPATIENT)
Dept: OBSTETRICS AND GYNECOLOGY | Facility: CLINIC | Age: 40
End: 2020-01-15

## 2020-01-15 NOTE — TELEPHONE ENCOUNTER
----- Message from Lisa Alegre sent at 1/15/2020 10:17 AM CST -----  Contact: Pt   Pt called in regards to checking on the status of her work release paperwork. Pt can be reached at [phone] (epvt).

## 2020-01-15 NOTE — TELEPHONE ENCOUNTER
Spoke to patient and she is on her way to [facility].  Her paperwork was not filled out correctly and needs some corrections.  She will discuss with staff when she gets there.

## 2020-01-17 ENCOUNTER — TELEPHONE (OUTPATIENT)
Dept: OBSTETRICS AND GYNECOLOGY | Facility: CLINIC | Age: 40
End: 2020-01-17

## 2020-01-17 ENCOUNTER — OFFICE VISIT (OUTPATIENT)
Dept: OBSTETRICS AND GYNECOLOGY | Facility: CLINIC | Age: 40
End: 2020-01-17
Payer: COMMERCIAL

## 2020-01-17 VITALS
HEIGHT: 65 IN | DIASTOLIC BLOOD PRESSURE: 102 MMHG | SYSTOLIC BLOOD PRESSURE: 152 MMHG | WEIGHT: 211.44 LBS | BODY MASS INDEX: 35.23 KG/M2

## 2020-01-17 DIAGNOSIS — Z90.710 STATUS POST LAPAROSCOPIC HYSTERECTOMY: Primary | ICD-10-CM

## 2020-01-17 PROCEDURE — 99999 PR PBB SHADOW E&M-EST. PATIENT-LVL II: ICD-10-PCS | Mod: PBBFAC,,,

## 2020-01-17 PROCEDURE — 99024 POSTOP FOLLOW-UP VISIT: CPT | Mod: S$GLB,,, | Performed by: OBSTETRICS & GYNECOLOGY

## 2020-01-17 PROCEDURE — 99024 PR POST-OP FOLLOW-UP VISIT: ICD-10-PCS | Mod: S$GLB,,, | Performed by: OBSTETRICS & GYNECOLOGY

## 2020-01-17 PROCEDURE — 99999 PR PBB SHADOW E&M-EST. PATIENT-LVL II: CPT | Mod: PBBFAC,,,

## 2020-01-17 NOTE — PROGRESS NOTES
Subjective:       Patient ID: Bianca Amato is a 39 y.o. female.    Chief Complaint:  Post-op Evaluation      History of Present Illness  HPI  Postoperative Follow-up  Patient presents to the clinic 6 weeks status post Davinci assisted hysterectomy and bilateral salpingectomy for fibroids. Eating a regular diet without difficulty. Bowel movements are normal. The patient is not having any pain.  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 Otherwise she feels able to do her work tasks. She denies need to lift anything over 10 lb.    GYN & OB History  Patient's last menstrual period was 2019 (approximate).   Date of Last Pap: No result found    OB History    Para Term  AB Living   4 4 4     4   SAB TAB Ectopic Multiple Live Births                  # Outcome Date GA Lbr Ashwin/2nd Weight Sex Delivery Anes PTL Lv   4 Term            3 Term            2 Term            1 Term                Review of Systems  Review of Systems   Constitutional: Negative for activity change, chills, fatigue and fever.   Respiratory: Negative for cough.    Cardiovascular: Negative for chest pain and leg swelling.   Gastrointestinal: Negative for abdominal pain, constipation, nausea and vomiting.   Genitourinary: Negative for dysuria, frequency, hematuria, pelvic pain, urgency, vaginal bleeding and vaginal discharge.   Integumentary:  Negative for rash.           Objective:    Physical Exam:   Constitutional: She is oriented to person, place, and time. She appears well-developed and well-nourished. No distress.             Abdominal: Soft. She exhibits abdominal incision (4 trocar incisions well healed). She exhibits no distension. There is no tenderness.             Musculoskeletal: She exhibits no edema.       Neurological:  She is alert and oriented to person, place, and time.    Skin: Skin is warm and dry. She is not diaphoretic. No erythema.           Problem List Items Addressed This Visit     None      Visit Diagnoses     Status post laparoscopic hysterectomy    -  Primary      Patient reassured that incisions are healing well. She states she is not having pain issues, just that certain tasks at work are painful. In particular the overhead arm extension that she is required to do. She is requesting to have modification of her work duties. Letter provided for her to limit excessive torso stretching for another 2 weeks to allow for further healing.

## 2020-01-17 NOTE — LETTER
January 17, 2020      [facility]  [address]  [place] [address]  Phone: [phone]  Fax: [phone]       Patient: Bianca Amato   YOB: 1980  Date of Visit: 01/17/2020    To Whom It May Concern:    Emerita Amato  was at [facility] on 01/17/2020. She may return to work on 1/17/2020 with restrictions to include no lifting above >10 lb and no excessive reaching of upper torso until 2/1/2020. If you have any questions or concerns, or if I can be of further assistance, please do not hesitate to contact me.  Sincerely,    Niya Diaz PA-C

## 2020-01-17 NOTE — LETTER
January 17, 2020      [facility]  [address]  [place] [address]  Phone: [phone]  Fax: [phone]       Patient: Bianca Amato   YOB: 1980  Date of Visit: 01/17/2020    To Whom It May Concern:    Emerita Amato  was at [facility] on 01/17/2020. She may return to work on 1/17/2020 with restrictions to include no lifting above >10 lb and no excessive reaching of upper torso. If you have any questions or concerns, or if I can be of further assistance, please do not hesitate to contact me.    Sincerely,          Niya Diaz PA-C

## 2020-01-17 NOTE — TELEPHONE ENCOUNTER
Spoke with pt. Assisted pt with rescheduling appt due to Dr. Davidson having surgery. Pt verbalized understanding.

## 2020-01-23 ENCOUNTER — TELEPHONE (OUTPATIENT)
Dept: OBSTETRICS AND GYNECOLOGY | Facility: CLINIC | Age: 40
End: 2020-01-23

## 2020-01-23 NOTE — TELEPHONE ENCOUNTER
"Patient stated "it's ok, I got may paper work straight" and when asked if everything was OK, patient stated "I'm doing ok".  Patient advised to call with any future needs.  "

## 2020-01-23 NOTE — TELEPHONE ENCOUNTER
----- Message from Margaret Maldonado sent at 1/22/2020  1:13 PM CST -----  Contact: self  needs call back regarding paperwork...[phone] (dkef)

## 2020-02-27 ENCOUNTER — OFFICE VISIT (OUTPATIENT)
Dept: INTERNAL MEDICINE | Facility: CLINIC | Age: 40
End: 2020-02-27
Payer: COMMERCIAL

## 2020-02-27 VITALS
OXYGEN SATURATION: 98 % | RESPIRATION RATE: 16 BRPM | DIASTOLIC BLOOD PRESSURE: 98 MMHG | HEART RATE: 103 BPM | SYSTOLIC BLOOD PRESSURE: 142 MMHG | WEIGHT: 209.31 LBS | TEMPERATURE: 99 F | BODY MASS INDEX: 34.87 KG/M2 | HEIGHT: 65 IN

## 2020-02-27 DIAGNOSIS — I10 ESSENTIAL HYPERTENSION: ICD-10-CM

## 2020-02-27 DIAGNOSIS — J45.20 MILD INTERMITTENT ASTHMA WITHOUT COMPLICATION: ICD-10-CM

## 2020-02-27 DIAGNOSIS — H61.20 CERUMEN IN AUDITORY CANAL ON EXAMINATION: ICD-10-CM

## 2020-02-27 DIAGNOSIS — J06.9 UPPER RESPIRATORY TRACT INFECTION, UNSPECIFIED TYPE: Primary | ICD-10-CM

## 2020-02-27 LAB — GROUP A STREP, MOLECULAR: NEGATIVE

## 2020-02-27 PROCEDURE — 3077F SYST BP >= 140 MM HG: CPT | Mod: CPTII,S$GLB,, | Performed by: NURSE PRACTITIONER

## 2020-02-27 PROCEDURE — 99999 PR PBB SHADOW E&M-EST. PATIENT-LVL III: CPT | Mod: PBBFAC,,, | Performed by: NURSE PRACTITIONER

## 2020-02-27 PROCEDURE — 3080F DIAST BP >= 90 MM HG: CPT | Mod: CPTII,S$GLB,, | Performed by: NURSE PRACTITIONER

## 2020-02-27 PROCEDURE — 3008F PR BODY MASS INDEX (BMI) DOCUMENTED: ICD-10-PCS | Mod: CPTII,S$GLB,, | Performed by: NURSE PRACTITIONER

## 2020-02-27 PROCEDURE — 87651 STREP A DNA AMP PROBE: CPT

## 2020-02-27 PROCEDURE — 3077F PR MOST RECENT SYSTOLIC BLOOD PRESSURE >= 140 MM HG: ICD-10-PCS | Mod: CPTII,S$GLB,, | Performed by: NURSE PRACTITIONER

## 2020-02-27 PROCEDURE — 99214 PR OFFICE/OUTPT VISIT, EST, LEVL IV, 30-39 MIN: ICD-10-PCS | Mod: S$GLB,,, | Performed by: NURSE PRACTITIONER

## 2020-02-27 PROCEDURE — 99214 OFFICE O/P EST MOD 30 MIN: CPT | Mod: S$GLB,,, | Performed by: NURSE PRACTITIONER

## 2020-02-27 PROCEDURE — 99999 PR PBB SHADOW E&M-EST. PATIENT-LVL III: ICD-10-PCS | Mod: PBBFAC,,, | Performed by: NURSE PRACTITIONER

## 2020-02-27 PROCEDURE — 3080F PR MOST RECENT DIASTOLIC BLOOD PRESSURE >= 90 MM HG: ICD-10-PCS | Mod: CPTII,S$GLB,, | Performed by: NURSE PRACTITIONER

## 2020-02-27 PROCEDURE — 3008F BODY MASS INDEX DOCD: CPT | Mod: CPTII,S$GLB,, | Performed by: NURSE PRACTITIONER

## 2020-02-27 RX ORDER — MONTELUKAST SODIUM 10 MG/1
10 TABLET ORAL NIGHTLY
Qty: 30 TABLET | Refills: 4 | Status: SHIPPED | OUTPATIENT
Start: 2020-02-27 | End: 2020-03-28

## 2020-02-27 RX ORDER — LEVOCETIRIZINE DIHYDROCHLORIDE 5 MG/1
5 TABLET, FILM COATED ORAL NIGHTLY
Qty: 30 TABLET | Refills: 5 | Status: SHIPPED | OUTPATIENT
Start: 2020-02-27 | End: 2021-01-28

## 2020-02-27 RX ORDER — FLUTICASONE PROPIONATE 50 MCG
1 SPRAY, SUSPENSION (ML) NASAL DAILY
Qty: 16 G | Refills: 4 | Status: SHIPPED | OUTPATIENT
Start: 2020-02-27 | End: 2021-07-15

## 2020-02-27 NOTE — PROGRESS NOTES
Bianca Amato 40 y.o. female     Chief Complaint:  Chief Complaint   Patient presents with    Nasal Congestion    Cough       History of Present Illness:  Pt present for:     Cough & nasal congestion  Weakness   Mild body aches   Started 5 days ago    - Fever/chills   Chapped lips thirsty   -NVD   + SOB & wheezing   Using inhaler 3x/day   Decreased appetite   Cough drops   142/98, did not take BP meds this AM   Got flu shot     Exam:  Review of Systems   Constitutional: Negative for chills and fever.   HENT: Positive for congestion, ear pain, rhinorrhea and sore throat. Negative for dental problem, drooling, ear discharge, nosebleeds and postnasal drip.    Eyes: Negative for visual disturbance.   Respiratory: Positive for cough, shortness of breath and wheezing.    Cardiovascular: Negative for chest pain.   Gastrointestinal: Negative for abdominal pain, nausea and vomiting.   Genitourinary: Negative for difficulty urinating.   Musculoskeletal: Positive for myalgias.   Skin: Negative for rash.   Allergic/Immunologic: Positive for environmental allergies.   Neurological: Positive for headaches. Negative for dizziness.     Physical Exam   Constitutional: She is oriented to person, place, and time. She appears well-developed and well-nourished. She is cooperative.  Non-toxic appearance. She does not have a sickly appearance. She does not appear ill. No distress.   HENT:   Head: Normocephalic and atraumatic.   Right Ear: Hearing, external ear and ear canal normal.   Left Ear: Hearing, external ear and ear canal normal.   Nose: Mucosal edema and rhinorrhea present.   Mouth/Throat: Uvula is midline and mucous membranes are normal. Posterior oropharyngeal edema and posterior oropharyngeal erythema present. No oropharyngeal exudate.   Excessive cerumen bilaterally    Eyes: Pupils are equal, round, and reactive to light. Conjunctivae and EOM are normal. Right eye exhibits no discharge. Left eye exhibits no discharge.  No scleral icterus.   Neck: Normal range of motion. No tracheal deviation present.   Cardiovascular: Normal rate and regular rhythm.   Pulmonary/Chest: Effort normal and breath sounds normal. No stridor. No respiratory distress. She has no wheezes. She has no rales. She exhibits no tenderness.   Abdominal: Soft. Bowel sounds are normal. She exhibits no distension. There is no tenderness.   Musculoskeletal: Normal range of motion. She exhibits no edema.   Neurological: She is alert and oriented to person, place, and time.   Skin: Skin is warm, dry and intact. No rash noted. She is not diaphoretic. No erythema. No pallor.   Psychiatric: She has a normal mood and affect. Her speech is normal and behavior is normal. Judgment and thought content normal. Cognition and memory are normal.   Nursing note and vitals reviewed.      Most Recent Laboratory Results Reviewed ({Yes)  Lab Results   Component Value Date    WBC 9.70 12/04/2019    HGB 14.1 12/04/2019    HCT 43.9 12/04/2019     (H) 12/04/2019    CHOL 172 10/30/2019    TRIG 102 10/30/2019    HDL 49 10/30/2019    ALT 21 10/30/2019    AST 18 10/30/2019     12/04/2019    K 3.6 12/04/2019     12/04/2019    CREATININE 0.7 12/04/2019    BUN 12 12/04/2019    CO2 25 12/04/2019    TSH 0.195 (L) 08/24/2018       Assessment     ICD-10-CM ICD-9-CM   1. Upper respiratory tract infection, unspecified type J06.9 465.9   2. Mild intermittent asthma without complication J45.20 493.90   3. Essential hypertension I10 401.9        Plan   Upper respiratory tract infection, unspecified type  -     fluticasone propionate (FLONASE) 50 mcg/actuation nasal spray; 1 spray (50 mcg total) by Each Nostril route once daily.  Dispense: 16 g; Refill: 4  -     levocetirizine (XYZAL) 5 MG tablet; Take 1 tablet (5 mg total) by mouth every evening.  Dispense: 30 tablet; Refill: 5  -     Throat Screen, Rapid    Mild intermittent asthma without complication  -     montelukast (SINGULAIR) 10  mg tablet; Take 1 tablet (10 mg total) by mouth every evening.  Dispense: 30 tablet; Refill: 4    Essential hypertension  Has not taken BP meds today  past few documented reading elevated   Check BP daily and keep a log   Bring log and BP cuff to next apt      Excess cerumen   OTC Debrox drops for ear wax   Avoid using Q tips     Follow up in about 2 weeks (around 3/12/2020), or if symptoms worsen or fail to improve, for BP recheck.

## 2020-02-27 NOTE — PATIENT INSTRUCTIONS
- mucinex (not D/DM) twice daily   - increase fluids and rest   - humidifier at night   - sleep with your head elevated as much as possible   - throat lozenges all day long   - tylenol as needed for pain/body aches

## 2020-03-16 ENCOUNTER — PATIENT MESSAGE (OUTPATIENT)
Dept: INTERNAL MEDICINE | Facility: CLINIC | Age: 40
End: 2020-03-16

## 2020-04-03 ENCOUNTER — PATIENT MESSAGE (OUTPATIENT)
Dept: INTERNAL MEDICINE | Facility: CLINIC | Age: 40
End: 2020-04-03

## 2020-04-06 ENCOUNTER — PATIENT MESSAGE (OUTPATIENT)
Dept: INTERNAL MEDICINE | Facility: CLINIC | Age: 40
End: 2020-04-06

## 2020-04-07 ENCOUNTER — OFFICE VISIT (OUTPATIENT)
Dept: INTERNAL MEDICINE | Facility: CLINIC | Age: 40
End: 2020-04-07
Payer: COMMERCIAL

## 2020-04-07 DIAGNOSIS — J45.20 MILD INTERMITTENT ASTHMA, UNCOMPLICATED: ICD-10-CM

## 2020-04-07 DIAGNOSIS — J06.9 UPPER RESPIRATORY TRACT INFECTION, UNSPECIFIED TYPE: Primary | ICD-10-CM

## 2020-04-07 DIAGNOSIS — Z71.89 EDUCATED ABOUT COVID-19 VIRUS INFECTION: ICD-10-CM

## 2020-04-07 PROCEDURE — 99213 OFFICE O/P EST LOW 20 MIN: CPT | Mod: 95,,, | Performed by: NURSE PRACTITIONER

## 2020-04-07 PROCEDURE — 99213 PR OFFICE/OUTPT VISIT, EST, LEVL III, 20-29 MIN: ICD-10-PCS | Mod: 95,,, | Performed by: NURSE PRACTITIONER

## 2020-04-07 RX ORDER — ALBUTEROL SULFATE 90 UG/1
2 AEROSOL, METERED RESPIRATORY (INHALATION) EVERY 6 HOURS PRN
Qty: 18 G | Refills: 0 | Status: SHIPPED | OUTPATIENT
Start: 2020-04-07 | End: 2021-04-29 | Stop reason: SDUPTHER

## 2020-04-07 NOTE — PATIENT INSTRUCTIONS
- mucinex (not D/DM) twice daily   - increase fluids and rest   - humidifier at night   - sleep with your head elevated as much as possible   - throat lozenges all day long   - tylenol (NOT ADVIL) as needed for pain/body aches

## 2020-04-07 NOTE — PROGRESS NOTES
Bianca Amato 40 y.o. female     History of Present Illness:  The patient location is: home ([place])   The chief complaint leading to consultation is: cough   Visit type: Virtual visit with synchronous audio and video  Total time spent with patient: 8 minutes   Each patient to whom he or she provides medical services by telemedicine is:  (1) informed of the relationship between the physician and patient and the respective role of any other health care provider with respect to management of the patient; and (2) notified that he or she may decline to receive medical services by telemedicine and may withdraw from such care at any time.    Intermittent cough   Started yesterday   throat irritation   Doing salt gargles   Started this morning   No fever/chills/body aches   No SOB/wheezing/CP     Coworker positive COVID+     Reports close contact last about 1 week ago   Reports quarantining herself since Friday (4 days ago)     Exam:  Review of Systems   Constitutional: Negative for activity change, appetite change, chills, diaphoresis, fatigue, fever and unexpected weight change.   HENT: Positive for sore throat.    Respiratory: Positive for cough. Negative for chest tightness, shortness of breath and wheezing.    Cardiovascular: Negative for chest pain.   Musculoskeletal: Negative for myalgias.   Allergic/Immunologic: Positive for environmental allergies.   Neurological: Negative for headaches.     Physical Exam   Constitutional: She is oriented to person, place, and time. She appears well-developed and well-nourished.  Non-toxic appearance. She does not have a sickly appearance. She does not appear ill. No distress.   HENT:   Head: Normocephalic and atraumatic.   Right Ear: External ear normal.   Left Ear: External ear normal.   Eyes: Pupils are equal, round, and reactive to light. Conjunctivae, EOM and lids are normal. Right eye exhibits no discharge. Left eye exhibits no discharge. No scleral icterus.   Neck: Normal  range of motion. No tracheal deviation present.   Pulmonary/Chest: Effort normal. No respiratory distress.   Neurological: She is alert and oriented to person, place, and time.   Skin: She is not diaphoretic.   Psychiatric: She has a normal mood and affect. Her speech is normal and behavior is normal. Judgment and thought content normal. Cognition and memory are normal.       Most Recent Laboratory Results Reviewed ({Yes)  Lab Results   Component Value Date    WBC 9.70 12/04/2019    HGB 14.1 12/04/2019    HCT 43.9 12/04/2019     (H) 12/04/2019    CHOL 172 10/30/2019    TRIG 102 10/30/2019    HDL 49 10/30/2019    ALT 21 10/30/2019    AST 18 10/30/2019     12/04/2019    K 3.6 12/04/2019     12/04/2019    CREATININE 0.7 12/04/2019    BUN 12 12/04/2019    CO2 25 12/04/2019    TSH 0.195 (L) 08/24/2018       Assessment     ICD-10-CM ICD-9-CM   1. Upper respiratory tract infection, unspecified type J06.9 465.9   2. Mild intermittent asthma, uncomplicated J45.20 493.90        Plan   Upper respiratory tract infection, unspecified type  -     SARS- CoV-2 (COVID-19) QUALITATIVE PCR; Future; Expected date: 04/07/2020  - mucinex (not D/DM) twice daily   - increase fluids and rest   - humidifier at night   - sleep with your head elevated as much as possible   - throat lozenges all day long   - tylenol as needed for pain/body aches      Mild intermittent asthma, uncomplicated  Comments:  rare use of albuterol, reports only using with acute exacerbation about 1-2 times yearly, want to have albuterol on hand PRN   -     albuterol (PROVENTIL HFA) 90 mcg/actuation inhaler; Inhale 2 puffs into the lungs every 6 (six) hours as needed.  Dispense: 18 g; Refill: 0     COVID teaching   Self quarantine   Hand washing     Follow up if symptoms worsen or fail to improve.

## 2020-04-07 NOTE — LETTER
[address] ? [place], [address] ? Phone [phone] ? Fax [phone] ? [email or web address]          Return to Work/School    Patient: Bianca Amato  YOB: 1980   Date: 04/07/2020      To Whom It May Concern:     Bianca Amato was in contact with/seen in my office on 04/07/2020. COVID-19 is present in our communities across the state. There is limited testing for COVID at this time, so not all patients can be tested. In this situation, your employee meets the following criteria:      Bianca Amato has met the criteria for COVID-19 testing based upon symptoms, travel, and/or potential exposure. The test has been completed and is pending results at this time. During this time the employee is not able to work and should be quarantined per the Centers for Disease Control timelines.      If you have any questions or concerns, or if I can be of further assistance, please do not hesitate to contact me.     Sincerely,    Marti Sexton NP

## 2020-04-08 ENCOUNTER — LAB VISIT (OUTPATIENT)
Dept: INTERNAL MEDICINE | Facility: CLINIC | Age: 40
End: 2020-04-08
Payer: COMMERCIAL

## 2020-04-08 DIAGNOSIS — J45.20 MILD INTERMITTENT ASTHMA WITHOUT COMPLICATION: Primary | ICD-10-CM

## 2020-04-08 DIAGNOSIS — J06.9 UPPER RESPIRATORY TRACT INFECTION, UNSPECIFIED TYPE: ICD-10-CM

## 2020-04-08 PROCEDURE — U0002 COVID-19 LAB TEST NON-CDC: HCPCS

## 2020-04-09 LAB — SARS-COV-2 RNA RESP QL NAA+PROBE: NOT DETECTED

## 2020-04-14 ENCOUNTER — PATIENT MESSAGE (OUTPATIENT)
Dept: INTERNAL MEDICINE | Facility: CLINIC | Age: 40
End: 2020-04-14

## 2020-07-17 ENCOUNTER — TELEPHONE (OUTPATIENT)
Dept: INTERNAL MEDICINE | Facility: CLINIC | Age: 40
End: 2020-07-17

## 2020-07-17 NOTE — TELEPHONE ENCOUNTER
----- Message from Elina Ellis sent at 7/17/2020  3:35 PM CDT -----  Contact: [phone]  Caller is requesting an earlier appt than we can schedule.  Caller declined first available appointment listed below. Caller will not accept being placed on the wait list and is requesting a message be sent to the provider.  When is the next available appointment:  ? Medicaid patient  Did you offer to schedule the next available appt and put the patient on the wait list?:   no  What visit type: EP  Symptoms:  cough  Patient preference of timeframe to be scheduled:    What is the reason the patient is requesting a sooner appointment? (insurance terminating, changing jobs):    Would the patient rather a call back or a response via [facility]?:  Call back  Comments:

## 2020-07-18 ENCOUNTER — HOSPITAL ENCOUNTER (EMERGENCY)
Facility: HOSPITAL | Age: 40
Discharge: HOME | End: 2020-07-18
Attending: EMERGENCY MEDICINE
Payer: MEDICAID

## 2020-07-18 VITALS
RESPIRATION RATE: 18 BRPM | BODY MASS INDEX: 34.78 KG/M2 | HEART RATE: 92 BPM | HEIGHT: 65 IN | DIASTOLIC BLOOD PRESSURE: 116 MMHG | SYSTOLIC BLOOD PRESSURE: 156 MMHG | OXYGEN SATURATION: 98 % | TEMPERATURE: 99 F | WEIGHT: 208.75 LBS

## 2020-07-18 DIAGNOSIS — R07.9 CHEST PAIN: ICD-10-CM

## 2020-07-18 DIAGNOSIS — R05.9 COUGH: Primary | ICD-10-CM

## 2020-07-18 DIAGNOSIS — N39.0 URINARY TRACT INFECTION WITHOUT HEMATURIA, SITE UNSPECIFIED: ICD-10-CM

## 2020-07-18 LAB
ALBUMIN SERPL BCP-MCNC: 3.8 G/DL (ref 3.5–5.2)
ALP SERPL-CCNC: 92 U/L (ref 55–135)
ALT SERPL W/O P-5'-P-CCNC: 27 U/L (ref 10–44)
ANION GAP SERPL CALC-SCNC: 8 MMOL/L (ref 8–16)
AST SERPL-CCNC: 14 U/L (ref 10–40)
B-HCG UR QL: NEGATIVE
BACTERIA #/AREA URNS HPF: ABNORMAL /HPF
BASOPHILS # BLD AUTO: 0.02 K/UL (ref 0–0.2)
BASOPHILS NFR BLD: 0.2 % (ref 0–1.9)
BILIRUB SERPL-MCNC: 0.3 MG/DL (ref 0.1–1)
BILIRUB UR QL STRIP: NEGATIVE
BNP SERPL-MCNC: <10 PG/ML (ref 0–99)
BUN SERPL-MCNC: 9 MG/DL (ref 6–20)
CALCIUM SERPL-MCNC: 9.8 MG/DL (ref 8.7–10.5)
CHLORIDE SERPL-SCNC: 104 MMOL/L (ref 95–110)
CLARITY UR: CLEAR
CO2 SERPL-SCNC: 25 MMOL/L (ref 23–29)
COLOR UR: YELLOW
CREAT SERPL-MCNC: 0.9 MG/DL (ref 0.5–1.4)
D DIMER PPP IA.FEU-MCNC: 0.57 MG/L FEU
DIFFERENTIAL METHOD: ABNORMAL
EOSINOPHIL # BLD AUTO: 0.2 K/UL (ref 0–0.5)
EOSINOPHIL NFR BLD: 2.8 % (ref 0–8)
ERYTHROCYTE [DISTWIDTH] IN BLOOD BY AUTOMATED COUNT: 14 % (ref 11.5–14.5)
EST. GFR  (AFRICAN AMERICAN): >60 ML/MIN/1.73 M^2
EST. GFR  (NON AFRICAN AMERICAN): >60 ML/MIN/1.73 M^2
GLUCOSE SERPL-MCNC: 116 MG/DL (ref 70–110)
GLUCOSE UR QL STRIP: NEGATIVE
HCT VFR BLD AUTO: 40.7 % (ref 37–48.5)
HGB BLD-MCNC: 12.8 G/DL (ref 12–16)
HGB UR QL STRIP: NEGATIVE
HIV 1+2 AB+HIV1 P24 AG SERPL QL IA: NEGATIVE
IMM GRANULOCYTES # BLD AUTO: 0.03 K/UL (ref 0–0.04)
IMM GRANULOCYTES NFR BLD AUTO: 0.4 % (ref 0–0.5)
KETONES UR QL STRIP: NEGATIVE
LEUKOCYTE ESTERASE UR QL STRIP: ABNORMAL
LYMPHOCYTES # BLD AUTO: 3 K/UL (ref 1–4.8)
LYMPHOCYTES NFR BLD: 36.9 % (ref 18–48)
MCH RBC QN AUTO: 27.8 PG (ref 27–31)
MCHC RBC AUTO-ENTMCNC: 31.4 G/DL (ref 32–36)
MCV RBC AUTO: 88 FL (ref 82–98)
MICROSCOPIC COMMENT: ABNORMAL
MONOCYTES # BLD AUTO: 0.5 K/UL (ref 0.3–1)
MONOCYTES NFR BLD: 5.8 % (ref 4–15)
NEUTROPHILS # BLD AUTO: 4.4 K/UL (ref 1.8–7.7)
NEUTROPHILS NFR BLD: 53.9 % (ref 38–73)
NITRITE UR QL STRIP: NEGATIVE
NRBC BLD-RTO: 0 /100 WBC
PH UR STRIP: 6 [PH] (ref 5–8)
PLATELET # BLD AUTO: 495 K/UL (ref 150–350)
PMV BLD AUTO: 9.1 FL (ref 9.2–12.9)
POTASSIUM SERPL-SCNC: 3.8 MMOL/L (ref 3.5–5.1)
PROT SERPL-MCNC: 7.4 G/DL (ref 6–8.4)
PROT UR QL STRIP: NEGATIVE
RBC # BLD AUTO: 4.61 M/UL (ref 4–5.4)
SARS-COV-2 RDRP RESP QL NAA+PROBE: NEGATIVE
SODIUM SERPL-SCNC: 137 MMOL/L (ref 136–145)
SP GR UR STRIP: >=1.03 (ref 1–1.03)
SQUAMOUS #/AREA URNS HPF: 25 /HPF
TROPONIN I SERPL DL<=0.01 NG/ML-MCNC: 0.02 NG/ML (ref 0–0.03)
URN SPEC COLLECT METH UR: ABNORMAL
UROBILINOGEN UR STRIP-ACNC: NEGATIVE EU/DL
WBC # BLD AUTO: 8.1 K/UL (ref 3.9–12.7)
WBC #/AREA URNS HPF: 20 /HPF (ref 0–5)
WBC CLUMPS URNS QL MICRO: ABNORMAL

## 2020-07-18 PROCEDURE — 85025 COMPLETE CBC W/AUTO DIFF WBC: CPT

## 2020-07-18 PROCEDURE — 93010 EKG 12-LEAD: ICD-10-PCS | Mod: ,,, | Performed by: INTERNAL MEDICINE

## 2020-07-18 PROCEDURE — 93010 ELECTROCARDIOGRAM REPORT: CPT | Mod: ,,, | Performed by: INTERNAL MEDICINE

## 2020-07-18 PROCEDURE — 80053 COMPREHEN METABOLIC PANEL: CPT

## 2020-07-18 PROCEDURE — 81025 URINE PREGNANCY TEST: CPT

## 2020-07-18 PROCEDURE — 83880 ASSAY OF NATRIURETIC PEPTIDE: CPT

## 2020-07-18 PROCEDURE — 85379 FIBRIN DEGRADATION QUANT: CPT

## 2020-07-18 PROCEDURE — 36415 COLL VENOUS BLD VENIPUNCTURE: CPT

## 2020-07-18 PROCEDURE — 86703 HIV-1/HIV-2 1 RESULT ANTBDY: CPT

## 2020-07-18 PROCEDURE — 25500020 PHARM REV CODE 255: Performed by: NURSE PRACTITIONER

## 2020-07-18 PROCEDURE — 99285 EMERGENCY DEPT VISIT HI MDM: CPT | Mod: 25

## 2020-07-18 PROCEDURE — 87086 URINE CULTURE/COLONY COUNT: CPT

## 2020-07-18 PROCEDURE — 84484 ASSAY OF TROPONIN QUANT: CPT

## 2020-07-18 PROCEDURE — U0002 COVID-19 LAB TEST NON-CDC: HCPCS

## 2020-07-18 PROCEDURE — 93005 ELECTROCARDIOGRAM TRACING: CPT

## 2020-07-18 PROCEDURE — 81000 URINALYSIS NONAUTO W/SCOPE: CPT

## 2020-07-18 RX ORDER — NITROFURANTOIN 25; 75 MG/1; MG/1
100 CAPSULE ORAL 2 TIMES DAILY
Qty: 10 CAPSULE | Refills: 0 | Status: SHIPPED | OUTPATIENT
Start: 2020-07-18 | End: 2020-07-23

## 2020-07-18 RX ADMIN — IOHEXOL 100 ML: 350 INJECTION, SOLUTION INTRAVENOUS at 07:07

## 2020-07-18 NOTE — ED PROVIDER NOTES
HISTORY     Chief Complaint   Patient presents with    Cough     started with runny nose x 3 days ago, then started with coughing, greenish sputum, developed right sided chest pain and tingling on right arm. hx of asthma.     Review of patient's allergies indicates:   Allergen Reactions    Latex, natural rubber Hives    Pcn [penicillins] Hives    Sulfa (sulfonamide antibiotics) Hives and Swelling        HPI   The history is provided by the patient. No  was used.   Chest Pain  The current episode started yesterday. Chest pain occurs intermittently. The chest pain is resolved (resolved yesterday). At its most intense, the chest pain is at 4/10. The chest pain is currently at 0/10. The quality of the pain is described as pressure-like. The pain radiates to the right arm. Primary symptoms include cough. Pertinent negatives for primary symptoms include no fever, no fatigue, no syncope, no shortness of breath, no wheezing, no palpitations, no abdominal pain, no nausea, no vomiting, no dizziness and no altered mental status.   The cough began 3 to 5 days ago. The cough is productive. The sputum is green.   Pertinent negatives for associated symptoms include no claudication, no diaphoresis, no lower extremity edema, no near-syncope, no numbness, no orthopnea, no paroxysmal nocturnal dyspnea and no weakness.        PCP: Vivi Chand MD     Past Medical History:  Past Medical History:   Diagnosis Date    Carpal tunnel syndrome     bilateral    Hypertension     Iron deficiency anemia     Mild intermittent asthma         Past Surgical History:  Past Surgical History:   Procedure Laterality Date    EYE FOREIGN BODY REMOVAL Right     ROBOT-ASSISTED LAPAROSCOPIC ABDOMINAL HYSTERECTOMY USING DA ALYSA XI N/A 12/6/2019    Procedure: XI ROBOTIC HYSTERECTOMY;  Surgeon: VENESSA Davidson MD;  Location: [facility];  Service: OB/GYN;  Laterality: N/A;    ROBOT-ASSISTED SURGICAL REMOVAL OF FALLOPIAN  TUBE USING DA ALYSA XI Bilateral 12/6/2019    Procedure: XI ROBOTIC SALPINGECTOMY;  Surgeon: VENESSA Davidson MD;  Location: [facility] OR;  Service: OB/GYN;  Laterality: Bilateral;    SURGICAL REMOVAL OF MASS OF AXILLA Right 11/12/2018    Procedure: EXCISION, MASS, AXILLARY;  Surgeon: Alis Tian MD;  Location: [facility] OR;  Service: General;  Laterality: Right;    TUBAL LIGATION          Family History:  Family History   Problem Relation Age of Onset    Diabetes Mother     Sarcoidosis Mother     COPD Father     Hypertension Father     Breast cancer Neg Hx     Colon cancer Neg Hx     Ovarian cancer Neg Hx     Thrombosis Neg Hx         Social History:  Social History     Tobacco Use    Smoking status: Never Smoker    Smokeless tobacco: Never Used   Substance and Sexual Activity    Alcohol use: No     Frequency: Never     Drinks per session: Patient refused     Binge frequency: Never    Drug use: No    Sexual activity: Not Currently     Partners: Male     Birth control/protection: Surgical     Comment: BTL         ROS   Review of Systems   Constitutional: Negative for diaphoresis, fatigue and fever.   HENT: Positive for rhinorrhea. Negative for sore throat.    Respiratory: Positive for cough. Negative for shortness of breath and wheezing.    Cardiovascular: Positive for chest pain. Negative for palpitations, orthopnea, claudication, syncope and near-syncope.   Gastrointestinal: Negative for abdominal pain, nausea and vomiting.   Genitourinary: Negative for dysuria.   Musculoskeletal: Negative for back pain.   Skin: Negative for rash.   Neurological: Negative for dizziness, weakness and numbness.   Hematological: Does not bruise/bleed easily.   Psychiatric/Behavioral: Negative for agitation.       PHYSICAL EXAM     Initial Vitals [07/18/20 1505]   BP Pulse Resp Temp SpO2   (!) 171/102 (!) 111 20 99 °F (37.2 °C) 97 %      MAP       --           Physical Exam    Nursing note and vitals  "reviewed.  Constitutional: She appears well-developed and well-nourished. She is not diaphoretic. No distress.   HENT:   Head: Normocephalic and atraumatic.   Right Ear: External ear normal.   Left Ear: External ear normal.   Mouth/Throat: Oropharynx is clear and moist.   Eyes: Conjunctivae are normal. Right eye exhibits no discharge. Left eye exhibits no discharge.   Neck: Normal range of motion. Neck supple.   Cardiovascular: Normal rate.   Pulmonary/Chest: Breath sounds normal. No respiratory distress. She has no wheezes. She has no rhonchi. She has no rales.   Abdominal: Soft. Bowel sounds are normal.   Musculoskeletal: Normal range of motion.   Neurological: She is alert and oriented to person, place, and time. She has normal strength.   Skin: Skin is warm and dry.   Psychiatric: She has a normal mood and affect. Thought content normal.          ED COURSE   Procedures  ED ONGOING VITALS:  Vitals:    07/18/20 1505 07/18/20 1924   BP: (!) 171/102 (S) (!) 156/116   Pulse: (!) 111 92   Resp: 20 18   Temp: 99 °F (37.2 °C)    TempSrc: Oral    SpO2: 97% 98%   Weight: 94.7 kg (208 lb 12.4 oz)    Height: 5' 5" (1.651 m)          ABNORMAL LAB VALUES:  Labs Reviewed   CBC W/ AUTO DIFFERENTIAL - Abnormal; Notable for the following components:       Result Value    Mean Corpuscular Hemoglobin Conc 31.4 (*)     Platelets 495 (*)     MPV 9.1 (*)     All other components within normal limits   COMPREHENSIVE METABOLIC PANEL - Abnormal; Notable for the following components:    Glucose 116 (*)     All other components within normal limits   D DIMER, QUANTITATIVE - Abnormal; Notable for the following components:    D-Dimer 0.57 (*)     All other components within normal limits   URINALYSIS, REFLEX TO URINE CULTURE - Abnormal; Notable for the following components:    Specific Gravity, UA >=1.030 (*)     Leukocytes, UA Trace (*)     All other components within normal limits    Narrative:     Specimen Source->Urine   URINALYSIS " MICROSCOPIC - Abnormal; Notable for the following components:    WBC, UA 20 (*)     Bacteria Many (*)     All other components within normal limits    Narrative:     Specimen Source->Urine   CULTURE, URINE   HIV 1 / 2 ANTIBODY   TROPONIN I   B-TYPE NATRIURETIC PEPTIDE   SARS-COV-2 RNA AMPLIFICATION, QUAL   PREGNANCY TEST, URINE RAPID    Narrative:     Specimen Source->Urine         ALL LAB VALUES:  Results for orders placed or performed during the hospital encounter of 07/18/20   HIV 1/2 Ag/Ab (4th Gen)   Result Value Ref Range    HIV 1/2 Ag/Ab Negative Negative   CBC auto differential   Result Value Ref Range    WBC 8.10 3.90 - 12.70 K/uL    RBC 4.61 4.00 - 5.40 M/uL    Hemoglobin 12.8 12.0 - 16.0 g/dL    Hematocrit 40.7 37.0 - 48.5 %    Mean Corpuscular Volume 88 82 - 98 fL    Mean Corpuscular Hemoglobin 27.8 27.0 - 31.0 pg    Mean Corpuscular Hemoglobin Conc 31.4 (L) 32.0 - 36.0 g/dL    RDW 14.0 11.5 - 14.5 %    Platelets 495 (H) 150 - 350 K/uL    MPV 9.1 (L) 9.2 - 12.9 fL    Immature Granulocytes 0.4 0.0 - 0.5 %    Gran # (ANC) 4.4 1.8 - 7.7 K/uL    Immature Grans (Abs) 0.03 0.00 - 0.04 K/uL    Lymph # 3.0 1.0 - 4.8 K/uL    Mono # 0.5 0.3 - 1.0 K/uL    Eos # 0.2 0.0 - 0.5 K/uL    Baso # 0.02 0.00 - 0.20 K/uL    nRBC 0 0 /100 WBC    Gran% 53.9 38.0 - 73.0 %    Lymph% 36.9 18.0 - 48.0 %    Mono% 5.8 4.0 - 15.0 %    Eosinophil% 2.8 0.0 - 8.0 %    Basophil% 0.2 0.0 - 1.9 %    Differential Method Automated    Comprehensive metabolic panel   Result Value Ref Range    Sodium 137 136 - 145 mmol/L    Potassium 3.8 3.5 - 5.1 mmol/L    Chloride 104 95 - 110 mmol/L    CO2 25 23 - 29 mmol/L    Glucose 116 (H) 70 - 110 mg/dL    BUN, Bld 9 6 - 20 mg/dL    Creatinine 0.9 0.5 - 1.4 mg/dL    Calcium 9.8 8.7 - 10.5 mg/dL    Total Protein 7.4 6.0 - 8.4 g/dL    Albumin 3.8 3.5 - 5.2 g/dL    Total Bilirubin 0.3 0.1 - 1.0 mg/dL    Alkaline Phosphatase 92 55 - 135 U/L    AST 14 10 - 40 U/L    ALT 27 10 - 44 U/L    Anion Gap 8 8 - 16  mmol/L    eGFR if African American >60 >60 mL/min/1.73 m^2    eGFR if non African American >60 >60 mL/min/1.73 m^2   Troponin I #1   Result Value Ref Range    Troponin I 0.017 0.000 - 0.026 ng/mL   B-Type natriuretic peptide (BNP)   Result Value Ref Range    BNP <10 0 - 99 pg/mL   D dimer, quantitative   Result Value Ref Range    D-Dimer 0.57 (H) <0.50 mg/L FEU   COVID-19 Rapid Screening   Result Value Ref Range    SARS-CoV-2 RNA, Amplification, Qual Negative Negative   Urinalysis, Reflex to Urine Culture Urine, Clean Catch    Specimen: Urine   Result Value Ref Range    Specimen UA Urine, Clean Catch     Color, UA Yellow Yellow, Straw, Simin    Appearance, UA Clear Clear    pH, UA 6.0 5.0 - 8.0    Specific Gravity, UA >=1.030 (A) 1.005 - 1.030    Protein, UA Negative Negative    Glucose, UA Negative Negative    Ketones, UA Negative Negative    Bilirubin (UA) Negative Negative    Occult Blood UA Negative Negative    Nitrite, UA Negative Negative    Urobilinogen, UA Negative <2.0 EU/dL    Leukocytes, UA Trace (A) Negative   Pregnancy, urine rapid (UPT)   Result Value Ref Range    Preg Test, Ur Negative    Urinalysis Microscopic   Result Value Ref Range    WBC, UA 20 (H) 0 - 5 /hpf    WBC Clumps, UA None None-Rare    Bacteria Many (A) None-Occ /hpf    Squam Epithel, UA 25 /hpf    Microscopic Comment SEE COMMENT            RADIOLOGY STUDIES:  Imaging Results          CTA Chest Non-Coronary (PE Study) (Final result)  Result time 07/18/20 19:31:53    Final result by Geoffrey Moyer III, MD (07/18/20 19:31:53)                 Impression:      No evidence of pulmonary embolism or other acute cardiopulmonary disease.    Mild benign-appearing bilateral axillary lymphadenopathy.    2 cm left thyroid nodule.      Electronically signed by: Geoffrey Moyer MD  Date:    07/18/2020  Time:    19:31             Narrative:    EXAMINATION:  CTA CHEST NON CORONARY    CLINICAL HISTORY:  Chest pain; pe suspected;    TECHNIQUE:  Routine CT  angiogram of the chest protocol performed after the IV administration of 100 mL Omnipaque 350. 3D reconstructions/reformats/MIPs obtained. All CT scans at this facility are performed  using dose modulation techniques as appropriate to performed exam including the following:  automated exposure control; adjustment of mA and/or kV according to the patients size (this includes techniques or standardized protocols for targeted exams where dose is matched to indication/reason for exam: i.e. extremities or head);  iterative reconstruction technique.    COMPARISON:  No prior chest CT available    FINDINGS:  There is satisfactory opacification of the pulmonary arteries. There is no evidence of pulmonary embolism.  No aortic aneurysm or dissection is identified.  There is no cardiomegaly or pericardial effusion. The lungs are grossly clear without evidence of acute infiltrate, effusion, pneumothorax or other acute pulmonary disease.  There is mild benign-appearing bilateral axillary adenopathy, left greater than right, measuring up to 1.1 cm in short axis dimension.  No mediastinal or hilar adenopathy identified.  There is a 2 cm low-density left thyroid nodule.  No acute osseous abnormality is seen. Limited images through the upper abdomen demonstrate no acute findings.                               X-Ray Chest AP Portable (Final result)  Result time 07/18/20 15:39:53    Final result by Mk Amaya MD (07/18/20 15:39:53)                 Impression:      No acute findings.      Electronically signed by: Mk Amaya MD  Date:    07/18/2020  Time:    15:39             Narrative:    EXAMINATION:  XR CHEST AP PORTABLE    CLINICAL HISTORY:  Acute chest pain, Chest Pain;    COMPARISON:  None    FINDINGS:  Heart size is normal. The lung fields are clear. No acute cardiopulmonary infiltrate.                                  EKG Readings: (Independently Interpreted)   Initial Reading: No STEMI. Rhythm: Sinus  Tachycardia. Heart Rate: 102.          The above vital signs and test results have been reviewed by the emergency provider.     ED Medications:  Current Discharge Medication List        Discharge Medications:  New Prescriptions    NITROFURANTOIN, MACROCRYSTAL-MONOHYDRATE, (MACROBID) 100 MG CAPSULE    Take 1 capsule (100 mg total) by mouth 2 (two) times daily. for 5 days      Follow-up Information     Vivi Chand MD.    Specialty: Family Medicine  Why: As needed  Contact information:  [address] [place] [address] [address] [place] [address] [phone]                  7:45 PM    I discussed with patient and/or family/caretaker that evaluation in the ED does not suggest any emergent or life threatening medical conditions requiring immediate intervention beyond what was provided in the ED, and I believe patient is safe for discharge. Regardless, an unremarkable evaluation in the ED does not preclude the development or presence of a serious or life threatening condition. As such, patient was instructed to return immediately for any worsening or change in current symptoms.    Pre-hypertension/Hypertension: The pt has been informed that they may have pre-hypertension or hypertension based on a blood pressure reading in the ED. I recommend that the pt call the PCP listed on their discharge instructions or a physician of their choice this week to arrange f/u for further evaluation of possible pre-hypertension or hypertension.       MEDICAL DECISION MAKING                 CLINICAL IMPRESSION       ICD-10-CM ICD-9-CM   1. Cough  R05 786.2   2. Chest pain  R07.9 786.50   3. Urinary tract infection without hematuria, site unspecified  N39.0 599.0       Disposition:   Disposition: Discharged  Condition: Stable         Dennis Mario NP  07/18/20 1945       eDnnis Mario NP  07/18/20 1946

## 2020-07-20 ENCOUNTER — OFFICE VISIT (OUTPATIENT)
Dept: INTERNAL MEDICINE | Facility: CLINIC | Age: 40
End: 2020-07-20
Payer: MEDICAID

## 2020-07-20 VITALS — DIASTOLIC BLOOD PRESSURE: 90 MMHG | SYSTOLIC BLOOD PRESSURE: 164 MMHG

## 2020-07-20 DIAGNOSIS — R07.89 CHEST PRESSURE: Primary | ICD-10-CM

## 2020-07-20 DIAGNOSIS — I10 ESSENTIAL HYPERTENSION: ICD-10-CM

## 2020-07-20 DIAGNOSIS — J45.20 MILD INTERMITTENT ASTHMA WITHOUT COMPLICATION: ICD-10-CM

## 2020-07-20 DIAGNOSIS — E04.1 THYROID NODULE: ICD-10-CM

## 2020-07-20 LAB — BACTERIA UR CULT: NORMAL

## 2020-07-20 PROCEDURE — 99214 PR OFFICE/OUTPT VISIT, EST, LEVL IV, 30-39 MIN: ICD-10-PCS | Mod: 95,,, | Performed by: FAMILY MEDICINE

## 2020-07-20 PROCEDURE — 99214 OFFICE O/P EST MOD 30 MIN: CPT | Mod: 95,,, | Performed by: FAMILY MEDICINE

## 2020-07-20 RX ORDER — HYDROCHLOROTHIAZIDE 12.5 MG/1
12.5 CAPSULE ORAL DAILY
Qty: 90 CAPSULE | Refills: 1 | Status: SHIPPED | OUTPATIENT
Start: 2020-07-20 | End: 2020-10-15 | Stop reason: SDUPTHER

## 2020-07-20 RX ORDER — ALBUTEROL SULFATE 0.63 MG/3ML
0.63 SOLUTION RESPIRATORY (INHALATION) EVERY 6 HOURS PRN
Qty: 1 BOX | Refills: 2 | Status: SHIPPED | OUTPATIENT
Start: 2020-07-20 | End: 2021-04-29 | Stop reason: SDUPTHER

## 2020-07-20 RX ORDER — AMLODIPINE BESYLATE 5 MG/1
5 TABLET ORAL DAILY
Qty: 90 TABLET | Refills: 1 | Status: SHIPPED | OUTPATIENT
Start: 2020-07-20 | End: 2020-07-28 | Stop reason: SDUPTHER

## 2020-07-20 NOTE — ASSESSMENT & PLAN NOTE
Uncontrolled, resume medications, discussed medication compliance, continue home monitoring, f/u in 2 weeks; notify MD for persistently elevated BP; Patient expressed understanding.

## 2020-07-20 NOTE — PROGRESS NOTES
Subjective:       Patient ID: Bianca Amato is a 40 y.o. female.    Chief Complaint: Cough    The patient location is: home  The chief complaint leading to consultation is: cough    Visit type: audiovisual    Face to Face time with patient: 16 minutes (start 12:02 pm; end 12:18 pm)  16 minutes of total time spent on the encounter, which includes face to face time and non-face to face time preparing to see the patient (eg, review of tests), Obtaining and/or reviewing separately obtained history, Documenting clinical information in the electronic or other health record, Independently interpreting results (not separately reported) and communicating results to the patient/family/caregiver, or Care coordination (not separately reported).         Each patient to whom he or she provides medical services by telemedicine is:  (1) informed of the relationship between the physician and patient and the respective role of any other health care provider with respect to management of the patient; and (2) notified that he or she may decline to receive medical services by telemedicine and may withdraw from such care at any time.    Notes: Cough since Thursday. Seen in ER on Saturday. Given macrobid for UTI. Reports cough is improving. Reports mask use seems to make it worse. Reports chest pressure at times as well. Requests albuterol neb solution refill for home nebulizer. Patient is otherwise without concerns today.      Cough  This is a new problem. The current episode started in the past 7 days. The problem has been unchanged. The problem occurs every few minutes. The cough is productive of purulent sputum. Pertinent negatives include no chest pain, chills, fever, headaches, myalgias or shortness of breath. She has tried a beta-agonist inhaler for the symptoms. The treatment provided mild relief. Her past medical history is significant for asthma and environmental allergies.     Review of Systems   Constitutional: Negative  for chills, fatigue, fever and unexpected weight change.   Eyes: Negative for visual disturbance.   Respiratory: Positive for cough and chest tightness. Negative for shortness of breath.    Cardiovascular: Negative for chest pain.   Musculoskeletal: Negative for myalgias.   Allergic/Immunologic: Positive for environmental allergies.   Neurological: Negative for headaches.       Objective:      Physical Exam  Vitals signs reviewed.   Constitutional:       General: She is not in acute distress.     Appearance: She is well-developed.   HENT:      Head: Normocephalic and atraumatic.   Eyes:      General: No scleral icterus.     Conjunctiva/sclera: Conjunctivae normal.   Pulmonary:      Effort: Pulmonary effort is normal.   Neurological:      Mental Status: She is alert and oriented to person, place, and time.         Assessment:       1. Chest pressure    2. Essential hypertension    3. Mild intermittent asthma without complication    4. Thyroid nodule        Plan:     Problem List Items Addressed This Visit     Essential hypertension    Current Assessment & Plan     Uncontrolled, resume medications, discussed medication compliance, continue home monitoring, f/u in 2 weeks; notify MD for persistently elevated BP; Patient expressed understanding.         Relevant Medications    hydroCHLOROthiazide (MICROZIDE) 12.5 mg capsule    amLODIPine (NORVASC) 5 MG tablet    Mild intermittent asthma without complication    Relevant Medications    albuterol (ACCUNEB) 0.63 mg/3 mL Nebu    Thyroid nodule    Relevant Orders    US Soft Tissue Head Neck Thyroid    T4, free    TSH      Other Visit Diagnoses     Chest pressure    -  Primary    Relevant Orders    Ambulatory referral/consult to Cardiology

## 2020-07-21 ENCOUNTER — TELEPHONE (OUTPATIENT)
Dept: RADIOLOGY | Facility: HOSPITAL | Age: 40
End: 2020-07-21

## 2020-07-22 ENCOUNTER — HOSPITAL ENCOUNTER (OUTPATIENT)
Dept: RADIOLOGY | Facility: HOSPITAL | Age: 40
Discharge: HOME | End: 2020-07-22
Attending: FAMILY MEDICINE
Payer: MEDICAID

## 2020-07-22 DIAGNOSIS — E04.1 THYROID NODULE: ICD-10-CM

## 2020-07-22 PROCEDURE — 76536 US SOFT TISSUE HEAD NECK THYROID: ICD-10-PCS | Mod: 26,,, | Performed by: RADIOLOGY

## 2020-07-22 PROCEDURE — 76536 US EXAM OF HEAD AND NECK: CPT | Mod: 26,,, | Performed by: RADIOLOGY

## 2020-07-22 PROCEDURE — 76536 US EXAM OF HEAD AND NECK: CPT | Mod: TC

## 2020-07-23 DIAGNOSIS — E04.1 THYROID NODULE: Primary | ICD-10-CM

## 2020-07-27 ENCOUNTER — PATIENT OUTREACH (OUTPATIENT)
Dept: ADMINISTRATIVE | Facility: OTHER | Age: 40
End: 2020-07-27

## 2020-07-27 NOTE — PROGRESS NOTES
Chart reviewed.   Immunizations: Triggered Imm Registry     Orders placed: n/a  Upcoming appts to satisfy WHITNEY topics: n/a

## 2020-07-28 ENCOUNTER — OFFICE VISIT (OUTPATIENT)
Dept: CARDIOLOGY | Facility: CLINIC | Age: 40
End: 2020-07-28
Payer: MEDICAID

## 2020-07-28 VITALS
DIASTOLIC BLOOD PRESSURE: 90 MMHG | OXYGEN SATURATION: 98 % | BODY MASS INDEX: 34.56 KG/M2 | SYSTOLIC BLOOD PRESSURE: 132 MMHG | HEART RATE: 102 BPM | WEIGHT: 207.69 LBS

## 2020-07-28 DIAGNOSIS — J45.20 MILD INTERMITTENT ASTHMA WITHOUT COMPLICATION: ICD-10-CM

## 2020-07-28 DIAGNOSIS — R00.2 PALPITATIONS: ICD-10-CM

## 2020-07-28 DIAGNOSIS — R07.89 CHEST PRESSURE: Primary | ICD-10-CM

## 2020-07-28 DIAGNOSIS — I10 ESSENTIAL HYPERTENSION: ICD-10-CM

## 2020-07-28 DIAGNOSIS — E66.09 CLASS 1 OBESITY DUE TO EXCESS CALORIES WITH SERIOUS COMORBIDITY AND BODY MASS INDEX (BMI) OF 34.0 TO 34.9 IN ADULT: ICD-10-CM

## 2020-07-28 PROBLEM — E66.811 CLASS 1 OBESITY DUE TO EXCESS CALORIES WITH SERIOUS COMORBIDITY AND BODY MASS INDEX (BMI) OF 34.0 TO 34.9 IN ADULT: Status: ACTIVE | Noted: 2020-07-28

## 2020-07-28 PROCEDURE — 99204 PR OFFICE/OUTPT VISIT, NEW, LEVL IV, 45-59 MIN: ICD-10-PCS | Mod: S$PBB,,, | Performed by: INTERNAL MEDICINE

## 2020-07-28 PROCEDURE — 99214 OFFICE O/P EST MOD 30 MIN: CPT | Mod: PBBFAC | Performed by: INTERNAL MEDICINE

## 2020-07-28 PROCEDURE — 99204 OFFICE O/P NEW MOD 45 MIN: CPT | Mod: S$PBB,,, | Performed by: INTERNAL MEDICINE

## 2020-07-28 PROCEDURE — 99999 PR PBB SHADOW E&M-EST. PATIENT-LVL IV: CPT | Mod: PBBFAC,,, | Performed by: INTERNAL MEDICINE

## 2020-07-28 PROCEDURE — 99999 PR PBB SHADOW E&M-EST. PATIENT-LVL IV: ICD-10-PCS | Mod: PBBFAC,,, | Performed by: INTERNAL MEDICINE

## 2020-07-28 RX ORDER — AMLODIPINE BESYLATE 10 MG/1
10 TABLET ORAL DAILY
Qty: 30 TABLET | Refills: 3 | Status: SHIPPED | OUTPATIENT
Start: 2020-07-28 | End: 2020-10-15 | Stop reason: SDUPTHER

## 2020-07-28 NOTE — LETTER
July 28, 2020      Vivi Cahnd MD  [address] [place] [address] [address] [place] [address]           [facility]  [address] [place] [address] [address] [place] [address]  Phone: [phone]  Fax: [phone]          Patient: Bianca Amato   MR Number: [id number]   YOB: 1980   Date of Visit: 7/28/2020       Dear Dr. Vivi Chand:    Thank you for referring Bianca Amato to me for evaluation. Attached you will find relevant portions of my assessment and plan of care.    If you have questions, please do not hesitate to call me. I look forward to following Bianca Amato along with you.    Sincerely,    De Childs MD    Enclosure  CC:  No Recipients    If you would like to receive this communication electronically, please contact [email or web address] or [phone] to request more information on [employer] Link access.    For providers and/or their staff who would like to refer a patient to [facility], please contact us through our one-stop-shop provider referral line, [facility] , at [phone].    If you feel you have received this communication in error or would no longer like to receive these types of communications, please e-mail [email or web address]

## 2020-07-28 NOTE — PROGRESS NOTES
Subjective:   Patient ID:  Bianca Amato is a 40 y.o. female who presents for cardiac consult of Numbness (on the right side)    Referring Physician: Vivi Chand MD   Reason for consult: chest pain    HPI  The patient came in today for cardiac consult of Numbness (on the right side)    7/28/20  Bianca Amato is a 40 y.o. female pt with HTN, obesity, asthma, anemia presents for initial CV eval of chest pain.     She went to ER last week for  Chest Pain  The current episode started yesterday. Chest pain occurs intermittently. The chest pain is resolved (resolved yesterday). At its most intense, the chest pain is at 4/10. The chest pain is currently at 0/10. The quality of the pain is described as pressure-like. The pain radiates to the right arm. Primary symptoms include cough. Pertinent negatives for primary symptoms include no fever, no fatigue, no syncope, no shortness of breath, no wheezing, no palpitations, no abdominal pain, no nausea, no vomiting, no dizziness and no altered mental status.   The cough began 3 to 5 days ago. The cough is productive. The sputum is green.     Workup neg trops, BNP; D dimer elevated, Neg CTA for PE. ECG with sinus tach, poor RWP concern for anterior MI. Chest pain was more right sided with numbness and tingling but also center.      Patient feels no leg swelling, no PND, no palpitation, no dizziness, no syncope, no CNS symptoms.    Patient has fairly good exercise tolerance. Was a .     Patient is compliant with medications.     Past Medical History:   Diagnosis Date    Carpal tunnel syndrome     bilateral    Hypertension     Iron deficiency anemia     Mild intermittent asthma        Past Surgical History:   Procedure Laterality Date    EYE FOREIGN BODY REMOVAL Right     ROBOT-ASSISTED LAPAROSCOPIC ABDOMINAL HYSTERECTOMY USING DA ALYSA XI N/A 12/6/2019    Procedure: XI ROBOTIC HYSTERECTOMY;  Surgeon: VENESSA Davidson MD;  Location:  [facility] OR;  Service: OB/GYN;  Laterality: N/A;    ROBOT-ASSISTED SURGICAL REMOVAL OF FALLOPIAN TUBE USING DA ALYSA XI Bilateral 12/6/2019    Procedure: XI ROBOTIC SALPINGECTOMY;  Surgeon: VENESSA Davidson MD;  Location: [facility] OR;  Service: OB/GYN;  Laterality: Bilateral;    SURGICAL REMOVAL OF MASS OF AXILLA Right 11/12/2018    Procedure: EXCISION, MASS, AXILLARY;  Surgeon: Alis Tian MD;  Location: [facility];  Service: General;  Laterality: Right;    TUBAL LIGATION         Social History     Tobacco Use    Smoking status: Never Smoker    Smokeless tobacco: Never Used   Substance Use Topics    Alcohol use: No     Frequency: Never     Drinks per session: Patient refused     Binge frequency: Never    Drug use: No       Family History   Problem Relation Age of Onset    Diabetes Mother     Sarcoidosis Mother     COPD Father     Hypertension Father     Breast cancer Neg Hx     Colon cancer Neg Hx     Ovarian cancer Neg Hx     Thrombosis Neg Hx        Patient's Medications   New Prescriptions    No medications on file   Previous Medications    ALBUTEROL (ACCUNEB) 0.63 MG/3 ML NEBU    Take 3 mLs (0.63 mg total) by nebulization every 6 (six) hours as needed. Rescue    ALBUTEROL (PROVENTIL HFA) 90 MCG/ACTUATION INHALER    Inhale 2 puffs into the lungs every 6 (six) hours as needed.    CARBAMIDE PEROXIDE (DEBROX) 6.5 % OTIC SOLUTION    Place 2 drops into both ears as needed.     FLUTICASONE PROPIONATE (FLONASE) 50 MCG/ACTUATION NASAL SPRAY    1 spray (50 mcg total) by Each Nostril route once daily.    HYDROCHLOROTHIAZIDE (MICROZIDE) 12.5 MG CAPSULE    Take 1 capsule (12.5 mg total) by mouth once daily.    LEVOCETIRIZINE (XYZAL) 5 MG TABLET    Take 1 tablet (5 mg total) by mouth every evening.   Modified Medications    Modified Medication Previous Medication    AMLODIPINE (NORVASC) 10 MG TABLET amLODIPine (NORVASC) 5 MG tablet       Take 1 tablet (10 mg total) by mouth once daily.    Take 1 tablet (5 mg  total) by mouth once daily.   Discontinued Medications    No medications on file       Review of Systems   Constitutional: Negative.    HENT: Negative.    Eyes: Negative.    Respiratory: Negative.    Cardiovascular: Positive for chest pain.   Gastrointestinal: Negative.    Genitourinary: Negative.    Musculoskeletal: Negative.    Skin: Negative.    Neurological: Negative.    Endo/Heme/Allergies: Negative.    Psychiatric/Behavioral: Negative.    All 12 systems otherwise negative.      Wt Readings from Last 3 Encounters:   07/28/20 94.2 kg (207 lb 10.8 oz)   07/18/20 94.7 kg (208 lb 12.4 oz)   02/27/20 95 kg (209 lb 5.2 oz)     Temp Readings from Last 3 Encounters:   07/18/20 99 °F (37.2 °C) (Oral)   02/27/20 99 °F (37.2 °C) (Tympanic)   12/07/19 98.1 °F (36.7 °C) (Oral)     BP Readings from Last 3 Encounters:   07/28/20 (!) 132/90   07/20/20 (!) 164/90   07/18/20 (S) (!) 156/116     Pulse Readings from Last 3 Encounters:   07/28/20 102   07/18/20 92   02/27/20 103       BP (!) 132/90 (BP Location: Left arm, Patient Position: Sitting, BP Method: Medium (Manual))   Pulse 102   Wt 94.2 kg (207 lb 10.8 oz)   LMP 11/07/2019 (Approximate)   SpO2 98%   BMI 34.56 kg/m²     Objective:   Physical Exam   Constitutional: She is oriented to person, place, and time. She appears well-developed and well-nourished. No distress.   HENT:   Head: Normocephalic and atraumatic.   Nose: Nose normal.   Mouth/Throat: Oropharynx is clear and moist.   Eyes: Conjunctivae and EOM are normal. No scleral icterus.   Neck: Normal range of motion. Neck supple. No JVD present. No thyromegaly present.   Cardiovascular: Normal rate, regular rhythm, S1 normal and S2 normal. Exam reveals no gallop, no S3, no S4 and no friction rub.   No murmur heard.  Pulmonary/Chest: Effort normal and breath sounds normal. No stridor. No respiratory distress. She has no wheezes. She has no rales. She exhibits no tenderness.   Abdominal: Soft. Bowel sounds are  normal. She exhibits no distension and no mass. There is no abdominal tenderness. There is no rebound.   Genitourinary:    Genitourinary Comments: Deferred     Musculoskeletal: Normal range of motion.         General: No tenderness, deformity or edema.   Lymphadenopathy:     She has no cervical adenopathy.   Neurological: She is alert and oriented to person, place, and time. She exhibits normal muscle tone. Coordination normal.   Skin: Skin is warm and dry. No rash noted. She is not diaphoretic. No erythema. No pallor.   Psychiatric: She has a normal mood and affect. Her behavior is normal. Judgment and thought content normal.   Nursing note and vitals reviewed.      Lab Results   Component Value Date     07/18/2020    K 3.8 07/18/2020     07/18/2020    CO2 25 07/18/2020    BUN 9 07/18/2020    CREATININE 0.9 07/18/2020     (H) 07/18/2020    AST 14 07/18/2020    ALT 27 07/18/2020    ALBUMIN 3.8 07/18/2020    PROT 7.4 07/18/2020    BILITOT 0.3 07/18/2020    WBC 8.10 07/18/2020    HGB 12.8 07/18/2020    HCT 40.7 07/18/2020    MCV 88 07/18/2020     (H) 07/18/2020    TSH 0.541 07/22/2020    CHOL 172 10/30/2019    HDL 49 10/30/2019    LDLCALC 102.6 10/30/2019    TRIG 102 10/30/2019    BNP <10 07/18/2020     Assessment:      1. Chest pressure    2. Essential hypertension    3. Mild intermittent asthma without complication    4. Class 1 obesity due to excess calories with serious comorbidity and body mass index (BMI) of 34.0 to 34.9 in adult    5. Palpitations        Plan:   1. Chest pain with abnormal ECG - poor RWP, with palpitatiosn  - recent R/o for ACS  - heart racing at times  -may need DONNY eval, refer to pulm  - order ECG stress and 2D ECHO  - order Holter    2. HTN  - cont meds and titrate  - restarted Norvasc and HCTZ  - increase Norvasc to 10mg    3. Obesity  -needs weight loss    4. Asthma  - cont tx per PCP    Thank you for allowing me to participate in this patient's care. Please do  not hesitate to contact me with any questions or concerns. Consult note has been forwarded to the referral physician.

## 2020-08-04 ENCOUNTER — TELEPHONE (OUTPATIENT)
Dept: INTERNAL MEDICINE | Facility: CLINIC | Age: 40
End: 2020-08-04

## 2020-08-04 NOTE — TELEPHONE ENCOUNTER
----- Message from Alvaro Silva sent at 8/4/2020  1:29 PM CDT -----  .Type:  Sooner Apoointment Request    Caller is requesting a sooner appointment.  Caller declined first available appointment listed below.  Caller will not accept being placed on the waitlist and is requesting a message be sent to doctor.  Name of Caller:Bianca Amato  When is the first available appointment?10/08/20  Symptoms:follow-up  Would the patient rather a call back or a response via [facility]? Call back  Best Call Back Number:[phone]  Additional Information: pt had to cancel appt on 08/4/20, would lke to rescheduel to a an appt time before 10/08/20

## 2020-08-06 ENCOUNTER — OFFICE VISIT (OUTPATIENT)
Dept: INTERNAL MEDICINE | Facility: CLINIC | Age: 40
End: 2020-08-06
Payer: MEDICAID

## 2020-08-06 VITALS
HEIGHT: 65 IN | HEART RATE: 100 BPM | SYSTOLIC BLOOD PRESSURE: 122 MMHG | BODY MASS INDEX: 33.74 KG/M2 | WEIGHT: 202.5 LBS | TEMPERATURE: 98 F | DIASTOLIC BLOOD PRESSURE: 88 MMHG | OXYGEN SATURATION: 97 %

## 2020-08-06 DIAGNOSIS — Z23 NEED FOR 23-POLYVALENT PNEUMOCOCCAL POLYSACCHARIDE VACCINE: Primary | ICD-10-CM

## 2020-08-06 DIAGNOSIS — Z11.59 NEED FOR HEPATITIS C SCREENING TEST: ICD-10-CM

## 2020-08-06 DIAGNOSIS — E66.09 CLASS 1 OBESITY DUE TO EXCESS CALORIES WITH SERIOUS COMORBIDITY AND BODY MASS INDEX (BMI) OF 33.0 TO 33.9 IN ADULT: ICD-10-CM

## 2020-08-06 DIAGNOSIS — I10 ESSENTIAL HYPERTENSION: ICD-10-CM

## 2020-08-06 PROCEDURE — 99999 PR PBB SHADOW E&M-EST. PATIENT-LVL IV: CPT | Mod: PBBFAC,,, | Performed by: FAMILY MEDICINE

## 2020-08-06 PROCEDURE — 99999 PR PBB SHADOW E&M-EST. PATIENT-LVL IV: ICD-10-PCS | Mod: PBBFAC,,, | Performed by: FAMILY MEDICINE

## 2020-08-06 PROCEDURE — 90471 IMMUNIZATION ADMIN: CPT | Mod: PBBFAC

## 2020-08-06 PROCEDURE — 99213 OFFICE O/P EST LOW 20 MIN: CPT | Mod: S$PBB,,, | Performed by: FAMILY MEDICINE

## 2020-08-06 PROCEDURE — 99213 PR OFFICE/OUTPT VISIT, EST, LEVL III, 20-29 MIN: ICD-10-PCS | Mod: S$PBB,,, | Performed by: FAMILY MEDICINE

## 2020-08-06 PROCEDURE — 99214 OFFICE O/P EST MOD 30 MIN: CPT | Mod: PBBFAC | Performed by: FAMILY MEDICINE

## 2020-08-06 NOTE — ASSESSMENT & PLAN NOTE
Controlled on HCTZ and amlodipine; home monitor reading high; advised to bring cuff to Cardiology appointment for comparison, if her home readings are true readings will need to adjust medications

## 2020-08-06 NOTE — PROGRESS NOTES
Subjective:       Patient ID: Bianca Amato is a 40 y.o. female.    Chief Complaint: Hypertension    Patient presents to clinic today for followup of hypertension. Patient reports she is working on lifestyle changes to lose weight.    Wt Readings from Last 10 Encounters:  08/06/20 : 91.8 kg (202 lb 7.9 oz)  07/28/20 : 94.2 kg (207 lb 10.8 oz)  07/18/20 : 94.7 kg (208 lb 12.4 oz)  02/27/20 : 95 kg (209 lb 5.2 oz)  01/17/20 : 95.9 kg (211 lb 6.7 oz)  01/10/20 : 96.7 kg (213 lb 3 oz)  01/03/20 : 96.7 kg (213 lb 3 oz)  12/06/19 : 94.2 kg (207 lb 10.8 oz)  12/04/19 : 96.3 kg (212 lb 4.9 oz)  10/29/19 : 96.3 kg (212 lb 4.9 oz)        Review of Systems   Constitutional: Negative for chills, fatigue, fever and unexpected weight change.   Eyes: Negative for visual disturbance.   Respiratory: Negative for shortness of breath.    Cardiovascular: Negative for chest pain.   Musculoskeletal: Negative for myalgias.   Neurological: Negative for headaches.       Objective:      Physical Exam  Vitals signs reviewed.   Constitutional:       General: She is not in acute distress.     Appearance: She is well-developed.   HENT:      Head: Normocephalic and atraumatic.   Eyes:      General: Lids are normal. No scleral icterus.     Extraocular Movements: Extraocular movements intact.      Conjunctiva/sclera: Conjunctivae normal.      Pupils: Pupils are equal, round, and reactive to light.   Pulmonary:      Effort: Pulmonary effort is normal.   Neurological:      Mental Status: She is alert and oriented to person, place, and time.      Cranial Nerves: No cranial nerve deficit.      Gait: Gait normal.   Psychiatric:         Mood and Affect: Mood and affect normal.         Assessment:       1. Need for 23-polyvalent pneumococcal polysaccharide vaccine    2. Need for hepatitis C screening test    3. Essential hypertension    4. Class 1 obesity due to excess calories with serious comorbidity and body mass index (BMI) of 33.0 to 33.9 in  adult        Plan:     Problem List Items Addressed This Visit     Class 1 obesity due to excess calories with serious comorbidity and body mass index (BMI) of 33.0 to 33.9 in adult    Current Assessment & Plan     Body mass index is 33.7 kg/m². Improving, encouraged to continue with lifestyle changes.         Essential hypertension    Current Assessment & Plan     Controlled on HCTZ and amlodipine; home monitor reading high; advised to bring cuff to Cardiology appointment for comparison, if her home readings are true readings will need to adjust medications           Other Visit Diagnoses     Need for 23-polyvalent pneumococcal polysaccharide vaccine    -  Primary    Relevant Orders    Pneumococcal Polysaccharide Vaccine (23 Valent) (SQ/IM) (Completed)    Need for hepatitis C screening test        Relevant Orders    Hepatitis C Antibody          Health Maintenance reviewed/updated.

## 2020-08-12 ENCOUNTER — HOSPITAL ENCOUNTER (OUTPATIENT)
Dept: CARDIOLOGY | Facility: HOSPITAL | Age: 40
Discharge: HOME | End: 2020-08-12
Attending: INTERNAL MEDICINE
Payer: MEDICAID

## 2020-08-12 VITALS
HEIGHT: 65 IN | WEIGHT: 202 LBS | BODY MASS INDEX: 33.66 KG/M2 | DIASTOLIC BLOOD PRESSURE: 96 MMHG | SYSTOLIC BLOOD PRESSURE: 129 MMHG | HEART RATE: 84 BPM

## 2020-08-12 VITALS
HEIGHT: 65 IN | DIASTOLIC BLOOD PRESSURE: 88 MMHG | HEART RATE: 83 BPM | BODY MASS INDEX: 33.66 KG/M2 | SYSTOLIC BLOOD PRESSURE: 122 MMHG | WEIGHT: 202 LBS

## 2020-08-12 DIAGNOSIS — R00.2 PALPITATIONS: ICD-10-CM

## 2020-08-12 DIAGNOSIS — J45.20 MILD INTERMITTENT ASTHMA WITHOUT COMPLICATION: ICD-10-CM

## 2020-08-12 DIAGNOSIS — R07.89 CHEST PRESSURE: ICD-10-CM

## 2020-08-12 DIAGNOSIS — E66.09 CLASS 1 OBESITY DUE TO EXCESS CALORIES WITH SERIOUS COMORBIDITY AND BODY MASS INDEX (BMI) OF 34.0 TO 34.9 IN ADULT: ICD-10-CM

## 2020-08-12 DIAGNOSIS — I10 ESSENTIAL HYPERTENSION: ICD-10-CM

## 2020-08-12 LAB
AORTIC ROOT ANNULUS: 2.65 CM
ASCENDING AORTA: 2.6 CM
AV INDEX (PROSTH): 0.87
AV MEAN GRADIENT: 4 MMHG
AV PEAK GRADIENT: 8 MMHG
AV VALVE AREA: 3.05 CM2
AV VELOCITY RATIO: 0.75
BSA FOR ECHO PROCEDURE: 2.05 M2
CV ECHO LV RWT: 0.81 CM
CV STRESS BASE HR: 84 BPM
DIASTOLIC BLOOD PRESSURE: 96 MMHG
DOP CALC AO PEAK VEL: 1.38 M/S
DOP CALC AO VTI: 25.96 CM
DOP CALC LVOT AREA: 3.5 CM2
DOP CALC LVOT DIAMETER: 2.12 CM
DOP CALC LVOT PEAK VEL: 1.03 M/S
DOP CALC LVOT STROKE VOLUME: 79.28 CM3
DOP CALC RVOT PEAK VEL: 0.7 M/S
DOP CALC RVOT VTI: 13.72 CM
DOP CALCLVOT PEAK VEL VTI: 22.47 CM
E WAVE DECELERATION TIME: 119.22 MSEC
E/A RATIO: 0.87
E/E' RATIO: 7.89 M/S
ECHO LV POSTERIOR WALL: 1.58 CM (ref 0.6–1.1)
FRACTIONAL SHORTENING: 39 % (ref 28–44)
INTERVENTRICULAR SEPTUM: 1.45 CM (ref 0.6–1.1)
IVRT: 128.45 MSEC
LA MAJOR: 4.42 CM
LA MINOR: 4.48 CM
LA WIDTH: 3.4 CM
LEFT ATRIUM SIZE: 3.88 CM
LEFT ATRIUM VOLUME INDEX: 25.1 ML/M2
LEFT ATRIUM VOLUME: 49.9 CM3
LEFT INTERNAL DIMENSION IN SYSTOLE: 2.37 CM (ref 2.1–4)
LEFT VENTRICLE DIASTOLIC VOLUME INDEX: 32.91 ML/M2
LEFT VENTRICLE DIASTOLIC VOLUME: 65.38 ML
LEFT VENTRICLE MASS INDEX: 114 G/M2
LEFT VENTRICLE SYSTOLIC VOLUME INDEX: 9.8 ML/M2
LEFT VENTRICLE SYSTOLIC VOLUME: 19.5 ML
LEFT VENTRICULAR INTERNAL DIMENSION IN DIASTOLE: 3.89 CM (ref 3.5–6)
LEFT VENTRICULAR MASS: 227.33 G
LV LATERAL E/E' RATIO: 7.5 M/S
LV SEPTAL E/E' RATIO: 8.33 M/S
MV PEAK A VEL: 0.86 M/S
MV PEAK E VEL: 0.75 M/S
MV STENOSIS PRESSURE HALF TIME: 34.57 MS
MV VALVE AREA P 1/2 METHOD: 6.36 CM2
OHS CV CPX 1 MINUTE RECOVERY HEART RATE: 137 BPM
OHS CV CPX 85 PERCENT MAX PREDICTED HEART RATE MALE: 144
OHS CV CPX ESTIMATED METS: 7
OHS CV CPX MAX PREDICTED HEART RATE: 169
OHS CV CPX PATIENT IS FEMALE: 1
OHS CV CPX PATIENT IS MALE: 0
OHS CV CPX PEAK DIASTOLIC BLOOD PRESSURE: 88 MMHG
OHS CV CPX PEAK HEAR RATE: 148 BPM
OHS CV CPX PEAK RATE PRESSURE PRODUCT: NORMAL
OHS CV CPX PEAK SYSTOLIC BLOOD PRESSURE: 210 MMHG
OHS CV CPX PERCENT MAX PREDICTED HEART RATE ACHIEVED: 88
OHS CV CPX RATE PRESSURE PRODUCT PRESENTING: NORMAL
PISA TR MAX VEL: 1.66 M/S
PULM VEIN S/D RATIO: 1.25
PV MEAN GRADIENT: 1.24 MMHG
PV PEAK D VEL: 0.36 M/S
PV PEAK GRADIENT: 1.98 MMHG
PV PEAK S VEL: 0.45 M/S
PV PEAK VELOCITY: 0.77 CM/S
RA MAJOR: 4.01 CM
RA PRESSURE: 3 MMHG
RA WIDTH: 2.85 CM
RIGHT VENTRICULAR END-DIASTOLIC DIMENSION: 1.78 CM
SINUS: 2.93 CM
STJ: 2.81 CM
STRESS ECHO POST EXERCISE DUR MIN: 8 MINUTES
STRESS ECHO POST EXERCISE DUR SEC: 21 SECONDS
SYSTOLIC BLOOD PRESSURE: 129 MMHG
TDI LATERAL: 0.1 M/S
TDI SEPTAL: 0.09 M/S
TDI: 0.1 M/S
TR MAX PG: 11 MMHG
TRICUSPID ANNULAR PLANE SYSTOLIC EXCURSION: 1.98 CM
TV REST PULMONARY ARTERY PRESSURE: 14 MMHG

## 2020-08-12 PROCEDURE — 93306 TTE W/DOPPLER COMPLETE: CPT

## 2020-08-12 PROCEDURE — 93016 EXERCISE STRESS - EKG (CUPID ONLY): ICD-10-PCS | Mod: ,,, | Performed by: INTERNAL MEDICINE

## 2020-08-12 PROCEDURE — 93016 CV STRESS TEST SUPVJ ONLY: CPT | Mod: ,,, | Performed by: INTERNAL MEDICINE

## 2020-08-12 PROCEDURE — 93306 TTE W/DOPPLER COMPLETE: CPT | Mod: 26,,, | Performed by: INTERNAL MEDICINE

## 2020-08-12 PROCEDURE — 93018 EXERCISE STRESS - EKG (CUPID ONLY): ICD-10-PCS | Mod: ,,, | Performed by: INTERNAL MEDICINE

## 2020-08-12 PROCEDURE — 93227 HOLTER MONITOR - 48 HOUR (CUPID ONLY): ICD-10-PCS | Mod: ,,, | Performed by: INTERNAL MEDICINE

## 2020-08-12 PROCEDURE — 93017 CV STRESS TEST TRACING ONLY: CPT

## 2020-08-12 PROCEDURE — 93306 ECHO (CUPID ONLY): ICD-10-PCS | Mod: 26,,, | Performed by: INTERNAL MEDICINE

## 2020-08-12 PROCEDURE — 93226 XTRNL ECG REC<48 HR SCAN A/R: CPT

## 2020-08-12 PROCEDURE — 93227 XTRNL ECG REC<48 HR R&I: CPT | Mod: ,,, | Performed by: INTERNAL MEDICINE

## 2020-08-12 PROCEDURE — 93018 CV STRESS TEST I&R ONLY: CPT | Mod: ,,, | Performed by: INTERNAL MEDICINE

## 2020-08-17 ENCOUNTER — PATIENT MESSAGE (OUTPATIENT)
Dept: CARDIOLOGY | Facility: CLINIC | Age: 40
End: 2020-08-17

## 2020-08-17 NOTE — TELEPHONE ENCOUNTER
I called the patient and there was no voicemail available. I will message the patient to call us back.

## 2020-08-18 LAB
OHS CV EVENT MONITOR DAY: 2
OHS CV HOLTER LENGTH DECIMAL HOURS: 96
OHS CV HOLTER LENGTH HOURS: 48
OHS CV HOLTER LENGTH MINUTES: 0

## 2020-10-15 ENCOUNTER — OFFICE VISIT (OUTPATIENT)
Dept: CARDIOLOGY | Facility: CLINIC | Age: 40
End: 2020-10-15
Payer: MEDICAID

## 2020-10-15 VITALS
HEIGHT: 65 IN | SYSTOLIC BLOOD PRESSURE: 122 MMHG | HEART RATE: 78 BPM | BODY MASS INDEX: 33.76 KG/M2 | WEIGHT: 202.63 LBS | OXYGEN SATURATION: 98 % | DIASTOLIC BLOOD PRESSURE: 82 MMHG

## 2020-10-15 DIAGNOSIS — R00.2 PALPITATIONS: ICD-10-CM

## 2020-10-15 DIAGNOSIS — J45.20 MILD INTERMITTENT ASTHMA WITHOUT COMPLICATION: ICD-10-CM

## 2020-10-15 DIAGNOSIS — R07.9 CHEST PAIN, UNSPECIFIED TYPE: ICD-10-CM

## 2020-10-15 DIAGNOSIS — E66.09 CLASS 1 OBESITY DUE TO EXCESS CALORIES WITH SERIOUS COMORBIDITY AND BODY MASS INDEX (BMI) OF 33.0 TO 33.9 IN ADULT: ICD-10-CM

## 2020-10-15 DIAGNOSIS — I10 ESSENTIAL HYPERTENSION: Primary | ICD-10-CM

## 2020-10-15 PROCEDURE — 99214 PR OFFICE/OUTPT VISIT, EST, LEVL IV, 30-39 MIN: ICD-10-PCS | Mod: S$PBB,,, | Performed by: INTERNAL MEDICINE

## 2020-10-15 PROCEDURE — 99214 OFFICE O/P EST MOD 30 MIN: CPT | Mod: S$PBB,,, | Performed by: INTERNAL MEDICINE

## 2020-10-15 PROCEDURE — 99213 OFFICE O/P EST LOW 20 MIN: CPT | Mod: PBBFAC | Performed by: INTERNAL MEDICINE

## 2020-10-15 PROCEDURE — 99999 PR PBB SHADOW E&M-EST. PATIENT-LVL III: CPT | Mod: PBBFAC,,, | Performed by: INTERNAL MEDICINE

## 2020-10-15 PROCEDURE — 99999 PR PBB SHADOW E&M-EST. PATIENT-LVL III: ICD-10-PCS | Mod: PBBFAC,,, | Performed by: INTERNAL MEDICINE

## 2020-10-15 RX ORDER — HYDROCHLOROTHIAZIDE 12.5 MG/1
12.5 CAPSULE ORAL DAILY
Qty: 90 CAPSULE | Refills: 1 | Status: SHIPPED | OUTPATIENT
Start: 2020-10-15 | End: 2021-02-08 | Stop reason: SDUPTHER

## 2020-10-15 RX ORDER — AMLODIPINE BESYLATE 10 MG/1
10 TABLET ORAL DAILY
Qty: 90 TABLET | Refills: 1 | Status: SHIPPED | OUTPATIENT
Start: 2020-10-15 | End: 2021-02-08 | Stop reason: SDUPTHER

## 2020-10-15 RX ORDER — HYDROCHLOROTHIAZIDE 12.5 MG/1
12.5 CAPSULE ORAL DAILY
Qty: 90 CAPSULE | Refills: 1 | Status: SHIPPED | OUTPATIENT
Start: 2020-10-15 | End: 2020-10-15 | Stop reason: SDUPTHER

## 2020-10-15 RX ORDER — AMLODIPINE BESYLATE 10 MG/1
10 TABLET ORAL DAILY
Qty: 90 TABLET | Refills: 1 | Status: SHIPPED | OUTPATIENT
Start: 2020-10-15 | End: 2020-10-15 | Stop reason: SDUPTHER

## 2020-10-15 NOTE — PROGRESS NOTES
Subjective:   Patient ID:  Bianca Amato is a 40 y.o. female who presents for cardiac consult of Chest Pain    Referring Physician: Vivi Chand MD   Reason for consult: chest pain    HPI  The patient came in today for cardiac consult of Chest Pain      Bianca Amato is a 40 y.o. female pt with HTN, obesity, asthma, anemia presents for follow up CV eval of chest pain.     7/28/20  She went to ER last week for  Chest Pain  The current episode started yesterday. Chest pain occurs intermittently. The chest pain is resolved (resolved yesterday). At its most intense, the chest pain is at 4/10. The chest pain is currently at 0/10. The quality of the pain is described as pressure-like. The pain radiates to the right arm. Primary symptoms include cough. Pertinent negatives for primary symptoms include no fever, no fatigue, no syncope, no shortness of breath, no wheezing, no palpitations, no abdominal pain, no nausea, no vomiting, no dizziness and no altered mental status.   The cough began 3 to 5 days ago. The cough is productive. The sputum is green.   Workup neg trops, BNP; D dimer elevated, Neg CTA for PE. ECG with sinus tach, poor RWP concern for anterior MI. Chest pain was more right sided with numbness and tingling but also center.     10/15/20  ECHO normal BI V function, Holter neg. Stress test neg for ischemia. Adjusted BP meds last visit. BP well controlled. Is active and is working up. Chest pain resolved. No palpitations.      Patient feels no leg swelling, no PND, no palpitation, no dizziness, no syncope, no CNS symptoms.    Patient has fairly good exercise tolerance. Was a .     Patient is compliant with medications.     Results for orders placed during the hospital encounter of 08/12/20   Echo Color Flow Doppler? Yes    Narrative · Concentric left ventricular hypertrophy.  · Normal left ventricular systolic function. The estimated ejection   fraction is 55%.  · Normal LV  diastolic function.  · No wall motion abnormalities.  · Normal right ventricular systolic function.  · Normal central venous pressure (3 mmHg).  · The estimated PA systolic pressure is 14 mmHg.          Results for orders placed during the hospital encounter of 08/12/20   Exercise Stress - EKG    Narrative   The EKG portion of this study is negative for ischemia.    The patient reported no chest pain during the stress test.       HOLTER  Predominant Rhythm  Sinus rhythm with heart rates varying between 68 and 136 bpm with an average of 95 bpm.   PVC    Ventricular Arrhythmias  There were very rare PVCs totalling 149 and averaging 1.55 per hour.   PAC    Supraventricular Arrhythmias  There were very rare PACs totalling 28 and averaging 0.29 per hour.         Past Medical History:   Diagnosis Date    Carpal tunnel syndrome     bilateral    Hypertension     Iron deficiency anemia     Mild intermittent asthma        Past Surgical History:   Procedure Laterality Date    EYE FOREIGN BODY REMOVAL Right     ROBOT-ASSISTED LAPAROSCOPIC ABDOMINAL HYSTERECTOMY USING DA ALYSA XI N/A 12/6/2019    Procedure: XI ROBOTIC HYSTERECTOMY;  Surgeon: VENESSA Davidson MD;  Location: [facility] OR;  Service: OB/GYN;  Laterality: N/A;    ROBOT-ASSISTED SURGICAL REMOVAL OF FALLOPIAN TUBE USING DA ALYSA XI Bilateral 12/6/2019    Procedure: XI ROBOTIC SALPINGECTOMY;  Surgeon: VENESSA Davidson MD;  Location: [facility] OR;  Service: OB/GYN;  Laterality: Bilateral;    SURGICAL REMOVAL OF MASS OF AXILLA Right 11/12/2018    Procedure: EXCISION, MASS, AXILLARY;  Surgeon: Alis Tian MD;  Location: [facility] OR;  Service: General;  Laterality: Right;    TUBAL LIGATION         Social History     Tobacco Use    Smoking status: Never Smoker    Smokeless tobacco: Never Used   Substance Use Topics    Alcohol use: No     Frequency: Never     Drinks per session: Patient refused     Binge frequency: Never    Drug use: No       Family History    Problem Relation Age of Onset    Diabetes Mother     Sarcoidosis Mother     COPD Father     Hypertension Father     Breast cancer Neg Hx     Colon cancer Neg Hx     Ovarian cancer Neg Hx     Thrombosis Neg Hx        Patient's Medications   New Prescriptions    No medications on file   Previous Medications    ALBUTEROL (ACCUNEB) 0.63 MG/3 ML NEBU    Take 3 mLs (0.63 mg total) by nebulization every 6 (six) hours as needed. Rescue    ALBUTEROL (PROVENTIL HFA) 90 MCG/ACTUATION INHALER    Inhale 2 puffs into the lungs every 6 (six) hours as needed.    AMLODIPINE (NORVASC) 10 MG TABLET    Take 1 tablet (10 mg total) by mouth once daily.    CARBAMIDE PEROXIDE (DEBROX) 6.5 % OTIC SOLUTION    Place 2 drops into both ears as needed.     FLUTICASONE PROPIONATE (FLONASE) 50 MCG/ACTUATION NASAL SPRAY    1 spray (50 mcg total) by Each Nostril route once daily.    HYDROCHLOROTHIAZIDE (MICROZIDE) 12.5 MG CAPSULE    Take 1 capsule (12.5 mg total) by mouth once daily.    LEVOCETIRIZINE (XYZAL) 5 MG TABLET    Take 1 tablet (5 mg total) by mouth every evening.   Modified Medications    No medications on file   Discontinued Medications    No medications on file       Review of Systems   Constitutional: Negative.    HENT: Negative.    Eyes: Negative.    Respiratory: Negative.    Cardiovascular: Positive for chest pain.   Gastrointestinal: Negative.    Genitourinary: Negative.    Musculoskeletal: Negative.    Skin: Negative.    Neurological: Negative.    Endo/Heme/Allergies: Negative.    Psychiatric/Behavioral: Negative.    All 12 systems otherwise negative.      Wt Readings from Last 3 Encounters:   10/15/20 91.9 kg (202 lb 9.6 oz)   08/12/20 91.6 kg (202 lb)   08/12/20 91.6 kg (202 lb)     Temp Readings from Last 3 Encounters:   08/06/20 98.4 °F (36.9 °C)   07/18/20 99 °F (37.2 °C) (Oral)   02/27/20 99 °F (37.2 °C) (Tympanic)     BP Readings from Last 3 Encounters:   10/15/20 122/82   08/12/20 (!) 129/96   08/12/20 122/88  "    Pulse Readings from Last 3 Encounters:   10/15/20 78   08/12/20 84   08/12/20 83       /82 (BP Location: Right arm, Patient Position: Sitting, BP Method: Large (Manual))   Pulse 78   Ht 5' 5" (1.651 m)   Wt 91.9 kg (202 lb 9.6 oz)   LMP 11/07/2019 (Approximate)   SpO2 98%   BMI 33.71 kg/m²     Objective:   Physical Exam   Constitutional: She is oriented to person, place, and time. She appears well-developed and well-nourished. No distress.   HENT:   Head: Normocephalic and atraumatic.   Nose: Nose normal.   Mouth/Throat: Oropharynx is clear and moist.   Eyes: Conjunctivae and EOM are normal. No scleral icterus.   Neck: Normal range of motion. Neck supple. No JVD present. No thyromegaly present.   Cardiovascular: Normal rate, regular rhythm, S1 normal and S2 normal. Exam reveals no gallop, no S3, no S4 and no friction rub.   No murmur heard.  Pulmonary/Chest: Effort normal and breath sounds normal. No stridor. No respiratory distress. She has no wheezes. She has no rales. She exhibits no tenderness.   Abdominal: Soft. Bowel sounds are normal. She exhibits no distension and no mass. There is no abdominal tenderness. There is no rebound.   Genitourinary:    Genitourinary Comments: Deferred     Musculoskeletal: Normal range of motion.         General: No tenderness, deformity or edema.   Lymphadenopathy:     She has no cervical adenopathy.   Neurological: She is alert and oriented to person, place, and time. She exhibits normal muscle tone. Coordination normal.   Skin: Skin is warm and dry. No rash noted. She is not diaphoretic. No erythema. No pallor.   Psychiatric: She has a normal mood and affect. Her behavior is normal. Judgment and thought content normal.   Nursing note and vitals reviewed.      Lab Results   Component Value Date     07/18/2020    K 3.8 07/18/2020     07/18/2020    CO2 25 07/18/2020    BUN 9 07/18/2020    CREATININE 0.9 07/18/2020     (H) 07/18/2020    AST 14 " 07/18/2020    ALT 27 07/18/2020    ALBUMIN 3.8 07/18/2020    PROT 7.4 07/18/2020    BILITOT 0.3 07/18/2020    WBC 8.10 07/18/2020    HGB 12.8 07/18/2020    HCT 40.7 07/18/2020    MCV 88 07/18/2020     (H) 07/18/2020    TSH 0.541 07/22/2020    CHOL 172 10/30/2019    HDL 49 10/30/2019    LDLCALC 102.6 10/30/2019    TRIG 102 10/30/2019    BNP <10 07/18/2020     Assessment:      1. Essential hypertension    2. Mild intermittent asthma without complication    3. Class 1 obesity due to excess calories with serious comorbidity and body mass index (BMI) of 33.0 to 33.9 in adult    4. Chest pain, unspecified type    5. Palpitations        Plan:   1. Chest pain with abnormal ECG - poor RWP, with palpitations - resolved   - heart racing at times  - may need DONNY eval, refer to pulm   - ECG stress and 2D ECHO - negative  - Holter - negative    2. HTN - well controlled  - cont meds and titrate    3. Obesity  -needs weight loss    4. Asthma  - cont tx per PCP    Thank you for allowing me to participate in this patient's care. Please do not hesitate to contact me with any questions or concerns. Consult note has been forwarded to the referral physician.

## 2020-10-29 ENCOUNTER — PATIENT MESSAGE (OUTPATIENT)
Dept: INTERNAL MEDICINE | Facility: CLINIC | Age: 40
End: 2020-10-29

## 2020-10-30 ENCOUNTER — PATIENT MESSAGE (OUTPATIENT)
Dept: INTERNAL MEDICINE | Facility: CLINIC | Age: 40
End: 2020-10-30

## 2020-10-30 ENCOUNTER — TELEPHONE (OUTPATIENT)
Dept: INTERNAL MEDICINE | Facility: CLINIC | Age: 40
End: 2020-10-30

## 2020-10-30 NOTE — TELEPHONE ENCOUNTER
----- Message from Meera Miguel sent at 10/30/2020  2:21 PM CDT -----  Regarding: bites on arm/swollen  Type:  Needs Medical Advice    Who Called: pt  Symptoms (please be specific): bite on arm, swollen /both and her knee   How long has patient had these symptoms:  n/a  Pharmacy name and phone #:  n/a  Would the patient rather a call back or a response via [facility]? Call back   Best Call Back Number: [phone]  Additional Information: Please call back.Thanks

## 2020-12-11 ENCOUNTER — PATIENT MESSAGE (OUTPATIENT)
Dept: OTHER | Facility: OTHER | Age: 40
End: 2020-12-11

## 2021-01-21 ENCOUNTER — TELEPHONE (OUTPATIENT)
Dept: INTERNAL MEDICINE | Facility: CLINIC | Age: 41
End: 2021-01-21

## 2021-01-21 DIAGNOSIS — Z12.31 ENCOUNTER FOR SCREENING MAMMOGRAM FOR BREAST CANCER: Primary | ICD-10-CM

## 2021-01-28 ENCOUNTER — OFFICE VISIT (OUTPATIENT)
Dept: SURGERY | Facility: CLINIC | Age: 41
End: 2021-01-28
Payer: MEDICAID

## 2021-01-28 VITALS
TEMPERATURE: 98 F | WEIGHT: 206.38 LBS | DIASTOLIC BLOOD PRESSURE: 105 MMHG | SYSTOLIC BLOOD PRESSURE: 147 MMHG | HEART RATE: 94 BPM | BODY MASS INDEX: 34.34 KG/M2

## 2021-01-28 DIAGNOSIS — N62 MACROMASTIA: ICD-10-CM

## 2021-01-28 DIAGNOSIS — N64.4 MASTALGIA IN FEMALE: Primary | ICD-10-CM

## 2021-01-28 PROBLEM — R59.0 AXILLARY LYMPHADENOPATHY: Status: RESOLVED | Noted: 2018-11-01 | Resolved: 2021-01-28

## 2021-01-28 PROCEDURE — 99999 PR PBB SHADOW E&M-EST. PATIENT-LVL III: ICD-10-PCS | Mod: PBBFAC,,, | Performed by: SURGERY

## 2021-01-28 PROCEDURE — 99213 OFFICE O/P EST LOW 20 MIN: CPT | Mod: S$PBB,,, | Performed by: SURGERY

## 2021-01-28 PROCEDURE — 99213 OFFICE O/P EST LOW 20 MIN: CPT | Mod: PBBFAC | Performed by: SURGERY

## 2021-01-28 PROCEDURE — 99213 PR OFFICE/OUTPT VISIT, EST, LEVL III, 20-29 MIN: ICD-10-PCS | Mod: S$PBB,,, | Performed by: SURGERY

## 2021-01-28 PROCEDURE — 99999 PR PBB SHADOW E&M-EST. PATIENT-LVL III: CPT | Mod: PBBFAC,,, | Performed by: SURGERY

## 2021-02-01 ENCOUNTER — LAB VISIT (OUTPATIENT)
Dept: LAB | Facility: HOSPITAL | Age: 41
End: 2021-02-01
Attending: FAMILY MEDICINE
Payer: MEDICAID

## 2021-02-01 DIAGNOSIS — Z11.59 NEED FOR HEPATITIS C SCREENING TEST: ICD-10-CM

## 2021-02-01 PROCEDURE — 86803 HEPATITIS C AB TEST: CPT

## 2021-02-01 PROCEDURE — 36415 COLL VENOUS BLD VENIPUNCTURE: CPT

## 2021-02-02 LAB — HCV AB SERPL QL IA: NEGATIVE

## 2021-02-05 ENCOUNTER — LAB VISIT (OUTPATIENT)
Dept: LAB | Facility: HOSPITAL | Age: 41
End: 2021-02-05
Attending: INTERNAL MEDICINE
Payer: MEDICAID

## 2021-02-05 ENCOUNTER — OFFICE VISIT (OUTPATIENT)
Dept: PULMONOLOGY | Facility: CLINIC | Age: 41
End: 2021-02-05
Payer: MEDICAID

## 2021-02-05 VITALS
SYSTOLIC BLOOD PRESSURE: 136 MMHG | HEIGHT: 65 IN | BODY MASS INDEX: 34.03 KG/M2 | TEMPERATURE: 98 F | HEART RATE: 87 BPM | OXYGEN SATURATION: 99 % | WEIGHT: 204.25 LBS | RESPIRATION RATE: 18 BRPM | DIASTOLIC BLOOD PRESSURE: 100 MMHG

## 2021-02-05 DIAGNOSIS — E66.09 CLASS 1 OBESITY DUE TO EXCESS CALORIES WITH SERIOUS COMORBIDITY AND BODY MASS INDEX (BMI) OF 33.0 TO 33.9 IN ADULT: ICD-10-CM

## 2021-02-05 DIAGNOSIS — R06.83 SNORING: ICD-10-CM

## 2021-02-05 DIAGNOSIS — E04.2 MULTIPLE THYROID NODULES: ICD-10-CM

## 2021-02-05 DIAGNOSIS — I10 UNCONTROLLED HYPERTENSION: ICD-10-CM

## 2021-02-05 DIAGNOSIS — J45.20 MILD INTERMITTENT ASTHMA WITHOUT COMPLICATION: ICD-10-CM

## 2021-02-05 DIAGNOSIS — J45.20 MILD INTERMITTENT ASTHMA WITHOUT COMPLICATION: Primary | ICD-10-CM

## 2021-02-05 DIAGNOSIS — R29.818 SUSPECTED SLEEP APNEA: ICD-10-CM

## 2021-02-05 PROCEDURE — 99999 PR PBB SHADOW E&M-EST. PATIENT-LVL IV: ICD-10-PCS | Mod: PBBFAC,,, | Performed by: INTERNAL MEDICINE

## 2021-02-05 PROCEDURE — 36415 COLL VENOUS BLD VENIPUNCTURE: CPT

## 2021-02-05 PROCEDURE — 99999 PR PBB SHADOW E&M-EST. PATIENT-LVL IV: CPT | Mod: PBBFAC,,, | Performed by: INTERNAL MEDICINE

## 2021-02-05 PROCEDURE — 99204 PR OFFICE/OUTPT VISIT, NEW, LEVL IV, 45-59 MIN: ICD-10-PCS | Mod: S$PBB,,, | Performed by: INTERNAL MEDICINE

## 2021-02-05 PROCEDURE — 99214 OFFICE O/P EST MOD 30 MIN: CPT | Mod: PBBFAC | Performed by: INTERNAL MEDICINE

## 2021-02-05 PROCEDURE — 82785 ASSAY OF IGE: CPT

## 2021-02-05 PROCEDURE — 99204 OFFICE O/P NEW MOD 45 MIN: CPT | Mod: S$PBB,,, | Performed by: INTERNAL MEDICINE

## 2021-02-06 ENCOUNTER — TELEPHONE (OUTPATIENT)
Dept: SLEEP MEDICINE | Facility: CLINIC | Age: 41
End: 2021-02-06

## 2021-02-06 ENCOUNTER — PATIENT MESSAGE (OUTPATIENT)
Dept: PULMONOLOGY | Facility: CLINIC | Age: 41
End: 2021-02-06

## 2021-02-06 LAB — IGE SERPL-ACNC: 36 IU/ML (ref 0–100)

## 2021-02-08 ENCOUNTER — OFFICE VISIT (OUTPATIENT)
Dept: INTERNAL MEDICINE | Facility: CLINIC | Age: 41
End: 2021-02-08
Payer: MEDICAID

## 2021-02-08 ENCOUNTER — HOSPITAL ENCOUNTER (OUTPATIENT)
Dept: RADIOLOGY | Facility: HOSPITAL | Age: 41
Discharge: HOME | End: 2021-02-08
Attending: FAMILY MEDICINE
Payer: MEDICAID

## 2021-02-08 VITALS — BODY MASS INDEX: 34.05 KG/M2 | WEIGHT: 204.38 LBS | HEIGHT: 65 IN

## 2021-02-08 VITALS
HEART RATE: 92 BPM | SYSTOLIC BLOOD PRESSURE: 130 MMHG | OXYGEN SATURATION: 98 % | BODY MASS INDEX: 34.46 KG/M2 | DIASTOLIC BLOOD PRESSURE: 70 MMHG | WEIGHT: 206.81 LBS | HEIGHT: 65 IN | TEMPERATURE: 98 F

## 2021-02-08 DIAGNOSIS — Z13.220 SCREENING FOR LIPOID DISORDERS: ICD-10-CM

## 2021-02-08 DIAGNOSIS — Z12.31 ENCOUNTER FOR SCREENING MAMMOGRAM FOR BREAST CANCER: ICD-10-CM

## 2021-02-08 DIAGNOSIS — I10 ESSENTIAL HYPERTENSION: ICD-10-CM

## 2021-02-08 DIAGNOSIS — Z00.00 ROUTINE GENERAL MEDICAL EXAMINATION AT A HEALTH CARE FACILITY: Primary | ICD-10-CM

## 2021-02-08 DIAGNOSIS — E66.09 CLASS 1 OBESITY DUE TO EXCESS CALORIES WITH SERIOUS COMORBIDITY AND BODY MASS INDEX (BMI) OF 34.0 TO 34.9 IN ADULT: ICD-10-CM

## 2021-02-08 DIAGNOSIS — J45.20 MILD INTERMITTENT ASTHMA WITHOUT COMPLICATION: ICD-10-CM

## 2021-02-08 DIAGNOSIS — E04.1 THYROID NODULE: ICD-10-CM

## 2021-02-08 DIAGNOSIS — D50.9 IRON DEFICIENCY ANEMIA, UNSPECIFIED IRON DEFICIENCY ANEMIA TYPE: ICD-10-CM

## 2021-02-08 DIAGNOSIS — Z13.29 THYROID DISORDER SCREEN: ICD-10-CM

## 2021-02-08 PROCEDURE — 99999 PR PBB SHADOW E&M-EST. PATIENT-LVL IV: ICD-10-PCS | Mod: PBBFAC,,, | Performed by: FAMILY MEDICINE

## 2021-02-08 PROCEDURE — 99396 PREV VISIT EST AGE 40-64: CPT | Mod: S$PBB,,, | Performed by: FAMILY MEDICINE

## 2021-02-08 PROCEDURE — 99214 OFFICE O/P EST MOD 30 MIN: CPT | Mod: PBBFAC | Performed by: FAMILY MEDICINE

## 2021-02-08 PROCEDURE — 77067 SCR MAMMO BI INCL CAD: CPT | Mod: 26,,, | Performed by: RADIOLOGY

## 2021-02-08 PROCEDURE — 77067 MAMMO DIGITAL SCREENING BILAT: ICD-10-PCS | Mod: 26,,, | Performed by: RADIOLOGY

## 2021-02-08 PROCEDURE — 77067 SCR MAMMO BI INCL CAD: CPT | Mod: TC

## 2021-02-08 PROCEDURE — 99396 PR PREVENTIVE VISIT,EST,40-64: ICD-10-PCS | Mod: S$PBB,,, | Performed by: FAMILY MEDICINE

## 2021-02-08 PROCEDURE — 99999 PR PBB SHADOW E&M-EST. PATIENT-LVL IV: CPT | Mod: PBBFAC,,, | Performed by: FAMILY MEDICINE

## 2021-02-08 RX ORDER — HYDROCHLOROTHIAZIDE 12.5 MG/1
12.5 CAPSULE ORAL DAILY
Qty: 90 CAPSULE | Refills: 3 | Status: SHIPPED | OUTPATIENT
Start: 2021-02-08 | End: 2021-04-27 | Stop reason: SDUPTHER

## 2021-02-08 RX ORDER — AMLODIPINE BESYLATE 10 MG/1
10 TABLET ORAL DAILY
Qty: 90 TABLET | Refills: 3 | Status: SHIPPED | OUTPATIENT
Start: 2021-02-08 | End: 2022-03-08 | Stop reason: SDUPTHER

## 2021-02-09 ENCOUNTER — PATIENT MESSAGE (OUTPATIENT)
Dept: ADMINISTRATIVE | Facility: OTHER | Age: 41
End: 2021-02-09

## 2021-02-11 ENCOUNTER — PROCEDURE VISIT (OUTPATIENT)
Dept: SLEEP MEDICINE | Facility: CLINIC | Age: 41
End: 2021-02-11
Payer: MEDICAID

## 2021-02-11 DIAGNOSIS — G47.33 OSA (OBSTRUCTIVE SLEEP APNEA): Primary | ICD-10-CM

## 2021-02-11 DIAGNOSIS — E66.09 CLASS 1 OBESITY DUE TO EXCESS CALORIES WITH SERIOUS COMORBIDITY AND BODY MASS INDEX (BMI) OF 33.0 TO 33.9 IN ADULT: ICD-10-CM

## 2021-02-11 DIAGNOSIS — R06.83 SNORING: ICD-10-CM

## 2021-02-11 DIAGNOSIS — R29.818 SUSPECTED SLEEP APNEA: ICD-10-CM

## 2021-02-11 DIAGNOSIS — I10 UNCONTROLLED HYPERTENSION: ICD-10-CM

## 2021-02-11 PROCEDURE — 95806 SLEEP STUDY UNATT&RESP EFFT: CPT | Mod: PBBFAC | Performed by: INTERNAL MEDICINE

## 2021-02-21 PROCEDURE — 95806 PR SLEEP STUDY, UNATTENDED, SIMUL RECORD HR/O2 SAT/RESP FLOW/RESP EFFT: ICD-10-PCS | Mod: 26,S$PBB,, | Performed by: INTERNAL MEDICINE

## 2021-02-21 PROCEDURE — 95806 SLEEP STUDY UNATT&RESP EFFT: CPT | Mod: 26,S$PBB,, | Performed by: INTERNAL MEDICINE

## 2021-02-24 DIAGNOSIS — G47.33 MILD OBSTRUCTIVE SLEEP APNEA: Primary | ICD-10-CM

## 2021-04-05 ENCOUNTER — OFFICE VISIT (OUTPATIENT)
Dept: PODIATRY | Facility: CLINIC | Age: 41
End: 2021-04-05
Payer: MEDICAID

## 2021-04-05 VITALS — BODY MASS INDEX: 34.1 KG/M2 | HEIGHT: 65 IN | WEIGHT: 204.69 LBS

## 2021-04-05 DIAGNOSIS — L60.0 INGROWING NAIL, LEFT GREAT TOE: ICD-10-CM

## 2021-04-05 DIAGNOSIS — B35.1 ONYCHOMYCOSIS: ICD-10-CM

## 2021-04-05 DIAGNOSIS — B35.3 TINEA PEDIS OF BOTH FEET: Primary | ICD-10-CM

## 2021-04-05 DIAGNOSIS — M79.675 PAIN OF LEFT GREAT TOE: ICD-10-CM

## 2021-04-05 DIAGNOSIS — L74.513 HYPERHIDROSIS OF FEET: ICD-10-CM

## 2021-04-05 PROCEDURE — 11730 AVULSION NAIL PLATE SIMPLE 1: CPT | Mod: TA,S$PBB,, | Performed by: PODIATRIST

## 2021-04-05 PROCEDURE — 99214 PR OFFICE/OUTPT VISIT, EST, LEVL IV, 30-39 MIN: ICD-10-PCS | Mod: 25,S$PBB,, | Performed by: PODIATRIST

## 2021-04-05 PROCEDURE — 99214 OFFICE O/P EST MOD 30 MIN: CPT | Mod: 25,S$PBB,, | Performed by: PODIATRIST

## 2021-04-05 PROCEDURE — 99213 OFFICE O/P EST LOW 20 MIN: CPT | Mod: PBBFAC,25 | Performed by: PODIATRIST

## 2021-04-05 PROCEDURE — 99999 PR PBB SHADOW E&M-EST. PATIENT-LVL III: CPT | Mod: PBBFAC,,, | Performed by: PODIATRIST

## 2021-04-05 PROCEDURE — 11730 PR REMOVAL OF NAIL PLATE: ICD-10-PCS | Mod: TA,S$PBB,, | Performed by: PODIATRIST

## 2021-04-05 PROCEDURE — 99999 PR PBB SHADOW E&M-EST. PATIENT-LVL III: ICD-10-PCS | Mod: PBBFAC,,, | Performed by: PODIATRIST

## 2021-04-05 PROCEDURE — 11730 AVULSION NAIL PLATE SIMPLE 1: CPT | Mod: TA,PBBFAC | Performed by: PODIATRIST

## 2021-04-05 RX ORDER — TRAMADOL HYDROCHLORIDE 50 MG/1
TABLET ORAL
Qty: 28 TABLET | Refills: 0 | Status: SHIPPED | OUTPATIENT
Start: 2021-04-05 | End: 2021-04-27

## 2021-04-05 RX ORDER — TERBINAFINE HYDROCHLORIDE 250 MG/1
250 TABLET ORAL DAILY
Qty: 30 TABLET | Refills: 2 | Status: SHIPPED | OUTPATIENT
Start: 2021-04-05 | End: 2021-05-05

## 2021-04-05 RX ORDER — GLYCOPYRROLATE 2 MG/1
2 TABLET ORAL DAILY
Qty: 30 TABLET | Refills: 0 | Status: SHIPPED | OUTPATIENT
Start: 2021-04-05 | End: 2021-05-05

## 2021-04-05 RX ORDER — KETOCONAZOLE 20 MG/G
CREAM TOPICAL 2 TIMES DAILY
Qty: 30 G | Refills: 2 | Status: SHIPPED | OUTPATIENT
Start: 2021-04-05 | End: 2021-08-09 | Stop reason: SDUPTHER

## 2021-04-06 ENCOUNTER — TELEPHONE (OUTPATIENT)
Dept: PULMONOLOGY | Facility: CLINIC | Age: 41
End: 2021-04-06

## 2021-04-06 ENCOUNTER — PATIENT MESSAGE (OUTPATIENT)
Dept: PULMONOLOGY | Facility: CLINIC | Age: 41
End: 2021-04-06

## 2021-04-14 DIAGNOSIS — I10 ESSENTIAL HYPERTENSION: Primary | ICD-10-CM

## 2021-04-14 DIAGNOSIS — D50.9 IRON DEFICIENCY ANEMIA, UNSPECIFIED IRON DEFICIENCY ANEMIA TYPE: ICD-10-CM

## 2021-04-15 ENCOUNTER — TELEPHONE (OUTPATIENT)
Dept: CARDIOLOGY | Facility: CLINIC | Age: 41
End: 2021-04-15

## 2021-04-27 ENCOUNTER — HOSPITAL ENCOUNTER (OUTPATIENT)
Dept: CARDIOLOGY | Facility: HOSPITAL | Age: 41
Discharge: HOME | End: 2021-04-27
Attending: INTERNAL MEDICINE
Payer: MEDICAID

## 2021-04-27 ENCOUNTER — OFFICE VISIT (OUTPATIENT)
Dept: CARDIOLOGY | Facility: CLINIC | Age: 41
End: 2021-04-27
Payer: MEDICAID

## 2021-04-27 ENCOUNTER — OFFICE VISIT (OUTPATIENT)
Dept: SURGERY | Facility: CLINIC | Age: 41
End: 2021-04-27
Payer: MEDICAID

## 2021-04-27 VITALS
OXYGEN SATURATION: 98 % | WEIGHT: 207.44 LBS | HEART RATE: 96 BPM | BODY MASS INDEX: 38.17 KG/M2 | DIASTOLIC BLOOD PRESSURE: 100 MMHG | TEMPERATURE: 99 F | SYSTOLIC BLOOD PRESSURE: 124 MMHG | SYSTOLIC BLOOD PRESSURE: 136 MMHG | RESPIRATION RATE: 16 BRPM | HEIGHT: 62 IN | WEIGHT: 206.81 LBS | HEART RATE: 84 BPM | DIASTOLIC BLOOD PRESSURE: 96 MMHG | HEIGHT: 62 IN | BODY MASS INDEX: 38.06 KG/M2

## 2021-04-27 DIAGNOSIS — N64.4 MASTALGIA IN FEMALE: Primary | ICD-10-CM

## 2021-04-27 DIAGNOSIS — D50.9 IRON DEFICIENCY ANEMIA, UNSPECIFIED IRON DEFICIENCY ANEMIA TYPE: ICD-10-CM

## 2021-04-27 DIAGNOSIS — I10 ESSENTIAL HYPERTENSION: Primary | ICD-10-CM

## 2021-04-27 DIAGNOSIS — J45.20 MILD INTERMITTENT ASTHMA WITHOUT COMPLICATION: ICD-10-CM

## 2021-04-27 DIAGNOSIS — E66.01 SEVERE OBESITY (BMI 35.0-39.9) WITH COMORBIDITY: ICD-10-CM

## 2021-04-27 DIAGNOSIS — G47.33 OSA (OBSTRUCTIVE SLEEP APNEA): ICD-10-CM

## 2021-04-27 DIAGNOSIS — I10 ESSENTIAL HYPERTENSION: ICD-10-CM

## 2021-04-27 PROCEDURE — 93010 EKG 12-LEAD: ICD-10-PCS | Mod: ,,, | Performed by: INTERNAL MEDICINE

## 2021-04-27 PROCEDURE — 99213 PR OFFICE/OUTPT VISIT, EST, LEVL III, 20-29 MIN: ICD-10-PCS | Mod: S$PBB,,, | Performed by: SURGERY

## 2021-04-27 PROCEDURE — 99214 OFFICE O/P EST MOD 30 MIN: CPT | Mod: PBBFAC,25 | Performed by: INTERNAL MEDICINE

## 2021-04-27 PROCEDURE — 99214 OFFICE O/P EST MOD 30 MIN: CPT | Mod: S$PBB,,, | Performed by: INTERNAL MEDICINE

## 2021-04-27 PROCEDURE — 99213 OFFICE O/P EST LOW 20 MIN: CPT | Mod: S$PBB,,, | Performed by: SURGERY

## 2021-04-27 PROCEDURE — 99999 PR PBB SHADOW E&M-EST. PATIENT-LVL III: CPT | Mod: PBBFAC,,, | Performed by: SURGERY

## 2021-04-27 PROCEDURE — 93005 ELECTROCARDIOGRAM TRACING: CPT

## 2021-04-27 PROCEDURE — 99999 PR PBB SHADOW E&M-EST. PATIENT-LVL IV: ICD-10-PCS | Mod: PBBFAC,,, | Performed by: INTERNAL MEDICINE

## 2021-04-27 PROCEDURE — 99999 PR PBB SHADOW E&M-EST. PATIENT-LVL IV: CPT | Mod: PBBFAC,,, | Performed by: INTERNAL MEDICINE

## 2021-04-27 PROCEDURE — 99999 PR PBB SHADOW E&M-EST. PATIENT-LVL III: ICD-10-PCS | Mod: PBBFAC,,, | Performed by: SURGERY

## 2021-04-27 PROCEDURE — 99213 OFFICE O/P EST LOW 20 MIN: CPT | Mod: PBBFAC,27,25 | Performed by: SURGERY

## 2021-04-27 PROCEDURE — 99214 PR OFFICE/OUTPT VISIT, EST, LEVL IV, 30-39 MIN: ICD-10-PCS | Mod: S$PBB,,, | Performed by: INTERNAL MEDICINE

## 2021-04-27 PROCEDURE — 93010 ELECTROCARDIOGRAM REPORT: CPT | Mod: ,,, | Performed by: INTERNAL MEDICINE

## 2021-04-27 RX ORDER — HYDROCHLOROTHIAZIDE 12.5 MG/1
12.5 CAPSULE ORAL DAILY
Qty: 90 CAPSULE | Refills: 1 | Status: SHIPPED | OUTPATIENT
Start: 2021-04-27 | End: 2021-07-30 | Stop reason: DRUGHIGH

## 2021-04-27 RX ORDER — LOSARTAN POTASSIUM 25 MG/1
25 TABLET ORAL DAILY
Qty: 30 TABLET | Refills: 3 | Status: SHIPPED | OUTPATIENT
Start: 2021-04-27 | End: 2021-06-17

## 2021-04-29 DIAGNOSIS — J45.20 MILD INTERMITTENT ASTHMA WITHOUT COMPLICATION: ICD-10-CM

## 2021-04-29 DIAGNOSIS — J45.20 MILD INTERMITTENT ASTHMA, UNCOMPLICATED: ICD-10-CM

## 2021-04-29 RX ORDER — ALBUTEROL SULFATE 0.63 MG/3ML
0.63 SOLUTION RESPIRATORY (INHALATION) EVERY 6 HOURS PRN
Qty: 75 ML | Refills: 11 | Status: SHIPPED | OUTPATIENT
Start: 2021-04-29 | End: 2022-04-29

## 2021-04-29 RX ORDER — ALBUTEROL SULFATE 90 UG/1
2 AEROSOL, METERED RESPIRATORY (INHALATION) EVERY 6 HOURS PRN
Qty: 54 G | Refills: 3 | Status: SHIPPED | OUTPATIENT
Start: 2021-04-29 | End: 2022-08-11

## 2021-05-05 ENCOUNTER — PATIENT MESSAGE (OUTPATIENT)
Dept: CARDIOLOGY | Facility: CLINIC | Age: 41
End: 2021-05-05

## 2021-05-10 ENCOUNTER — PATIENT MESSAGE (OUTPATIENT)
Dept: PHARMACY | Facility: CLINIC | Age: 41
End: 2021-05-10

## 2021-05-27 ENCOUNTER — TELEPHONE (OUTPATIENT)
Dept: INTERNAL MEDICINE | Facility: CLINIC | Age: 41
End: 2021-05-27

## 2021-06-03 ENCOUNTER — PATIENT MESSAGE (OUTPATIENT)
Dept: INTERNAL MEDICINE | Facility: CLINIC | Age: 41
End: 2021-06-03

## 2021-06-10 ENCOUNTER — TELEPHONE (OUTPATIENT)
Dept: CARDIOLOGY | Facility: CLINIC | Age: 41
End: 2021-06-10

## 2021-07-15 ENCOUNTER — OFFICE VISIT (OUTPATIENT)
Dept: PULMONOLOGY | Facility: CLINIC | Age: 41
End: 2021-07-15
Payer: MEDICAID

## 2021-07-15 VITALS
HEIGHT: 62 IN | SYSTOLIC BLOOD PRESSURE: 120 MMHG | BODY MASS INDEX: 36.07 KG/M2 | HEART RATE: 115 BPM | RESPIRATION RATE: 18 BRPM | WEIGHT: 196 LBS | OXYGEN SATURATION: 99 % | DIASTOLIC BLOOD PRESSURE: 78 MMHG

## 2021-07-15 DIAGNOSIS — J45.20 MILD INTERMITTENT ASTHMA WITHOUT COMPLICATION: ICD-10-CM

## 2021-07-15 DIAGNOSIS — E04.2 MULTIPLE THYROID NODULES: ICD-10-CM

## 2021-07-15 DIAGNOSIS — G47.33 MILD OBSTRUCTIVE SLEEP APNEA: Primary | ICD-10-CM

## 2021-07-15 DIAGNOSIS — I10 WELL-CONTROLLED HYPERTENSION: ICD-10-CM

## 2021-07-15 DIAGNOSIS — R06.83 SNORING: ICD-10-CM

## 2021-07-15 DIAGNOSIS — E66.09 CLASS 1 OBESITY DUE TO EXCESS CALORIES WITH SERIOUS COMORBIDITY AND BODY MASS INDEX (BMI) OF 33.0 TO 33.9 IN ADULT: ICD-10-CM

## 2021-07-15 PROCEDURE — 99214 OFFICE O/P EST MOD 30 MIN: CPT | Mod: S$PBB,,, | Performed by: INTERNAL MEDICINE

## 2021-07-15 PROCEDURE — 99999 PR PBB SHADOW E&M-EST. PATIENT-LVL IV: ICD-10-PCS | Mod: PBBFAC,,, | Performed by: INTERNAL MEDICINE

## 2021-07-15 PROCEDURE — 99999 PR PBB SHADOW E&M-EST. PATIENT-LVL IV: CPT | Mod: PBBFAC,,, | Performed by: INTERNAL MEDICINE

## 2021-07-15 PROCEDURE — 99214 OFFICE O/P EST MOD 30 MIN: CPT | Mod: PBBFAC | Performed by: INTERNAL MEDICINE

## 2021-07-15 PROCEDURE — 99214 PR OFFICE/OUTPT VISIT, EST, LEVL IV, 30-39 MIN: ICD-10-PCS | Mod: S$PBB,,, | Performed by: INTERNAL MEDICINE

## 2021-07-23 ENCOUNTER — HOSPITAL ENCOUNTER (OUTPATIENT)
Dept: RADIOLOGY | Facility: HOSPITAL | Age: 41
Discharge: HOME | End: 2021-07-23
Attending: FAMILY MEDICINE
Payer: MEDICAID

## 2021-07-23 DIAGNOSIS — E04.1 THYROID NODULE: ICD-10-CM

## 2021-07-23 PROCEDURE — 76536 US SOFT TISSUE HEAD NECK THYROID: ICD-10-PCS | Mod: 26,,, | Performed by: RADIOLOGY

## 2021-07-23 PROCEDURE — 76536 US EXAM OF HEAD AND NECK: CPT | Mod: 26,,, | Performed by: RADIOLOGY

## 2021-07-23 PROCEDURE — 76536 US EXAM OF HEAD AND NECK: CPT | Mod: TC

## 2021-07-24 ENCOUNTER — LAB VISIT (OUTPATIENT)
Dept: OTOLARYNGOLOGY | Facility: CLINIC | Age: 41
End: 2021-07-24
Payer: MEDICAID

## 2021-07-24 DIAGNOSIS — R29.818 SUSPECTED SLEEP APNEA: ICD-10-CM

## 2021-07-24 PROCEDURE — U0003 INFECTIOUS AGENT DETECTION BY NUCLEIC ACID (DNA OR RNA); SEVERE ACUTE RESPIRATORY SYNDROME CORONAVIRUS 2 (SARS-COV-2) (CORONAVIRUS DISEASE [COVID-19]), AMPLIFIED PROBE TECHNIQUE, MAKING USE OF HIGH THROUGHPUT TECHNOLOGIES AS DESCRIBED BY CMS-2020-01-R: HCPCS | Performed by: INTERNAL MEDICINE

## 2021-07-24 PROCEDURE — U0005 INFEC AGEN DETEC AMPLI PROBE: HCPCS | Performed by: INTERNAL MEDICINE

## 2021-07-26 DIAGNOSIS — E04.1 THYROID NODULE: Primary | ICD-10-CM

## 2021-07-26 LAB
SARS-COV-2 RNA RESP QL NAA+PROBE: NOT DETECTED
SARS-COV-2- CYCLE NUMBER: -1

## 2021-07-27 ENCOUNTER — CLINICAL SUPPORT (OUTPATIENT)
Dept: PULMONOLOGY | Facility: CLINIC | Age: 41
End: 2021-07-27
Payer: MEDICAID

## 2021-07-27 DIAGNOSIS — J45.20 MILD INTERMITTENT ASTHMA WITHOUT COMPLICATION: ICD-10-CM

## 2021-07-27 LAB
BRPFT: NORMAL
FEF 25 75 LLN: 1.33
FEF 25 75 PRE REF: 86.3 %
FEF 25 75 REF: 2.55
FEV1 FVC LLN: 72
FEV1 FVC PRE REF: 100.4 %
FEV1 FVC REF: 82
FEV1 LLN: 1.81
FEV1 PRE REF: 81.2 %
FEV1 REF: 2.35
FVC LLN: 2.22
FVC PRE REF: 80.7 %
FVC REF: 2.86
PEF LLN: 4.28
PEF PRE REF: 85.6 %
PEF REF: 6.15
PRE FEF 25 75: 2.2 L/S
PRE FET 100: 7.41 SEC
PRE FEV1 FVC: 82.65 %
PRE FEV1: 1.91 L
PRE FVC: 2.31 L
PRE PEF: 5.26 L/S

## 2021-07-27 PROCEDURE — 94010 BREATHING CAPACITY TEST: ICD-10-PCS | Mod: 26,S$PBB,, | Performed by: INTERNAL MEDICINE

## 2021-07-27 PROCEDURE — 94010 BREATHING CAPACITY TEST: CPT | Mod: 26,S$PBB,, | Performed by: INTERNAL MEDICINE

## 2021-07-27 PROCEDURE — 94010 BREATHING CAPACITY TEST: CPT | Mod: PBBFAC

## 2021-07-30 ENCOUNTER — LAB VISIT (OUTPATIENT)
Dept: LAB | Facility: HOSPITAL | Age: 41
End: 2021-07-30
Attending: FAMILY MEDICINE
Payer: MEDICAID

## 2021-07-30 DIAGNOSIS — I10 ESSENTIAL HYPERTENSION: ICD-10-CM

## 2021-07-30 PROCEDURE — 36415 COLL VENOUS BLD VENIPUNCTURE: CPT | Performed by: FAMILY MEDICINE

## 2021-07-30 PROCEDURE — 80048 BASIC METABOLIC PNL TOTAL CA: CPT | Performed by: FAMILY MEDICINE

## 2021-07-31 LAB
ANION GAP SERPL CALC-SCNC: 9 MMOL/L (ref 8–16)
BUN SERPL-MCNC: 9 MG/DL (ref 6–20)
CALCIUM SERPL-MCNC: 9.6 MG/DL (ref 8.7–10.5)
CHLORIDE SERPL-SCNC: 106 MMOL/L (ref 95–110)
CO2 SERPL-SCNC: 23 MMOL/L (ref 23–29)
CREAT SERPL-MCNC: 0.7 MG/DL (ref 0.5–1.4)
EST. GFR  (AFRICAN AMERICAN): >60 ML/MIN/1.73 M^2
EST. GFR  (NON AFRICAN AMERICAN): >60 ML/MIN/1.73 M^2
GLUCOSE SERPL-MCNC: 65 MG/DL (ref 70–110)
POTASSIUM SERPL-SCNC: 4.2 MMOL/L (ref 3.5–5.1)
SODIUM SERPL-SCNC: 138 MMOL/L (ref 136–145)

## 2021-08-09 ENCOUNTER — OFFICE VISIT (OUTPATIENT)
Dept: INTERNAL MEDICINE | Facility: CLINIC | Age: 41
End: 2021-08-09
Payer: MEDICAID

## 2021-08-09 VITALS
DIASTOLIC BLOOD PRESSURE: 84 MMHG | BODY MASS INDEX: 35.85 KG/M2 | SYSTOLIC BLOOD PRESSURE: 117 MMHG | HEIGHT: 62 IN | HEART RATE: 92 BPM

## 2021-08-09 DIAGNOSIS — Z00.00 ROUTINE GENERAL MEDICAL EXAMINATION AT A HEALTH CARE FACILITY: Primary | ICD-10-CM

## 2021-08-09 DIAGNOSIS — Z13.1 DIABETES MELLITUS SCREENING: ICD-10-CM

## 2021-08-09 DIAGNOSIS — Z13.220 SCREENING FOR LIPOID DISORDERS: ICD-10-CM

## 2021-08-09 DIAGNOSIS — B35.3 TINEA PEDIS OF BOTH FEET: ICD-10-CM

## 2021-08-09 DIAGNOSIS — Z13.29 THYROID DISORDER SCREEN: ICD-10-CM

## 2021-08-09 DIAGNOSIS — J45.20 MILD INTERMITTENT ASTHMA WITHOUT COMPLICATION: ICD-10-CM

## 2021-08-09 DIAGNOSIS — I10 ESSENTIAL HYPERTENSION: ICD-10-CM

## 2021-08-09 PROCEDURE — 99213 OFFICE O/P EST LOW 20 MIN: CPT | Mod: 95,,, | Performed by: FAMILY MEDICINE

## 2021-08-09 PROCEDURE — 99213 PR OFFICE/OUTPT VISIT, EST, LEVL III, 20-29 MIN: ICD-10-PCS | Mod: 95,,, | Performed by: FAMILY MEDICINE

## 2021-08-10 RX ORDER — KETOCONAZOLE 20 MG/G
CREAM TOPICAL 2 TIMES DAILY
Qty: 30 G | Refills: 2 | Status: SHIPPED | OUTPATIENT
Start: 2021-08-10 | End: 2023-05-23 | Stop reason: SDUPTHER

## 2021-09-07 ENCOUNTER — PATIENT MESSAGE (OUTPATIENT)
Dept: ADMINISTRATIVE | Facility: OTHER | Age: 41
End: 2021-09-07

## 2021-12-13 ENCOUNTER — OFFICE VISIT (OUTPATIENT)
Dept: PODIATRY | Facility: CLINIC | Age: 41
End: 2021-12-13
Payer: MEDICAID

## 2021-12-13 VITALS — BODY MASS INDEX: 35.88 KG/M2 | WEIGHT: 195 LBS | HEIGHT: 62 IN

## 2021-12-13 DIAGNOSIS — M79.675 PAIN OF LEFT GREAT TOE: ICD-10-CM

## 2021-12-13 DIAGNOSIS — L60.3 ONYCHODYSTROPHY: Primary | ICD-10-CM

## 2021-12-13 PROCEDURE — 99212 OFFICE O/P EST SF 10 MIN: CPT | Mod: S$PBB,,, | Performed by: PODIATRIST

## 2021-12-13 PROCEDURE — 4010F ACE/ARB THERAPY RXD/TAKEN: CPT | Mod: CPTII,,, | Performed by: PODIATRIST

## 2021-12-13 PROCEDURE — 99999 PR PBB SHADOW E&M-EST. PATIENT-LVL III: CPT | Mod: PBBFAC,,, | Performed by: PODIATRIST

## 2021-12-13 PROCEDURE — 4010F PR ACE/ARB THEARPY RXD/TAKEN: ICD-10-PCS | Mod: CPTII,,, | Performed by: PODIATRIST

## 2021-12-13 PROCEDURE — 99213 OFFICE O/P EST LOW 20 MIN: CPT | Mod: PBBFAC | Performed by: PODIATRIST

## 2021-12-13 PROCEDURE — 99999 PR PBB SHADOW E&M-EST. PATIENT-LVL III: ICD-10-PCS | Mod: PBBFAC,,, | Performed by: PODIATRIST

## 2021-12-13 PROCEDURE — 99212 PR OFFICE/OUTPT VISIT, EST, LEVL II, 10-19 MIN: ICD-10-PCS | Mod: S$PBB,,, | Performed by: PODIATRIST

## 2022-01-08 ENCOUNTER — PATIENT MESSAGE (OUTPATIENT)
Dept: INTERNAL MEDICINE | Facility: CLINIC | Age: 42
End: 2022-01-08
Payer: MEDICAID

## 2022-01-10 ENCOUNTER — PATIENT MESSAGE (OUTPATIENT)
Dept: INTERNAL MEDICINE | Facility: CLINIC | Age: 42
End: 2022-01-10

## 2022-01-10 ENCOUNTER — OFFICE VISIT (OUTPATIENT)
Dept: INTERNAL MEDICINE | Facility: CLINIC | Age: 42
End: 2022-01-10
Payer: MEDICAID

## 2022-01-10 VITALS — SYSTOLIC BLOOD PRESSURE: 104 MMHG | DIASTOLIC BLOOD PRESSURE: 84 MMHG

## 2022-01-10 DIAGNOSIS — J01.90 ACUTE SINUSITIS, RECURRENCE NOT SPECIFIED, UNSPECIFIED LOCATION: Primary | ICD-10-CM

## 2022-01-10 PROCEDURE — 1160F PR REVIEW ALL MEDS BY PRESCRIBER/CLIN PHARMACIST DOCUMENTED: ICD-10-PCS | Mod: CPTII,95,, | Performed by: PHYSICIAN ASSISTANT

## 2022-01-10 PROCEDURE — 3079F DIAST BP 80-89 MM HG: CPT | Mod: CPTII,95,, | Performed by: PHYSICIAN ASSISTANT

## 2022-01-10 PROCEDURE — 1159F PR MEDICATION LIST DOCUMENTED IN MEDICAL RECORD: ICD-10-PCS | Mod: CPTII,95,, | Performed by: PHYSICIAN ASSISTANT

## 2022-01-10 PROCEDURE — 99213 PR OFFICE/OUTPT VISIT, EST, LEVL III, 20-29 MIN: ICD-10-PCS | Mod: 95,,, | Performed by: PHYSICIAN ASSISTANT

## 2022-01-10 PROCEDURE — 3074F PR MOST RECENT SYSTOLIC BLOOD PRESSURE < 130 MM HG: ICD-10-PCS | Mod: CPTII,95,, | Performed by: PHYSICIAN ASSISTANT

## 2022-01-10 PROCEDURE — 3079F PR MOST RECENT DIASTOLIC BLOOD PRESSURE 80-89 MM HG: ICD-10-PCS | Mod: CPTII,95,, | Performed by: PHYSICIAN ASSISTANT

## 2022-01-10 PROCEDURE — 1160F RVW MEDS BY RX/DR IN RCRD: CPT | Mod: CPTII,95,, | Performed by: PHYSICIAN ASSISTANT

## 2022-01-10 PROCEDURE — 3074F SYST BP LT 130 MM HG: CPT | Mod: CPTII,95,, | Performed by: PHYSICIAN ASSISTANT

## 2022-01-10 PROCEDURE — 1159F MED LIST DOCD IN RCRD: CPT | Mod: CPTII,95,, | Performed by: PHYSICIAN ASSISTANT

## 2022-01-10 PROCEDURE — 99213 OFFICE O/P EST LOW 20 MIN: CPT | Mod: 95,,, | Performed by: PHYSICIAN ASSISTANT

## 2022-01-10 RX ORDER — PREDNISONE 10 MG/1
10 TABLET ORAL 2 TIMES DAILY
Qty: 10 TABLET | Refills: 0 | Status: SHIPPED | OUTPATIENT
Start: 2022-01-10 | End: 2022-01-17

## 2022-01-10 RX ORDER — ALBUTEROL SULFATE 90 UG/1
2 AEROSOL, METERED RESPIRATORY (INHALATION) EVERY 4 HOURS PRN
Qty: 18 G | Refills: 0 | Status: SHIPPED | OUTPATIENT
Start: 2022-01-10 | End: 2022-07-29 | Stop reason: SDUPTHER

## 2022-01-10 RX ORDER — LEVOCETIRIZINE DIHYDROCHLORIDE 5 MG/1
5 TABLET, FILM COATED ORAL NIGHTLY
Qty: 30 TABLET | Refills: 0 | Status: SHIPPED | OUTPATIENT
Start: 2022-01-10 | End: 2023-01-09 | Stop reason: SDUPTHER

## 2022-01-10 RX ORDER — AZELASTINE 1 MG/ML
1 SPRAY, METERED NASAL 2 TIMES DAILY
Qty: 30 ML | Refills: 2 | Status: SHIPPED | OUTPATIENT
Start: 2022-01-10 | End: 2022-03-17

## 2022-01-10 RX ORDER — AZITHROMYCIN 250 MG/1
TABLET, FILM COATED ORAL
Qty: 6 TABLET | Refills: 0 | Status: SHIPPED | OUTPATIENT
Start: 2022-01-10 | End: 2022-03-17

## 2022-01-10 NOTE — PROGRESS NOTES
Subjective:       Patient ID: Bianca Amato is a 41 y.o. female.    Chief Complaint: No chief complaint on file.      The patient location is: home  The chief complaint leading to consultation is: nasal congestion    Visit type: audiovisual    Face to Face time with patient: 6 minutes (chart review started 341; video started 341; video ended 347)  8 minutes of total time spent on the encounter, which includes face to face time and non-face to face time preparing to see the patient (eg, review of tests), Obtaining and/or reviewing separately obtained history, Documenting clinical information in the electronic or other health record, Independently interpreting results (not separately reported) and communicating results to the patient/family/caregiver, or Care coordination (not separately reported).     Each patient to whom he or she provides medical services by telemedicine is:  (1) informed of the relationship between the physician and patient and the respective role of any other health care provider with respect to management of the patient; and (2) notified that he or she may decline to receive medical services by telemedicine and may withdraw from such care at any time.      Sinus Problem  This is a new problem. The current episode started 1 to 4 weeks ago. The problem has been gradually worsening since onset. There has been no fever. Associated symptoms include congestion and coughing. Pertinent negatives include no headaches or neck pain. Past treatments include nothing.   Pt had covid contact from family members right around [place] but she tested negative.     Review of Systems   Constitutional: Negative for activity change and unexpected weight change.   HENT: Positive for congestion and rhinorrhea. Negative for hearing loss and trouble swallowing.    Eyes: Negative for discharge and visual disturbance.   Respiratory: Positive for cough. Negative for chest tightness and wheezing.    Cardiovascular:  Negative for chest pain and palpitations.   Gastrointestinal: Negative for blood in stool, constipation, diarrhea and vomiting.   Endocrine: Negative for polydipsia and polyuria.   Genitourinary: Negative for difficulty urinating, dysuria, hematuria and menstrual problem.   Musculoskeletal: Negative for arthralgias, joint swelling and neck pain.   Neurological: Negative for weakness and headaches.   Psychiatric/Behavioral: Negative for confusion and dysphoric mood.         Objective:      Physical Exam  Vitals and nursing note reviewed.   Constitutional:       General: She is not in acute distress.     Appearance: She is well-developed.   HENT:      Head: Normocephalic and atraumatic.   Eyes:      General: Lids are normal. No scleral icterus.     Extraocular Movements: Extraocular movements intact.      Conjunctiva/sclera: Conjunctivae normal.   Pulmonary:      Effort: Pulmonary effort is normal.   Neurological:      Mental Status: She is alert and oriented to person, place, and time.   Psychiatric:         Mood and Affect: Mood and affect normal.         Assessment:       1. Acute sinusitis, recurrence not specified, unspecified location        Plan:     Problem List Items Addressed This Visit    None     Visit Diagnoses     Acute sinusitis, recurrence not specified, unspecified location    -  Primary    Relevant Medications    albuterol (VENTOLIN HFA) 90 mcg/actuation inhaler    levocetirizine (XYZAL) 5 MG tablet    azelastine (ASTELIN) 137 mcg (0.1 %) nasal spray    azithromycin (Z-JARRELL) 250 MG tablet    predniSONE (DELTASONE) 10 MG tablet

## 2022-01-19 ENCOUNTER — OFFICE VISIT (OUTPATIENT)
Dept: PULMONOLOGY | Facility: CLINIC | Age: 42
End: 2022-01-19
Payer: MEDICAID

## 2022-01-19 VITALS
HEIGHT: 62 IN | DIASTOLIC BLOOD PRESSURE: 94 MMHG | HEART RATE: 74 BPM | WEIGHT: 198.63 LBS | SYSTOLIC BLOOD PRESSURE: 132 MMHG | RESPIRATION RATE: 16 BRPM | OXYGEN SATURATION: 99 % | BODY MASS INDEX: 36.55 KG/M2

## 2022-01-19 DIAGNOSIS — I10 ESSENTIAL HYPERTENSION: ICD-10-CM

## 2022-01-19 DIAGNOSIS — J45.20 MILD INTERMITTENT ASTHMA WITHOUT COMPLICATION: ICD-10-CM

## 2022-01-19 DIAGNOSIS — G47.33 OSA ON CPAP: Primary | ICD-10-CM

## 2022-01-19 DIAGNOSIS — E66.01 CLASS 2 SEVERE OBESITY DUE TO EXCESS CALORIES WITH SERIOUS COMORBIDITY AND BODY MASS INDEX (BMI) OF 36.0 TO 36.9 IN ADULT: ICD-10-CM

## 2022-01-19 PROBLEM — E66.812 CLASS 2 SEVERE OBESITY DUE TO EXCESS CALORIES WITH SERIOUS COMORBIDITY AND BODY MASS INDEX (BMI) OF 36.0 TO 36.9 IN ADULT: Status: ACTIVE | Noted: 2020-07-28

## 2022-01-19 PROCEDURE — 1160F PR REVIEW ALL MEDS BY PRESCRIBER/CLIN PHARMACIST DOCUMENTED: ICD-10-PCS | Mod: CPTII,,, | Performed by: NURSE PRACTITIONER

## 2022-01-19 PROCEDURE — 99214 OFFICE O/P EST MOD 30 MIN: CPT | Mod: S$PBB,,, | Performed by: NURSE PRACTITIONER

## 2022-01-19 PROCEDURE — 3075F PR MOST RECENT SYSTOLIC BLOOD PRESS GE 130-139MM HG: ICD-10-PCS | Mod: CPTII,,, | Performed by: NURSE PRACTITIONER

## 2022-01-19 PROCEDURE — 99999 PR PBB SHADOW E&M-EST. PATIENT-LVL IV: ICD-10-PCS | Mod: PBBFAC,,, | Performed by: NURSE PRACTITIONER

## 2022-01-19 PROCEDURE — 1160F RVW MEDS BY RX/DR IN RCRD: CPT | Mod: CPTII,,, | Performed by: NURSE PRACTITIONER

## 2022-01-19 PROCEDURE — 1159F PR MEDICATION LIST DOCUMENTED IN MEDICAL RECORD: ICD-10-PCS | Mod: CPTII,,, | Performed by: NURSE PRACTITIONER

## 2022-01-19 PROCEDURE — 4010F PR ACE/ARB THEARPY RXD/TAKEN: ICD-10-PCS | Mod: CPTII,,, | Performed by: NURSE PRACTITIONER

## 2022-01-19 PROCEDURE — 3080F PR MOST RECENT DIASTOLIC BLOOD PRESSURE >= 90 MM HG: ICD-10-PCS | Mod: CPTII,,, | Performed by: NURSE PRACTITIONER

## 2022-01-19 PROCEDURE — 99214 PR OFFICE/OUTPT VISIT, EST, LEVL IV, 30-39 MIN: ICD-10-PCS | Mod: S$PBB,,, | Performed by: NURSE PRACTITIONER

## 2022-01-19 PROCEDURE — 99214 OFFICE O/P EST MOD 30 MIN: CPT | Mod: PBBFAC | Performed by: NURSE PRACTITIONER

## 2022-01-19 PROCEDURE — 3008F BODY MASS INDEX DOCD: CPT | Mod: CPTII,,, | Performed by: NURSE PRACTITIONER

## 2022-01-19 PROCEDURE — 3075F SYST BP GE 130 - 139MM HG: CPT | Mod: CPTII,,, | Performed by: NURSE PRACTITIONER

## 2022-01-19 PROCEDURE — 3008F PR BODY MASS INDEX (BMI) DOCUMENTED: ICD-10-PCS | Mod: CPTII,,, | Performed by: NURSE PRACTITIONER

## 2022-01-19 PROCEDURE — 99999 PR PBB SHADOW E&M-EST. PATIENT-LVL IV: CPT | Mod: PBBFAC,,, | Performed by: NURSE PRACTITIONER

## 2022-01-19 PROCEDURE — 1159F MED LIST DOCD IN RCRD: CPT | Mod: CPTII,,, | Performed by: NURSE PRACTITIONER

## 2022-01-19 PROCEDURE — 4010F ACE/ARB THERAPY RXD/TAKEN: CPT | Mod: CPTII,,, | Performed by: NURSE PRACTITIONER

## 2022-01-19 PROCEDURE — 3080F DIAST BP >= 90 MM HG: CPT | Mod: CPTII,,, | Performed by: NURSE PRACTITIONER

## 2022-01-19 NOTE — ASSESSMENT & PLAN NOTE
Encouraged calorie reduction and 30 minutes of exercise daily. Discussed impact of obesity on general health.  Wt Readings from Last 9 Encounters:   01/19/22 90.1 kg (198 lb 10.2 oz)   12/13/21 88.5 kg (195 lb)   07/15/21 88.9 kg (195 lb 15.8 oz)   04/27/21 93.8 kg (206 lb 12.7 oz)   04/27/21 94.1 kg (207 lb 7.3 oz)   04/05/21 92.8 kg (204 lb 11.2 oz)   02/08/21 92.7 kg (204 lb 5.9 oz)   02/08/21 93.8 kg (206 lb 12.7 oz)   02/05/21 92.7 kg (204 lb 4.1 oz)   Body mass index is 36.33 kg/m².

## 2022-01-19 NOTE — PROGRESS NOTES
Subjective:      Patient ID: Bianca Amato is a 41 y.o. female.    Chief Complaint: Sleep Apnea    HPI: Bianca Amato presents to clinic for follow up for DONNY with CPAP complaince assessment.  She is on Auto CPAP of 5-20 cmH2O pressure which is optimally controlling sleep apnea with apneic index (AHI) 1.0 events an hour.    Patient reports benefit from CPAP use. Helped her sleep longer on nights tolerated. She states motivation to work on adherence to CPAP use.   Patient reports complaint of cpap causes stomach pain and bloated feeling. Orders change remotely to Auto CPAP 5-12 cm. Full face mask is used.    [place] 6    2/10/2021 Home Sleep Study 1 night study  MILD/BORDERLINE OBSTRUCTIVE SLEEP APNEA with overall AHI 9.3/hr ( 59 events): night #1  Oxygen desaturation: 79%. SpO2 between 85% to 89% for 1 min.  Patient snored 99% time above 50 .  Heart rate range: 80 bpm - 107 bpm    Compliance  [employer] Standard  Usage 09/21/2021 - 12/19/2021  Usage days 7/90 days (8%)  >= 4 hours 1 days (1%)  < 4 hours 6 days (7%)  Usage hours 12 hours 17 minutes  Average usage (total days) 8 minutes  Average usage (days used) 1 hours 45 minutes  Median usage (days used) 1 hours 34 minutes  Total used hours (value since last reset - 12/19/2021) 287 hours  AirSense 10 AutoSet  Serial number [id number]  Mode AutoSet  Min Pressure 5 cmH2O  Max Pressure 20 cmH2O  EPR Fulltime  EPR level 3  Response Standard  Therapy  Pressure - cmH2O Median: 7.0 95th percentile: 12.1 Maximum: 13.2  Leaks - L/min Median: 1.2 95th percentile: 3.6 Maximum: 6.9  Events per hour AI: 1.0 HI: 0.0 AHI: 1.0  Apnea Index Central: 0.0 Obstructive: 1.0 Unknown: 0.0  RERA Index 0.5    Asthma  Mild intermittent controlled on occasional use Albuterol inhaler or albuterol nebs   There is no persistent cough, no wheezing, no shortness of breath, no hemoptysis, no sputum production, no weight loss, noted TB exposure, no asbestos exposure, no chest pain.    Triggers: strong purfumes, wearing certain face covering mask.     7/27/2021 spirometry was normal. FEV1 81.2%     Previous Report Reviewed: lab reports and office notes     Past Medical History: The following portions of the patient's history were reviewed and updated as appropriate:   She  has a past surgical history that includes Tubal ligation; Surgical removal of mass of axilla (Right, 11/12/2018); Eye foreign body removal (Right); Robot-assisted laparoscopic abdominal hysterectomy using da Kaushik Xi (N/A, 12/6/2019); Robot-assisted surgical removal of fallopian tube using da Kaushik Xi (Bilateral, 12/6/2019); Hysterectomy (2020); and Eye surgery (2009).  Her family history includes Asthma in her father; COPD in her father; Diabetes in her mother; Hypertension in her father; Sarcoidosis in her mother.  She  reports that she has never smoked. She has never used smokeless tobacco. She reports that she does not drink alcohol and does not use drugs.  She has a current medication list which includes the following prescription(s): albuterol, albuterol, albuterol, amlodipine, azelastine, azithromycin, hydrochlorothiazide, ketoconazole, levocetirizine, and losartan.  She is allergic to latex; latex, natural rubber; penicillins; and sulfa (sulfonamide antibiotics)..    The following portions of the patient's history were reviewed and updated as appropriate: allergies, current medications, past family history, past medical history, past social history, past surgical history and problem list.    Review of Systems   Constitutional: Negative for fever, chills, weight loss, weight gain, activity change, appetite change, fatigue and night sweats.   HENT: Negative for postnasal drip, rhinorrhea, sinus pressure, voice change and congestion.    Eyes: Negative for redness and itching.   Respiratory: Negative for snoring, cough, sputum production, chest tightness, shortness of breath, wheezing, orthopnea, asthma nighttime symptoms,  "dyspnea on extertion, use of rescue inhaler and somnolence.    Cardiovascular: Negative.  Negative for chest pain, palpitations and leg swelling.   Genitourinary: Negative for difficulty urinating and hematuria.   Endocrine: Negative for cold intolerance and heat intolerance.    Musculoskeletal: Negative for arthralgias, gait problem, joint swelling and myalgias.   Skin: Negative.    Gastrointestinal: Negative for nausea, vomiting, abdominal pain and acid reflux.   Neurological: Negative for dizziness, weakness, light-headedness and headaches.   Hematological: Negative for adenopathy. No excessive bruising.   All other systems reviewed and are negative.     Objective:   BP (!) 132/94   Pulse 74   Resp 16   Ht 5' 2" (1.575 m)   Wt 90.1 kg (198 lb 10.2 oz)   LMP 11/07/2019 (Approximate)   SpO2 99%   BMI 36.33 kg/m²   Physical Exam  Vitals and nursing note reviewed.   Constitutional:       General: She is active. She is not in acute distress.     Appearance: She is well-developed and well-nourished. She is obese. She is not ill-appearing or toxic-appearing.   HENT:      Head: Normocephalic.      Right Ear: External ear normal.      Left Ear: External ear normal.      Nose: Nose normal.      Mouth/Throat:      Mouth: Oropharynx is clear and moist.      Pharynx: No oropharyngeal exudate.   Eyes:      Conjunctiva/sclera: Conjunctivae normal.   Cardiovascular:      Rate and Rhythm: Normal rate and regular rhythm.      Pulses: Intact distal pulses.      Heart sounds: Normal heart sounds.   Pulmonary:      Effort: Pulmonary effort is normal.      Breath sounds: Normal breath sounds. No stridor.   Abdominal:      Palpations: Abdomen is soft.   Musculoskeletal:         General: Normal range of motion.      Cervical back: Normal range of motion and neck supple.   Lymphadenopathy:      Cervical: No cervical adenopathy.   Skin:     General: Skin is warm and dry.   Neurological:      Mental Status: She is alert and oriented " to person, place, and time.   Psychiatric:         Mood and Affect: Mood and affect normal.         Behavior: Behavior normal. Behavior is cooperative.         Thought Content: Thought content normal.         Judgment: Judgment normal.         Personal Diagnostic Review  CPAP download    2/10/2021 Home Sleep Study 1 night study  MILD/BORDERLINE OBSTRUCTIVE SLEEP APNEA with overall AHI 9.3/hr ( 59 events): night #1  Oxygen desaturation: 79%. SpO2 between 85% to 89% for 1 min.  Patient snored 99% time above 50 .  Heart rate range: 80 bpm - 107 bpm    EXAMINATION:  XR CHEST AP PORTABLE     CLINICAL HISTORY:  Acute chest pain, Chest Pain;     COMPARISON:  None     FINDINGS:  Heart size is normal. The lung fields are clear. No acute cardiopulmonary infiltrate.     Impression:     No acute findings.        Electronically signed by: Mk Amaya MD  Date:                                            07/18/2020  Time:                                           15:39        US Soft Tissue Head Neck Thyroid  Narrative: EXAMINATION:  US SOFT TISSUE HEAD NECK THYROID    CLINICAL HISTORY:  .  Nontoxic single thyroid nodule    TECHNIQUE:  Ultrasound of the thyroid and cervical lymph nodes was performed.    COMPARISON:  Thyroid sonogram July 22, 2020    FINDINGS:  The thyroid is normal in size.  Right lobe of the thyroid measures 5.3 x 1.9 x 1.8 cm.  Left lobe of the thyroid measures 4.5 x 1.4 x 1.9 cm.  Normal thyroid parenchyma.    There is a stable 1.6 x 0.8 x 1.1 cm solid slightly hypoechoic nodule within the mid right thyroid lobe which is wider than tall, smoothly marginated, and lacks internal calcification.  There is a smaller 7 mm solid hypoechoic nodule more inferiorly within the right thyroid lobe with similar imaging features.  This smaller nodule was not imaged on the prior exam.    Stable 9 mm solid hypoechoic nodule within the thyroid isthmus.    There is a 1.4 x 1.6 x 1.1 cm solid hypoechoic nodule within the  "inferior left thyroid lobe.  This nodule is not significantly changed in size compared to prior exam.  The internal cystic component within this nodule has resolved since prior study.  Tiny 3 mm solid hypoechoic nodule is seen adjacent to this nodule within the inferior left thyroid lobe.  Impression: Stable bilateral solid thyroid nodules, unchanged compared to initial sonogram from July 2020.  Recommend continued imaging follow-up in 12 months.    Electronically signed by: Thomas Bradley  Date:    07/23/2021  Time:    09:24        Assessment:     1. DONNY on CPAP    2. Mild intermittent asthma without complication    3. Essential hypertension    4. Class 2 severe obesity due to excess calories with serious comorbidity and body mass index (BMI) of 36.0 to 36.9 in adult        Orders Placed This Encounter   Procedures    HME - OTHER     Change CPAP settings remotely. Please change to Auto CPAP 5 - 12 cm     Order Specific Question:   Type of Equipment:     Answer:   CPAP     Order Specific Question:   Height:     Answer:   5' 2" (1.575 m)     Order Specific Question:   Weight:     Answer:   90.1 kg (198 lb 10.2 oz)     Order Specific Question:   Does patient have medical equipment at home?     Answer:   CPAP    CPAP/BIPAP SUPPLIES     Benefits and compliant  90 day supply. 4 refills  HME: [facility]     Order Specific Question:   Length of need (1-99 months):     Answer:   99     Order Specific Question:   Choose ONE mask type and its corresponding cushions and/or pillows:     Answer:    Full Face Mask, 1 per 90 days:  Full Face Cushion, (3 per 90 days)     Order Specific Question:   Choose EITHER Heated or Non-Heated Tubjing     Answer:    Non-Heated Tubing, 1 per 90 days     Order Specific Question:   Number of Days Needed:     Answer:   99     Order Specific Question:   All other supplies as needed as listed below:     Answer:    Headgear, 1 per 180 days     Order Specific Question:   All other " "supplies as needed as listed below:     Answer:    Chin Strap, 1 per 180 days     Order Specific Question:   All other supplies as needed as listed below:     Answer:    Disposable Filter, 6 per 90 days     Order Specific Question:   All other supplies as needed as listed below:     Answer:    Humidifier Chamber, 1 per 180 days     Order Specific Question:   All other supplies as needed as listed below:     Answer:    Non-Disposable Filter, 1 per 180 days    NEBULIZER KIT (SUPPLIES) FOR HOME USE     Medical Necessity of Nebulizer Treatment:  The use of a nebulizer is explicitly recommended on a daily basis for treatment of Chronic Obstructive Pulmonary Disease. Use of the nebulizer is medically necessary for mobilization of secretions for relief of pulmonary symptoms of coughing and airway obstruction. Additionally the use of nebulizer is medically necessary for administration of bronchodilator medications and in some cases inhaled steroids and inhaled antibiotics. The use of nebulizer is recommended at least twice a day and may be used as often as every 4- 6 hours depending on the clinical condition.     Order Specific Question:   Height:     Answer:   5' 2" (1.575 m)     Order Specific Question:   Weight:     Answer:   90.1 kg (198 lb 10.2 oz)     Order Specific Question:   Length of need (1-99 months):     Answer:   99     Order Specific Question:   Mask or Mouthpiece?     Answer:   Mouthpiece    X-Ray Chest PA And Lateral     Standing Status:   Future     Standing Expiration Date:   1/19/2023     Order Specific Question:   May the Radiologist modify the order per protocol to meet the clinical needs of the patient?     Answer:   Yes     Order Specific Question:   Release to patient     Answer:   Immediate     Plan:     Problem List Items Addressed This Visit     DONNY on CPAP - Primary     On auto CPAP 5-20 cm   AHI: 1.0  Full face mask  Patient request change of pressure, does not like " surges, causing air in stomach and bloating.   Orders remote change to Auto CPAP 5-12 cm   Follow up 6 months   CPAP habituation guidelines provided    (DME) - [facility]  Reviewed therapeutic goals for positive airway pressure therapy Auto CPAP  Ideal is usage 100% of nights for 6 - 8 hours per night. Minimum usage is 70% of night for at least 4 hours per night used.            Relevant Orders    HME - OTHER    CPAP/BIPAP SUPPLIES    Mild intermittent asthma without complication     Stable on albuterol inhaler or albuterol nebs prn          Relevant Orders    NEBULIZER KIT (SUPPLIES) FOR HOME USE    X-Ray Chest PA And Lateral    Essential hypertension     Managed by primary care provider           Class 2 severe obesity due to excess calories with serious comorbidity and body mass index (BMI) of 36.0 to 36.9 in adult     Encouraged calorie reduction and 30 minutes of exercise daily. Discussed impact of obesity on general health.  Wt Readings from Last 9 Encounters:   01/19/22 90.1 kg (198 lb 10.2 oz)   12/13/21 88.5 kg (195 lb)   07/15/21 88.9 kg (195 lb 15.8 oz)   04/27/21 93.8 kg (206 lb 12.7 oz)   04/27/21 94.1 kg (207 lb 7.3 oz)   04/05/21 92.8 kg (204 lb 11.2 oz)   02/08/21 92.7 kg (204 lb 5.9 oz)   02/08/21 93.8 kg (206 lb 12.7 oz)   02/05/21 92.7 kg (204 lb 4.1 oz)   Body mass index is 36.33 kg/m².               I spent a total of 30 minutes on the day of the visit.  This includes face to face time and non-face to face time preparing to see the patient (eg, review of tests), obtaining and/or reviewing separately obtained history, documenting clinical information in the electronic or other health record, independently interpreting results and communicating results to the patient/family/caregiver, or care coordinator.    Follow up in about 6 months (around 7/19/2022) for CPAP 6 mo compliance download. asthma review cxr .

## 2022-01-19 NOTE — ASSESSMENT & PLAN NOTE
On auto CPAP 5-20 cm   AHI: 1.0  Full face mask  Patient request change of pressure, does not like surges, causing air in stomach and bloating.   Orders remote change to Auto CPAP 5-12 cm   Follow up 6 months   CPAP habituation guidelines provided    (DME) - [facility]  Reviewed therapeutic goals for positive airway pressure therapy Auto CPAP  Ideal is usage 100% of nights for 6 - 8 hours per night. Minimum usage is 70% of night for at least 4 hours per night used.

## 2022-02-07 ENCOUNTER — TELEPHONE (OUTPATIENT)
Dept: INTERNAL MEDICINE | Facility: CLINIC | Age: 42
End: 2022-02-07
Payer: MEDICAID

## 2022-02-07 DIAGNOSIS — Z12.31 ENCOUNTER FOR SCREENING MAMMOGRAM FOR BREAST CANCER: Primary | ICD-10-CM

## 2022-02-07 NOTE — TELEPHONE ENCOUNTER
----- Message from Kristine Foote sent at 2/7/2022  1:52 PM CST -----  Regarding: mammogram  Contact: pt  Pt requesting mammogram orders. [phone]

## 2022-02-08 ENCOUNTER — LAB VISIT (OUTPATIENT)
Dept: LAB | Facility: HOSPITAL | Age: 42
End: 2022-02-08
Attending: FAMILY MEDICINE
Payer: MEDICAID

## 2022-02-08 DIAGNOSIS — Z13.220 SCREENING FOR LIPOID DISORDERS: ICD-10-CM

## 2022-02-08 DIAGNOSIS — Z13.1 DIABETES MELLITUS SCREENING: ICD-10-CM

## 2022-02-08 DIAGNOSIS — Z00.00 ROUTINE GENERAL MEDICAL EXAMINATION AT A HEALTH CARE FACILITY: ICD-10-CM

## 2022-02-08 DIAGNOSIS — Z13.29 THYROID DISORDER SCREEN: ICD-10-CM

## 2022-02-08 LAB
ALBUMIN SERPL BCP-MCNC: 4.1 G/DL (ref 3.5–5.2)
ALP SERPL-CCNC: 75 U/L (ref 55–135)
ALT SERPL W/O P-5'-P-CCNC: 12 U/L (ref 10–44)
ANION GAP SERPL CALC-SCNC: 10 MMOL/L (ref 8–16)
AST SERPL-CCNC: 13 U/L (ref 10–40)
BASOPHILS # BLD AUTO: 0.02 K/UL (ref 0–0.2)
BASOPHILS NFR BLD: 0.3 % (ref 0–1.9)
BILIRUB SERPL-MCNC: 0.5 MG/DL (ref 0.1–1)
BUN SERPL-MCNC: 13 MG/DL (ref 6–20)
CALCIUM SERPL-MCNC: 10.3 MG/DL (ref 8.7–10.5)
CHLORIDE SERPL-SCNC: 102 MMOL/L (ref 95–110)
CHOLEST SERPL-MCNC: 199 MG/DL (ref 120–199)
CHOLEST/HDLC SERPL: 4.7 {RATIO} (ref 2–5)
CO2 SERPL-SCNC: 23 MMOL/L (ref 23–29)
CREAT SERPL-MCNC: 0.7 MG/DL (ref 0.5–1.4)
DIFFERENTIAL METHOD: ABNORMAL
EOSINOPHIL # BLD AUTO: 0.3 K/UL (ref 0–0.5)
EOSINOPHIL NFR BLD: 3.5 % (ref 0–8)
ERYTHROCYTE [DISTWIDTH] IN BLOOD BY AUTOMATED COUNT: 14.2 % (ref 11.5–14.5)
EST. GFR  (AFRICAN AMERICAN): >60 ML/MIN/1.73 M^2
EST. GFR  (NON AFRICAN AMERICAN): >60 ML/MIN/1.73 M^2
ESTIMATED AVG GLUCOSE: 117 MG/DL (ref 68–131)
GLUCOSE SERPL-MCNC: 86 MG/DL (ref 70–110)
HBA1C MFR BLD: 5.7 % (ref 4–5.6)
HCT VFR BLD AUTO: 43 % (ref 37–48.5)
HDLC SERPL-MCNC: 42 MG/DL (ref 40–75)
HDLC SERPL: 21.1 % (ref 20–50)
HGB BLD-MCNC: 13.4 G/DL (ref 12–16)
IMM GRANULOCYTES # BLD AUTO: 0.01 K/UL (ref 0–0.04)
IMM GRANULOCYTES NFR BLD AUTO: 0.1 % (ref 0–0.5)
LDLC SERPL CALC-MCNC: 128.2 MG/DL (ref 63–159)
LYMPHOCYTES # BLD AUTO: 2.7 K/UL (ref 1–4.8)
LYMPHOCYTES NFR BLD: 36.6 % (ref 18–48)
MCH RBC QN AUTO: 28.5 PG (ref 27–31)
MCHC RBC AUTO-ENTMCNC: 31.2 G/DL (ref 32–36)
MCV RBC AUTO: 91 FL (ref 82–98)
MONOCYTES # BLD AUTO: 0.5 K/UL (ref 0.3–1)
MONOCYTES NFR BLD: 6.6 % (ref 4–15)
NEUTROPHILS # BLD AUTO: 3.9 K/UL (ref 1.8–7.7)
NEUTROPHILS NFR BLD: 52.9 % (ref 38–73)
NONHDLC SERPL-MCNC: 157 MG/DL
NRBC BLD-RTO: 0 /100 WBC
PLATELET # BLD AUTO: 491 K/UL (ref 150–450)
PMV BLD AUTO: 10.1 FL (ref 9.2–12.9)
POTASSIUM SERPL-SCNC: 3.6 MMOL/L (ref 3.5–5.1)
PROT SERPL-MCNC: 7.7 G/DL (ref 6–8.4)
RBC # BLD AUTO: 4.71 M/UL (ref 4–5.4)
SODIUM SERPL-SCNC: 135 MMOL/L (ref 136–145)
T4 FREE SERPL-MCNC: 0.95 NG/DL (ref 0.71–1.51)
TRIGL SERPL-MCNC: 144 MG/DL (ref 30–150)
TSH SERPL DL<=0.005 MIU/L-ACNC: 0.37 UIU/ML (ref 0.4–4)
WBC # BLD AUTO: 7.43 K/UL (ref 3.9–12.7)

## 2022-02-08 PROCEDURE — 80053 COMPREHEN METABOLIC PANEL: CPT | Performed by: FAMILY MEDICINE

## 2022-02-08 PROCEDURE — 80061 LIPID PANEL: CPT | Performed by: FAMILY MEDICINE

## 2022-02-08 PROCEDURE — 84439 ASSAY OF FREE THYROXINE: CPT | Performed by: FAMILY MEDICINE

## 2022-02-08 PROCEDURE — 84443 ASSAY THYROID STIM HORMONE: CPT | Performed by: FAMILY MEDICINE

## 2022-02-08 PROCEDURE — 36415 COLL VENOUS BLD VENIPUNCTURE: CPT | Performed by: FAMILY MEDICINE

## 2022-02-08 PROCEDURE — 83036 HEMOGLOBIN GLYCOSYLATED A1C: CPT | Performed by: FAMILY MEDICINE

## 2022-02-08 PROCEDURE — 85025 COMPLETE CBC W/AUTO DIFF WBC: CPT | Performed by: FAMILY MEDICINE

## 2022-02-16 ENCOUNTER — OFFICE VISIT (OUTPATIENT)
Dept: INTERNAL MEDICINE | Facility: CLINIC | Age: 42
End: 2022-02-16
Payer: MEDICAID

## 2022-02-16 VITALS
TEMPERATURE: 99 F | HEART RATE: 88 BPM | OXYGEN SATURATION: 98 % | HEIGHT: 62 IN | BODY MASS INDEX: 34.61 KG/M2 | WEIGHT: 188.06 LBS | DIASTOLIC BLOOD PRESSURE: 70 MMHG | SYSTOLIC BLOOD PRESSURE: 122 MMHG

## 2022-02-16 DIAGNOSIS — Z00.00 ROUTINE GENERAL MEDICAL EXAMINATION AT A HEALTH CARE FACILITY: Primary | ICD-10-CM

## 2022-02-16 DIAGNOSIS — R73.03 PREDIABETES: ICD-10-CM

## 2022-02-16 DIAGNOSIS — I10 ESSENTIAL HYPERTENSION: ICD-10-CM

## 2022-02-16 DIAGNOSIS — L30.4 INTERTRIGO: ICD-10-CM

## 2022-02-16 DIAGNOSIS — H61.23 BILATERAL IMPACTED CERUMEN: ICD-10-CM

## 2022-02-16 PROCEDURE — 99215 OFFICE O/P EST HI 40 MIN: CPT | Mod: PBBFAC | Performed by: FAMILY MEDICINE

## 2022-02-16 PROCEDURE — 3078F PR MOST RECENT DIASTOLIC BLOOD PRESSURE < 80 MM HG: ICD-10-PCS | Mod: CPTII,,, | Performed by: FAMILY MEDICINE

## 2022-02-16 PROCEDURE — 99396 PR PREVENTIVE VISIT,EST,40-64: ICD-10-PCS | Mod: S$PBB,,, | Performed by: FAMILY MEDICINE

## 2022-02-16 PROCEDURE — 4010F PR ACE/ARB THEARPY RXD/TAKEN: ICD-10-PCS | Mod: CPTII,,, | Performed by: FAMILY MEDICINE

## 2022-02-16 PROCEDURE — 1159F PR MEDICATION LIST DOCUMENTED IN MEDICAL RECORD: ICD-10-PCS | Mod: CPTII,,, | Performed by: FAMILY MEDICINE

## 2022-02-16 PROCEDURE — 99999 PR PBB SHADOW E&M-EST. PATIENT-LVL V: ICD-10-PCS | Mod: PBBFAC,,, | Performed by: FAMILY MEDICINE

## 2022-02-16 PROCEDURE — 3044F PR MOST RECENT HEMOGLOBIN A1C LEVEL <7.0%: ICD-10-PCS | Mod: CPTII,,, | Performed by: FAMILY MEDICINE

## 2022-02-16 PROCEDURE — 3008F BODY MASS INDEX DOCD: CPT | Mod: CPTII,,, | Performed by: FAMILY MEDICINE

## 2022-02-16 PROCEDURE — 3008F PR BODY MASS INDEX (BMI) DOCUMENTED: ICD-10-PCS | Mod: CPTII,,, | Performed by: FAMILY MEDICINE

## 2022-02-16 PROCEDURE — 3078F DIAST BP <80 MM HG: CPT | Mod: CPTII,,, | Performed by: FAMILY MEDICINE

## 2022-02-16 PROCEDURE — 1159F MED LIST DOCD IN RCRD: CPT | Mod: CPTII,,, | Performed by: FAMILY MEDICINE

## 2022-02-16 PROCEDURE — 4010F ACE/ARB THERAPY RXD/TAKEN: CPT | Mod: CPTII,,, | Performed by: FAMILY MEDICINE

## 2022-02-16 PROCEDURE — 3074F SYST BP LT 130 MM HG: CPT | Mod: CPTII,,, | Performed by: FAMILY MEDICINE

## 2022-02-16 PROCEDURE — 99396 PREV VISIT EST AGE 40-64: CPT | Mod: S$PBB,,, | Performed by: FAMILY MEDICINE

## 2022-02-16 PROCEDURE — 99999 PR PBB SHADOW E&M-EST. PATIENT-LVL V: CPT | Mod: PBBFAC,,, | Performed by: FAMILY MEDICINE

## 2022-02-16 PROCEDURE — 3044F HG A1C LEVEL LT 7.0%: CPT | Mod: CPTII,,, | Performed by: FAMILY MEDICINE

## 2022-02-16 PROCEDURE — 3074F PR MOST RECENT SYSTOLIC BLOOD PRESSURE < 130 MM HG: ICD-10-PCS | Mod: CPTII,,, | Performed by: FAMILY MEDICINE

## 2022-02-16 RX ORDER — CLOTRIMAZOLE 1 %
CREAM (GRAM) TOPICAL 2 TIMES DAILY
Qty: 30 G | Refills: 1 | Status: SHIPPED | OUTPATIENT
Start: 2022-02-16 | End: 2022-09-22 | Stop reason: SDUPTHER

## 2022-02-16 RX ORDER — NYSTATIN 100000 [USP'U]/G
POWDER TOPICAL 2 TIMES DAILY
Qty: 60 G | Refills: 2 | Status: SHIPPED | OUTPATIENT
Start: 2022-02-16 | End: 2023-02-24 | Stop reason: SDUPTHER

## 2022-02-16 NOTE — PROGRESS NOTES
"Subjective:       Patient ID: Bianca Amato is a 42 y.o. female.    Chief Complaint: Annual Exam    Patient presents to clinic today for annual physical exam.    Wt Readings from Last 10 Encounters:  02/16/22 : 85.3 kg (188 lb 0.8 oz)  01/19/22 : 90.1 kg (198 lb 10.2 oz)  12/13/21 : 88.5 kg (195 lb)  07/15/21 : 88.9 kg (195 lb 15.8 oz)  04/27/21 : 93.8 kg (206 lb 12.7 oz)  04/27/21 : 94.1 kg (207 lb 7.3 oz)  04/05/21 : 92.8 kg (204 lb 11.2 oz)  02/08/21 : 92.7 kg (204 lb 5.9 oz)  02/08/21 : 93.8 kg (206 lb 12.7 oz)  02/05/21 : 92.7 kg (204 lb 4.1 oz)        Review of Systems   Constitutional: Negative for chills, fatigue, fever and unexpected weight change.   HENT: Positive for ear pain ("sometimes"). Negative for congestion, dental problem, hearing loss, rhinorrhea and trouble swallowing.    Eyes: Negative for pain and visual disturbance.   Respiratory: Negative for cough and shortness of breath.    Cardiovascular: Negative for chest pain, palpitations and leg swelling.   Gastrointestinal: Positive for abdominal pain ("sometimes"). Negative for abdominal distention, blood in stool, constipation, diarrhea, nausea and vomiting.   Genitourinary: Negative for difficulty urinating and vaginal discharge.   Musculoskeletal: Positive for back pain. Negative for arthralgias and myalgias.   Skin: Rash: "under my breasts"   Neurological: Positive for headaches (occasionally). Negative for dizziness, weakness and numbness.   Hematological: Negative for adenopathy. Does not bruise/bleed easily.   Psychiatric/Behavioral: Negative for dysphoric mood and sleep disturbance. The patient is not nervous/anxious.          Objective:      Physical Exam  Vitals reviewed.   Constitutional:       General: She is not in acute distress.     Appearance: Normal appearance. She is well-developed.   HENT:      Head: Normocephalic and atraumatic.      Right Ear: Tympanic membrane, ear canal and external ear normal.      Left Ear: Tympanic " membrane, ear canal and external ear normal.      Nose: Nose normal. No mucosal edema or rhinorrhea.      Mouth/Throat:      Pharynx: Uvula midline.   Eyes:      General: Lids are normal. No scleral icterus.     Extraocular Movements: Extraocular movements intact.      Conjunctiva/sclera: Conjunctivae normal.      Pupils: Pupils are equal, round, and reactive to light.   Neck:      Thyroid: No thyromegaly.   Cardiovascular:      Rate and Rhythm: Normal rate and regular rhythm.      Heart sounds: No murmur heard.    No friction rub. No gallop.   Pulmonary:      Effort: Pulmonary effort is normal.      Breath sounds: Normal breath sounds. No wheezing, rhonchi or rales.   Abdominal:      General: Bowel sounds are normal. There is no distension.      Palpations: Abdomen is soft. There is no mass.      Tenderness: There is no abdominal tenderness.   Musculoskeletal:         General: Normal range of motion.      Cervical back: Normal range of motion and neck supple.   Lymphadenopathy:      Cervical: No cervical adenopathy.   Skin:     General: Skin is warm and dry.      Findings: Rash present. No lesion.      Nails: There is no clubbing.      Comments: Rash consistent with intertrigo   Neurological:      Mental Status: She is alert and oriented to person, place, and time.      Cranial Nerves: No cranial nerve deficit.      Sensory: No sensory deficit.      Gait: Gait normal.   Psychiatric:         Mood and Affect: Mood and affect normal.         Assessment:       1. Routine general medical examination at a health care facility    2. Bilateral impacted cerumen    3. Essential hypertension    4. Prediabetes    5. Intertrigo        Plan:     Problem List Items Addressed This Visit     Bilateral impacted cerumen    Relevant Orders    Ambulatory referral/consult to ENT    Essential hypertension    Current Assessment & Plan     Controlled, continue losartan, HCTZ and amlodipine           Relevant Orders    Ambulatory  referral/consult to Optometry    Intertrigo    Relevant Medications    clotrimazole (LOTRIMIN) 1 % cream    nystatin (MYCOSTATIN) powder    Prediabetes    Relevant Orders    Ambulatory referral/consult to Nutrition Services      Other Visit Diagnoses     Routine general medical examination at a health care facility    -  Primary          Health Maintenance reviewed/updated.

## 2022-02-17 ENCOUNTER — PATIENT MESSAGE (OUTPATIENT)
Dept: INTERNAL MEDICINE | Facility: CLINIC | Age: 42
End: 2022-02-17
Payer: MEDICAID

## 2022-02-17 DIAGNOSIS — R39.9 URINARY SYMPTOM OR SIGN: Primary | ICD-10-CM

## 2022-02-18 ENCOUNTER — PATIENT MESSAGE (OUTPATIENT)
Dept: INTERNAL MEDICINE | Facility: CLINIC | Age: 42
End: 2022-02-18
Payer: MEDICAID

## 2022-02-21 ENCOUNTER — LAB VISIT (OUTPATIENT)
Dept: LAB | Facility: HOSPITAL | Age: 42
End: 2022-02-21
Attending: FAMILY MEDICINE
Payer: MEDICAID

## 2022-02-21 DIAGNOSIS — R39.9 URINARY SYMPTOM OR SIGN: ICD-10-CM

## 2022-02-21 LAB
BILIRUB UR QL STRIP: NEGATIVE
CLARITY UR: CLEAR
COLOR UR: NORMAL
GLUCOSE UR QL STRIP: NEGATIVE
HGB UR QL STRIP: NEGATIVE
KETONES UR QL STRIP: NEGATIVE
LEUKOCYTE ESTERASE UR QL STRIP: NEGATIVE
NITRITE UR QL STRIP: NEGATIVE
PH UR STRIP: 7 [PH] (ref 5–8)
PROT UR QL STRIP: NEGATIVE
SP GR UR STRIP: 1.01 (ref 1–1.03)
URN SPEC COLLECT METH UR: NORMAL
UROBILINOGEN UR STRIP-ACNC: NEGATIVE EU/DL

## 2022-02-21 PROCEDURE — 81003 URINALYSIS AUTO W/O SCOPE: CPT | Performed by: PHYSICIAN ASSISTANT

## 2022-02-28 ENCOUNTER — PATIENT MESSAGE (OUTPATIENT)
Dept: INTERNAL MEDICINE | Facility: CLINIC | Age: 42
End: 2022-02-28
Payer: MEDICAID

## 2022-02-28 DIAGNOSIS — N62 MACROMASTIA: Primary | ICD-10-CM

## 2022-03-02 NOTE — TELEPHONE ENCOUNTER
Called pt to get day and time for medicaid pt to assist with scheduling for plastic surgery.  No answer, No VM, message sent through portal.

## 2022-03-03 ENCOUNTER — PATIENT MESSAGE (OUTPATIENT)
Dept: INTERNAL MEDICINE | Facility: CLINIC | Age: 42
End: 2022-03-03
Payer: MEDICAID

## 2022-03-04 PROBLEM — H61.23 BILATERAL IMPACTED CERUMEN: Status: ACTIVE | Noted: 2022-03-04

## 2022-03-04 PROBLEM — R73.03 PREDIABETES: Status: ACTIVE | Noted: 2022-03-04

## 2022-03-04 PROBLEM — L30.4 INTERTRIGO: Status: ACTIVE | Noted: 2022-03-04

## 2022-03-07 ENCOUNTER — PATIENT MESSAGE (OUTPATIENT)
Dept: ADMINISTRATIVE | Facility: OTHER | Age: 42
End: 2022-03-07
Payer: MEDICAID

## 2022-03-08 ENCOUNTER — OFFICE VISIT (OUTPATIENT)
Dept: OTOLARYNGOLOGY | Facility: CLINIC | Age: 42
End: 2022-03-08
Payer: MEDICAID

## 2022-03-08 VITALS — TEMPERATURE: 98 F | WEIGHT: 192.44 LBS | BODY MASS INDEX: 35.2 KG/M2

## 2022-03-08 DIAGNOSIS — I10 ESSENTIAL HYPERTENSION: ICD-10-CM

## 2022-03-08 DIAGNOSIS — H61.23 BILATERAL IMPACTED CERUMEN: ICD-10-CM

## 2022-03-08 PROCEDURE — 99213 PR OFFICE/OUTPT VISIT, EST, LEVL III, 20-29 MIN: ICD-10-PCS | Mod: 25,S$PBB,, | Performed by: PHYSICIAN ASSISTANT

## 2022-03-08 PROCEDURE — 99999 PR PBB SHADOW E&M-EST. PATIENT-LVL III: CPT | Mod: PBBFAC,,, | Performed by: PHYSICIAN ASSISTANT

## 2022-03-08 PROCEDURE — 99213 OFFICE O/P EST LOW 20 MIN: CPT | Mod: 25,S$PBB,, | Performed by: PHYSICIAN ASSISTANT

## 2022-03-08 PROCEDURE — 99213 OFFICE O/P EST LOW 20 MIN: CPT | Mod: PBBFAC | Performed by: PHYSICIAN ASSISTANT

## 2022-03-08 PROCEDURE — 4010F ACE/ARB THERAPY RXD/TAKEN: CPT | Mod: CPTII,,, | Performed by: PHYSICIAN ASSISTANT

## 2022-03-08 PROCEDURE — 92504 EAR MICROSCOPY EXAMINATION: CPT | Mod: PBBFAC | Performed by: PHYSICIAN ASSISTANT

## 2022-03-08 PROCEDURE — 3044F PR MOST RECENT HEMOGLOBIN A1C LEVEL <7.0%: ICD-10-PCS | Mod: CPTII,,, | Performed by: PHYSICIAN ASSISTANT

## 2022-03-08 PROCEDURE — 99999 PR PBB SHADOW E&M-EST. PATIENT-LVL III: ICD-10-PCS | Mod: PBBFAC,,, | Performed by: PHYSICIAN ASSISTANT

## 2022-03-08 PROCEDURE — 3008F BODY MASS INDEX DOCD: CPT | Mod: CPTII,,, | Performed by: PHYSICIAN ASSISTANT

## 2022-03-08 PROCEDURE — 3008F PR BODY MASS INDEX (BMI) DOCUMENTED: ICD-10-PCS | Mod: CPTII,,, | Performed by: PHYSICIAN ASSISTANT

## 2022-03-08 PROCEDURE — 1159F PR MEDICATION LIST DOCUMENTED IN MEDICAL RECORD: ICD-10-PCS | Mod: CPTII,,, | Performed by: PHYSICIAN ASSISTANT

## 2022-03-08 PROCEDURE — 92504 EAR MICROSCOPY EXAMINATION: CPT | Mod: S$PBB,,, | Performed by: PHYSICIAN ASSISTANT

## 2022-03-08 PROCEDURE — 4010F PR ACE/ARB THEARPY RXD/TAKEN: ICD-10-PCS | Mod: CPTII,,, | Performed by: PHYSICIAN ASSISTANT

## 2022-03-08 PROCEDURE — 3044F HG A1C LEVEL LT 7.0%: CPT | Mod: CPTII,,, | Performed by: PHYSICIAN ASSISTANT

## 2022-03-08 PROCEDURE — 92504 PR EAR MICROSCOPY EXAMINATION: ICD-10-PCS | Mod: S$PBB,,, | Performed by: PHYSICIAN ASSISTANT

## 2022-03-08 PROCEDURE — 1159F MED LIST DOCD IN RCRD: CPT | Mod: CPTII,,, | Performed by: PHYSICIAN ASSISTANT

## 2022-03-08 NOTE — PROGRESS NOTES
REFERRING PROVIDER  Vivi Chand Md  [id number] [place] [address]  Subjective:   Patient: Bianca Amato [id number], :1980   Visit date:3/8/2022 1:35 PM    Chief Complaint:  Cerumen Impaction        HPI:     Bianca Amato is a 42 y.o. female whom I am asked to see for evaluation of otalgia or hearing loss in both ears for the past 1-4 weeks.   Bianca rates the severity as moderate.  No exacerbating or relieving factors.  There is no drainage from the ears.      Her meds, allergies, medical, surgical, social & family histories were reviewed & updated:  -     She has a current medication list which includes the following prescription(s): albuterol, albuterol, albuterol, amlodipine, azelastine, azithromycin, clotrimazole, hydrochlorothiazide, levocetirizine, losartan, nystatin, and ketoconazole.  -     She  has a past medical history of Carpal tunnel syndrome, Hypertension, Iron deficiency anemia, and Mild intermittent asthma.   -     She does not have any pertinent problems on file.   -     She  has a past surgical history that includes Tubal ligation; Surgical removal of mass of axilla (Right, 2018); Eye foreign body removal (Right); Robot-assisted laparoscopic abdominal hysterectomy using da Kaushik Xi (N/A, 2019); Robot-assisted surgical removal of fallopian tube using da Kaushik Xi (Bilateral, 2019); Hysterectomy (); and Eye surgery ().  -     She  reports that she has never smoked. She has never used smokeless tobacco. She reports that she does not drink alcohol and does not use drugs.  -     Her family history includes Asthma in her father; COPD in her father; Diabetes in her mother; Hypertension in her father; Sarcoidosis in her mother.  -     She is allergic to latex; latex, natural rubber; penicillins; and sulfa (sulfonamide antibiotics).      Review of Systems:  -     Allergic/Immunologic: is allergic to latex; latex, natural rubber; penicillins; and  sulfa (sulfonamide antibiotics)..  -     Constitutional: Current temp: 97.9 °F (36.6 °C) (Temporal)        Objective:     Physical Exam:  Vitals:  Temp 97.9 °F (36.6 °C) (Temporal)   Wt 87.3 kg (192 lb 7.4 oz)   LMP 11/07/2019 (Approximate)   BMI 35.20 kg/m²   Communication:  Able to communicate, no hoarseness.  Head & Face:  Normocephalic, atraumatic, no sinus tenderness.  Eyes:  Extraocular motions intact.  Ears:  Otoscopy of external auditory canals reveals impaction of bilateral ear canals.  With the patient in the supine position, we used the operating microscope to examine both ears with the appropriate sized ear speculum.  A variety of sterile, micro-instruments were utilized to remove the cerumen atraumatically from the impacted ear(s).   After removal, the ears were reexamined-  Right Ear:  No mass/lesion of auricle. The external auditory canals is without erythema or discharge. Pneumatic otoscopy of the tympanic membrane revealed no perforation and good mobility, with no fluid in middle ear. Clinical speech reception thresholds grossly normal.  Left Ear:  No mass/lesion of auricle. The external auditory canals is without erythema or discharge. Pneumatic otoscopy of the tympanic membrane revealed no perforation and good mobility, with no fluid in middle ear. Clinical speech reception thresholds grossly normal  Nose:  No masses/lesions of external nose, nasal mucosa, septum, and turbinates were within normal limits.  Mouth:  No mass/lesion of lips, teeth, gums, hard/soft palate, tongue, tonsils, or oropharynx.  Neck & Lymphatics:  No cervical lymphadenopathy, no neck mass/crepitus/ asymmetry, trachea is midline, no thyroid enlargement/tenderness/mass.  Neuro/Psych: Alert with normal mood and affect.   Respiration/Chest:  Symmetric expansion during respiration, normal respiratory effort.  Skin:  Warm and intact.    Assessment & Plan:       Cerumen Impaction - Bianca has cerumen impaction.  We discussed  preventative measures and treatment options.  Q-tips must be avoided, instead the ears can be cleaned with OTC ear rinses (or a mixture of alcohol & vinegar in equal parts).   For hard wax, Bianca may place mineral oil/baby oil in the ear with a cotton ball at night and remove in the shower.  This will assist in softening the wax and allow it to drain out on its own. If the cerumen impacts the ear canal and causes hearing loss or infection she needs to follow-up in the clinic for treatment and cleaning.            Rosamaria Byrnes PA-C  [facility]   [facility]  [address]  [place] [id number]  P: [phone]  F: [phone]

## 2022-03-09 RX ORDER — AMLODIPINE BESYLATE 10 MG/1
10 TABLET ORAL DAILY
Qty: 90 TABLET | Refills: 3 | Status: SHIPPED | OUTPATIENT
Start: 2022-03-09 | End: 2022-03-10 | Stop reason: SDUPTHER

## 2022-03-10 DIAGNOSIS — I10 ESSENTIAL HYPERTENSION: ICD-10-CM

## 2022-03-10 NOTE — TELEPHONE ENCOUNTER
LOV 02/16/2022    Pt states that the part of her nebulizer that she puts the solution is is cracked.  She needs another.  I put one in the med management but it is not letting me pend that order.  Please advise

## 2022-03-11 RX ORDER — AMLODIPINE BESYLATE 10 MG/1
10 TABLET ORAL DAILY
Qty: 90 TABLET | Refills: 3 | Status: SHIPPED | OUTPATIENT
Start: 2022-03-11 | End: 2023-03-15 | Stop reason: SDUPTHER

## 2022-03-11 RX ORDER — LOSARTAN POTASSIUM 50 MG/1
50 TABLET ORAL DAILY
Qty: 90 TABLET | Refills: 3 | Status: SHIPPED | OUTPATIENT
Start: 2022-03-11 | End: 2023-03-15 | Stop reason: SDUPTHER

## 2022-03-17 ENCOUNTER — HOSPITAL ENCOUNTER (OUTPATIENT)
Dept: RADIOLOGY | Facility: HOSPITAL | Age: 42
Discharge: HOME | End: 2022-03-17
Attending: FAMILY MEDICINE
Payer: MEDICAID

## 2022-03-17 ENCOUNTER — OFFICE VISIT (OUTPATIENT)
Dept: INTERNAL MEDICINE | Facility: CLINIC | Age: 42
End: 2022-03-17
Payer: MEDICAID

## 2022-03-17 VITALS
WEIGHT: 189.13 LBS | OXYGEN SATURATION: 98 % | SYSTOLIC BLOOD PRESSURE: 134 MMHG | HEIGHT: 62 IN | TEMPERATURE: 98 F | BODY MASS INDEX: 34.8 KG/M2 | DIASTOLIC BLOOD PRESSURE: 88 MMHG | HEART RATE: 86 BPM

## 2022-03-17 DIAGNOSIS — J45.20 MILD INTERMITTENT ASTHMA WITHOUT COMPLICATION: Primary | ICD-10-CM

## 2022-03-17 DIAGNOSIS — Z12.31 ENCOUNTER FOR SCREENING MAMMOGRAM FOR BREAST CANCER: ICD-10-CM

## 2022-03-17 PROCEDURE — 99999 PR PBB SHADOW E&M-EST. PATIENT-LVL III: CPT | Mod: PBBFAC,,, | Performed by: FAMILY MEDICINE

## 2022-03-17 PROCEDURE — 3008F BODY MASS INDEX DOCD: CPT | Mod: CPTII,,, | Performed by: FAMILY MEDICINE

## 2022-03-17 PROCEDURE — 77067 MAMMO DIGITAL SCREENING BILAT WITH TOMO: ICD-10-PCS | Mod: 26,,, | Performed by: RADIOLOGY

## 2022-03-17 PROCEDURE — 3044F PR MOST RECENT HEMOGLOBIN A1C LEVEL <7.0%: ICD-10-PCS | Mod: CPTII,,, | Performed by: FAMILY MEDICINE

## 2022-03-17 PROCEDURE — 99999 PR PBB SHADOW E&M-EST. PATIENT-LVL III: ICD-10-PCS | Mod: PBBFAC,,, | Performed by: FAMILY MEDICINE

## 2022-03-17 PROCEDURE — 99213 OFFICE O/P EST LOW 20 MIN: CPT | Mod: S$PBB,,, | Performed by: FAMILY MEDICINE

## 2022-03-17 PROCEDURE — 3075F SYST BP GE 130 - 139MM HG: CPT | Mod: CPTII,,, | Performed by: FAMILY MEDICINE

## 2022-03-17 PROCEDURE — 77063 BREAST TOMOSYNTHESIS BI: CPT | Mod: TC

## 2022-03-17 PROCEDURE — 3079F PR MOST RECENT DIASTOLIC BLOOD PRESSURE 80-89 MM HG: ICD-10-PCS | Mod: CPTII,,, | Performed by: FAMILY MEDICINE

## 2022-03-17 PROCEDURE — 4010F ACE/ARB THERAPY RXD/TAKEN: CPT | Mod: CPTII,,, | Performed by: FAMILY MEDICINE

## 2022-03-17 PROCEDURE — 3008F PR BODY MASS INDEX (BMI) DOCUMENTED: ICD-10-PCS | Mod: CPTII,,, | Performed by: FAMILY MEDICINE

## 2022-03-17 PROCEDURE — 3079F DIAST BP 80-89 MM HG: CPT | Mod: CPTII,,, | Performed by: FAMILY MEDICINE

## 2022-03-17 PROCEDURE — 3075F PR MOST RECENT SYSTOLIC BLOOD PRESS GE 130-139MM HG: ICD-10-PCS | Mod: CPTII,,, | Performed by: FAMILY MEDICINE

## 2022-03-17 PROCEDURE — 3044F HG A1C LEVEL LT 7.0%: CPT | Mod: CPTII,,, | Performed by: FAMILY MEDICINE

## 2022-03-17 PROCEDURE — 1159F PR MEDICATION LIST DOCUMENTED IN MEDICAL RECORD: ICD-10-PCS | Mod: CPTII,,, | Performed by: FAMILY MEDICINE

## 2022-03-17 PROCEDURE — 99213 OFFICE O/P EST LOW 20 MIN: CPT | Mod: PBBFAC | Performed by: FAMILY MEDICINE

## 2022-03-17 PROCEDURE — 77063 MAMMO DIGITAL SCREENING BILAT WITH TOMO: ICD-10-PCS | Mod: 26,,, | Performed by: RADIOLOGY

## 2022-03-17 PROCEDURE — 1159F MED LIST DOCD IN RCRD: CPT | Mod: CPTII,,, | Performed by: FAMILY MEDICINE

## 2022-03-17 PROCEDURE — 99213 PR OFFICE/OUTPT VISIT, EST, LEVL III, 20-29 MIN: ICD-10-PCS | Mod: S$PBB,,, | Performed by: FAMILY MEDICINE

## 2022-03-17 PROCEDURE — 77067 SCR MAMMO BI INCL CAD: CPT | Mod: 26,,, | Performed by: RADIOLOGY

## 2022-03-17 PROCEDURE — 77063 BREAST TOMOSYNTHESIS BI: CPT | Mod: 26,,, | Performed by: RADIOLOGY

## 2022-03-17 PROCEDURE — 4010F PR ACE/ARB THEARPY RXD/TAKEN: ICD-10-PCS | Mod: CPTII,,, | Performed by: FAMILY MEDICINE

## 2022-03-17 RX ORDER — NEBULIZER AND COMPRESSOR
1 EACH MISCELLANEOUS EVERY 4 HOURS PRN
Qty: 1 EACH | Refills: 0 | Status: SHIPPED | OUTPATIENT
Start: 2022-03-17 | End: 2023-04-03 | Stop reason: SDUPTHER

## 2022-03-17 NOTE — PROGRESS NOTES
Subjective:       Patient ID: Bianca Amato is a 42 y.o. female.    Chief Complaint: Follow-up    Patient presents to clinic today for followup of weight loss. She also requests nebulizer supplies to pharmacy. She also needs help scheduling Plastic Surgery appointment, referral previously placed. Patient is otherwise without concerns today.    Wt Readings from Last 10 Encounters:  03/17/22 : 85.8 kg (189 lb 2.5 oz)  03/08/22 : 87.3 kg (192 lb 7.4 oz)  02/16/22 : 85.3 kg (188 lb 0.8 oz)  01/19/22 : 90.1 kg (198 lb 10.2 oz)  12/13/21 : 88.5 kg (195 lb)  07/15/21 : 88.9 kg (195 lb 15.8 oz)  04/27/21 : 93.8 kg (206 lb 12.7 oz)  04/27/21 : 94.1 kg (207 lb 7.3 oz)  04/05/21 : 92.8 kg (204 lb 11.2 oz)  02/08/21 : 92.7 kg (204 lb 5.9 oz)        Review of Systems   Constitutional: Negative for chills, fatigue, fever and unexpected weight change.   Eyes: Negative for visual disturbance.   Respiratory: Negative for shortness of breath.    Cardiovascular: Negative for chest pain.   Musculoskeletal: Negative for myalgias.   Neurological: Negative for headaches.         Objective:      Physical Exam  Vitals reviewed.   Constitutional:       General: She is not in acute distress.     Appearance: She is well-developed.   HENT:      Head: Normocephalic and atraumatic.   Eyes:      General: Lids are normal. No scleral icterus.     Extraocular Movements: Extraocular movements intact.      Conjunctiva/sclera: Conjunctivae normal.      Pupils: Pupils are equal, round, and reactive to light.   Pulmonary:      Effort: Pulmonary effort is normal.   Neurological:      Mental Status: She is alert and oriented to person, place, and time.      Cranial Nerves: No cranial nerve deficit.      Gait: Gait normal.   Psychiatric:         Mood and Affect: Mood and affect normal.         Assessment:       1. Mild intermittent asthma without complication        Plan:     Problem List Items Addressed This Visit     Mild intermittent asthma without  complication - Primary    Relevant Medications    nebulizer accessories Kit    Other Relevant Orders    NEBULIZER KIT (SUPPLIES) FOR HOME USE        Weight is stable, advised to monitor at home.  Nursing staff will assist with Plastic Surgery appointment.

## 2022-03-28 NOTE — PROGRESS NOTES
"Nutrition Assessment  Session Time:  45 minutes      Client name:  Bianca Amato  :  1980  Age:  42 y.o.  Gender:  female    Client states:  Referred by Vivi Chand MD with a diagnosis of:   -prediabetes    Seeking guidance on diet to follow for prediabetes.    Has been working towards weight loss for the past year.  Reduced intake of salt in diet.  Cut back on fast food in diet. Very limited intake now compared to frequent consumption previously. If having fast food, makes an attempt to choose the healthier options that are grilled or baked.  Consuming more home cooked meals.   Reports having diabetic family members and patient herself does not want to be in that same situation.     Goal: to be healthy, no set weight    Anthropometrics  Height:  62"     Weight:  188.54 lbs   3-: 189 lbs   2021: 207 lbs  BMI:  34.5  % Body Fat:  n/a    Clinical Signs/Symptoms  N/V/D:  none  Appetite:  good       Past Medical History:   Diagnosis Date    Carpal tunnel syndrome     bilateral    Hypertension     Iron deficiency anemia     Mild intermittent asthma        Past Surgical History:   Procedure Laterality Date    EYE FOREIGN BODY REMOVAL Right     EYE SURGERY      Im not sure what year    HYSTERECTOMY  2020    OOPHORECTOMY      ROBOT-ASSISTED LAPAROSCOPIC ABDOMINAL HYSTERECTOMY USING DA ALYSA XI N/A 2019    Procedure: XI ROBOTIC HYSTERECTOMY;  Surgeon: VENESSA Davidson MD;  Location: [facility] OR;  Service: OB/GYN;  Laterality: N/A;    ROBOT-ASSISTED SURGICAL REMOVAL OF FALLOPIAN TUBE USING DA ALYSA XI Bilateral 2019    Procedure: XI ROBOTIC SALPINGECTOMY;  Surgeon: VENESSA Davidson MD;  Location: [facility] OR;  Service: OB/GYN;  Laterality: Bilateral;    SURGICAL REMOVAL OF MASS OF AXILLA Right 2018    Procedure: EXCISION, MASS, AXILLARY;  Surgeon: Alis Tian MD;  Location: [facility] OR;  Service: General;  Laterality: Right;    TUBAL LIGATION   "       Medications    has a current medication list which includes the following prescription(s): albuterol, albuterol, albuterol, amlodipine, clotrimazole, hydrochlorothiazide, ketoconazole, levocetirizine, losartan, nebulizer accessories, and nystatin.    Vitamins, Minerals, and/or Supplements:  Not discussed     Food/Medication Interactions:  Reviewed     Food Allergies or Intolerances:  NKFA     Social History    Marital status:  Single  Employment:  yes    Social History     Tobacco Use    Smoking status: Never Smoker    Smokeless tobacco: Never Used   Substance Use Topics    Alcohol use: No        Lab Reports   Sodium   Date Value Ref Range Status   02/08/2022 135 (L) 136 - 145 mmol/L Final     Potassium   Date Value Ref Range Status   02/08/2022 3.6 3.5 - 5.1 mmol/L Final     Chloride   Date Value Ref Range Status   02/08/2022 102 95 - 110 mmol/L Final     CO2   Date Value Ref Range Status   02/08/2022 23 23 - 29 mmol/L Final     Glucose   Date Value Ref Range Status   02/08/2022 86 70 - 110 mg/dL Final     BUN   Date Value Ref Range Status   02/08/2022 13 6 - 20 mg/dL Final     Creatinine   Date Value Ref Range Status   02/08/2022 0.7 0.5 - 1.4 mg/dL Final     Calcium   Date Value Ref Range Status   02/08/2022 10.3 8.7 - 10.5 mg/dL Final     Total Protein   Date Value Ref Range Status   02/08/2022 7.7 6.0 - 8.4 g/dL Final     Albumin   Date Value Ref Range Status   02/08/2022 4.1 3.5 - 5.2 g/dL Final     Total Bilirubin   Date Value Ref Range Status   02/08/2022 0.5 0.1 - 1.0 mg/dL Final     Comment:     For infants and newborns, interpretation of results should be based  on gestational age, weight and in agreement with clinical  observations.    Premature Infant recommended reference ranges:  Up to 24 hours.............<8.0 mg/dL  Up to 48 hours............<12.0 mg/dL  3-5 days..................<15.0 mg/dL  6-29 days.................<15.0 mg/dL       Alkaline Phosphatase   Date Value Ref Range Status    02/08/2022 75 55 - 135 U/L Final     AST   Date Value Ref Range Status   02/08/2022 13 10 - 40 U/L Final     ALT   Date Value Ref Range Status   02/08/2022 12 10 - 44 U/L Final     Anion Gap   Date Value Ref Range Status   02/08/2022 10 8 - 16 mmol/L Final     eGFR if    Date Value Ref Range Status   02/08/2022 >60.0 >60 mL/min/1.73 m^2 Final     eGFR if non    Date Value Ref Range Status   02/08/2022 >60.0 >60 mL/min/1.73 m^2 Final     Comment:     Calculation used to obtain the estimated glomerular filtration  rate (eGFR) is the CKD-EPI equation.         Lab Results   Component Value Date    WBC 7.43 02/08/2022    HGB 13.4 02/08/2022    HCT 43.0 02/08/2022    MCV 91 02/08/2022     (H) 02/08/2022        Lab Results   Component Value Date    CHOL 199 02/08/2022     Lab Results   Component Value Date    HDL 42 02/08/2022     Lab Results   Component Value Date    LDLCALC 128.2 02/08/2022     Lab Results   Component Value Date    TRIG 144 02/08/2022     Lab Results   Component Value Date    CHOLHDL 21.1 02/08/2022     Lab Results   Component Value Date    HGBA1C 5.7 (H) 02/08/2022     BP Readings from Last 1 Encounters:   03/17/22 134/88       Food History  Breakfast:  Egg mcmuffin// honey nut cheerios + water// egg muffin with turkey mohan   Lunch:  Lemon water  Dinner:  Grilled chicken// salad// baked salmon + green beans// brown rice  H.S. Snack:  Not daily// gold fish// sunflower seeds// pecans  *Fluid intake:  Lemon water, gold peak zero sugar tea, coffee (equal sugar substitute + fairlife light milk)    Alcohol: none    Smoke: none     Exercise History:  None.  No time due to work, 10am-7pm 5 days weekly, [employer]     Cultural/Spiritual/Personal Preferences:  None identified    Support System:  family/friends    State of Change:  Action    Barriers to Change:  none    Diagnosis    Food and nutrition related knowledge deficit related to no prior nutrition education as  evidenced by seeking guidance on dietary recommendations for prediabetes.    Intervention    RMR (Method:  [place]. Jeor):  1460 kcal  Activity Factor:  1.2    PAT:  1752 - 500 = 1252 kcal    Goals:  1.  Plate method at meals  2.  Whenever consuming a carbohydrate source, always pair a healthy fat/protein with it  3.  Consume lunch daily following the plate method, do not skip meals    Nutrition Education  The following education was provided to the patient:  Discussed meal planning/[employer] My Plate design.  Discussed healthy snacking.   Discussed label reading.  Discussed weight management.  Suggested dietary modifications based on current dietary behaviors and individual food preferences.  Discussed sugary foods and/or beverages (potential health consequences of excessive sugar intake, ways to reduce sugar intake, healthy beverage alternatives, etc.).    Patient verbalized understanding of nutrition education and recommendations received.    Handouts Provided  Fueling Well On-The-Go  Balanced Diabetes Plate  Healthy Snack List    Monitoring/Evaluation    Monitor the following:  Weight  BMI  Caloric intake  Labs:  Glucose, HgbA1c    Follow Up Plan:  As needed

## 2022-03-29 ENCOUNTER — NUTRITION (OUTPATIENT)
Dept: INTERNAL MEDICINE | Facility: CLINIC | Age: 42
End: 2022-03-29
Payer: MEDICAID

## 2022-03-29 DIAGNOSIS — I10 ESSENTIAL HYPERTENSION: ICD-10-CM

## 2022-03-29 DIAGNOSIS — E66.01 CLASS 2 SEVERE OBESITY DUE TO EXCESS CALORIES WITH SERIOUS COMORBIDITY AND BODY MASS INDEX (BMI) OF 36.0 TO 36.9 IN ADULT: ICD-10-CM

## 2022-03-29 DIAGNOSIS — R73.03 PREDIABETES: ICD-10-CM

## 2022-03-29 PROCEDURE — 97802 PR MED NUTR THER, 1ST, INDIV, EA 15 MIN: ICD-10-PCS | Mod: S$GLB,,, | Performed by: DIETITIAN, REGISTERED

## 2022-03-29 PROCEDURE — 97802 MEDICAL NUTRITION INDIV IN: CPT | Mod: S$GLB,,, | Performed by: DIETITIAN, REGISTERED

## 2022-04-11 ENCOUNTER — PATIENT MESSAGE (OUTPATIENT)
Dept: PHARMACY | Facility: CLINIC | Age: 42
End: 2022-04-11
Payer: MEDICAID

## 2022-05-12 ENCOUNTER — TELEPHONE (OUTPATIENT)
Dept: INTERNAL MEDICINE | Facility: CLINIC | Age: 42
End: 2022-05-12
Payer: MEDICAID

## 2022-05-12 ENCOUNTER — PATIENT MESSAGE (OUTPATIENT)
Dept: INTERNAL MEDICINE | Facility: CLINIC | Age: 42
End: 2022-05-12
Payer: MEDICAID

## 2022-05-12 NOTE — TELEPHONE ENCOUNTER
Pt was requesting same day appiontment.  Offered virtual.  Pt declined.  PT then inquired about referral for plastic surgery.  Advised it is still pending approval.  Pt inquired if provider could provide any further information.  Please advise.

## 2022-05-12 NOTE — TELEPHONE ENCOUNTER
Due to insurance, there is not much I can do to rush her referral to plastic surgery. I am unaware if it is even approved. My recommendation would be to contact her insurance to determine if there is another plastic surgeon in the area that will take her insurance.

## 2022-05-12 NOTE — TELEPHONE ENCOUNTER
----- Message from Airam Pratt sent at 5/12/2022  1:14 PM CDT -----  Pt had an appt scheduled for 5/13/2022 but it was cancelled and she would like to reschedule for tomorrow. There isn't an available appt when I tried to schedule her. She would like the nurse to ana rosa her back please. Call back number .[phone]. Thx. El

## 2022-05-12 NOTE — TELEPHONE ENCOUNTER
----- Message from Jacobo Hale sent at 5/12/2022  8:55 AM CDT -----  Contact: [phone]  Type:  Same Day Appointment Request    Caller is requesting a same day appointment.  Caller declined first available appointment listed below.    Name of Caller:Bianca   When is the first available appointment?08/05/2022  Symptoms:knee pain   Best Call Back Number:[phone]   Additional Information:

## 2022-05-16 ENCOUNTER — HOSPITAL ENCOUNTER (OUTPATIENT)
Dept: RADIOLOGY | Facility: HOSPITAL | Age: 42
Discharge: HOME | End: 2022-05-16
Payer: MEDICAID

## 2022-05-16 ENCOUNTER — OFFICE VISIT (OUTPATIENT)
Dept: INTERNAL MEDICINE | Facility: CLINIC | Age: 42
End: 2022-05-16
Payer: MEDICAID

## 2022-05-16 VITALS
HEIGHT: 62 IN | OXYGEN SATURATION: 99 % | WEIGHT: 182 LBS | TEMPERATURE: 97 F | DIASTOLIC BLOOD PRESSURE: 78 MMHG | RESPIRATION RATE: 18 BRPM | HEART RATE: 85 BPM | SYSTOLIC BLOOD PRESSURE: 118 MMHG | BODY MASS INDEX: 33.49 KG/M2

## 2022-05-16 DIAGNOSIS — E66.9 OBESITY (BMI 30-39.9): ICD-10-CM

## 2022-05-16 DIAGNOSIS — M25.562 ACUTE PAIN OF LEFT KNEE: ICD-10-CM

## 2022-05-16 DIAGNOSIS — M79.672 LEFT FOOT PAIN: Primary | ICD-10-CM

## 2022-05-16 PROCEDURE — 4010F ACE/ARB THERAPY RXD/TAKEN: CPT | Mod: CPTII,,,

## 2022-05-16 PROCEDURE — 3074F PR MOST RECENT SYSTOLIC BLOOD PRESSURE < 130 MM HG: ICD-10-PCS | Mod: CPTII,,,

## 2022-05-16 PROCEDURE — 1160F RVW MEDS BY RX/DR IN RCRD: CPT | Mod: CPTII,,,

## 2022-05-16 PROCEDURE — 3074F SYST BP LT 130 MM HG: CPT | Mod: CPTII,,,

## 2022-05-16 PROCEDURE — 99999 PR PBB SHADOW E&M-EST. PATIENT-LVL V: CPT | Mod: PBBFAC,,,

## 2022-05-16 PROCEDURE — 3008F PR BODY MASS INDEX (BMI) DOCUMENTED: ICD-10-PCS | Mod: CPTII,,,

## 2022-05-16 PROCEDURE — 3008F BODY MASS INDEX DOCD: CPT | Mod: CPTII,,,

## 2022-05-16 PROCEDURE — 3044F HG A1C LEVEL LT 7.0%: CPT | Mod: CPTII,,,

## 2022-05-16 PROCEDURE — 73560 X-RAY EXAM OF KNEE 1 OR 2: CPT | Mod: 59,TC,RT

## 2022-05-16 PROCEDURE — 3078F PR MOST RECENT DIASTOLIC BLOOD PRESSURE < 80 MM HG: ICD-10-PCS | Mod: CPTII,,,

## 2022-05-16 PROCEDURE — 99214 OFFICE O/P EST MOD 30 MIN: CPT | Mod: S$PBB,,,

## 2022-05-16 PROCEDURE — 3044F PR MOST RECENT HEMOGLOBIN A1C LEVEL <7.0%: ICD-10-PCS | Mod: CPTII,,,

## 2022-05-16 PROCEDURE — 99215 OFFICE O/P EST HI 40 MIN: CPT | Mod: PBBFAC

## 2022-05-16 PROCEDURE — 73560 X-RAY EXAM OF KNEE 1 OR 2: CPT | Mod: 26,RT,, | Performed by: RADIOLOGY

## 2022-05-16 PROCEDURE — 3078F DIAST BP <80 MM HG: CPT | Mod: CPTII,,,

## 2022-05-16 PROCEDURE — 99214 PR OFFICE/OUTPT VISIT, EST, LEVL IV, 30-39 MIN: ICD-10-PCS | Mod: S$PBB,,,

## 2022-05-16 PROCEDURE — 73562 X-RAY EXAM OF KNEE 3: CPT | Mod: 26,LT,, | Performed by: RADIOLOGY

## 2022-05-16 PROCEDURE — 73562 XR KNEE ORTHO LEFT: ICD-10-PCS | Mod: 26,LT,, | Performed by: RADIOLOGY

## 2022-05-16 PROCEDURE — 1159F PR MEDICATION LIST DOCUMENTED IN MEDICAL RECORD: ICD-10-PCS | Mod: CPTII,,,

## 2022-05-16 PROCEDURE — 1159F MED LIST DOCD IN RCRD: CPT | Mod: CPTII,,,

## 2022-05-16 PROCEDURE — 73560 XR KNEE ORTHO LEFT: ICD-10-PCS | Mod: 26,RT,, | Performed by: RADIOLOGY

## 2022-05-16 PROCEDURE — 99999 PR PBB SHADOW E&M-EST. PATIENT-LVL V: ICD-10-PCS | Mod: PBBFAC,,,

## 2022-05-16 PROCEDURE — 1160F PR REVIEW ALL MEDS BY PRESCRIBER/CLIN PHARMACIST DOCUMENTED: ICD-10-PCS | Mod: CPTII,,,

## 2022-05-16 PROCEDURE — 4010F PR ACE/ARB THEARPY RXD/TAKEN: ICD-10-PCS | Mod: CPTII,,,

## 2022-05-16 NOTE — PROGRESS NOTES
Bianca Amato  05/16/2022  [id number]    Vivi Chand MD  Patient Care Team:  Vivi Chand MD as PCP - General (Family Medicine)  Manish Gandhi MD (Obstetrics and Gynecology)  Ann-Marie Johnston LPN as Care Coordinator (Internal Medicine)  Manish Gandhi MD (Obstetrics and Gynecology)  Vivi Chand MD as Hypertension Digital Medicine Responsible Provider (Family Medicine)  Titi FontaineD as Hypertension Digital Medicine Clinician (Pharmacist)  Shawna Esteban PA-C as Physician Assistant (Internal Medicine)  [facility] Digital [facility] Contract  Kalani Jesica as Digital Medicine Health           Visit Type:an urgent visit for a new problem    Chief Complaint:     Left knee pain     History of Present Illness:  Went to  on 5/12 for left knee pain  Was given Toradol and it helped with pain   Denies any injuries or falls   Would like an xray   It was swollen last week  Swelling has went down    She is having foot pain   Bottom of her foot feels tender   She does a lot of walking on uneven ground at work     History:  Past Medical History:   Diagnosis Date    Carpal tunnel syndrome     bilateral    Hypertension     Iron deficiency anemia     Mild intermittent asthma      Past Surgical History:   Procedure Laterality Date    EYE FOREIGN BODY REMOVAL Right     EYE SURGERY  2009    Im not sure what year    HYSTERECTOMY  2020    OOPHORECTOMY      ROBOT-ASSISTED LAPAROSCOPIC ABDOMINAL HYSTERECTOMY USING DA ALYSA XI N/A 12/06/2019    Procedure: XI ROBOTIC HYSTERECTOMY;  Surgeon: VENESSA Davidson MD;  Location: [facility], [place] OR;  Service: OB/GYN;  Laterality: N/A;    ROBOT-ASSISTED SURGICAL REMOVAL OF FALLOPIAN TUBE USING DA ALYSA XI Bilateral 12/06/2019    Procedure: XI ROBOTIC SALPINGECTOMY;  Surgeon: VENESSA Davidson MD;  Location: [facility] [place] OR;  Service: OB/GYN;  Laterality: Bilateral;    SURGICAL REMOVAL OF MASS OF AXILLA Right 11/12/2018    Procedure: EXCISION, MASS,  AXILLARY;  Surgeon: Alis Tian MD;  [facility];  Service: General;  Laterality: Right;    TUBAL LIGATION       Family History   Problem Relation Age of Onset    Diabetes Mother     Sarcoidosis Mother     COPD Father     Hypertension Father     Asthma Father     Breast cancer Neg Hx     Colon cancer Neg Hx     Ovarian cancer Neg Hx     Thrombosis Neg Hx      Social History     Socioeconomic History    Marital status: Single    Number of children: 4   Occupational History    Occupation:      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   Tobacco Use    Smoking status: Never Smoker    Smokeless tobacco: Never Used   Substance and Sexual Activity    Alcohol use: No    Drug use: No    Sexual activity: Yes     Partners: Male     Birth control/protection: Condom, None     Comment: Sometimes used condoms     Social Determinants of Health     Financial Resource Strain: Low Risk     Difficulty of Paying Living Expenses: Not hard at all   Food Insecurity: No Food Insecurity    Worried About Running Out of Food in the Last Year: Never true    Ran Out of Food in the Last Year: Never true   Transportation Needs: No Transportation Needs    Lack of Transportation (Medical): No    Lack of Transportation (Non-Medical): No   Physical Activity: Insufficiently Active    Days of Exercise per Week: 7 days    Minutes of Exercise per Session: 10 min   Stress: No Stress Concern Present    Feeling of Stress : Not at all   Social Connections: Unknown    Frequency of Communication with Friends and Family: More than three times a week    Frequency of Social Gatherings with Friends and Family: Three times a week    Active Member of Clubs or Organizations: No    Attends Club or Organization Meetings: Never    Marital Status: Never    Housing Stability: Low Risk     Unable to Pay for Housing in the Last Year: No    Number of Places Lived in the Last Year: 0    Unstable Housing in the  Last Year: No     Patient Active Problem List   Diagnosis    Mild intermittent asthma without complication    Essential hypertension    Iron deficiency anemia    Normocytic anemia    Bilateral carpal tunnel syndrome    Thyroid nodule    Class 2 severe obesity due to excess calories with serious comorbidity and body mass index (BMI) of 36.0 to 36.9 in adult    Mastalgia in female    Macromastia    DONNY on CPAP    Bilateral impacted cerumen    Prediabetes    Intertrigo     Review of patient's allergies indicates:   Allergen Reactions    Latex Rash and Shortness Of Breath    Latex, natural rubber Hives    Penicillins Hives, Rash and Shortness Of Breath    Sulfa (sulfonamide antibiotics) Hives, Swelling, Rash and Shortness Of Breath       The following were reviewed at this visit: active problem list, medication list, allergies, family history, social history, and health maintenance.    Medications:  Current Outpatient Medications on File Prior to Visit   Medication Sig Dispense Refill    albuterol (VENTOLIN HFA) 90 mcg/actuation inhaler Inhale 2 puffs into the lungs every 6 (six) hours as needed for Wheezing. 54 g 3    albuterol (VENTOLIN HFA) 90 mcg/actuation inhaler Inhale 2 puffs into the lungs every 4 (four) hours as needed for Wheezing or Shortness of Breath. 18 g 0    amLODIPine (NORVASC) 10 MG tablet Take 1 tablet (10 mg total) by mouth once daily. 90 tablet 3    clotrimazole (LOTRIMIN) 1 % cream Apply topically 2 (two) times daily. Until rash is clear for 7 days 30 g 1    hydroCHLOROthiazide (HYDRODIURIL) 25 MG tablet Take 1 tablet (25 mg total) by mouth once daily. 30 tablet 5    ketoconazole (NIZORAL) 2 % cream Apply topically to affected area(s) 2 (two) times daily. 30 g 2    levocetirizine (XYZAL) 5 MG tablet Take 1 tablet (5 mg total) by mouth every evening. 30 tablet 0    losartan (COZAAR) 50 MG tablet Take 1 tablet (50 mg total) by mouth once daily. 90 tablet 3    nebulizer and  compressor Meagan 1 each by Misc.(Non-Drug; Combo Route) route every 4 (four) hours as needed (asthma symptoms). 1 each 0    nystatin (MYCOSTATIN) powder Apply topically 2 (two) times daily. 60 g 2     No current facility-administered medications on file prior to visit.       Medications have been reviewed and reconciled with patient at this visit.  Barriers to medications reviewed with patient.    Adverse reactions to current medications reviewed with patient..    Over the counter medications reviewed and reconciled with patient.    Exam:  Wt Readings from Last 3 Encounters:   03/17/22 85.8 kg (189 lb 2.5 oz)   03/08/22 87.3 kg (192 lb 7.4 oz)   02/16/22 85.3 kg (188 lb 0.8 oz)     Temp Readings from Last 3 Encounters:   03/17/22 97.9 °F (36.6 °C) (Temporal)   03/08/22 97.9 °F (36.6 °C) (Temporal)   02/16/22 98.9 °F (37.2 °C) (Tympanic)     BP Readings from Last 3 Encounters:   03/17/22 134/88   02/16/22 122/70   01/19/22 (!) 132/94     Pulse Readings from Last 3 Encounters:   03/17/22 86   02/16/22 88   01/19/22 74     There is no height or weight on file to calculate BMI.      Review of Systems   Constitutional: Negative.  Negative for chills and fever.   HENT: Negative.  Negative for congestion, sinus pain and sore throat.    Eyes: Negative for blurred vision and double vision.   Respiratory: Negative for cough, sputum production, shortness of breath and wheezing.    Cardiovascular: Negative for chest pain, palpitations and leg swelling.   Gastrointestinal: Negative for abdominal pain, constipation, diarrhea, heartburn, nausea and vomiting.   Genitourinary: Negative.    Musculoskeletal: Positive for joint pain.   Skin: Negative.  Negative for rash.   Neurological: Negative.    Endo/Heme/Allergies: Negative.  Negative for polydipsia. Does not bruise/bleed easily.   Psychiatric/Behavioral: Negative for depression and substance abuse.     Physical Exam  Vitals and nursing note reviewed.   Constitutional:        General: She is not in acute distress.     Appearance: She is well-developed. She is obese. She is not diaphoretic.   HENT:      Head: Normocephalic and atraumatic.      Right Ear: External ear normal.      Left Ear: External ear normal.      Nose: Nose normal.   Eyes:      General:         Right eye: No discharge.         Left eye: No discharge.      Conjunctiva/sclera: Conjunctivae normal.      Pupils: Pupils are equal, round, and reactive to light.   Neck:      Thyroid: No thyromegaly.   Cardiovascular:      Rate and Rhythm: Normal rate and regular rhythm.      Pulses: Normal pulses.      Heart sounds: Normal heart sounds. No murmur heard.  Pulmonary:      Effort: Pulmonary effort is normal. No respiratory distress.      Breath sounds: Normal breath sounds. No wheezing.   Abdominal:      General: Bowel sounds are normal.   Musculoskeletal:         General: Tenderness present.      Cervical back: Normal range of motion and neck supple.      Right knee: No swelling.      Left knee: No swelling. Decreased range of motion. Tenderness present.   Lymphadenopathy:      Cervical: No cervical adenopathy.   Skin:     General: Skin is warm and dry.      Capillary Refill: Capillary refill takes less than 2 seconds.   Neurological:      Mental Status: She is alert and oriented to person, place, and time.      Cranial Nerves: No cranial nerve deficit.   Psychiatric:         Behavior: Behavior normal.         Thought Content: Thought content normal.         Judgment: Judgment normal.         Laboratory Reviewed ({Yes)  Lab Results   Component Value Date    WBC 7.43 02/08/2022    HGB 13.4 02/08/2022    HCT 43.0 02/08/2022     (H) 02/08/2022    CHOL 199 02/08/2022    TRIG 144 02/08/2022    HDL 42 02/08/2022    ALT 12 02/08/2022    AST 13 02/08/2022     (L) 02/08/2022    K 3.6 02/08/2022     02/08/2022    CREATININE 0.7 02/08/2022    BUN 13 02/08/2022    CO2 23 02/08/2022    TSH 0.371 (L) 02/08/2022    HGBA1C  5.7 (H) 02/08/2022     Bianca was seen today for knee pain and toe pain.    Diagnoses and all orders for this visit:    Left foot pain  -     Ambulatory referral/consult to Podiatry; Future    Acute pain of left knee  -     Cancel: X-Ray Knee 3 View Left; Future    Obesity (BMI 30-39.9)   Discussed lifestyle modifications     Care Plan/Goals: Reviewed    Goals      Blood Pressure < 140/90      Exercise at least 150 minutes per week.      Take at least one BP reading per week at various times of the day           Follow up: No follow-ups on file.    After visit summary was printed and given to patient upon discharge today.  Patient goals and care plan are included in After Visit Summary.

## 2022-05-23 ENCOUNTER — PATIENT MESSAGE (OUTPATIENT)
Dept: PHARMACY | Facility: CLINIC | Age: 42
End: 2022-05-23
Payer: MEDICAID

## 2022-05-23 ENCOUNTER — TELEPHONE (OUTPATIENT)
Dept: INTERNAL MEDICINE | Facility: CLINIC | Age: 42
End: 2022-05-23
Payer: MEDICAID

## 2022-05-23 NOTE — TELEPHONE ENCOUNTER
----- Message from Esther Saab sent at 5/23/2022 10:32 AM CDT -----  Contact: Self [phone]  Would like to receive medical advice.    Would they like a call back or a response via [facility]:  call back once ready for pickup     Additional information:  Calling to request a work letter re written. Pt states on letter work restrictions will need to be listed.

## 2022-05-23 NOTE — TELEPHONE ENCOUNTER
Spoke with patient and explained to her the only restriction is to take frequent breaks until her appointment with Dr. Garcia. Patient verbalized understanding still waiting for Charlene with HR to call back.

## 2022-05-23 NOTE — TELEPHONE ENCOUNTER
"Paperwork faxed to Charlene in  stating restrictions for patients work to [phone]    Outcome: 5/23/2022 4:07:01 PM Transmission Record   Sent to [phone] with remote ID "[id number]"   Result: (0/339;0/0) Success   Page record: 1 - 6   Elapsed time: 03:04 on channel 12    "

## 2022-05-23 NOTE — TELEPHONE ENCOUNTER
----- Message from Toribio Fox sent at 5/23/2022 10:18 AM CDT -----  Contact: [employer]  dept. Charlene [phone]  Consult    She wants to clarify the work excuse. She will fax you a form to complete today.    Thank you

## 2022-05-23 NOTE — TELEPHONE ENCOUNTER
----- Message from Salima Cannon sent at 5/23/2022  2:05 PM CDT -----  Charlene Azul is the [employer] where Bianca Amato works.  Charlene would like a callback at [phone]. She is returning your callback       Thanks

## 2022-05-23 NOTE — TELEPHONE ENCOUNTER
Spoke with Charlene. She is needing to know the restrictions. Advised her that Aparna Sorto states that she needs to take frequent breaks until her appointment with Podiatry.     She is faxing over paperwork to fill out stating this.     Will need to be faxed back to [phone]

## 2022-05-26 ENCOUNTER — OFFICE VISIT (OUTPATIENT)
Dept: PODIATRY | Facility: CLINIC | Age: 42
End: 2022-05-26
Payer: MEDICAID

## 2022-05-26 ENCOUNTER — PATIENT MESSAGE (OUTPATIENT)
Dept: PODIATRY | Facility: CLINIC | Age: 42
End: 2022-05-26

## 2022-05-26 VITALS — BODY MASS INDEX: 33.51 KG/M2 | HEIGHT: 62 IN | WEIGHT: 182.13 LBS

## 2022-05-26 DIAGNOSIS — M79.672 LEFT FOOT PAIN: ICD-10-CM

## 2022-05-26 DIAGNOSIS — M24.572 CONTRACTURE, LEFT ANKLE: ICD-10-CM

## 2022-05-26 DIAGNOSIS — M21.42 PES PLANUS OF BOTH FEET: ICD-10-CM

## 2022-05-26 DIAGNOSIS — B35.3 TINEA PEDIS OF LEFT FOOT: ICD-10-CM

## 2022-05-26 DIAGNOSIS — M72.2 PLANTAR FASCIITIS: Primary | ICD-10-CM

## 2022-05-26 DIAGNOSIS — M21.41 PES PLANUS OF BOTH FEET: ICD-10-CM

## 2022-05-26 PROCEDURE — 1159F MED LIST DOCD IN RCRD: CPT | Mod: CPTII,,, | Performed by: PODIATRIST

## 2022-05-26 PROCEDURE — 4010F ACE/ARB THERAPY RXD/TAKEN: CPT | Mod: CPTII,,, | Performed by: PODIATRIST

## 2022-05-26 PROCEDURE — 3008F PR BODY MASS INDEX (BMI) DOCUMENTED: ICD-10-PCS | Mod: CPTII,,, | Performed by: PODIATRIST

## 2022-05-26 PROCEDURE — 99214 OFFICE O/P EST MOD 30 MIN: CPT | Mod: S$PBB,,, | Performed by: PODIATRIST

## 2022-05-26 PROCEDURE — 3044F PR MOST RECENT HEMOGLOBIN A1C LEVEL <7.0%: ICD-10-PCS | Mod: CPTII,,, | Performed by: PODIATRIST

## 2022-05-26 PROCEDURE — 1160F PR REVIEW ALL MEDS BY PRESCRIBER/CLIN PHARMACIST DOCUMENTED: ICD-10-PCS | Mod: CPTII,,, | Performed by: PODIATRIST

## 2022-05-26 PROCEDURE — 1159F PR MEDICATION LIST DOCUMENTED IN MEDICAL RECORD: ICD-10-PCS | Mod: CPTII,,, | Performed by: PODIATRIST

## 2022-05-26 PROCEDURE — 99999 PR PBB SHADOW E&M-EST. PATIENT-LVL III: CPT | Mod: PBBFAC,,, | Performed by: PODIATRIST

## 2022-05-26 PROCEDURE — 99999 PR PBB SHADOW E&M-EST. PATIENT-LVL III: ICD-10-PCS | Mod: PBBFAC,,, | Performed by: PODIATRIST

## 2022-05-26 PROCEDURE — 1160F RVW MEDS BY RX/DR IN RCRD: CPT | Mod: CPTII,,, | Performed by: PODIATRIST

## 2022-05-26 PROCEDURE — 99213 OFFICE O/P EST LOW 20 MIN: CPT | Mod: PBBFAC | Performed by: PODIATRIST

## 2022-05-26 PROCEDURE — 4010F PR ACE/ARB THEARPY RXD/TAKEN: ICD-10-PCS | Mod: CPTII,,, | Performed by: PODIATRIST

## 2022-05-26 PROCEDURE — 3008F BODY MASS INDEX DOCD: CPT | Mod: CPTII,,, | Performed by: PODIATRIST

## 2022-05-26 PROCEDURE — 3044F HG A1C LEVEL LT 7.0%: CPT | Mod: CPTII,,, | Performed by: PODIATRIST

## 2022-05-26 PROCEDURE — 99214 PR OFFICE/OUTPT VISIT, EST, LEVL IV, 30-39 MIN: ICD-10-PCS | Mod: S$PBB,,, | Performed by: PODIATRIST

## 2022-05-26 RX ORDER — CICLOPIROX 7.7 MG/G
GEL TOPICAL 2 TIMES DAILY
Qty: 45 G | Refills: 2 | Status: SHIPPED | OUTPATIENT
Start: 2022-05-26 | End: 2022-08-11

## 2022-05-26 RX ORDER — TERBINAFINE HYDROCHLORIDE 250 MG/1
250 TABLET ORAL DAILY
Qty: 30 TABLET | Refills: 0 | Status: SHIPPED | OUTPATIENT
Start: 2022-05-26 | End: 2022-06-25

## 2022-05-26 NOTE — PROGRESS NOTES
Subjective:       Patient ID: Bianca Amato is a 42 y.o. female.    Chief Complaint: Foot Pain (C/o livier horses in left foot and nail of 5th digit digging into the side of the 4th. Pre diabetic, Last seen 05/16/22 with Dr. Sorto)      HPI: Bianca Amato complains of mild pains to the left foot at the 4th and 5th toes. States pains are sharp and stabbing-like in nature. Pains are to the plantar foot, mostly with walking and standing. Rates the pains at approx. 8/10. States a blister to the associated webspace. States mild drainage. Also complains of knee and plantar foot pains. Has been out of work for the past several weeks due to knee pains.     Review of patient's allergies indicates:   Allergen Reactions    Latex Rash and Shortness Of Breath    Latex, natural rubber Hives    Penicillins Hives, Rash and Shortness Of Breath    Sulfa (sulfonamide antibiotics) Hives, Swelling, Rash and Shortness Of Breath       Past Medical History:   Diagnosis Date    Carpal tunnel syndrome     bilateral    Hypertension     Iron deficiency anemia     Mild intermittent asthma        Family History   Problem Relation Age of Onset    Diabetes Mother     Sarcoidosis Mother     COPD Father     Hypertension Father     Asthma Father     Breast cancer Neg Hx     Colon cancer Neg Hx     Ovarian cancer Neg Hx     Thrombosis Neg Hx        Social History     Socioeconomic History    Marital status: Single    Number of children: 4   Occupational History    Occupation:      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   Tobacco Use    Smoking status: Never Smoker    Smokeless tobacco: Never Used   Substance and Sexual Activity    Alcohol use: No    Drug use: No    Sexual activity: Yes     Partners: Male     Birth control/protection: Condom, None     Comment: Sometimes used condoms     Social Determinants of Health     Financial Resource Strain: Low Risk     Difficulty of Paying Living Expenses: Not  hard at all   Food Insecurity: No Food Insecurity    Worried About Running Out of Food in the Last Year: Never true    Ran Out of Food in the Last Year: Never true   Transportation Needs: No Transportation Needs    Lack of Transportation (Medical): No    Lack of Transportation (Non-Medical): No   Physical Activity: Insufficiently Active    Days of Exercise per Week: 7 days    Minutes of Exercise per Session: 10 min   Stress: No Stress Concern Present    Feeling of Stress : Not at all   Social Connections: Unknown    Frequency of Communication with Friends and Family: More than three times a week    Frequency of Social Gatherings with Friends and Family: Three times a week    Active Member of Clubs or Organizations: No    Attends Club or Organization Meetings: Never    Marital Status: Never    Housing Stability: Low Risk     Unable to Pay for Housing in the Last Year: No    Number of Places Lived in the Last Year: 0    Unstable Housing in the Last Year: No       Past Surgical History:   Procedure Laterality Date    EYE FOREIGN BODY REMOVAL Right     EYE SURGERY  2009    Im not sure what year    HYSTERECTOMY  2020    OOPHORECTOMY      ROBOT-ASSISTED LAPAROSCOPIC ABDOMINAL HYSTERECTOMY USING DA ALYSA XI N/A 12/06/2019    Procedure: XI ROBOTIC HYSTERECTOMY;  Surgeon: VENESSA Davidson MD;  [facility]: [facility];  Service: OB/GYN;  Laterality: N/A;    ROBOT-ASSISTED SURGICAL REMOVAL OF FALLOPIAN TUBE USING DA ALYSA XI Bilateral 12/06/2019    Procedure: XI ROBOTIC SALPINGECTOMY;  Surgeon: VENESSA Davidson MD;  Location: [facility] OR;  Service: OB/GYN;  Laterality: Bilateral;    SURGICAL REMOVAL OF MASS OF AXILLA Right 11/12/2018    Procedure: EXCISION, MASS, AXILLARY;  Surgeon: Alis Tian MD;  Location: [facility] OR;  Service: General;  Laterality: Right;    TUBAL LIGATION         Review of Systems   Constitutional: Negative for chills, fatigue and fever.   HENT: Negative for hearing loss.   "  Eyes: Negative for photophobia and visual disturbance.   Respiratory: Negative for cough, chest tightness, shortness of breath and wheezing.    Cardiovascular: Negative for chest pain and palpitations.   Gastrointestinal: Negative for constipation, diarrhea, nausea and vomiting.   Endocrine: Negative for cold intolerance and heat intolerance.   Genitourinary: Negative for flank pain.   Musculoskeletal: Positive for gait problem. Negative for neck pain and neck stiffness.   Skin: Negative for wound.   Neurological: Negative for light-headedness and headaches.   Psychiatric/Behavioral: Negative for sleep disturbance.         Objective:   Ht 5' 2" (1.575 m)   Wt 82.6 kg (182 lb 1.6 oz)   LMP 11/07/2019 (Approximate)   BMI 33.31 kg/m²     Physical Exam   LOWER EXTREMITY PHYSICAL EXAMINATION    ORTHOPEDIC:  Pes planus foot type is noted bilateral.  Gastrocsoleus equinus contracture noted.  Pain to palpation of 4th and 5th toes due to webspace maceration tinea pedis.  No PT tendon pains are noted bilateral.    DERMATOLOGY: Skin is supple, dry and intact.  Tinea pedis noted, left 4th webspace.    Assessment:     1. Plantar fasciitis    2. Left foot pain    3. Pes planus of both feet    4. Contracture, left ankle    5. Tinea pedis of left foot          Plan:     Plantar fasciitis    Left foot pain  -     Ambulatory referral/consult to Podiatry    Pes planus of both feet    Contracture, left ankle    Tinea pedis of left foot  -     terbinafine HCL (LAMISIL) 250 mg tablet; Take 1 tablet (250 mg total) by mouth once daily.  Dispense: 30 tablet; Refill: 0  -     ciclopirox 0.77 % Gel; Apply topically 2 (two) times daily.  Dispense: 45 g; Refill: 2      Thorough discussion is had with the patient today, concerning the diagnosis, its etiology, and the treatment algorithm at present.     Prescription is written for Orthotist evaluation at [facility], [address], [place] [address], for lower extremity " orthotic(s) management and/or shoe gear management.    Did discuss proper and supportive shoe gear in detail and at length with the patient.  These are shoes with firm and robust arch support; medial counter.  Shoes which only bend at the metatarsophalangeal joint and which are rigid in the midfoot and hindfoot. Patient urged to purchase running type or cross training type shoes gear which are designed for pronation control.     Recommend moisture wicking socks to alleviate the hyperhidrosis which makes one prone to recurrent tinea pedis.  I also recommend to alternate shoe gear, meaning, Monday, Wednesday, Friday, Saturday/Sunday and Tuesday, Thursday, Saturday/Sunday.  I do also recommend Tinactin antifungal spray to shoes daily.  After the aforementioned, put the shoes aside and allow to dry overnight and/or one full day.  Patient may purchase OTC Gold Bond Powder and use to his/her shoes daily prior to putting them on. He/She may also sprinkle a slight amount of powder to the foot prior to putting on socks.          Future Appointments   Date Time Provider Department Center   6/7/2022  1:20 PM Rosamaria Byrnes PA-C ON ENT  Medical    6/29/2022  1:30 PM ON [facility] Count includes the [facility] APRIL Wright   6/29/2022  2:20 PM Jolynn Trinidad NP ON [facility]  Medical C   7/26/2022  8:00 AM [address] EFRAINJeanette De La Torre   8/19/2022  9:20 AM Shawna Esteban PA-C [facility] IM  Medical C

## 2022-06-09 ENCOUNTER — OFFICE VISIT (OUTPATIENT)
Dept: OTOLARYNGOLOGY | Facility: CLINIC | Age: 42
End: 2022-06-09
Payer: MEDICAID

## 2022-06-09 ENCOUNTER — TELEPHONE (OUTPATIENT)
Dept: OTOLARYNGOLOGY | Facility: CLINIC | Age: 42
End: 2022-06-09

## 2022-06-09 VITALS — SYSTOLIC BLOOD PRESSURE: 126 MMHG | TEMPERATURE: 98 F | HEART RATE: 81 BPM | DIASTOLIC BLOOD PRESSURE: 84 MMHG

## 2022-06-09 DIAGNOSIS — H61.23 BILATERAL IMPACTED CERUMEN: Primary | ICD-10-CM

## 2022-06-09 PROCEDURE — 4010F PR ACE/ARB THEARPY RXD/TAKEN: ICD-10-PCS | Mod: CPTII,,, | Performed by: PHYSICIAN ASSISTANT

## 2022-06-09 PROCEDURE — 69210 REMOVE IMPACTED EAR WAX UNI: CPT | Mod: PBBFAC | Performed by: PHYSICIAN ASSISTANT

## 2022-06-09 PROCEDURE — 99213 OFFICE O/P EST LOW 20 MIN: CPT | Mod: PBBFAC | Performed by: PHYSICIAN ASSISTANT

## 2022-06-09 PROCEDURE — 3074F PR MOST RECENT SYSTOLIC BLOOD PRESSURE < 130 MM HG: ICD-10-PCS | Mod: CPTII,,, | Performed by: PHYSICIAN ASSISTANT

## 2022-06-09 PROCEDURE — 99999 PR PBB SHADOW E&M-EST. PATIENT-LVL III: ICD-10-PCS | Mod: PBBFAC,,, | Performed by: PHYSICIAN ASSISTANT

## 2022-06-09 PROCEDURE — 69210 PR REMOVAL IMPACTED CERUMEN REQUIRING INSTRUMENTATION, UNILATERAL: ICD-10-PCS | Mod: S$PBB,,, | Performed by: PHYSICIAN ASSISTANT

## 2022-06-09 PROCEDURE — 3079F DIAST BP 80-89 MM HG: CPT | Mod: CPTII,,, | Performed by: PHYSICIAN ASSISTANT

## 2022-06-09 PROCEDURE — 1159F PR MEDICATION LIST DOCUMENTED IN MEDICAL RECORD: ICD-10-PCS | Mod: CPTII,,, | Performed by: PHYSICIAN ASSISTANT

## 2022-06-09 PROCEDURE — 3044F HG A1C LEVEL LT 7.0%: CPT | Mod: CPTII,,, | Performed by: PHYSICIAN ASSISTANT

## 2022-06-09 PROCEDURE — 99499 NO LOS: ICD-10-PCS | Mod: S$PBB,,, | Performed by: PHYSICIAN ASSISTANT

## 2022-06-09 PROCEDURE — 99999 PR PBB SHADOW E&M-EST. PATIENT-LVL III: CPT | Mod: PBBFAC,,, | Performed by: PHYSICIAN ASSISTANT

## 2022-06-09 PROCEDURE — 3044F PR MOST RECENT HEMOGLOBIN A1C LEVEL <7.0%: ICD-10-PCS | Mod: CPTII,,, | Performed by: PHYSICIAN ASSISTANT

## 2022-06-09 PROCEDURE — 3079F PR MOST RECENT DIASTOLIC BLOOD PRESSURE 80-89 MM HG: ICD-10-PCS | Mod: CPTII,,, | Performed by: PHYSICIAN ASSISTANT

## 2022-06-09 PROCEDURE — 69210 REMOVE IMPACTED EAR WAX UNI: CPT | Mod: S$PBB,,, | Performed by: PHYSICIAN ASSISTANT

## 2022-06-09 PROCEDURE — 4010F ACE/ARB THERAPY RXD/TAKEN: CPT | Mod: CPTII,,, | Performed by: PHYSICIAN ASSISTANT

## 2022-06-09 PROCEDURE — 3074F SYST BP LT 130 MM HG: CPT | Mod: CPTII,,, | Performed by: PHYSICIAN ASSISTANT

## 2022-06-09 PROCEDURE — 1159F MED LIST DOCD IN RCRD: CPT | Mod: CPTII,,, | Performed by: PHYSICIAN ASSISTANT

## 2022-06-09 PROCEDURE — 99499 UNLISTED E&M SERVICE: CPT | Mod: S$PBB,,, | Performed by: PHYSICIAN ASSISTANT

## 2022-06-09 NOTE — PROGRESS NOTES
Subjective:   Cerumen impactions     Patient ID: Bianca Amato is a 42 y.o. female.    Chief Complaint:  Excessive ear wax     Bianca Amato is a 42 y.o. female here to see me today for evaluation of a possible wax impaction in bilateral ears.  She has complaints of hearing loss in the affected ears, but denies pain or drainage.  This has been an issue in the past.  The patient has not been using any sort of ear drop to soften the wax.  She usually comes in every 3-4 months for ear cleaning.  She was last here with Rosamaria Byrnes PA-C in 3/2022 for ear cleaning.    HPI  Review of Systems   HENT: Positive for hearing loss. Negative for ear discharge, ear pain and tinnitus.        Objective:     Physical Exam   HENT:   Right Ear: External ear and ear canal normal. Decreased hearing is noted.   Left Ear: External ear and ear canal normal. Decreased hearing is noted.   Bilateral moderate cerumen impactions, removal described below       Procedure Note    CHIEF COMPLAINT:  Cerumen Impaction    Description:  The patient was seated in an exam chair.  An ear speculum was placed in the right EAC and was examined under the microscope.  Suction and/or loop curettes were used to remove a large cerumen impaction.  The tympanic membrane was visualized and was normal in appearance.  The procedure was repeated on the left side in a similar fashion.  The TM was intact and normal on this side as well.  The patient tolerated the procedure well.           Assessment:     1. Bilateral impacted cerumen        Plan:     1.  Cerumen impaction:  Removed today without difficulty.  I would recommend the use of a wax softening drop, either over the counter Debrox or mineral oil, on a weekly basis.  I also instructed the patient to avoid Qtips.  RTC in 3-4 months for repeat ear cleaning.

## 2022-07-12 ENCOUNTER — OFFICE VISIT (OUTPATIENT)
Dept: PODIATRY | Facility: CLINIC | Age: 42
End: 2022-07-12
Payer: MEDICAID

## 2022-07-12 VITALS — WEIGHT: 182.13 LBS | BODY MASS INDEX: 33.51 KG/M2 | HEIGHT: 62 IN

## 2022-07-12 DIAGNOSIS — M79.671 PAIN IN RIGHT FOOT: ICD-10-CM

## 2022-07-12 DIAGNOSIS — L74.513 HYPERHIDROSIS OF FEET: ICD-10-CM

## 2022-07-12 DIAGNOSIS — M79.672 PAIN IN LEFT FOOT: ICD-10-CM

## 2022-07-12 DIAGNOSIS — B35.3 TINEA PEDIS OF LEFT FOOT: ICD-10-CM

## 2022-07-12 DIAGNOSIS — M72.2 PLANTAR FASCIITIS: Primary | ICD-10-CM

## 2022-07-12 PROCEDURE — 99999 PR PBB SHADOW E&M-EST. PATIENT-LVL III: CPT | Mod: PBBFAC,,, | Performed by: PODIATRIST

## 2022-07-12 PROCEDURE — 3044F HG A1C LEVEL LT 7.0%: CPT | Mod: CPTII,,, | Performed by: PODIATRIST

## 2022-07-12 PROCEDURE — 99214 PR OFFICE/OUTPT VISIT, EST, LEVL IV, 30-39 MIN: ICD-10-PCS | Mod: S$PBB,,, | Performed by: PODIATRIST

## 2022-07-12 PROCEDURE — 1160F RVW MEDS BY RX/DR IN RCRD: CPT | Mod: CPTII,,, | Performed by: PODIATRIST

## 2022-07-12 PROCEDURE — 99214 OFFICE O/P EST MOD 30 MIN: CPT | Mod: S$PBB,,, | Performed by: PODIATRIST

## 2022-07-12 PROCEDURE — 1159F PR MEDICATION LIST DOCUMENTED IN MEDICAL RECORD: ICD-10-PCS | Mod: CPTII,,, | Performed by: PODIATRIST

## 2022-07-12 PROCEDURE — 99999 PR PBB SHADOW E&M-EST. PATIENT-LVL III: ICD-10-PCS | Mod: PBBFAC,,, | Performed by: PODIATRIST

## 2022-07-12 PROCEDURE — 1159F MED LIST DOCD IN RCRD: CPT | Mod: CPTII,,, | Performed by: PODIATRIST

## 2022-07-12 PROCEDURE — 1160F PR REVIEW ALL MEDS BY PRESCRIBER/CLIN PHARMACIST DOCUMENTED: ICD-10-PCS | Mod: CPTII,,, | Performed by: PODIATRIST

## 2022-07-12 PROCEDURE — 4010F PR ACE/ARB THEARPY RXD/TAKEN: ICD-10-PCS | Mod: CPTII,,, | Performed by: PODIATRIST

## 2022-07-12 PROCEDURE — 99213 OFFICE O/P EST LOW 20 MIN: CPT | Mod: PBBFAC | Performed by: PODIATRIST

## 2022-07-12 PROCEDURE — 3044F PR MOST RECENT HEMOGLOBIN A1C LEVEL <7.0%: ICD-10-PCS | Mod: CPTII,,, | Performed by: PODIATRIST

## 2022-07-12 PROCEDURE — 3008F BODY MASS INDEX DOCD: CPT | Mod: CPTII,,, | Performed by: PODIATRIST

## 2022-07-12 PROCEDURE — 4010F ACE/ARB THERAPY RXD/TAKEN: CPT | Mod: CPTII,,, | Performed by: PODIATRIST

## 2022-07-12 PROCEDURE — 3008F PR BODY MASS INDEX (BMI) DOCUMENTED: ICD-10-PCS | Mod: CPTII,,, | Performed by: PODIATRIST

## 2022-07-12 RX ORDER — GLYCOPYRROLATE 1 MG/1
1 TABLET ORAL 3 TIMES DAILY
Qty: 90 TABLET | Refills: 2 | Status: SHIPPED | OUTPATIENT
Start: 2022-07-12 | End: 2022-10-22

## 2022-07-12 RX ORDER — TERBINAFINE HYDROCHLORIDE 250 MG/1
250 TABLET ORAL DAILY
Qty: 30 TABLET | Refills: 0 | Status: SHIPPED | OUTPATIENT
Start: 2022-07-12 | End: 2022-08-11

## 2022-07-12 RX ORDER — IBUPROFEN 800 MG/1
800 TABLET ORAL 2 TIMES DAILY
Qty: 60 TABLET | Refills: 1 | Status: SHIPPED | OUTPATIENT
Start: 2022-07-12 | End: 2022-08-02 | Stop reason: SDUPTHER

## 2022-07-12 NOTE — PROGRESS NOTES
Subjective:       Patient ID: Bianca Amato is a 42 y.o. female.    Chief Complaint: Follow-up (Left foot pain (livier horse) f/u. 0/10 pain. Prediabetic. Last seen 05/16/22 with Aparna Sorto NP)      HPI: Bianca Amato complains of mild to moderate pains to the left and right foot. States pains are sharp and stabbing-like in nature. Pains are to the plantar foot, mostly with walking and standing. Rates the pains at approx. 4/10. States post-static dyskinesia. Denies any recent identifiable trauma. Does limp with gait. States seldom NSAID medications thus far. Pains have been present for the past several weeks to months and the pains have worsened over the past couple of weeks. She does have a history of plantar fasciitis. States walking and standing causes and/or exacerbates the symptoms. Patient has had no corticosteroid injection(s) to the left or right foot, prior. Patient's Primary Care Provider is Vivi Chand MD. Did complete PO Lamisil for LLE tinea pedis. Requests a refill.    Review of patient's allergies indicates:   Allergen Reactions    Latex Rash and Shortness Of Breath    Latex, natural rubber Hives    Penicillins Hives, Rash and Shortness Of Breath    Sulfa (sulfonamide antibiotics) Hives, Swelling, Rash and Shortness Of Breath       Past Medical History:   Diagnosis Date    Carpal tunnel syndrome     bilateral    Hypertension     Iron deficiency anemia     Mild intermittent asthma        Family History   Problem Relation Age of Onset    Diabetes Mother     Sarcoidosis Mother     COPD Father     Hypertension Father     Asthma Father     Breast cancer Neg Hx     Colon cancer Neg Hx     Ovarian cancer Neg Hx     Thrombosis Neg Hx        Social History     Socioeconomic History    Marital status: Single    Number of children: 4   Occupational History    Occupation:      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   Tobacco Use    Smoking status: Never Smoker     Smokeless tobacco: Never Used   Substance and Sexual Activity    Alcohol use: No    Drug use: No    Sexual activity: Yes     Partners: Male     Birth control/protection: Condom, None     Comment: Sometimes used condoms     Social Determinants of Health     Financial Resource Strain: Low Risk     Difficulty of Paying Living Expenses: Not hard at all   Food Insecurity: No Food Insecurity    Worried About Running Out of Food in the Last Year: Never true    Ran Out of Food in the Last Year: Never true   Transportation Needs: No Transportation Needs    Lack of Transportation (Medical): No    Lack of Transportation (Non-Medical): No   Physical Activity: Insufficiently Active    Days of Exercise per Week: 7 days    Minutes of Exercise per Session: 10 min   Stress: No Stress Concern Present    Feeling of Stress : Not at all   Social Connections: Unknown    Frequency of Communication with Friends and Family: More than three times a week    Frequency of Social Gatherings with Friends and Family: Three times a week    Active Member of Clubs or Organizations: No    Attends Club or Organization Meetings: Never    Marital Status: Never    Housing Stability: Low Risk     Unable to Pay for Housing in the Last Year: No    Number of Places Lived in the Last Year: 0    Unstable Housing in the Last Year: No       Past Surgical History:   Procedure Laterality Date    EYE FOREIGN BODY REMOVAL Right     EYE SURGERY  2009    Im not sure what year    HYSTERECTOMY  2020    OOPHORECTOMY      ROBOT-ASSISTED LAPAROSCOPIC ABDOMINAL HYSTERECTOMY USING DA ALYSA XI N/A 12/06/2019    Procedure: XI ROBOTIC HYSTERECTOMY;  Surgeon: VENESSA Davidson MD;  Location: [facility] OR;  Service: OB/GYN;  Laterality: N/A;    ROBOT-ASSISTED SURGICAL REMOVAL OF FALLOPIAN TUBE USING DA ALYSA XI Bilateral 12/06/2019    Procedure: XI ROBOTIC SALPINGECTOMY;  Surgeon: VENESSA Davidson MD;  Location: [facility] OR;  Service: OB/GYN;   "Laterality: Bilateral;    SURGICAL REMOVAL OF MASS OF AXILLA Right 11/12/2018    Procedure: EXCISION, MASS, AXILLARY;  Surgeon: Alis Tian MD;  [facility];  Service: General;  Laterality: Right;    TUBAL LIGATION         Review of Systems   Constitutional: Negative for chills, fatigue and fever.   HENT: Negative for hearing loss.    Eyes: Negative for photophobia and visual disturbance.   Respiratory: Negative for cough, chest tightness, shortness of breath and wheezing.    Cardiovascular: Negative for chest pain and palpitations.   Gastrointestinal: Negative for constipation, diarrhea, nausea and vomiting.   Endocrine: Negative for cold intolerance and heat intolerance.   Genitourinary: Negative for flank pain.   Musculoskeletal: Negative for neck pain and neck stiffness.   Neurological: Negative for light-headedness and headaches.   Psychiatric/Behavioral: Negative for sleep disturbance.         Objective:   Ht 5' 2" (1.575 m)   Wt 82.6 kg (182 lb 1.6 oz)   LMP 11/07/2019 (Approximate)   BMI 33.31 kg/m²         Physical Exam   LOWER EXTREMITY PHYSICAL EXAMINATION    NEUROLOGY: Proprioception is intact, bilateral. Sensation to light touch is intact. Negative Tinel's Sign and negative Valleix Sign. No neurological sensations with compression of the area of Perez's Nerve in the area of the Abductor Hallucis muscle belly.    ORTHOPEDIC: There is minimal to no tenderness to palpation of the area around the plantar medial calcaneal tubercle on the left and right foot. There is also no pain to palpation of the immediate plantar aspect of the heel, and no pains to the lateral band of the fascia. No edema is noted. No fullness is noted. There are mild to moderate pains w/o defects are noted within the plantar fascia at the arch.  Pains are characterized as sharp and stabbing-like with direct palpation of the area. No plantar fibromas are noted. No defects are noted within the ligament. Dorsiflexion of " the MTPJs with simultaneous palpation of the fascia at the arch, does worsen and exacerbate the pains. No pains with medial to lateral compression of the heel. Equinus contracture is noted. Antalgic gait pattern is noted.     DERMATOLOGY: Skin is supple, dry and intact. Slight tinea pedis with webspace maceration is noted, LLE.     Assessment:     1. Plantar fasciitis    2. Pain in left foot    3. Pain in right foot    4. Tinea pedis of left foot    5. Hyperhidrosis of feet          Plan:     Pain in left foot  Pain in right foot  Plantar fasciitis  -     ibuprofen (ADVIL,MOTRIN) 800 MG tablet; Take 1 tablet (800 mg total) by mouth 2 (two) times daily.  Dispense: 60 tablet; Refill: 1    Thorough discussion is had with the patient today, concerning the diagnosis, its etiology, and the treatment algorithm at present.     Did discuss proper and supportive shoe gear in detail and at length with the patient.  These are shoes with firm and robust arch support; medial counter.  Shoes which only bend at the metatarsophalangeal joint and which are rigid in the midfoot and hindfoot. Patient urged to purchase running type or cross training type shoes gear which are designed for pronation control.     NSAIDs BID for 10-14 days, then prn.    Needs an alcohol or gel based product, but insurer denied.    Tinea pedis of left foot  -     terbinafine HCL (LAMISIL) 250 mg tablet; Take 1 tablet (250 mg total) by mouth once daily.  Dispense: 30 tablet; Refill: 0    Recommend moisture wicking socks to alleviate the hyperhidrosis which makes one prone to recurrent tinea pedis.  I also recommend to alternate shoe gear, meaning, Monday, Wednesday, Friday, Saturday/Sunday and Tuesday, Thursday, Saturday/Sunday.  I do also recommend Tinactin antifungal spray to shoes daily.  After the aforementioned, put the shoes aside and allow to dry overnight and/or one full day.  Patient may purchase OTC Gold Bond Powder and use to his/her shoes daily  prior to putting them on. He/She may also sprinkle a slight amount of powder to the foot prior to putting on socks.    Hyperhidrosis of feet  -     glycopyrrolate (ROBINUL) 1 mg Tab; Take 1 tablet (1 mg total) by mouth 3 (three) times daily.  Dispense: 90 tablet; Refill: 2    Patient is educated as to the use and the effectiveness of Robinul (Glycopyrrolate) in relation to hyperhidrosis.  Patient is also educated as to the potential side effects, which may include, but is not limited to, decreased sweating, dry mouth, constipation, dizziness, drowsiness, headache, loss of taste, nervousness, mild weakness, difficulty falling asleep or staying asleep, upset stomach, vomiting, feeling bloated, nasal congestion, (dry, hot, or flushed skin), diarrhea, signs of anaphylaxis (a severe allergic reaction (may include hives, rash, itching, chest tightness, or swelling of the face, lips, tongue, or throat)), chest pain, decreased sexual ability, confusion, difficulty urinating, fainting, fast, slow, or irregular heartbeat, fever, seizures, severe or persistent dizziness or headache, shortness of breath, unusual weakness, severe vomiting and/or blurred vision or vision problems.          Future Appointments   Date Time Provider Department Center   7/14/2022  8:15 AM [facility]   7/26/2022  8:00 AM [address] [place]   8/1/2022  2:20 PM Jolynn Trinidad NP [facility]   8/19/2022  9:20 AM Shawna Esteban PA-C ON [facility]   9/22/2022  9:20 AM Luana Mera PA-C ON [facility]

## 2022-07-14 ENCOUNTER — HOSPITAL ENCOUNTER (OUTPATIENT)
Dept: RADIOLOGY | Facility: HOSPITAL | Age: 42
Discharge: HOME | End: 2022-07-14
Attending: NURSE PRACTITIONER
Payer: MEDICAID

## 2022-07-14 DIAGNOSIS — J45.20 MILD INTERMITTENT ASTHMA WITHOUT COMPLICATION: ICD-10-CM

## 2022-07-14 PROCEDURE — 71046 X-RAY EXAM CHEST 2 VIEWS: CPT | Mod: TC

## 2022-07-14 PROCEDURE — 71046 XR CHEST PA AND LATERAL: ICD-10-PCS | Mod: 26,,, | Performed by: RADIOLOGY

## 2022-07-14 PROCEDURE — 71046 X-RAY EXAM CHEST 2 VIEWS: CPT | Mod: 26,,, | Performed by: RADIOLOGY

## 2022-07-26 ENCOUNTER — PATIENT MESSAGE (OUTPATIENT)
Dept: PULMONOLOGY | Facility: CLINIC | Age: 42
End: 2022-07-26
Payer: MEDICAID

## 2022-07-26 ENCOUNTER — HOSPITAL ENCOUNTER (OUTPATIENT)
Dept: RADIOLOGY | Facility: HOSPITAL | Age: 42
Discharge: HOME | End: 2022-07-26
Attending: FAMILY MEDICINE
Payer: MEDICAID

## 2022-07-26 ENCOUNTER — TELEPHONE (OUTPATIENT)
Dept: INTERNAL MEDICINE | Facility: CLINIC | Age: 42
End: 2022-07-26
Payer: MEDICAID

## 2022-07-26 DIAGNOSIS — E04.1 THYROID NODULE: ICD-10-CM

## 2022-07-26 PROCEDURE — 76536 US EXAM OF HEAD AND NECK: CPT | Mod: TC

## 2022-07-26 NOTE — TELEPHONE ENCOUNTER
U/S result in under images tab. Please advise patients to give 72 hours at least for provider to comment on any lab results/imaging studies.

## 2022-07-26 NOTE — TELEPHONE ENCOUNTER
Pt states that she had an ultrasound done this morning and has received results and wants clarification of results.  I cannot find a record in chart of ultrasound but do see that the appointment for it was scheduled for today.  Please advise.

## 2022-07-26 NOTE — TELEPHONE ENCOUNTER
----- Message from Airam Pratt sent at 7/26/2022  8:12 AM CDT -----  .Type:  Sooner Apoointment Request    Caller is requesting a sooner appointment.  Caller declined first available appointment listed below.  Caller will not accept being placed on the waitlist and is requesting a message be sent to doctor.  Name of Caller: .Bianca Tami Amato   When is the first available appointment? 10/17/2022  Symptoms:lower back pain  Would the patient rather a call back or a response via [facility]? Call back  Best Call Back Number:.[phone]   Additional Information:

## 2022-07-28 ENCOUNTER — TELEPHONE (OUTPATIENT)
Dept: INTERNAL MEDICINE | Facility: CLINIC | Age: 42
End: 2022-07-28
Payer: MEDICAID

## 2022-07-28 DIAGNOSIS — E04.1 THYROID NODULE: Primary | ICD-10-CM

## 2022-07-28 NOTE — TELEPHONE ENCOUNTER
----- Message from Shawna Esteban PA-C sent at 7/28/2022  1:37 PM CDT -----  Please assist with scheduling

## 2022-07-29 ENCOUNTER — OFFICE VISIT (OUTPATIENT)
Dept: INTERNAL MEDICINE | Facility: CLINIC | Age: 42
End: 2022-07-29
Payer: MEDICAID

## 2022-07-29 ENCOUNTER — TELEPHONE (OUTPATIENT)
Dept: OTOLARYNGOLOGY | Facility: CLINIC | Age: 42
End: 2022-07-29
Payer: MEDICAID

## 2022-07-29 ENCOUNTER — HOSPITAL ENCOUNTER (OUTPATIENT)
Dept: RADIOLOGY | Facility: HOSPITAL | Age: 42
Discharge: HOME | End: 2022-07-29
Attending: PHYSICIAN ASSISTANT
Payer: MEDICAID

## 2022-07-29 VITALS
TEMPERATURE: 98 F | HEART RATE: 99 BPM | HEIGHT: 62 IN | SYSTOLIC BLOOD PRESSURE: 102 MMHG | OXYGEN SATURATION: 98 % | DIASTOLIC BLOOD PRESSURE: 78 MMHG | BODY MASS INDEX: 34.03 KG/M2 | WEIGHT: 184.94 LBS

## 2022-07-29 DIAGNOSIS — J45.20 MILD INTERMITTENT ASTHMA WITHOUT COMPLICATION: ICD-10-CM

## 2022-07-29 DIAGNOSIS — R10.30 LOWER ABDOMINAL PAIN: Primary | ICD-10-CM

## 2022-07-29 DIAGNOSIS — R10.30 LOWER ABDOMINAL PAIN: ICD-10-CM

## 2022-07-29 PROCEDURE — 3074F SYST BP LT 130 MM HG: CPT | Mod: CPTII,,, | Performed by: PHYSICIAN ASSISTANT

## 2022-07-29 PROCEDURE — 1160F RVW MEDS BY RX/DR IN RCRD: CPT | Mod: CPTII,,, | Performed by: PHYSICIAN ASSISTANT

## 2022-07-29 PROCEDURE — 3008F PR BODY MASS INDEX (BMI) DOCUMENTED: ICD-10-PCS | Mod: CPTII,,, | Performed by: PHYSICIAN ASSISTANT

## 2022-07-29 PROCEDURE — 3044F PR MOST RECENT HEMOGLOBIN A1C LEVEL <7.0%: ICD-10-PCS | Mod: CPTII,,, | Performed by: PHYSICIAN ASSISTANT

## 2022-07-29 PROCEDURE — 3078F DIAST BP <80 MM HG: CPT | Mod: CPTII,,, | Performed by: PHYSICIAN ASSISTANT

## 2022-07-29 PROCEDURE — 99213 PR OFFICE/OUTPT VISIT, EST, LEVL III, 20-29 MIN: ICD-10-PCS | Mod: S$PBB,,, | Performed by: PHYSICIAN ASSISTANT

## 2022-07-29 PROCEDURE — 1159F PR MEDICATION LIST DOCUMENTED IN MEDICAL RECORD: ICD-10-PCS | Mod: CPTII,,, | Performed by: PHYSICIAN ASSISTANT

## 2022-07-29 PROCEDURE — 99999 PR PBB SHADOW E&M-EST. PATIENT-LVL V: CPT | Mod: PBBFAC,,, | Performed by: PHYSICIAN ASSISTANT

## 2022-07-29 PROCEDURE — 3008F BODY MASS INDEX DOCD: CPT | Mod: CPTII,,, | Performed by: PHYSICIAN ASSISTANT

## 2022-07-29 PROCEDURE — 1159F MED LIST DOCD IN RCRD: CPT | Mod: CPTII,,, | Performed by: PHYSICIAN ASSISTANT

## 2022-07-29 PROCEDURE — 99215 OFFICE O/P EST HI 40 MIN: CPT | Mod: PBBFAC | Performed by: PHYSICIAN ASSISTANT

## 2022-07-29 PROCEDURE — 4010F PR ACE/ARB THEARPY RXD/TAKEN: ICD-10-PCS | Mod: CPTII,,, | Performed by: PHYSICIAN ASSISTANT

## 2022-07-29 PROCEDURE — 99999 PR PBB SHADOW E&M-EST. PATIENT-LVL V: ICD-10-PCS | Mod: PBBFAC,,, | Performed by: PHYSICIAN ASSISTANT

## 2022-07-29 PROCEDURE — 74019 RADEX ABDOMEN 2 VIEWS: CPT | Mod: TC

## 2022-07-29 PROCEDURE — 3078F PR MOST RECENT DIASTOLIC BLOOD PRESSURE < 80 MM HG: ICD-10-PCS | Mod: CPTII,,, | Performed by: PHYSICIAN ASSISTANT

## 2022-07-29 PROCEDURE — 4010F ACE/ARB THERAPY RXD/TAKEN: CPT | Mod: CPTII,,, | Performed by: PHYSICIAN ASSISTANT

## 2022-07-29 PROCEDURE — 99213 OFFICE O/P EST LOW 20 MIN: CPT | Mod: S$PBB,,, | Performed by: PHYSICIAN ASSISTANT

## 2022-07-29 PROCEDURE — 74019 XR ABDOMEN FLAT AND ERECT: ICD-10-PCS | Mod: 26,,, | Performed by: RADIOLOGY

## 2022-07-29 PROCEDURE — 3074F PR MOST RECENT SYSTOLIC BLOOD PRESSURE < 130 MM HG: ICD-10-PCS | Mod: CPTII,,, | Performed by: PHYSICIAN ASSISTANT

## 2022-07-29 PROCEDURE — 1160F PR REVIEW ALL MEDS BY PRESCRIBER/CLIN PHARMACIST DOCUMENTED: ICD-10-PCS | Mod: CPTII,,, | Performed by: PHYSICIAN ASSISTANT

## 2022-07-29 PROCEDURE — 74019 RADEX ABDOMEN 2 VIEWS: CPT | Mod: 26,,, | Performed by: RADIOLOGY

## 2022-07-29 PROCEDURE — 3044F HG A1C LEVEL LT 7.0%: CPT | Mod: CPTII,,, | Performed by: PHYSICIAN ASSISTANT

## 2022-07-29 RX ORDER — FLUCONAZOLE 150 MG/1
TABLET ORAL
Qty: 2 TABLET | Refills: 0 | Status: SHIPPED | OUTPATIENT
Start: 2022-07-29 | End: 2022-08-11 | Stop reason: ALTCHOICE

## 2022-07-29 RX ORDER — METRONIDAZOLE 500 MG/1
500 TABLET ORAL EVERY 12 HOURS
Qty: 14 TABLET | Refills: 0 | Status: SHIPPED | OUTPATIENT
Start: 2022-07-29 | End: 2022-08-05

## 2022-07-29 RX ORDER — ALBUTEROL SULFATE 90 UG/1
2 AEROSOL, METERED RESPIRATORY (INHALATION) EVERY 4 HOURS PRN
Qty: 18 G | Refills: 0 | Status: SHIPPED | OUTPATIENT
Start: 2022-07-29 | End: 2022-12-07 | Stop reason: SDUPTHER

## 2022-07-29 NOTE — PROGRESS NOTES
Subjective:       Patient ID: Bianca Amato is a 42 y.o. female.    Chief Complaint: Back Pain      Patient Care Team:  Vivi Chand MD as PCP - General (Family Medicine)  Manish Gandhi MD (Obstetrics and Gynecology)  Ann-Marie Johnston LPN as Care Coordinator (Internal Medicine)  Manish Gandhi MD (Obstetrics and Gynecology)  Vivi Chand MD as Hypertension Digital Medicine Responsible Provider (Family Medicine)  Titi FontaineD as Hypertension Digital Medicine Clinician (Pharmacist)  Shawna Esteban PA-C as Physician Assistant (Internal Medicine)  [employer] as Hypertension Digital Medicine Contract  Kalani Newellsera as Digital Medicine Health     HPI    Bianca Amato is a 42 y.o. female who presents today with complaints of Back Pain  Reports 3-4 days history of left lower back pain. No known injury. Non-radiating, aching. Unchanged. B/W ibuprofen 800mg.   Reports lower abdominal pain pain, urine odor, abnormal vaginal discharge with odor and itching. No F/C, sweats/ flank pain, N/V/D. No treatments tried.       Review of Systems   Constitutional: Negative for chills and fever.   Gastrointestinal: Positive for abdominal pain. Negative for diarrhea, nausea and vomiting.   Genitourinary: Positive for frequency and vaginal discharge. Negative for dysuria, hematuria and urgency.   Musculoskeletal: Positive for back pain. Negative for myalgias.   Neurological: Negative for weakness and numbness.       Objective:      Physical Exam  Vitals and nursing note reviewed.   Constitutional:       General: She is not in acute distress.     Appearance: She is well-developed.   HENT:      Head: Normocephalic and atraumatic.   Eyes:      General: Lids are normal. No scleral icterus.     Extraocular Movements: Extraocular movements intact.      Conjunctiva/sclera: Conjunctivae normal.   Cardiovascular:      Rate and Rhythm: Normal rate and regular rhythm.   Pulmonary:      Effort: Pulmonary effort  is normal.      Breath sounds: Normal breath sounds. No decreased breath sounds, wheezing, rhonchi or rales.   Abdominal:      General: Bowel sounds are normal. There is no distension.      Palpations: Abdomen is soft. There is no mass.      Tenderness: There is abdominal tenderness in the suprapubic area. There is no right CVA tenderness or left CVA tenderness.   Musculoskeletal:      Lumbar back: Normal. No swelling, deformity, tenderness or bony tenderness. Normal range of motion.   Neurological:      Mental Status: She is alert.      Cranial Nerves: No cranial nerve deficit.   Psychiatric:         Mood and Affect: Mood and affect normal.         Assessment:       1. Lower abdominal pain    2. Mild intermittent asthma without complication        Plan:   1. Lower abdominal pain  -     X-Ray Abdomen Flat And Erect  -     metroNIDAZOLE (FLAGYL) 500 MG tablet  -     fluconazole (DIFLUCAN) 150 MG Tab  -     Urinalysis, Reflex to Urine Culture Urine, Clean Catch    2. Mild intermittent asthma without complication  -     albuterol (VENTOLIN HFA) 90 mcg/actuation inhaler        Lab Results   Component Value Date    COLORU Yellow 07/29/2022    APPEARANCEUA Clear 07/29/2022    PHUR 5.5 07/29/2022    SPECGRAV 1.025 07/29/2022    PROTEINUA Negative 07/29/2022    GLUCUA Negative 07/29/2022    KETONESU Negative 07/29/2022    BILIRUBINUA Negative 07/29/2022    OCCULTUA Negative 07/29/2022    NITRITE Negative 07/29/2022    UROBILINOGEN Negative 07/29/2022    LEUKOCYTESUR Negative 07/29/2022       X-Ray Abdomen Flat And Erect  Narrative: EXAMINATION:  XR ABDOMEN FLAT AND ERECT    CLINICAL HISTORY:  Lower abdominal pain, unspecified    TECHNIQUE:  Flat and erect AP views of the abdomen were performed.    COMPARISON:  None    FINDINGS:  Lung bases are relatively clear.  No evidence of free intraperitoneal air.  Stomach bubble is left-sided.  Bowel gas pattern is nonobstructive.  Air and stool are present within the  colon.  Impression: Nonobstructive bowel gas pattern.    Electronically signed by: Ezio Jolly MD  Date:    07/29/2022  Time:    15:57

## 2022-07-29 NOTE — TELEPHONE ENCOUNTER
----- Message from Ileana Harman sent at 7/29/2022  3:28 PM CDT -----  Regarding: New Patient Appt  Please follow up with patient to make new patient appointment. Thank you.

## 2022-08-02 ENCOUNTER — TELEPHONE (OUTPATIENT)
Dept: OTOLARYNGOLOGY | Facility: CLINIC | Age: 42
End: 2022-08-02
Payer: MEDICAID

## 2022-08-02 ENCOUNTER — PATIENT MESSAGE (OUTPATIENT)
Dept: INTERNAL MEDICINE | Facility: CLINIC | Age: 42
End: 2022-08-02
Payer: MEDICAID

## 2022-08-02 ENCOUNTER — PATIENT MESSAGE (OUTPATIENT)
Dept: PODIATRY | Facility: CLINIC | Age: 42
End: 2022-08-02

## 2022-08-02 ENCOUNTER — OFFICE VISIT (OUTPATIENT)
Dept: PODIATRY | Facility: CLINIC | Age: 42
End: 2022-08-02
Payer: MEDICAID

## 2022-08-02 VITALS — BODY MASS INDEX: 34.03 KG/M2 | WEIGHT: 184.94 LBS | HEIGHT: 62 IN

## 2022-08-02 DIAGNOSIS — M24.571 CONTRACTURE, RIGHT ANKLE: ICD-10-CM

## 2022-08-02 DIAGNOSIS — L85.3 XEROSIS CUTIS: ICD-10-CM

## 2022-08-02 DIAGNOSIS — M79.671 PAIN IN RIGHT FOOT: ICD-10-CM

## 2022-08-02 DIAGNOSIS — M24.572 CONTRACTURE, LEFT ANKLE: ICD-10-CM

## 2022-08-02 DIAGNOSIS — M79.672 PAIN IN LEFT FOOT: ICD-10-CM

## 2022-08-02 DIAGNOSIS — M72.2 PLANTAR FASCIITIS: Primary | ICD-10-CM

## 2022-08-02 PROCEDURE — 3044F HG A1C LEVEL LT 7.0%: CPT | Mod: CPTII,,, | Performed by: PODIATRIST

## 2022-08-02 PROCEDURE — 99214 PR OFFICE/OUTPT VISIT, EST, LEVL IV, 30-39 MIN: ICD-10-PCS | Mod: S$PBB,,, | Performed by: PODIATRIST

## 2022-08-02 PROCEDURE — 1159F PR MEDICATION LIST DOCUMENTED IN MEDICAL RECORD: ICD-10-PCS | Mod: CPTII,,, | Performed by: PODIATRIST

## 2022-08-02 PROCEDURE — 3008F BODY MASS INDEX DOCD: CPT | Mod: CPTII,,, | Performed by: PODIATRIST

## 2022-08-02 PROCEDURE — 99213 OFFICE O/P EST LOW 20 MIN: CPT | Mod: PBBFAC | Performed by: PODIATRIST

## 2022-08-02 PROCEDURE — 3044F PR MOST RECENT HEMOGLOBIN A1C LEVEL <7.0%: ICD-10-PCS | Mod: CPTII,,, | Performed by: PODIATRIST

## 2022-08-02 PROCEDURE — 1160F PR REVIEW ALL MEDS BY PRESCRIBER/CLIN PHARMACIST DOCUMENTED: ICD-10-PCS | Mod: CPTII,,, | Performed by: PODIATRIST

## 2022-08-02 PROCEDURE — 1159F MED LIST DOCD IN RCRD: CPT | Mod: CPTII,,, | Performed by: PODIATRIST

## 2022-08-02 PROCEDURE — 99214 OFFICE O/P EST MOD 30 MIN: CPT | Mod: S$PBB,,, | Performed by: PODIATRIST

## 2022-08-02 PROCEDURE — 3008F PR BODY MASS INDEX (BMI) DOCUMENTED: ICD-10-PCS | Mod: CPTII,,, | Performed by: PODIATRIST

## 2022-08-02 PROCEDURE — 4010F ACE/ARB THERAPY RXD/TAKEN: CPT | Mod: CPTII,,, | Performed by: PODIATRIST

## 2022-08-02 PROCEDURE — 1160F RVW MEDS BY RX/DR IN RCRD: CPT | Mod: CPTII,,, | Performed by: PODIATRIST

## 2022-08-02 PROCEDURE — 4010F PR ACE/ARB THEARPY RXD/TAKEN: ICD-10-PCS | Mod: CPTII,,, | Performed by: PODIATRIST

## 2022-08-02 PROCEDURE — 99999 PR PBB SHADOW E&M-EST. PATIENT-LVL III: CPT | Mod: PBBFAC,,, | Performed by: PODIATRIST

## 2022-08-02 PROCEDURE — 99999 PR PBB SHADOW E&M-EST. PATIENT-LVL III: ICD-10-PCS | Mod: PBBFAC,,, | Performed by: PODIATRIST

## 2022-08-02 RX ORDER — IBUPROFEN 800 MG/1
800 TABLET ORAL 2 TIMES DAILY
Qty: 60 TABLET | Refills: 1 | Status: SHIPPED | OUTPATIENT
Start: 2022-08-02 | End: 2022-10-22

## 2022-08-02 NOTE — PROGRESS NOTES
Subjective:       Patient ID: Bianca Amato is a 42 y.o. female.    Chief Complaint: Follow-up (Mild medial pain to right foot. Pre diabetic PCP: Dr. Chand last seen 07/29/22 with Shawna Esteban PA-C)      HPI: Bianca Amato presents to the clinic today for follow up concerning B/L plantar fasciitis. She has not had injection therapy prior. Has been doing PO NSAIDs prn, and not constantly. States antalgic gait. Was let go from her job, and thus does not stand up for long periods of time. States most of the pains at the instep. States no burning or tingling. States no lower back pains.     Review of patient's allergies indicates:   Allergen Reactions    Latex Rash and Shortness Of Breath    Latex, natural rubber Hives    Penicillins Hives, Rash and Shortness Of Breath    Sulfa (sulfonamide antibiotics) Hives, Swelling, Rash and Shortness Of Breath       Past Medical History:   Diagnosis Date    Carpal tunnel syndrome     bilateral    Hypertension     Iron deficiency anemia     Mild intermittent asthma        Family History   Problem Relation Age of Onset    Diabetes Mother     Sarcoidosis Mother     COPD Father     Hypertension Father     Asthma Father     Breast cancer Neg Hx     Colon cancer Neg Hx     Ovarian cancer Neg Hx     Thrombosis Neg Hx        Social History     Socioeconomic History    Marital status: Single    Number of children: 4   Occupational History    Occupation:      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   Tobacco Use    Smoking status: Never Smoker    Smokeless tobacco: Never Used   Substance and Sexual Activity    Alcohol use: No    Drug use: No    Sexual activity: Yes     Partners: Male     Birth control/protection: None     Comment: Sometimes used condoms     Social Determinants of Health     Financial Resource Strain: Low Risk     Difficulty of Paying Living Expenses: Not hard at all   Food Insecurity: No Food Insecurity    Worried About  Running Out of Food in the Last Year: Never true    Ran Out of Food in the Last Year: Never true   Transportation Needs: No Transportation Needs    Lack of Transportation (Medical): No    Lack of Transportation (Non-Medical): No   Physical Activity: Sufficiently Active    Days of Exercise per Week: 7 days    Minutes of Exercise per Session: 150+ min   Stress: Stress Concern Present    Feeling of Stress : To some extent   Social Connections: Unknown    Frequency of Communication with Friends and Family: More than three times a week    Frequency of Social Gatherings with Friends and Family: More than three times a week    Active Member of Clubs or Organizations: No    Attends Club or Organization Meetings: Never    Marital Status: Never    Housing Stability: Low Risk     Unable to Pay for Housing in the Last Year: No    Number of Places Lived in the Last Year: 1    Unstable Housing in the Last Year: No       Past Surgical History:   Procedure Laterality Date    EYE FOREIGN BODY REMOVAL Right     EYE SURGERY  2009    Im not sure what year    HYSTERECTOMY  2020    OOPHORECTOMY      ROBOT-ASSISTED LAPAROSCOPIC ABDOMINAL HYSTERECTOMY USING DA ALYSA XI N/A 12/06/2019    Procedure: XI ROBOTIC HYSTERECTOMY;  Surgeon: VENESSA Davidson MD;  Location: [facility] OR;  Service: OB/GYN;  Laterality: N/A;    ROBOT-ASSISTED SURGICAL REMOVAL OF FALLOPIAN TUBE USING DA ALYSA XI Bilateral 12/06/2019    Procedure: XI ROBOTIC SALPINGECTOMY;  Surgeon: VENESSA Davidson MD;  Location: [facility] OR;  Service: OB/GYN;  Laterality: Bilateral;    SURGICAL REMOVAL OF MASS OF AXILLA Right 11/12/2018    Procedure: EXCISION, MASS, AXILLARY;  Surgeon: Alis Tian MD;  Location: [facility] OR;  Service: General;  Laterality: Right;    TUBAL LIGATION         Review of Systems   Constitutional: Negative for chills, fatigue and fever.   HENT: Negative for hearing loss.    Eyes: Negative for photophobia and visual disturbance.  "  Respiratory: Negative for cough, chest tightness, shortness of breath and wheezing.    Cardiovascular: Negative for chest pain and palpitations.   Gastrointestinal: Negative for constipation, diarrhea, nausea and vomiting.   Endocrine: Negative for cold intolerance and heat intolerance.   Genitourinary: Negative for flank pain.   Musculoskeletal: Negative for neck pain and neck stiffness.   Neurological: Negative for light-headedness and headaches.   Psychiatric/Behavioral: Negative for sleep disturbance.         Objective:   Ht 5' 2" (1.575 m)   Wt 83.9 kg (184 lb 15.5 oz)   LMP 11/07/2019 (Approximate)   BMI 33.83 kg/m²         Physical Exam   LOWER EXTREMITY PHYSICAL EXAMINATION  ORTHOPEDIC: There is minimal to no tenderness to palpation of the area around the plantar medial calcaneal tubercle on the left and right foot. There is also no pain to palpation of the immediate plantar aspect of the heel, and no pains to the lateral band of the fascia. No edema is noted. No fullness is noted. There are mild to moderate pains w/o defects are noted within the plantar fascia at the arch. Pains are characterized as sharp and stabbing-like with direct palpation of the area. No plantar fibromas are noted. No defects are noted within the ligament. Dorsiflexion of the MTPJs with simultaneous palpation of the fascia at the arch, does worsen and exacerbate the pains. No pains with medial to lateral compression of the heel. Equinus contracture is noted. Antalgic gait pattern is noted.     NEUROLOGY: Proprioception is intact, bilateral. Sensation to light touch is intact. Negative Tinel's Sign and negative Valleix Sign. No neurological sensations with compression of the area of Perez's Nerve in the area of the Abductor Hallucis muscle belly.    Assessment:     1. Plantar fasciitis    2. Pain in left foot    3. Pain in right foot    4. Contracture, right ankle    5. Contracture, left ankle    6. Xerosis cutis          Plan: "     Plantar fasciitis  -     ibuprofen (ADVIL,MOTRIN) 800 MG tablet; Take 1 tablet (800 mg total) by mouth 2 (two) times daily.  Dispense: 60 tablet; Refill: 1    Pain in left foot    Pain in right foot    Contracture, right ankle    Contracture, left ankle    Xerosis cutis      Thorough discussion is had with the patient today, concerning the diagnosis, its etiology, and the treatment algorithm at present.     Did discuss proper and supportive shoe gear in detail and at length with the patient.  These are shoes with firm and robust arch support; medial counter.  Shoes which only bend at the metatarsophalangeal joint and which are rigid in the midfoot and hindfoot. Patient urged to purchase running type or cross training type shoes gear which are designed for pronation control.     Start PO NSAIDs BID for 14 days, then prn.    If no improvement, consider DDx of TTS.    Recommend twice to 3 times daily topical emollient. Did discuss with the patient concerning dry and thin skin in relation to complications due to comorbid states. Patient will purchase OTC Urea 40% and use BID or TID or OTC Eucerin Lotion/Cream and use it BID or TID.          Future Appointments   Date Time Provider Department Center   8/4/2022  9:30 AM Wisam Oreilly MD [facility]   8/19/2022  9:20 AM [facility]   9/22/2022  9:20 AM Luana Mera PA-C [facility]   9/26/2022  2:20 PM Jolynn Trinidad NP [facility]   10/7/2022  1[facility]30 [facility]

## 2022-08-08 ENCOUNTER — LAB VISIT (OUTPATIENT)
Dept: LAB | Facility: HOSPITAL | Age: 42
End: 2022-08-08
Attending: OTOLARYNGOLOGY
Payer: MEDICAID

## 2022-08-08 ENCOUNTER — OFFICE VISIT (OUTPATIENT)
Dept: OTOLARYNGOLOGY | Facility: CLINIC | Age: 42
End: 2022-08-08
Payer: MEDICAID

## 2022-08-08 VITALS
SYSTOLIC BLOOD PRESSURE: 149 MMHG | WEIGHT: 190.69 LBS | HEART RATE: 86 BPM | TEMPERATURE: 98 F | DIASTOLIC BLOOD PRESSURE: 99 MMHG | BODY MASS INDEX: 34.88 KG/M2

## 2022-08-08 DIAGNOSIS — E05.90 SUBCLINICAL HYPERTHYROIDISM: ICD-10-CM

## 2022-08-08 DIAGNOSIS — E05.90 SUBCLINICAL HYPERTHYROIDISM: Primary | ICD-10-CM

## 2022-08-08 DIAGNOSIS — E04.1 THYROID NODULE: ICD-10-CM

## 2022-08-08 PROBLEM — H61.23 BILATERAL IMPACTED CERUMEN: Status: RESOLVED | Noted: 2022-03-04 | Resolved: 2022-08-08

## 2022-08-08 LAB — THYROPEROXIDASE IGG SERPL-ACNC: <6 IU/ML

## 2022-08-08 PROCEDURE — 99999 PR PBB SHADOW E&M-EST. PATIENT-LVL III: ICD-10-PCS | Mod: PBBFAC,,, | Performed by: OTOLARYNGOLOGY

## 2022-08-08 PROCEDURE — 3044F PR MOST RECENT HEMOGLOBIN A1C LEVEL <7.0%: ICD-10-PCS | Mod: CPTII,,, | Performed by: OTOLARYNGOLOGY

## 2022-08-08 PROCEDURE — 3080F PR MOST RECENT DIASTOLIC BLOOD PRESSURE >= 90 MM HG: ICD-10-PCS | Mod: CPTII,,, | Performed by: OTOLARYNGOLOGY

## 2022-08-08 PROCEDURE — 4010F ACE/ARB THERAPY RXD/TAKEN: CPT | Mod: CPTII,,, | Performed by: OTOLARYNGOLOGY

## 2022-08-08 PROCEDURE — 99213 OFFICE O/P EST LOW 20 MIN: CPT | Mod: S$PBB,,, | Performed by: OTOLARYNGOLOGY

## 2022-08-08 PROCEDURE — 1159F PR MEDICATION LIST DOCUMENTED IN MEDICAL RECORD: ICD-10-PCS | Mod: CPTII,,, | Performed by: OTOLARYNGOLOGY

## 2022-08-08 PROCEDURE — 3044F HG A1C LEVEL LT 7.0%: CPT | Mod: CPTII,,, | Performed by: OTOLARYNGOLOGY

## 2022-08-08 PROCEDURE — 83520 IMMUNOASSAY QUANT NOS NONAB: CPT | Performed by: OTOLARYNGOLOGY

## 2022-08-08 PROCEDURE — 4010F PR ACE/ARB THEARPY RXD/TAKEN: ICD-10-PCS | Mod: CPTII,,, | Performed by: OTOLARYNGOLOGY

## 2022-08-08 PROCEDURE — 3077F PR MOST RECENT SYSTOLIC BLOOD PRESSURE >= 140 MM HG: ICD-10-PCS | Mod: CPTII,,, | Performed by: OTOLARYNGOLOGY

## 2022-08-08 PROCEDURE — 3008F BODY MASS INDEX DOCD: CPT | Mod: CPTII,,, | Performed by: OTOLARYNGOLOGY

## 2022-08-08 PROCEDURE — 3008F PR BODY MASS INDEX (BMI) DOCUMENTED: ICD-10-PCS | Mod: CPTII,,, | Performed by: OTOLARYNGOLOGY

## 2022-08-08 PROCEDURE — 99999 PR PBB SHADOW E&M-EST. PATIENT-LVL III: CPT | Mod: PBBFAC,,, | Performed by: OTOLARYNGOLOGY

## 2022-08-08 PROCEDURE — 1159F MED LIST DOCD IN RCRD: CPT | Mod: CPTII,,, | Performed by: OTOLARYNGOLOGY

## 2022-08-08 PROCEDURE — 86376 MICROSOMAL ANTIBODY EACH: CPT | Performed by: OTOLARYNGOLOGY

## 2022-08-08 PROCEDURE — 99213 OFFICE O/P EST LOW 20 MIN: CPT | Mod: PBBFAC | Performed by: OTOLARYNGOLOGY

## 2022-08-08 PROCEDURE — 3077F SYST BP >= 140 MM HG: CPT | Mod: CPTII,,, | Performed by: OTOLARYNGOLOGY

## 2022-08-08 PROCEDURE — 3080F DIAST BP >= 90 MM HG: CPT | Mod: CPTII,,, | Performed by: OTOLARYNGOLOGY

## 2022-08-08 PROCEDURE — 99213 PR OFFICE/OUTPT VISIT, EST, LEVL III, 20-29 MIN: ICD-10-PCS | Mod: S$PBB,,, | Performed by: OTOLARYNGOLOGY

## 2022-08-08 NOTE — PROGRESS NOTES
Referring Provider:    Shawna Esteban Pa-c  [id number] [facility] [place] [id number]  Subjective:   Patient: Bianca Amato [id number], :1980   Visit date:2022 9:44 AM    Chief Complaint:  Thyroid Nodule    HPI:  Bianca is a 42 y.o. female who I was asked to see in consultation for evaluation of the following issue(s):    COMPRESSIVE SYMPTOMS OR MINOR RISK FACTORS FOR THYROID CANCER (Negative unless checked):  []  Increasing in size over the past 6 months  []  Dysphagia   []  Dyspnea on exertion  []  Orthopnea  []  Hemoptysis  []  Voice changes  []  Pain  []  AGE >45    [x]  FEMALE     HIGH RISK HISTORY FOR THYROID CANCER:  []  Thyroid cancer in 1 or more first degree relatives  []  History of radiation exposure to the neck  []  Prior thyroidectomy with dx of thyroid cancer  []  PET positive nodule  []  Multiple Endocrine Neoplasia  []  Familial Medullary Thyroid Cancer    (2009 REVISED AMERICAN THYROID ASSOCIATION MANAGEMENT GUIDELINES FOR PATIENTS WITH THYROID NODULES AND DIFFERENTIATED THYROID CANCER)            Objective:   Physical Exam:  Vitals:  BP (!) 149/99   Pulse 86   Temp 97.5 °F (36.4 °C) (Temporal)   Wt 86.5 kg (190 lb 11.2 oz)   LMP 2019 (Approximate)   BMI 34.88 kg/m²   General appearance:  Well developed, well nourished        Communication:  no hoarseness, no dysphonia    Ears:  Otoscopy of external auditory canals and tympanic membranes was normal, clinical speech reception thresholds grossly intact, no mass/lesion of auricle.  Nose:  No masses/lesions of external nose, nasal mucosa, septum, and turbinates were within normal limits.  Mouth:  No mass/lesion of lips, teeth, gums, hard/soft palate, tongue, tonsils, or oropharynx.      Neck & Lymphatics:  No cervical lymphadenopathy, no neck mass/crepitus/ asymmetry, trachea is midline.  THYROID: no palpable nodules    DATA REVIEWED    [] On thyroid hormone medications (check if YES)  Lab Results   Component Value  Date    TSH 0.371 (L) 02/08/2022    TSH 0.541 07/22/2020    TSH 0.195 (L) 08/24/2018    TSH 0.281 (L) 07/29/2014    TSH 0.483 09/09/2013    FREET4 0.95 02/08/2022    FREET4 0.99 07/22/2020    FREET4 1.00 08/24/2018    FREET4 1.10 07/29/2014    CALCIUM 10.3 02/08/2022    CALCIUM 9.6 07/30/2021    CALCIUM 9.8 07/18/2020    CALCIUM 10.4 12/04/2019    CALCIUM 9.7 10/30/2019       ULTRASOUND:  Results for orders placed during the hospital encounter of 07/26/22    US Soft Tissue Head Neck Thyroid    Narrative  EXAMINATION:  US SOFT TISSUE HEAD NECK THYROID    CLINICAL HISTORY:  .  Nontoxic single thyroid nodule    TECHNIQUE:  Ultrasound of the thyroid and cervical lymph nodes was performed.    COMPARISON:  07/23/2021    FINDINGS:  The thyroid is normal in size.  Right lobe of the thyroid measures 4.3 x 1.3 x 1.9 cm.  Left lobe of the thyroid measures 5.6 x 1.2 x 1.7 cm.  Normal thyroid parenchyma.    Bilateral thyroid nodules measuring up to 1.4 cm on the right and 1.8 cm on the left.  Left sided nodule is slightly larger than was on prior.  Consider FNA.    Cervical lymph nodes demonstrate normal morphology and size.    Impression  See above      Electronically signed by: Deo Mcclendon MD  Date:    07/26/2022  Time:    09:15      Results for orders placed during the hospital encounter of 07/23/21    US Soft Tissue Head Neck Thyroid    Narrative  EXAMINATION:  US SOFT TISSUE HEAD NECK THYROID    CLINICAL HISTORY:  .  Nontoxic single thyroid nodule    TECHNIQUE:  Ultrasound of the thyroid and cervical lymph nodes was performed.    COMPARISON:  Thyroid sonogram July 22, 2020    FINDINGS:  The thyroid is normal in size.  Right lobe of the thyroid measures 5.3 x 1.9 x 1.8 cm.  Left lobe of the thyroid measures 4.5 x 1.4 x 1.9 cm.  Normal thyroid parenchyma.    There is a stable 1.6 x 0.8 x 1.1 cm solid slightly hypoechoic nodule within the mid right thyroid lobe which is wider than tall, smoothly marginated, and lacks  internal calcification.  There is a smaller 7 mm solid hypoechoic nodule more inferiorly within the right thyroid lobe with similar imaging features.  This smaller nodule was not imaged on the prior exam.    Stable 9 mm solid hypoechoic nodule within the thyroid isthmus.    There is a 1.4 x 1.6 x 1.1 cm solid hypoechoic nodule within the inferior left thyroid lobe.  This nodule is not significantly changed in size compared to prior exam.  The internal cystic component within this nodule has resolved since prior study.  Tiny 3 mm solid hypoechoic nodule is seen adjacent to this nodule within the inferior left thyroid lobe.    Impression  Stable bilateral solid thyroid nodules, unchanged compared to initial sonogram from July 2020.  Recommend continued imaging follow-up in 12 months.      Electronically signed by: Thomas Bradley  Date:    07/23/2021  Time:    09:24      Results for orders placed during the hospital encounter of 07/22/20    US Soft Tissue Head Neck Thyroid    Narrative  EXAMINATION:  US SOFT TISSUE HEAD NECK THYROID    CLINICAL HISTORY:  Nontoxic single thyroid nodule    TECHNIQUE:  Ultrasound of the thyroid and cervical lymph nodes was performed.    COMPARISON:  None.    FINDINGS:  The thyroid gland is normal in size.  The right lobe measures 5.1 x 1.6 x 1.7 cm.  The left lobe measures 5.6 x 1.6 x 1.8 cm. Thyroid isthmus measures 4.1 mm AP.  Total thyroid volume measures approximately 14.1 mL.  There is normal parenchymal echotexture and vascularity.  There is a solid hypoechoic well-circumscribed nodule at the midportion of the right lobe measuring up to 17 mm.  Similar appearing nodule noted in the isthmus measuring up to 8 mm.  There is a mixed cystic and solid hypoechoic nodule at the lower pole of the left lobe measuring up to 2.0 cm.  No suspicious microcalcifications demonstrated.  No lymphadenopathy is seen.    Impression  Multiple thyroid nodules as above.  Recommend follow-up at 1, 2, 3,  and 5 years per TIRADS criteria.      Electronically signed by: Maximo Coreas MD  Date:    07/22/2020  Time:    16:19    No results found for this or any previous visit.        PATHOLOGY:  none    Independent interpretation of thyroid ultrasound images:  Left sided inferior nodule, solid, hypo, W>T, smooth, no echogenic foci 18mm in largest dimenssion; TR4  Right sided nodule also TR4 borderline for FNA        Assessment & Plan:   Bianca was seen today for thyroid nodule.    Diagnoses and all orders for this visit:    Thyroid nodule  -     Ambulatory referral/consult to ENT      Bianca has presents with a complex thyroid problem.  We discussed that a high percentage of thyroid nodules are benign, however, thyroid malignancy is a fairly common disease. Untreated thyroid malignancy if left untreated can pose a threat to life or bodily function. The imaging and laboratory values were reviewed at length.  Bianca does not have compressive symptoms or cosmetic deformity from the size of the mass.  Bianca does not have a HIGH RISK HISTORY for thyroid cancer.        Multiinstitutional Analysis of Thyroid Nodule Risk Stratification Using the American College of Radiology Thyroid Imaging Reporting and Data System    []  TR1 (0 POINTS): BENIGN  o no FNA required; risk of malignancy 0.3%  [] TR2  (2 POINTS): NOT SUSPICIOUS  o No FNA required; risk of malignancy 1.5%  [] TR3  (3 POINTS): MILDLY SUSPICIOUS; risk of malignancy 4.8%  o ?2.5 cm FNA  o ?1.5 cm follow up, follow up: 1, 3 and 5 years  [x] TR4  (4-6 POINTS): MODERATELY SUSPICIOUS; risk of malignancy 9.1%  o ?1.5 cm FNA  o ?1.0 cm follow up, follow up: 1, 2, 3 and 5 years  [] TR5  (7 OR MORE POINTS):  HIGHLY SUSPICIOUS; risk of malignancy 35%  o ?1.0 cm FNA  o ?0.5 cm follow up, annual follow up for up to 5 years    Start with Thyroid stim Ig and Thyroid Peroxidase Ab.    If positive, proceed with FNA.    If negative, plan for thyroid uptake scan.  FNA if cold.

## 2022-08-09 LAB — TSH RECEP AB SER-ACNC: <1.1 IU/L (ref 0–1.75)

## 2022-08-11 ENCOUNTER — LAB VISIT (OUTPATIENT)
Dept: LAB | Facility: HOSPITAL | Age: 42
End: 2022-08-11
Attending: PHYSICIAN ASSISTANT
Payer: MEDICAID

## 2022-08-11 ENCOUNTER — PATIENT MESSAGE (OUTPATIENT)
Dept: OTOLARYNGOLOGY | Facility: CLINIC | Age: 42
End: 2022-08-11
Payer: MEDICAID

## 2022-08-11 ENCOUNTER — TELEPHONE (OUTPATIENT)
Dept: OTOLARYNGOLOGY | Facility: CLINIC | Age: 42
End: 2022-08-11
Payer: MEDICAID

## 2022-08-11 ENCOUNTER — OFFICE VISIT (OUTPATIENT)
Dept: INTERNAL MEDICINE | Facility: CLINIC | Age: 42
End: 2022-08-11
Payer: MEDICAID

## 2022-08-11 VITALS
BODY MASS INDEX: 34.32 KG/M2 | HEIGHT: 62 IN | HEART RATE: 78 BPM | OXYGEN SATURATION: 98 % | SYSTOLIC BLOOD PRESSURE: 132 MMHG | WEIGHT: 186.5 LBS | DIASTOLIC BLOOD PRESSURE: 78 MMHG | TEMPERATURE: 97 F

## 2022-08-11 DIAGNOSIS — I10 ESSENTIAL HYPERTENSION: ICD-10-CM

## 2022-08-11 DIAGNOSIS — E05.90 SUBCLINICAL HYPERTHYROIDISM: Primary | ICD-10-CM

## 2022-08-11 DIAGNOSIS — R73.03 PREDIABETES: ICD-10-CM

## 2022-08-11 DIAGNOSIS — R10.30 LOWER ABDOMINAL PAIN: Primary | ICD-10-CM

## 2022-08-11 LAB
ANION GAP SERPL CALC-SCNC: 7 MMOL/L (ref 8–16)
BUN SERPL-MCNC: 13 MG/DL (ref 6–20)
CALCIUM SERPL-MCNC: 9.7 MG/DL (ref 8.7–10.5)
CHLORIDE SERPL-SCNC: 106 MMOL/L (ref 95–110)
CO2 SERPL-SCNC: 24 MMOL/L (ref 23–29)
CREAT SERPL-MCNC: 0.6 MG/DL (ref 0.5–1.4)
EST. GFR  (NO RACE VARIABLE): >60 ML/MIN/1.73 M^2
ESTIMATED AVG GLUCOSE: 108 MG/DL (ref 68–131)
GLUCOSE SERPL-MCNC: 86 MG/DL (ref 70–110)
HBA1C MFR BLD: 5.4 % (ref 4–5.6)
POTASSIUM SERPL-SCNC: 4.3 MMOL/L (ref 3.5–5.1)
SODIUM SERPL-SCNC: 137 MMOL/L (ref 136–145)

## 2022-08-11 PROCEDURE — 83036 HEMOGLOBIN GLYCOSYLATED A1C: CPT | Performed by: PHYSICIAN ASSISTANT

## 2022-08-11 PROCEDURE — 1160F PR REVIEW ALL MEDS BY PRESCRIBER/CLIN PHARMACIST DOCUMENTED: ICD-10-PCS | Mod: CPTII,,, | Performed by: PHYSICIAN ASSISTANT

## 2022-08-11 PROCEDURE — 1160F RVW MEDS BY RX/DR IN RCRD: CPT | Mod: CPTII,,, | Performed by: PHYSICIAN ASSISTANT

## 2022-08-11 PROCEDURE — 3078F PR MOST RECENT DIASTOLIC BLOOD PRESSURE < 80 MM HG: ICD-10-PCS | Mod: CPTII,,, | Performed by: PHYSICIAN ASSISTANT

## 2022-08-11 PROCEDURE — 99999 PR PBB SHADOW E&M-EST. PATIENT-LVL V: ICD-10-PCS | Mod: PBBFAC,,, | Performed by: PHYSICIAN ASSISTANT

## 2022-08-11 PROCEDURE — 3075F PR MOST RECENT SYSTOLIC BLOOD PRESS GE 130-139MM HG: ICD-10-PCS | Mod: CPTII,,, | Performed by: PHYSICIAN ASSISTANT

## 2022-08-11 PROCEDURE — 4010F ACE/ARB THERAPY RXD/TAKEN: CPT | Mod: CPTII,,, | Performed by: PHYSICIAN ASSISTANT

## 2022-08-11 PROCEDURE — 3008F BODY MASS INDEX DOCD: CPT | Mod: CPTII,,, | Performed by: PHYSICIAN ASSISTANT

## 2022-08-11 PROCEDURE — 99999 PR PBB SHADOW E&M-EST. PATIENT-LVL V: CPT | Mod: PBBFAC,,, | Performed by: PHYSICIAN ASSISTANT

## 2022-08-11 PROCEDURE — 99213 OFFICE O/P EST LOW 20 MIN: CPT | Mod: S$PBB,,, | Performed by: PHYSICIAN ASSISTANT

## 2022-08-11 PROCEDURE — 3075F SYST BP GE 130 - 139MM HG: CPT | Mod: CPTII,,, | Performed by: PHYSICIAN ASSISTANT

## 2022-08-11 PROCEDURE — 1159F MED LIST DOCD IN RCRD: CPT | Mod: CPTII,,, | Performed by: PHYSICIAN ASSISTANT

## 2022-08-11 PROCEDURE — 1159F PR MEDICATION LIST DOCUMENTED IN MEDICAL RECORD: ICD-10-PCS | Mod: CPTII,,, | Performed by: PHYSICIAN ASSISTANT

## 2022-08-11 PROCEDURE — 36415 COLL VENOUS BLD VENIPUNCTURE: CPT | Performed by: PHYSICIAN ASSISTANT

## 2022-08-11 PROCEDURE — 80048 BASIC METABOLIC PNL TOTAL CA: CPT | Performed by: PHYSICIAN ASSISTANT

## 2022-08-11 PROCEDURE — 3044F PR MOST RECENT HEMOGLOBIN A1C LEVEL <7.0%: ICD-10-PCS | Mod: CPTII,,, | Performed by: PHYSICIAN ASSISTANT

## 2022-08-11 PROCEDURE — 3078F DIAST BP <80 MM HG: CPT | Mod: CPTII,,, | Performed by: PHYSICIAN ASSISTANT

## 2022-08-11 PROCEDURE — 3044F HG A1C LEVEL LT 7.0%: CPT | Mod: CPTII,,, | Performed by: PHYSICIAN ASSISTANT

## 2022-08-11 PROCEDURE — 99213 PR OFFICE/OUTPT VISIT, EST, LEVL III, 20-29 MIN: ICD-10-PCS | Mod: S$PBB,,, | Performed by: PHYSICIAN ASSISTANT

## 2022-08-11 PROCEDURE — 99215 OFFICE O/P EST HI 40 MIN: CPT | Mod: PBBFAC | Performed by: PHYSICIAN ASSISTANT

## 2022-08-11 PROCEDURE — 3008F PR BODY MASS INDEX (BMI) DOCUMENTED: ICD-10-PCS | Mod: CPTII,,, | Performed by: PHYSICIAN ASSISTANT

## 2022-08-11 PROCEDURE — 4010F PR ACE/ARB THEARPY RXD/TAKEN: ICD-10-PCS | Mod: CPTII,,, | Performed by: PHYSICIAN ASSISTANT

## 2022-08-11 NOTE — PROGRESS NOTES
Subjective:       Patient ID: Bianca Amato is a 42 y.o. female.    Chief Complaint: Results      Patient Care Team:  Vivi Chand MD as PCP - General (Family Medicine)  Manish Gandhi MD (Obstetrics and Gynecology)  Ann-Marie Johnston LPN as Care Coordinator (Internal Medicine)  Manish Gandhi MD (Obstetrics and Gynecology)  Vivi Chand MD as Hypertension Digital Medicine Responsible Provider (Family Medicine)  Esther Yadav PharmD as Hypertension Digital Medicine Clinician (Pharmacist)  Shawna Esteban PA-C as Physician Assistant (Internal Medicine)  [facility] as Hypertension Digital Medicine Contract  Kalani Newellsera as Digital Medicine Health     HPI    Bianca Amato is a 42 y.o. female who presents today with complaints of Results  Patient with no complaints today but requesting results of her recent abdominal x-ray.    Review of Systems   Constitutional: Negative for chills and fever.   Respiratory: Negative for shortness of breath.    Cardiovascular: Negative for chest pain.   Gastrointestinal: Negative for abdominal pain.       Objective:      Physical Exam  Vitals and nursing note reviewed.   Constitutional:       General: She is not in acute distress.     Appearance: She is well-developed.   HENT:      Head: Normocephalic and atraumatic.   Eyes:      General: Lids are normal. No scleral icterus.     Extraocular Movements: Extraocular movements intact.      Conjunctiva/sclera: Conjunctivae normal.   Pulmonary:      Effort: Pulmonary effort is normal.   Neurological:      Mental Status: She is alert.      Cranial Nerves: No cranial nerve deficit.   Psychiatric:         Mood and Affect: Mood and affect normal.         Assessment:       1. Lower abdominal pain Active   2. Essential hypertension    3. Prediabetes        Plan:   1. Lower abdominal pain  Comments:  Resolved    2. Essential hypertension  -     Basic Metabolic Panel    3. Prediabetes  -     Hemoglobin A1C    Abdominal  x-ray was normal, patient informed.    Non fasting labs today for upcoming 6 month follow-up appointment.

## 2022-09-04 ENCOUNTER — PATIENT MESSAGE (OUTPATIENT)
Dept: ADMINISTRATIVE | Facility: OTHER | Age: 42
End: 2022-09-04
Payer: MEDICAID

## 2022-09-12 ENCOUNTER — PATIENT MESSAGE (OUTPATIENT)
Dept: OTOLARYNGOLOGY | Facility: CLINIC | Age: 42
End: 2022-09-12
Payer: MEDICAID

## 2022-09-12 NOTE — TELEPHONE ENCOUNTER
Patient advised that she would not need a  to have the NM thyroid scan. This should not alter her state for driving.

## 2022-09-15 ENCOUNTER — HOSPITAL ENCOUNTER (OUTPATIENT)
Dept: RADIOLOGY | Facility: HOSPITAL | Age: 42
Discharge: HOME | End: 2022-09-15
Attending: OTOLARYNGOLOGY
Payer: MEDICAID

## 2022-09-15 DIAGNOSIS — E05.90 SUBCLINICAL HYPERTHYROIDISM: ICD-10-CM

## 2022-09-15 PROCEDURE — 78014 NM THYROID UPTAKE AND SCAN: ICD-10-PCS | Mod: 26,,, | Performed by: RADIOLOGY

## 2022-09-15 PROCEDURE — A9516 IODINE I-123 SOD IODIDE MIC: HCPCS

## 2022-09-15 PROCEDURE — 78014 THYROID IMAGING W/BLOOD FLOW: CPT | Mod: 26,,, | Performed by: RADIOLOGY

## 2022-09-16 ENCOUNTER — HOSPITAL ENCOUNTER (OUTPATIENT)
Dept: RADIOLOGY | Facility: HOSPITAL | Age: 42
Discharge: HOME | End: 2022-09-16
Attending: OTOLARYNGOLOGY
Payer: MEDICAID

## 2022-09-19 ENCOUNTER — PATIENT MESSAGE (OUTPATIENT)
Dept: OTOLARYNGOLOGY | Facility: CLINIC | Age: 42
End: 2022-09-19
Payer: MEDICAID

## 2022-09-19 ENCOUNTER — TELEPHONE (OUTPATIENT)
Dept: OTOLARYNGOLOGY | Facility: CLINIC | Age: 42
End: 2022-09-19
Payer: MEDICAID

## 2022-09-19 DIAGNOSIS — E04.1 THYROID NODULE: Primary | ICD-10-CM

## 2022-09-19 NOTE — TELEPHONE ENCOUNTER
----- Message from Wisam Oreilly MD sent at 9/19/2022  2:07 PM CDT -----  Please let them know that the thyroid scan looks OK, but she does need biopsy.  Please set up FNA with radiology.

## 2022-09-22 ENCOUNTER — OFFICE VISIT (OUTPATIENT)
Dept: OTOLARYNGOLOGY | Facility: CLINIC | Age: 42
End: 2022-09-22
Payer: MEDICAID

## 2022-09-22 ENCOUNTER — OFFICE VISIT (OUTPATIENT)
Dept: INTERNAL MEDICINE | Facility: CLINIC | Age: 42
End: 2022-09-22
Payer: MEDICAID

## 2022-09-22 ENCOUNTER — TELEPHONE (OUTPATIENT)
Dept: OTOLARYNGOLOGY | Facility: CLINIC | Age: 42
End: 2022-09-22
Payer: MEDICAID

## 2022-09-22 VITALS
BODY MASS INDEX: 34.61 KG/M2 | SYSTOLIC BLOOD PRESSURE: 126 MMHG | RESPIRATION RATE: 17 BRPM | TEMPERATURE: 98 F | HEART RATE: 80 BPM | OXYGEN SATURATION: 98 % | HEIGHT: 62 IN | DIASTOLIC BLOOD PRESSURE: 82 MMHG | WEIGHT: 188.06 LBS

## 2022-09-22 VITALS — BODY MASS INDEX: 34.88 KG/M2 | TEMPERATURE: 98 F | WEIGHT: 190.69 LBS

## 2022-09-22 DIAGNOSIS — R73.03 PREDIABETES: ICD-10-CM

## 2022-09-22 DIAGNOSIS — Z11.3 SCREENING EXAMINATION FOR STD (SEXUALLY TRANSMITTED DISEASE): ICD-10-CM

## 2022-09-22 DIAGNOSIS — Z99.89 DEPENDENCE ON OTHER ENABLING MACHINES AND DEVICES: ICD-10-CM

## 2022-09-22 DIAGNOSIS — Z01.419 VISIT FOR PELVIC EXAM: ICD-10-CM

## 2022-09-22 DIAGNOSIS — I10 ESSENTIAL HYPERTENSION: Primary | ICD-10-CM

## 2022-09-22 DIAGNOSIS — H61.23 BILATERAL IMPACTED CERUMEN: Primary | ICD-10-CM

## 2022-09-22 DIAGNOSIS — R39.9 URINARY SYMPTOM OR SIGN: ICD-10-CM

## 2022-09-22 DIAGNOSIS — G47.33 OSA ON CPAP: ICD-10-CM

## 2022-09-22 DIAGNOSIS — J45.20 MILD INTERMITTENT ASTHMA WITHOUT COMPLICATION: ICD-10-CM

## 2022-09-22 DIAGNOSIS — L30.4 INTERTRIGO: ICD-10-CM

## 2022-09-22 PROBLEM — E66.812 CLASS 2 SEVERE OBESITY DUE TO EXCESS CALORIES WITH SERIOUS COMORBIDITY AND BODY MASS INDEX (BMI) OF 36.0 TO 36.9 IN ADULT: Status: RESOLVED | Noted: 2020-07-28 | Resolved: 2022-09-22

## 2022-09-22 PROBLEM — E66.01 CLASS 2 SEVERE OBESITY DUE TO EXCESS CALORIES WITH SERIOUS COMORBIDITY AND BODY MASS INDEX (BMI) OF 36.0 TO 36.9 IN ADULT: Status: RESOLVED | Noted: 2020-07-28 | Resolved: 2022-09-22

## 2022-09-22 PROCEDURE — 99214 OFFICE O/P EST MOD 30 MIN: CPT | Mod: S$PBB,,, | Performed by: PHYSICIAN ASSISTANT

## 2022-09-22 PROCEDURE — 99215 OFFICE O/P EST HI 40 MIN: CPT | Mod: PBBFAC | Performed by: PHYSICIAN ASSISTANT

## 2022-09-22 PROCEDURE — 99214 PR OFFICE/OUTPT VISIT, EST, LEVL IV, 30-39 MIN: ICD-10-PCS | Mod: S$PBB,,, | Performed by: PHYSICIAN ASSISTANT

## 2022-09-22 PROCEDURE — 3008F PR BODY MASS INDEX (BMI) DOCUMENTED: ICD-10-PCS | Mod: CPTII,,, | Performed by: PHYSICIAN ASSISTANT

## 2022-09-22 PROCEDURE — 99499 NO LOS: ICD-10-PCS | Mod: S$PBB,,, | Performed by: PHYSICIAN ASSISTANT

## 2022-09-22 PROCEDURE — 1159F MED LIST DOCD IN RCRD: CPT | Mod: CPTII,,, | Performed by: PHYSICIAN ASSISTANT

## 2022-09-22 PROCEDURE — 4010F PR ACE/ARB THEARPY RXD/TAKEN: ICD-10-PCS | Mod: CPTII,,, | Performed by: PHYSICIAN ASSISTANT

## 2022-09-22 PROCEDURE — 69210 PR REMOVAL IMPACTED CERUMEN REQUIRING INSTRUMENTATION, UNILATERAL: ICD-10-PCS | Mod: S$PBB,,, | Performed by: PHYSICIAN ASSISTANT

## 2022-09-22 PROCEDURE — 99499 UNLISTED E&M SERVICE: CPT | Mod: S$PBB,,, | Performed by: PHYSICIAN ASSISTANT

## 2022-09-22 PROCEDURE — 4010F ACE/ARB THERAPY RXD/TAKEN: CPT | Mod: CPTII,,, | Performed by: PHYSICIAN ASSISTANT

## 2022-09-22 PROCEDURE — 3074F SYST BP LT 130 MM HG: CPT | Mod: CPTII,,, | Performed by: PHYSICIAN ASSISTANT

## 2022-09-22 PROCEDURE — 69210 REMOVE IMPACTED EAR WAX UNI: CPT | Mod: S$PBB,,, | Performed by: PHYSICIAN ASSISTANT

## 2022-09-22 PROCEDURE — 3079F PR MOST RECENT DIASTOLIC BLOOD PRESSURE 80-89 MM HG: ICD-10-PCS | Mod: CPTII,,, | Performed by: PHYSICIAN ASSISTANT

## 2022-09-22 PROCEDURE — 3079F DIAST BP 80-89 MM HG: CPT | Mod: CPTII,,, | Performed by: PHYSICIAN ASSISTANT

## 2022-09-22 PROCEDURE — 3008F BODY MASS INDEX DOCD: CPT | Mod: CPTII,,, | Performed by: PHYSICIAN ASSISTANT

## 2022-09-22 PROCEDURE — 99213 OFFICE O/P EST LOW 20 MIN: CPT | Mod: PBBFAC,27 | Performed by: PHYSICIAN ASSISTANT

## 2022-09-22 PROCEDURE — 99999 PR PBB SHADOW E&M-EST. PATIENT-LVL III: ICD-10-PCS | Mod: PBBFAC,,, | Performed by: PHYSICIAN ASSISTANT

## 2022-09-22 PROCEDURE — 69210 REMOVE IMPACTED EAR WAX UNI: CPT | Mod: PBBFAC | Performed by: PHYSICIAN ASSISTANT

## 2022-09-22 PROCEDURE — 99999 PR PBB SHADOW E&M-EST. PATIENT-LVL III: CPT | Mod: PBBFAC,,, | Performed by: PHYSICIAN ASSISTANT

## 2022-09-22 PROCEDURE — 3044F PR MOST RECENT HEMOGLOBIN A1C LEVEL <7.0%: ICD-10-PCS | Mod: CPTII,,, | Performed by: PHYSICIAN ASSISTANT

## 2022-09-22 PROCEDURE — 99999 PR PBB SHADOW E&M-EST. PATIENT-LVL V: CPT | Mod: PBBFAC,,, | Performed by: PHYSICIAN ASSISTANT

## 2022-09-22 PROCEDURE — 3044F HG A1C LEVEL LT 7.0%: CPT | Mod: CPTII,,, | Performed by: PHYSICIAN ASSISTANT

## 2022-09-22 PROCEDURE — 1159F PR MEDICATION LIST DOCUMENTED IN MEDICAL RECORD: ICD-10-PCS | Mod: CPTII,,, | Performed by: PHYSICIAN ASSISTANT

## 2022-09-22 PROCEDURE — 3074F PR MOST RECENT SYSTOLIC BLOOD PRESSURE < 130 MM HG: ICD-10-PCS | Mod: CPTII,,, | Performed by: PHYSICIAN ASSISTANT

## 2022-09-22 PROCEDURE — 99999 PR PBB SHADOW E&M-EST. PATIENT-LVL V: ICD-10-PCS | Mod: PBBFAC,,, | Performed by: PHYSICIAN ASSISTANT

## 2022-09-22 RX ORDER — CLOTRIMAZOLE 1 %
CREAM (GRAM) TOPICAL 2 TIMES DAILY
Qty: 90 G | Refills: 5 | Status: SHIPPED | OUTPATIENT
Start: 2022-09-22 | End: 2022-12-21 | Stop reason: SDUPTHER

## 2022-09-22 NOTE — ASSESSMENT & PLAN NOTE
/82, controlled, continue amlodipine, HCTZ, losartan  Lab Results   Component Value Date     08/11/2022    K 4.3 08/11/2022    BUN 13 08/11/2022    CREATININE 0.6 08/11/2022    ESTGFRAFRICA >60.0 02/08/2022    EGFRNONAA >60.0 02/08/2022    EGFRNORACEVR >60.0 08/11/2022

## 2022-09-22 NOTE — H&P (VIEW-ONLY)
Subjective:   Cerumen impactions     Patient ID: Bianca Amato is a 42 y.o. female.    Chief Complaint:  Excessive ear wax     Bianca Amato is a 42 y.o. female here to see me today for evaluation of a possible wax impaction in bilateral ears.  She has complaints of hearing loss in the affected ears, but denies pain or drainage.  This has been an issue in the past.  The patient has not been using any sort of ear drop to soften the wax.  She usually comes in every 4-6 months for ear cleaning.  Last here with me for ear cleaning in 6/2022.    HPI  Review of Systems   HENT: Positive for hearing loss. Negative for ear discharge, ear pain and tinnitus.        Objective:     Physical Exam   HENT:   Right Ear: External ear and ear canal normal. Decreased hearing is noted.   Left Ear: External ear and ear canal normal. Decreased hearing is noted.   Bilateral complete cerumen impactions, removal described below       Procedure Note    CHIEF COMPLAINT:  Cerumen Impaction    Description:  The patient was seated in an exam chair.  An ear speculum was placed in the right EAC and was examined under the microscope.  Suction and/or loop curettes were used to remove a large cerumen impaction.  The tympanic membrane was visualized and was normal in appearance.  The procedure was repeated on the left side in a similar fashion.  The TM was intact and normal on this side as well.  The patient tolerated the procedure well.           Assessment:     1. Bilateral impacted cerumen        Plan:     1.  Cerumen impaction:  Removed today without difficulty.  I would recommend the use of a wax softening drop, either over the counter Debrox or mineral oil, on a weekly basis.  I also instructed the patient to avoid Qtips.  RTC in 6 months for ear cleaning.

## 2022-09-22 NOTE — PROGRESS NOTES
Subjective:       Patient ID: Bianca Amato is a 42 y.o. female.    Chief Complaint: Follow-up (6 month follow up)      Patient Care Team:  Vivi Chand MD as PCP - General (Family Medicine)  Manish Gandhi MD (Obstetrics and Gynecology)  Ann-Marie Johnston LPN as Care Coordinator (Internal Medicine)  Manish Gandhi MD (Obstetrics and Gynecology)  Vivi Chand MD as Hypertension Digital Medicine Responsible Provider (Family Medicine)  Esther Yadav PharmD as Hypertension Digital Medicine Clinician (Pharmacist)  Shawna Esteban PA-C as Physician Assistant (Internal Medicine)  [employer] as Hypertension Digital Medicine Contract  Kalani Jesica as Digital Medicine Health     Patient presents to clinic today for followup of chronic conditions.      Review of Systems   Constitutional:  Negative for chills, fatigue, fever and unexpected weight change.   Eyes:  Negative for visual disturbance.   Respiratory:  Negative for shortness of breath.    Cardiovascular:  Negative for chest pain.   Musculoskeletal:  Negative for myalgias.   Neurological:  Negative for headaches.     Objective:      Physical Exam  Vitals and nursing note reviewed.   Constitutional:       General: She is not in acute distress.     Appearance: She is well-developed.   HENT:      Head: Normocephalic and atraumatic.   Eyes:      General: Lids are normal. No scleral icterus.     Extraocular Movements: Extraocular movements intact.      Conjunctiva/sclera: Conjunctivae normal.   Cardiovascular:      Rate and Rhythm: Normal rate and regular rhythm.   Pulmonary:      Effort: Pulmonary effort is normal.      Breath sounds: Normal breath sounds. No decreased breath sounds, wheezing, rhonchi or rales.   Neurological:      Mental Status: She is alert.      Cranial Nerves: No cranial nerve deficit.   Psychiatric:         Mood and Affect: Mood and affect normal.       Assessment:       1. Essential hypertension    2. Prediabetes    3. Mild  intermittent asthma without complication    4. DONNY on CPAP    5. Visit for pelvic exam    6. Urinary symptom or sign    7. Intertrigo    8. Screening examination for STD (sexually transmitted disease)    9. Dependence on other enabling machines and devices          Plan:   1. Essential hypertension  Assessment & Plan:  /82, controlled, continue amlodipine, HCTZ, losartan  Lab Results   Component Value Date     08/11/2022    K 4.3 08/11/2022    BUN 13 08/11/2022    CREATININE 0.6 08/11/2022    ESTGFRAFRICA >60.0 02/08/2022    EGFRNONAA >60.0 02/08/2022    EGFRNORACEVR >60.0 08/11/2022       Orders:  -     Comprehensive Metabolic Panel  -     Lipid Panel  -     TSH  -     CBC Auto Differential    2. Prediabetes  Assessment & Plan:  Stable, diet controlled  Lab Results   Component Value Date    HGBA1C 5.4 08/11/2022    HGBA1C 5.7 (H) 02/08/2022    LDLCALC 128.2 02/08/2022       Orders:  -     Hemoglobin A1C    3. Mild intermittent asthma without complication  Overview:  Followed by Pulmonology, continue current treatment plan    Assessment & Plan:  Stable on albuterol PRN      4. DONNY on CPAP  Overview:  2/10/2021 Home Sleep Study 1 night study  MILD/BORDERLINE OBSTRUCTIVE SLEEP APNEA with overall AHI 9.3/hr ( 59 events): night #1  Oxygen desaturation: 79%. SpO2 between 85% to 89% for 1 min.  Patient snored 99% time above 50 .  Heart rate range: 80 bpm - 107 bpm  On Auto CPAP 5-20 cm   1/19/2022 changed to Auto CPAP 5-12 cm   Full face mask      5. Visit for pelvic exam  -     Ambulatory referral/consult to Obstetrics / Gynecology    6. Urinary symptom or sign    7. Intertrigo  -     clotrimazole (LOTRIMIN) 1 % cream    8. Screening examination for STD (sexually transmitted disease)  -     Urinalysis, Reflex to Urine Culture Urine, Clean Catch  -     C. trachomatis/N. gonorrhoeae by AMP DNA [facility]; Urine  -     Trichomonas vaginalis, RNA, Qual, Urine    9. Dependence on other enabling machines and  devices  Overview:  CPAP        Offered flu vaccine but declined  Requesting STD screening but no symptoms  Recent labs reviewed with patient.    Annual with Dr. Chand scheduled with fasting labs [facility]  Health Maintenance reviewed/updated.

## 2022-09-22 NOTE — Clinical Note
Please schedule 6 month ear cleaning at [facility] (prefers AM) and she will see it in MyChart.  Thanks

## 2022-09-22 NOTE — ASSESSMENT & PLAN NOTE
Stable, diet controlled  Lab Results   Component Value Date    HGBA1C 5.4 08/11/2022    HGBA1C 5.7 (H) 02/08/2022    LDLCALC 128.2 02/08/2022

## 2022-09-27 ENCOUNTER — HOSPITAL ENCOUNTER (OUTPATIENT)
Dept: RADIOLOGY | Facility: HOSPITAL | Age: 42
Discharge: HOME | End: 2022-09-27
Attending: OTOLARYNGOLOGY
Payer: MEDICAID

## 2022-09-27 DIAGNOSIS — E04.1 THYROID NODULE: ICD-10-CM

## 2022-09-27 PROCEDURE — 88173 PR  INTERPRETATION OF FNA SMEAR: ICD-10-PCS | Mod: 26,,, | Performed by: PATHOLOGY

## 2022-09-27 PROCEDURE — 88173 CYTOPATH EVAL FNA REPORT: CPT | Mod: 26,,, | Performed by: PATHOLOGY

## 2022-09-27 PROCEDURE — 88173 CYTOPATH EVAL FNA REPORT: CPT | Performed by: PATHOLOGY

## 2022-09-27 PROCEDURE — 10005 FNA BX W/US GDN 1ST LES: CPT

## 2022-09-27 NOTE — DISCHARGE SUMMARY
[facility] (Hospital)  Discharge Note  Short Stay    * No surgery found *    OUTCOME: Patient tolerated treatment/procedure well without complication and is now ready for discharge.    DISPOSITION: Home or Self Care    FINAL DIAGNOSIS:  <principal problem not specified>    FOLLOWUP: In clinic    DISCHARGE INSTRUCTIONS:  No discharge procedures on file.     TIME SPENT ON DISCHARGE: 15 minutes   Sweta Hill is a 29 year old female presenting to [facility] care.  She moved here from [place] in June and so I do not have any old records.  She has had an issue with some pelvic pain and went to urgent care a couple weeks ago was diagnosed with bacterial vaginosis and was treated for it.  The pain worsened and she went to the emergency room.  Workup per patient showed a portal vein or pelvic vein thrombosis.  She was started on Lovenox and has already seen Hematology.  I do not have access to any of those records at this time.  Will have her signs we can get those records.  No recent long car trips or traveling.  She had been on birth control pills but stopped after going to the emergency room.  She has been taking her Lovenox without issues but wanted to get here when somebody sees her.  She was also placed on Prilosec for 2 weeks.  Her heartburn and abdominal discomfort have been improving a little each day.  She is eating and having normal bowel movements    Hypertension-no shortness of breath or chest pain    Asthma-controlled    She has had 2 negative covid tests    REVIEW OF SYSTEMS:  CONSTITUTIONAL:  Denies unintentional weight change, fatigue, fever, problematic headaches.   EYES:  Denies visual blurring, double vision.   ENT/O/P:  Denies nosebleeds (epistaxis), trouble swallowing, hoarseness and chronic sinus or nasal problems, dysphagia.   CARDIOVASCULAR:  Denies chest discomfort with exertion, SOB, CURTIS, orthopnea, palpitations.  RESPIRATORY:  Denies sputum, hemoptysis and cough, SOB, change in exercise tolerance.   GASTROINTESTINAL:  Denies change in bowel habits, BRBPR, hematochezia, melena.  As above  GENITOURINARY:  Denies dysuria and frequency.  As above  MUSCULOSKELETAL:  Denies joint pain, muscle aches.   SKIN:  Denies concerns, changing moles.   NEUROLOGIC:  Denies headaches or weakness or clumsiness of one limb, part of a limb or side of body, transient receptive or expressive  aphasia, numbness or tingling.   PSYCHIATRIC:  denies anxiety, denies depression and SI/HI.  ENDOCRINE:  Denies cold intolerance, heat intolerance, polyphagia, polydipsia, polyuria.   HEMATOLOGIC/LYMPHATIC:  Denies abnormal bruising or bleeding, lumps or bumps.   ALLERGY/IMMUNOLOGIC:  Denies chronic sinus problems, chronic nasal problems.     Past Medical History:   Diagnosis Date   • Abnormal Pap smear of cervix 2018   • Asthma 1998   • Bacterial vaginitis 09/2020   • Chicken pox    • Hypertension 2018   • Portal vein thrombosis 09/2020     Past Surgical History:   Procedure Laterality Date   • Leg tendon surgery Left 05/2013    L tendon avulsion sx     Current Outpatient Medications   Medication   • enoxaparin (LOVENOX) 80 MG/0.8ML injectable solution   • HYDROCHLOROTHIAZIDE PO   • lisinopril (ZESTRIL) 10 MG tablet   • fluticasone-salmeterol (Advair Diskus) 250-50 MCG/DOSE inhaler   • IPRATROPIUM-ALBUTEROL IN   • fluticasone-salmeterol (Advair Diskus) 250-50 MCG/DOSE inhaler   • UNKNOWN TO PATIENT     No current facility-administered medications for this visit.      ALLERGIES:  Patient has no known allergies.  Family History   Problem Relation Age of Onset   • Hyperlipidemia Mother    • Hypertension Mother    • Kidney disease Father         stones   • Heart disease Maternal Grandfather    • Cancer, Colon Paternal Grandmother      Social History     Socioeconomic History   • Marital status: Single     Spouse name: Not on file   • Number of children: Not on file   • Years of education: Not on file   • Highest education level: Not on file   Occupational History   • Not on file   Social Needs   • Financial resource strain: Not on file   • Food insecurity     Worry: Not on file     Inability: Not on file   • Transportation needs     Medical: Not on file     Non-medical: Not on file   Tobacco Use   • Smoking status: Never Smoker   • Smokeless tobacco: Never Used   Substance and Sexual Activity   • Alcohol use: Yes      Comment: occasionally   • Drug use: Not on file   • Sexual activity: Yes     Birth control/protection: Pill   Lifestyle   • Physical activity     Days per week: Not on file     Minutes per session: Not on file   • Stress: Not on file   Relationships   • Social connections     Talks on phone: Not on file     Gets together: Not on file     Attends Orthodoxy service: Not on file     Active member of club or organization: Not on file     Attends meetings of clubs or organizations: Not on file     Relationship status: Not on file   • Intimate partner violence     Fear of current or ex partner: Not on file     Emotionally abused: Not on file     Physically abused: Not on file     Forced sexual activity: Not on file   Other Topics Concern   • Not on file   Social History Narrative   • Not on file       Blood pressure 118/70, pulse 96, height 5' 5.35\" (1.66 m), weight 73 kg, last menstrual period 09/30/2020.  PHYSICAL EXAMINATION:  GENERAL:  Pt appears stated age, is in no apparent distress and is well developed and well nourished. Wearing mask  SKIN:  Skin color, texture, turgor are normal.  There are no bruises, rashes noted on exposed skin. tattoo   HEAD:  Normocephalic.  No masses, lesions, tenderness or abnormalities.  EYES:  Normal, PERRL, EOM's intact, sclerae anicteric and conjunctivae not injected.  EARS:  External ears normal to inspection and palpation, turgor normal, canals clear, TM's clear, normal light reflex, able to hear normal conversation.  NOSE:  Nose shows no deformity, asymmetry or inflammation, no sinus tenderness.  MOUTH:  Lips, mucosa and tongue normal.  Teeth and gums normal.  Oropharynx clear.  MMM.  NECK:  Supple, no adenopathy, no thyromegaly.  Carotid pulses 2+ equal and no carotid bruits.  LUNGS:  No respiratory distress, chest symmetric with normal A/P diameter, no chest deformities noted, normal respiratory rate and rhythm, lungs clear to auscultation.  HEART:  Regular rate and rhythm, S1  normal, S2 normal.  ABDOMEN:  Benign, soft, non-tender,not distended.  Bowel sounds normoactive.  EXTREMITIES:  Normal, no cyanosis, clubbing and no edema.  NEUROLOGIC:  CN III-XII intact.      ASSESSMENT:  Physical exam, routine  (primary encounter diagnosis)  Comment:  She has had a lot of lab work done will try to get records from here and [place].  Will try to get old immunization records and testing also.    Plan:  Continue meds and follow up with Hematology.  History of abnormal Paps in the past only to follow up with OB Gyne.    Essential hypertension  Comment:  Blood pressure is good  Plan:  Continue meds    Mild persistent asthma with exacerbation  Comment:  Stable  Plan:  Continue meds    PVT (portal vein thrombosis)  Comment:  Continue Lovenox  Plan:  She has follow-up with Hematology and may transition to a pill.  Stay off ocp      General health issues discussed with patient.  Avoidance of smoking, alcohol.  Diet and exercise encouraged.  All questions answered, patient advised to call office with any future concerns.

## 2022-10-03 LAB
FINAL PATHOLOGIC DIAGNOSIS: NORMAL
Lab: NORMAL

## 2022-10-07 ENCOUNTER — OFFICE VISIT (OUTPATIENT)
Dept: OPHTHALMOLOGY | Facility: CLINIC | Age: 42
End: 2022-10-07
Payer: MEDICAID

## 2022-10-07 ENCOUNTER — PATIENT MESSAGE (OUTPATIENT)
Dept: OPHTHALMOLOGY | Facility: CLINIC | Age: 42
End: 2022-10-07

## 2022-10-07 DIAGNOSIS — I10 ESSENTIAL HYPERTENSION: Primary | ICD-10-CM

## 2022-10-07 DIAGNOSIS — H52.7 REFRACTIVE ERRORS: ICD-10-CM

## 2022-10-07 DIAGNOSIS — R73.03 PREDIABETES: ICD-10-CM

## 2022-10-07 PROCEDURE — 4010F ACE/ARB THERAPY RXD/TAKEN: CPT | Mod: CPTII,,, | Performed by: OPTOMETRIST

## 2022-10-07 PROCEDURE — 99999 PR PBB SHADOW E&M-EST. PATIENT-LVL III: CPT | Mod: PBBFAC,,, | Performed by: OPTOMETRIST

## 2022-10-07 PROCEDURE — 1159F PR MEDICATION LIST DOCUMENTED IN MEDICAL RECORD: ICD-10-PCS | Mod: CPTII,,, | Performed by: OPTOMETRIST

## 2022-10-07 PROCEDURE — 3044F HG A1C LEVEL LT 7.0%: CPT | Mod: CPTII,,, | Performed by: OPTOMETRIST

## 2022-10-07 PROCEDURE — 4010F PR ACE/ARB THEARPY RXD/TAKEN: ICD-10-PCS | Mod: CPTII,,, | Performed by: OPTOMETRIST

## 2022-10-07 PROCEDURE — 92004 COMPRE OPH EXAM NEW PT 1/>: CPT | Mod: S$PBB,,, | Performed by: OPTOMETRIST

## 2022-10-07 PROCEDURE — 92015 DETERMINE REFRACTIVE STATE: CPT | Mod: ,,, | Performed by: OPTOMETRIST

## 2022-10-07 PROCEDURE — 99999 PR PBB SHADOW E&M-EST. PATIENT-LVL III: ICD-10-PCS | Mod: PBBFAC,,, | Performed by: OPTOMETRIST

## 2022-10-07 PROCEDURE — 92015 PR REFRACTION: ICD-10-PCS | Mod: ,,, | Performed by: OPTOMETRIST

## 2022-10-07 PROCEDURE — 92004 PR EYE EXAM, NEW PATIENT,COMPREHESV: ICD-10-PCS | Mod: S$PBB,,, | Performed by: OPTOMETRIST

## 2022-10-07 PROCEDURE — 3044F PR MOST RECENT HEMOGLOBIN A1C LEVEL <7.0%: ICD-10-PCS | Mod: CPTII,,, | Performed by: OPTOMETRIST

## 2022-10-07 PROCEDURE — 2023F DILAT RTA XM W/O RTNOPTHY: CPT | Mod: CPTII,,, | Performed by: OPTOMETRIST

## 2022-10-07 PROCEDURE — 99213 OFFICE O/P EST LOW 20 MIN: CPT | Mod: PBBFAC | Performed by: OPTOMETRIST

## 2022-10-07 PROCEDURE — 2023F PR DILATED RETINAL EXAM W/O EVID OF RETINOPATHY: ICD-10-PCS | Mod: CPTII,,, | Performed by: OPTOMETRIST

## 2022-10-07 PROCEDURE — 1159F MED LIST DOCD IN RCRD: CPT | Mod: CPTII,,, | Performed by: OPTOMETRIST

## 2022-10-07 NOTE — PROGRESS NOTES
HPI    Pre-diabetic  Patient was seeing floaters in both eyes x 2 weeks ago.  No floaters today  New patient last eye exam not sure.  Update glasses RX.  Lab Results       Component                Value               Date                       HGBA1C                   5.4                 08/11/2022            Last edited by Manolo Rodriguez, OD on 10/7/2022  2:09 PM.            Assessment /Plan     For exam results, see Encounter Report.    Essential hypertension  -     Ambulatory referral/consult to Optometry    Prediabetes    Refractive errors      No HTN Retinopathy    No Background Diabetic Retinopathy    Dispense Final Rx for glasses or may use OTC glasses.  RTC 1 year  Discussed above and answered questions.

## 2022-11-29 ENCOUNTER — PATIENT MESSAGE (OUTPATIENT)
Dept: PHARMACY | Facility: CLINIC | Age: 42
End: 2022-11-29
Payer: MEDICAID

## 2022-12-07 ENCOUNTER — PATIENT OUTREACH (OUTPATIENT)
Dept: ADMINISTRATIVE | Facility: HOSPITAL | Age: 42
End: 2022-12-07
Payer: MEDICAID

## 2022-12-07 ENCOUNTER — OFFICE VISIT (OUTPATIENT)
Dept: PULMONOLOGY | Facility: CLINIC | Age: 42
End: 2022-12-07
Payer: MEDICAID

## 2022-12-07 VITALS
DIASTOLIC BLOOD PRESSURE: 80 MMHG | WEIGHT: 190.94 LBS | RESPIRATION RATE: 18 BRPM | HEART RATE: 70 BPM | SYSTOLIC BLOOD PRESSURE: 123 MMHG | HEIGHT: 62 IN | BODY MASS INDEX: 35.14 KG/M2 | OXYGEN SATURATION: 98 %

## 2022-12-07 DIAGNOSIS — E66.9 CLASS 1 OBESITY WITH SERIOUS COMORBIDITY AND BODY MASS INDEX (BMI) OF 34.0 TO 34.9 IN ADULT, UNSPECIFIED OBESITY TYPE: ICD-10-CM

## 2022-12-07 DIAGNOSIS — R73.03 PREDIABETES: ICD-10-CM

## 2022-12-07 DIAGNOSIS — I10 ESSENTIAL HYPERTENSION: ICD-10-CM

## 2022-12-07 DIAGNOSIS — G47.33 OSA ON CPAP: Primary | ICD-10-CM

## 2022-12-07 DIAGNOSIS — J45.20 MILD INTERMITTENT ASTHMA WITHOUT COMPLICATION: ICD-10-CM

## 2022-12-07 PROCEDURE — 3074F PR MOST RECENT SYSTOLIC BLOOD PRESSURE < 130 MM HG: ICD-10-PCS | Mod: CPTII,,, | Performed by: NURSE PRACTITIONER

## 2022-12-07 PROCEDURE — 3079F PR MOST RECENT DIASTOLIC BLOOD PRESSURE 80-89 MM HG: ICD-10-PCS | Mod: CPTII,,, | Performed by: NURSE PRACTITIONER

## 2022-12-07 PROCEDURE — 3079F DIAST BP 80-89 MM HG: CPT | Mod: CPTII,,, | Performed by: NURSE PRACTITIONER

## 2022-12-07 PROCEDURE — 99214 OFFICE O/P EST MOD 30 MIN: CPT | Mod: PBBFAC | Performed by: NURSE PRACTITIONER

## 2022-12-07 PROCEDURE — 99999 PR PBB SHADOW E&M-EST. PATIENT-LVL IV: ICD-10-PCS | Mod: PBBFAC,,, | Performed by: NURSE PRACTITIONER

## 2022-12-07 PROCEDURE — 3044F HG A1C LEVEL LT 7.0%: CPT | Mod: CPTII,,, | Performed by: NURSE PRACTITIONER

## 2022-12-07 PROCEDURE — 1159F MED LIST DOCD IN RCRD: CPT | Mod: CPTII,,, | Performed by: NURSE PRACTITIONER

## 2022-12-07 PROCEDURE — 1159F PR MEDICATION LIST DOCUMENTED IN MEDICAL RECORD: ICD-10-PCS | Mod: CPTII,,, | Performed by: NURSE PRACTITIONER

## 2022-12-07 PROCEDURE — 3044F PR MOST RECENT HEMOGLOBIN A1C LEVEL <7.0%: ICD-10-PCS | Mod: CPTII,,, | Performed by: NURSE PRACTITIONER

## 2022-12-07 PROCEDURE — 99214 OFFICE O/P EST MOD 30 MIN: CPT | Mod: S$PBB,,, | Performed by: NURSE PRACTITIONER

## 2022-12-07 PROCEDURE — 3074F SYST BP LT 130 MM HG: CPT | Mod: CPTII,,, | Performed by: NURSE PRACTITIONER

## 2022-12-07 PROCEDURE — 1160F PR REVIEW ALL MEDS BY PRESCRIBER/CLIN PHARMACIST DOCUMENTED: ICD-10-PCS | Mod: CPTII,,, | Performed by: NURSE PRACTITIONER

## 2022-12-07 PROCEDURE — 3008F BODY MASS INDEX DOCD: CPT | Mod: CPTII,,, | Performed by: NURSE PRACTITIONER

## 2022-12-07 PROCEDURE — 4010F ACE/ARB THERAPY RXD/TAKEN: CPT | Mod: CPTII,,, | Performed by: NURSE PRACTITIONER

## 2022-12-07 PROCEDURE — 4010F PR ACE/ARB THEARPY RXD/TAKEN: ICD-10-PCS | Mod: CPTII,,, | Performed by: NURSE PRACTITIONER

## 2022-12-07 PROCEDURE — 1160F RVW MEDS BY RX/DR IN RCRD: CPT | Mod: CPTII,,, | Performed by: NURSE PRACTITIONER

## 2022-12-07 PROCEDURE — 99999 PR PBB SHADOW E&M-EST. PATIENT-LVL IV: CPT | Mod: PBBFAC,,, | Performed by: NURSE PRACTITIONER

## 2022-12-07 PROCEDURE — 99214 PR OFFICE/OUTPT VISIT, EST, LEVL IV, 30-39 MIN: ICD-10-PCS | Mod: S$PBB,,, | Performed by: NURSE PRACTITIONER

## 2022-12-07 PROCEDURE — 3008F PR BODY MASS INDEX (BMI) DOCUMENTED: ICD-10-PCS | Mod: CPTII,,, | Performed by: NURSE PRACTITIONER

## 2022-12-07 RX ORDER — ALBUTEROL SULFATE 90 UG/1
2 AEROSOL, METERED RESPIRATORY (INHALATION) EVERY 4 HOURS PRN
Qty: 18 G | Refills: 11 | Status: SHIPPED | OUTPATIENT
Start: 2022-12-07 | End: 2023-12-11 | Stop reason: SDUPTHER

## 2022-12-07 NOTE — PROGRESS NOTES
Subjective:      Patient ID: Bianca Amato is a 42 y.o. female.    Chief Complaint: Asthma and Sleep Apnea    HPI: Bianca Amato presents to clinic for follow up for DONNY with CPAP complaince assessment.  She is on Auto CPAP of 5-12 cmH2O pressure   Patient reports benefit from CPAP use. Helped her sleep longer on nights tolerated.   She is motivation to work on adherence to CPAP use.   Has not used CPAP since Dec 2021.   Patient reports  has not tired CPAP after change of settings from 5-20 to 5-12 cm 6 months ago. Prior complained of feeling bloated with CPAP.    Full face mask is used.        2/10/2021 Home Sleep Study 1 night study  MILD/BORDERLINE OBSTRUCTIVE SLEEP APNEA with overall AHI 9.3/hr ( 59 events): night #1  Oxygen desaturation: 79%. SpO2 between 85% to 89% for 1 min.  Patient snored 99% time above 50 .  Heart rate range: 80 bpm - 107 bpm    Compliance  Payor Standard  Usage 09/21/2021 - 12/19/2021  Usage days 7/90 days (8%)  >= 4 hours 1 days (1%)  < 4 hours 6 days (7%)  Usage hours 12 hours 17 minutes  Average usage (total days) 8 minutes  Average usage (days used) 1 hours 45 minutes  Median usage (days used) 1 hours 34 minutes  Total used hours (value since last reset - 12/19/2021) 287 hours  AirSense 10 AutoSet  Serial number [id number]  Mode AutoSet  Min Pressure 5 cmH2O  Max Pressure 20 cmH2O  EPR Fulltime  EPR level 3  Response Standard  Therapy  Pressure - cmH2O Median: 7.0 95th percentile: 12.1 Maximum: 13.2  Leaks - L/min Median: 1.2 95th percentile: 3.6 Maximum: 6.9  Events per hour AI: 1.0 HI: 0.0 AHI: 1.0  Apnea Index Central: 0.0 Obstructive: 1.0 Unknown: 0.0  RERA Index 0.5    Asthma  Mild intermittent controlled on occasional use Albuterol inhaler or albuterol nebs   There is no persistent cough, no wheezing, no shortness of breath, no hemoptysis, no sputum production, no weight loss, noted TB exposure, no asbestos exposure, no chest pain.   Triggers: strong  purfumes, wearing certain face covering mask.     7/27/2021 spirometry was normal. FEV1 81.2%     Previous Report Reviewed: lab reports and office notes     Past Medical History: The following portions of the patient's history were reviewed and updated as appropriate:   She  has a past surgical history that includes Tubal ligation; Surgical removal of mass of axilla (Right, 11/12/2018); Eye foreign body removal (Right); Robot-assisted laparoscopic abdominal hysterectomy using da Kaushik Xi (N/A, 12/06/2019); Robot-assisted surgical removal of fallopian tube using da Kaushik Xi (Bilateral, 12/06/2019); Hysterectomy (2020); Eye surgery (2009); and Oophorectomy.  Her family history includes Asthma in her father; COPD in her father; Diabetes in her mother; Hypertension in her father; Sarcoidosis in her mother.  She  reports that she has never smoked. She has never used smokeless tobacco. She reports that she does not drink alcohol and does not use drugs.  She has a current medication list which includes the following prescription(s): albuterol, amlodipine, clotrimazole, hydrochlorothiazide, losartan, nebulizer and compressor, nystatin, ketoconazole, and levocetirizine.  She is allergic to latex; latex, natural rubber; penicillins; and sulfa (sulfonamide antibiotics)..    The following portions of the patient's history were reviewed and updated as appropriate: allergies, current medications, past family history, past medical history, past social history, past surgical history and problem list.    Review of Systems   Constitutional:  Negative for fever, chills, weight loss, weight gain, activity change, appetite change, fatigue and night sweats.   HENT:  Negative for postnasal drip, rhinorrhea, sinus pressure, voice change and congestion.    Eyes:  Negative for redness and itching.   Respiratory:  Negative for snoring, cough, sputum production, chest tightness, shortness of breath, wheezing, orthopnea, asthma nighttime  "symptoms, dyspnea on extertion, use of rescue inhaler and somnolence.    Cardiovascular: Negative.  Negative for chest pain, palpitations and leg swelling.   Genitourinary:  Negative for difficulty urinating and hematuria.   Endocrine:  Negative for cold intolerance and heat intolerance.    Musculoskeletal:  Negative for arthralgias, gait problem, joint swelling and myalgias.   Skin: Negative.    Gastrointestinal:  Negative for nausea, vomiting, abdominal pain and acid reflux.   Neurological:  Negative for dizziness, weakness, light-headedness and headaches.   Hematological:  Negative for adenopathy. No excessive bruising.   All other systems reviewed and are negative.   Objective:   /80   Pulse 70   Resp 18   Ht 5' 2" (1.575 m)   Wt 86.6 kg (190 lb 14.7 oz)   LMP 11/07/2019 (Approximate)   SpO2 98%   BMI 34.92 kg/m²   Physical Exam  Vitals and nursing note reviewed.   Constitutional:       General: She is not in acute distress.     Appearance: She is well-developed. She is obese. She is not ill-appearing or toxic-appearing.   HENT:      Head: Normocephalic.      Right Ear: External ear normal.      Left Ear: External ear normal.      Nose: Nose normal.      Mouth/Throat:      Pharynx: No oropharyngeal exudate.   Eyes:      Conjunctiva/sclera: Conjunctivae normal.   Cardiovascular:      Rate and Rhythm: Normal rate and regular rhythm.      Heart sounds: Normal heart sounds.   Pulmonary:      Effort: Pulmonary effort is normal.      Breath sounds: Normal breath sounds. No stridor.   Abdominal:      Palpations: Abdomen is soft.   Musculoskeletal:         General: Normal range of motion.      Cervical back: Normal range of motion and neck supple.   Lymphadenopathy:      Cervical: No cervical adenopathy.   Skin:     General: Skin is warm and dry.   Neurological:      Mental Status: She is alert and oriented to person, place, and time.   Psychiatric:         Behavior: Behavior normal. Behavior is " cooperative.         Thought Content: Thought content normal.         Judgment: Judgment normal.       Personal Diagnostic Review  CPAP download    2/10/2021 Home Sleep Study 1 night study  MILD/BORDERLINE OBSTRUCTIVE SLEEP APNEA with overall AHI 9.3/hr ( 59 events): night #1  Oxygen desaturation: 79%. SpO2 between 85% to 89% for 1 min.  Patient snored 99% time above 50 .  Heart rate range: 80 bpm - 107 bpm    EXAMINATION:  XR CHEST AP PORTABLE     CLINICAL HISTORY:  Acute chest pain, Chest Pain;     COMPARISON:  None     FINDINGS:  Heart size is normal. The lung fields are clear. No acute cardiopulmonary infiltrate.     Impression:     No acute findings.        Electronically signed by: Mk Amaya MD  Date:                                            07/18/2020  Time:                                           15:39        US FNA Biopsy w/ Imaging 1st Lesion  Narrative: EXAMINATION:  US FINE NEEDLE ASPIRATION BIOPSY, FIRST LESION    CLINICAL HISTORY:  Nontoxic single thyroid nodule    TECHNIQUE:  Real-time ultrasonography was utilized to perform a fine needle aspiration.  Grayscale and color Doppler spot images were obtained.    COMPARISON:  Ultrasound thyroid dated 07/26/2022 and nuclear medicine study 09/15/2022    FINDINGS:  The patient was informed of the risks, benefits, and alternatives to the procedure and had  questions answered. The patient demonstrated verbal understanding and signed the informed consent.    The patient was then prepped and draped in a sterile fashion and local anesthesia was achieved via a 1% lidocaine solution.    Using sonographic guidance, a 22-gauge needle was then inserted into the left thyroid nodule and a sample was returned. Four passes were made.  The patient tolerated the procedure without an immediate complication.  Impression: Ultrasound guided fine-needle aspiration of a left thyroid nodule    Electronically signed by: Deo Mcclendon  MD  Date:    09/28/2022  Time:    07:50        Assessment:     1. DONNY on CPAP    2. Mild intermittent asthma without complication    3. Essential hypertension    4. Class 1 obesity with serious comorbidity and body mass index (BMI) of 34.0 to 34.9 in adult, unspecified obesity type    5. Prediabetes          Orders Placed This Encounter   Procedures    CPAP/BIPAP SUPPLIES     Benefits and compliant  90 day supply. 4 refills  HME: [facility]     Order Specific Question:   Length of need (1-99 months):     Answer:   99     Order Specific Question:   Choose ONE mask type and its corresponding cushions and/or pillows:     Answer:    Full Face Mask, 1 per 90 days:  Full Face Cushion, (3 per 90 days)     Order Specific Question:   Choose EITHER Heated or Non-Heated Tubjing     Answer:    Non-Heated Tubing, 1 per 90 days     Order Specific Question:   Number of Days Needed:     Answer:   99     Order Specific Question:   All other supplies as needed as listed below:     Answer:    Headgear, 1 per 180 days     Order Specific Question:   All other supplies as needed as listed below:     Answer:    Chin Strap, 1 per 180 days     Order Specific Question:   All other supplies as needed as listed below:     Answer:    Disposable Filter, 6 per 90 days     Order Specific Question:   All other supplies as needed as listed below:     Answer:    Humidifier Chamber, 1 per 180 days     Order Specific Question:   All other supplies as needed as listed below:     Answer:    Non-Disposable Filter, 1 per 180 days    X-Ray Chest PA And Lateral     Standing Status:   Future     Standing Expiration Date:   12/8/2024     Order Specific Question:   Is the patient pregnant?     Answer:   No     Order Specific Question:   May the Radiologist modify the order per protocol to meet the clinical needs of the patient?     Answer:   Yes     Order Specific Question:   Release to patient     Answer:   Immediate     Spirometry with/without bronchodilator     Standing Status:   Future     Standing Expiration Date:   12/7/2024     Order Specific Question:   Release to patient     Answer:   Immediate       Plan:     Problem List Items Addressed This Visit       Prediabetes     Managed by primary care provider  Hemoglobin A1C   Date Value Ref Range Status   08/11/2022 5.4 4.0 - 5.6 % Final     Comment:     ADA Screening Guidelines:  5.7-6.4%  Consistent with prediabetes  >or=6.5%  Consistent with diabetes    High levels of fetal hemoglobin interfere with the HbA1C  assay. Heterozygous hemoglobin variants (HbS, HgC, etc)do  not significantly interfere with this assay.   However, presence of multiple variants may affect accuracy.     02/08/2022 5.7 (H) 4.0 - 5.6 % Final     Comment:     ADA Screening Guidelines:  5.7-6.4%  Consistent with prediabetes  >or=6.5%  Consistent with diabetes    High levels of fetal hemoglobin interfere with the HbA1C  assay. Heterozygous hemoglobin variants (HbS, HgC, etc)do  not significantly interfere with this assay.   However, presence of multiple variants may affect accuracy.                DONNY on CPAP - Primary     On auto CPAP 5 -12 cm   Full face mask  Adherence     Risk of untreated sleep apnea reviewed: Left untreated, sleep apnea can have serious and life-shortening consequences: depression, headaches, diabetes, high blood pressure, heart disease, heart failure, irregular heart beats, and heart attacks, stroke, automobile accidents caused by falling asleep at the wheel and low productivity at work and at home.         (DME) - [facility]  Reviewed therapeutic goals for positive airway pressure therapy Auto CPAP  Ideal is usage 100% of nights for 6 - 8 hours per night. Minimum usage is 70% of night for at least 4 hours per night used.            Relevant Orders    CPAP/BIPAP SUPPLIES    Mild intermittent asthma without complication     Stable on albuterol inhaler or albuterol nebs prn   The current  medical regimen is effective;  continue present plan and medications.  Follow up annually mark/cxr          Relevant Medications    albuterol (VENTOLIN HFA) 90 mcg/actuation inhaler    Other Relevant Orders    Spirometry with/without bronchodilator    X-Ray Chest PA And Lateral    Essential hypertension     Stable and controlled. Continue current treatment plan as previously prescribed with your PCP.              Class 1 obesity with serious comorbidity and body mass index (BMI) of 34.0 to 34.9 in adult     Encouraged calorie reduction and 30 minutes of exercise daily. Discussed impact of obesity on general health.  Wt Readings from Last 9 Encounters:   12/07/22 86.6 kg (190 lb 14.7 oz)   09/22/22 86.5 kg (190 lb 11.2 oz)   09/22/22 85.3 kg (188 lb 0.8 oz)   08/11/22 84.6 kg (186 lb 8.2 oz)   08/08/22 86.5 kg (190 lb 11.2 oz)   08/02/22 83.9 kg (184 lb 15.5 oz)   07/29/22 83.9 kg (184 lb 15.5 oz)   07/12/22 82.6 kg (182 lb 1.6 oz)   05/26/22 82.6 kg (182 lb 1.6 oz)   Body mass index is 34.92 kg/m².              Follow up in about 1 year (around 12/7/2023) for CPAP 1 year compliance download. Asthma, w/review mark/cxr.

## 2022-12-07 NOTE — ASSESSMENT & PLAN NOTE
Managed by primary care provider  Hemoglobin A1C   Date Value Ref Range Status   08/11/2022 5.4 4.0 - 5.6 % Final     Comment:     ADA Screening Guidelines:  5.7-6.4%  Consistent with prediabetes  >or=6.5%  Consistent with diabetes    High levels of fetal hemoglobin interfere with the HbA1C  assay. Heterozygous hemoglobin variants (HbS, HgC, etc)do  not significantly interfere with this assay.   However, presence of multiple variants may affect accuracy.     02/08/2022 5.7 (H) 4.0 - 5.6 % Final     Comment:     ADA Screening Guidelines:  5.7-6.4%  Consistent with prediabetes  >or=6.5%  Consistent with diabetes    High levels of fetal hemoglobin interfere with the HbA1C  assay. Heterozygous hemoglobin variants (HbS, HgC, etc)do  not significantly interfere with this assay.   However, presence of multiple variants may affect accuracy.

## 2022-12-07 NOTE — ASSESSMENT & PLAN NOTE
On auto CPAP 5 -12 cm   Full face mask  Adherence     Risk of untreated sleep apnea reviewed: Left untreated, sleep apnea can have serious and life-shortening consequences: depression, headaches, diabetes, high blood pressure, heart disease, heart failure, irregular heart beats, and heart attacks, stroke, automobile accidents caused by falling asleep at the wheel and low productivity at work and at home.         (DME) - [facility]  Reviewed therapeutic goals for positive airway pressure therapy Auto CPAP  Ideal is usage 100% of nights for 6 - 8 hours per night. Minimum usage is 70% of night for at least 4 hours per night used.

## 2022-12-07 NOTE — ASSESSMENT & PLAN NOTE
Stable on albuterol inhaler or albuterol nebs prn   The current medical regimen is effective;  continue present plan and medications.  Follow up annually mark/cxr

## 2022-12-07 NOTE — ASSESSMENT & PLAN NOTE
Encouraged calorie reduction and 30 minutes of exercise daily. Discussed impact of obesity on general health.  Wt Readings from Last 9 Encounters:   12/07/22 86.6 kg (190 lb 14.7 oz)   09/22/22 86.5 kg (190 lb 11.2 oz)   09/22/22 85.3 kg (188 lb 0.8 oz)   08/11/22 84.6 kg (186 lb 8.2 oz)   08/08/22 86.5 kg (190 lb 11.2 oz)   08/02/22 83.9 kg (184 lb 15.5 oz)   07/29/22 83.9 kg (184 lb 15.5 oz)   07/12/22 82.6 kg (182 lb 1.6 oz)   05/26/22 82.6 kg (182 lb 1.6 oz)   Body mass index is 34.92 kg/m².

## 2022-12-19 ENCOUNTER — PATIENT MESSAGE (OUTPATIENT)
Dept: PHARMACY | Facility: CLINIC | Age: 42
End: 2022-12-19
Payer: MEDICAID

## 2022-12-20 ENCOUNTER — PATIENT MESSAGE (OUTPATIENT)
Dept: INTERNAL MEDICINE | Facility: CLINIC | Age: 42
End: 2022-12-20
Payer: MEDICAID

## 2022-12-21 ENCOUNTER — OFFICE VISIT (OUTPATIENT)
Dept: INTERNAL MEDICINE | Facility: CLINIC | Age: 42
End: 2022-12-21
Payer: MEDICAID

## 2022-12-21 VITALS
WEIGHT: 190.13 LBS | DIASTOLIC BLOOD PRESSURE: 82 MMHG | BODY MASS INDEX: 31.68 KG/M2 | TEMPERATURE: 96 F | OXYGEN SATURATION: 99 % | RESPIRATION RATE: 15 BRPM | HEIGHT: 65 IN | HEART RATE: 90 BPM | SYSTOLIC BLOOD PRESSURE: 130 MMHG

## 2022-12-21 DIAGNOSIS — B37.2 CANDIDAL SKIN INFECTION: Primary | ICD-10-CM

## 2022-12-21 DIAGNOSIS — N62 MACROMASTIA: ICD-10-CM

## 2022-12-21 PROCEDURE — 3008F BODY MASS INDEX DOCD: CPT | Mod: CPTII,,, | Performed by: PHYSICIAN ASSISTANT

## 2022-12-21 PROCEDURE — 3008F PR BODY MASS INDEX (BMI) DOCUMENTED: ICD-10-PCS | Mod: CPTII,,, | Performed by: PHYSICIAN ASSISTANT

## 2022-12-21 PROCEDURE — 99213 OFFICE O/P EST LOW 20 MIN: CPT | Mod: S$PBB,,, | Performed by: PHYSICIAN ASSISTANT

## 2022-12-21 PROCEDURE — 99213 PR OFFICE/OUTPT VISIT, EST, LEVL III, 20-29 MIN: ICD-10-PCS | Mod: S$PBB,,, | Performed by: PHYSICIAN ASSISTANT

## 2022-12-21 PROCEDURE — 1159F PR MEDICATION LIST DOCUMENTED IN MEDICAL RECORD: ICD-10-PCS | Mod: CPTII,,, | Performed by: PHYSICIAN ASSISTANT

## 2022-12-21 PROCEDURE — 3075F SYST BP GE 130 - 139MM HG: CPT | Mod: CPTII,,, | Performed by: PHYSICIAN ASSISTANT

## 2022-12-21 PROCEDURE — 3075F PR MOST RECENT SYSTOLIC BLOOD PRESS GE 130-139MM HG: ICD-10-PCS | Mod: CPTII,,, | Performed by: PHYSICIAN ASSISTANT

## 2022-12-21 PROCEDURE — 1159F MED LIST DOCD IN RCRD: CPT | Mod: CPTII,,, | Performed by: PHYSICIAN ASSISTANT

## 2022-12-21 PROCEDURE — 3044F PR MOST RECENT HEMOGLOBIN A1C LEVEL <7.0%: ICD-10-PCS | Mod: CPTII,,, | Performed by: PHYSICIAN ASSISTANT

## 2022-12-21 PROCEDURE — 4010F ACE/ARB THERAPY RXD/TAKEN: CPT | Mod: CPTII,,, | Performed by: PHYSICIAN ASSISTANT

## 2022-12-21 PROCEDURE — 99214 OFFICE O/P EST MOD 30 MIN: CPT | Mod: PBBFAC | Performed by: PHYSICIAN ASSISTANT

## 2022-12-21 PROCEDURE — 1160F PR REVIEW ALL MEDS BY PRESCRIBER/CLIN PHARMACIST DOCUMENTED: ICD-10-PCS | Mod: CPTII,,, | Performed by: PHYSICIAN ASSISTANT

## 2022-12-21 PROCEDURE — 3044F HG A1C LEVEL LT 7.0%: CPT | Mod: CPTII,,, | Performed by: PHYSICIAN ASSISTANT

## 2022-12-21 PROCEDURE — 3079F PR MOST RECENT DIASTOLIC BLOOD PRESSURE 80-89 MM HG: ICD-10-PCS | Mod: CPTII,,, | Performed by: PHYSICIAN ASSISTANT

## 2022-12-21 PROCEDURE — 3079F DIAST BP 80-89 MM HG: CPT | Mod: CPTII,,, | Performed by: PHYSICIAN ASSISTANT

## 2022-12-21 PROCEDURE — 4010F PR ACE/ARB THEARPY RXD/TAKEN: ICD-10-PCS | Mod: CPTII,,, | Performed by: PHYSICIAN ASSISTANT

## 2022-12-21 PROCEDURE — 1160F RVW MEDS BY RX/DR IN RCRD: CPT | Mod: CPTII,,, | Performed by: PHYSICIAN ASSISTANT

## 2022-12-21 PROCEDURE — 99999 PR PBB SHADOW E&M-EST. PATIENT-LVL IV: CPT | Mod: PBBFAC,,, | Performed by: PHYSICIAN ASSISTANT

## 2022-12-21 PROCEDURE — 99999 PR PBB SHADOW E&M-EST. PATIENT-LVL IV: ICD-10-PCS | Mod: PBBFAC,,, | Performed by: PHYSICIAN ASSISTANT

## 2022-12-21 RX ORDER — CLOTRIMAZOLE 1 %
CREAM (GRAM) TOPICAL 2 TIMES DAILY
Qty: 90 G | Refills: 5 | Status: SHIPPED | OUTPATIENT
Start: 2022-12-21 | End: 2024-01-09

## 2022-12-21 RX ORDER — FLUCONAZOLE 150 MG/1
150 TABLET ORAL
Qty: 4 TABLET | Refills: 0 | Status: SHIPPED | OUTPATIENT
Start: 2022-12-21 | End: 2023-01-12

## 2022-12-21 NOTE — Clinical Note
Please fax external plastic surgery referral for Medicaid insurance - consult excel list sent by [facility] for any plastics consults.

## 2022-12-22 NOTE — PROGRESS NOTES
Subjective:       Patient ID: Bianca Amato is a 42 y.o. female.    Chief Complaint: Rash (Under breast. More irritated on the left side.)      Rash  Pertinent negatives include no fever.     Bianca Amato is a 42 y.o. female who presents today with complaints of Rash (Under breast. More irritated on the left side.)  Report burning and irritation under left breast with accompanying rash. Has been using nystatin powder and clotrimazole cream without relief. Denies fever or drainage.     Review of Systems   Constitutional:  Negative for chills and fever.   Skin:  Positive for rash.     Objective:      Physical Exam  Vitals and nursing note reviewed.   Constitutional:       General: She is not in acute distress.     Appearance: She is well-developed.   HENT:      Head: Normocephalic and atraumatic.   Eyes:      General: Lids are normal. No scleral icterus.     Extraocular Movements: Extraocular movements intact.      Conjunctiva/sclera: Conjunctivae normal.   Pulmonary:      Effort: Pulmonary effort is normal.   Skin:         Neurological:      Mental Status: She is alert.      Cranial Nerves: No cranial nerve deficit.   Psychiatric:         Mood and Affect: Mood and affect normal.       Assessment:       1. Candidal skin infection    2. Macromastia          Plan:   1. Candidal skin infection  -     fluconazole (DIFLUCAN) 150 MG Tab; Take 1 tab by mouth now and again in 3 days for 4 doses  Dispense: 4 tablet; Refill: 0  -     clotrimazole (LOTRIMIN) 1 % cream; Apply topically 2 (two) times daily. Until rash is clear for 7 days  Dispense: 90 g; Refill: 5    2. Macromastia  -     Ambulatory referral/consult to Plastic Surgery; Future; Expected date: 12/29/2022      Stressed importance of keep area dry and skin from touching skin. Refer to derm if

## 2022-12-28 ENCOUNTER — PATIENT MESSAGE (OUTPATIENT)
Dept: INTERNAL MEDICINE | Facility: CLINIC | Age: 42
End: 2022-12-28
Payer: MEDICAID

## 2023-01-09 DIAGNOSIS — J01.90 ACUTE SINUSITIS, RECURRENCE NOT SPECIFIED, UNSPECIFIED LOCATION: ICD-10-CM

## 2023-01-09 RX ORDER — LEVOCETIRIZINE DIHYDROCHLORIDE 5 MG/1
5 TABLET, FILM COATED ORAL NIGHTLY
Qty: 30 TABLET | Refills: 2 | Status: SHIPPED | OUTPATIENT
Start: 2023-01-09 | End: 2023-08-14 | Stop reason: SDUPTHER

## 2023-01-09 NOTE — TELEPHONE ENCOUNTER
Refill Routing Note   Medication(s) are not appropriate for processing by [facility] for the following reason(s):      - Non-participating provider    [facility] action(s):  Route          Medication reconciliation completed: No     Appointments  past 12m or future 3m with PCP    Date Provider   Last Visit   3/17/2022 Vivi Chand MD   Next Visit   3/23/2023 Vivi Chand MD   ED visits in past 90 days: 0        Note composed:3:39 PM 01/09/2023

## 2023-01-19 ENCOUNTER — TELEPHONE (OUTPATIENT)
Dept: PRIMARY CARE CLINIC | Facility: CLINIC | Age: 43
End: 2023-01-19
Payer: MEDICAID

## 2023-01-19 ENCOUNTER — HOSPITAL ENCOUNTER (EMERGENCY)
Facility: HOSPITAL | Age: 43
Discharge: HOME | End: 2023-01-19
Attending: EMERGENCY MEDICINE
Payer: MEDICAID

## 2023-01-19 VITALS
RESPIRATION RATE: 16 BRPM | TEMPERATURE: 98 F | OXYGEN SATURATION: 96 % | DIASTOLIC BLOOD PRESSURE: 94 MMHG | WEIGHT: 192.69 LBS | BODY MASS INDEX: 32.06 KG/M2 | SYSTOLIC BLOOD PRESSURE: 144 MMHG | HEART RATE: 94 BPM

## 2023-01-19 DIAGNOSIS — J06.9 VIRAL URI WITH COUGH: Primary | ICD-10-CM

## 2023-01-19 LAB
INFLUENZA A, MOLECULAR: NEGATIVE
INFLUENZA B, MOLECULAR: NEGATIVE
SARS-COV-2 RDRP RESP QL NAA+PROBE: NEGATIVE
SPECIMEN SOURCE: NORMAL

## 2023-01-19 PROCEDURE — U0002 COVID-19 LAB TEST NON-CDC: HCPCS | Performed by: EMERGENCY MEDICINE

## 2023-01-19 PROCEDURE — 99282 EMERGENCY DEPT VISIT SF MDM: CPT

## 2023-01-19 PROCEDURE — 87502 INFLUENZA DNA AMP PROBE: CPT | Performed by: EMERGENCY MEDICINE

## 2023-01-19 NOTE — ED PROVIDER NOTES
History      Chief Complaint   Patient presents with    Nasal Congestion     Cough, congestion, and body aches since Saturday.        Review of patient's allergies indicates:   Allergen Reactions    Latex Rash and Shortness Of Breath    Latex, natural rubber Hives    Penicillins Hives, Rash and Shortness Of Breath    Sulfa (sulfonamide antibiotics) Hives, Swelling, Rash and Shortness Of Breath        HPI   HPI    1/19/2023, 9:39 AM   History obtained from the patient      History of Present Illness: Bianca Amato is a 42 y.o. female patient who presents to the Emergency Department for nasal congestion, runny nose, cough for few days. Denies f/n/v, neck stiffness or facial pain.  Symptoms are constant and moderate in severity.  No further complaints or concerns at this time.           PCP: Vivi Chand MD       Past Medical History:  Past Medical History:   Diagnosis Date    Carpal tunnel syndrome     bilateral    Hypertension     Iron deficiency anemia     Mild intermittent asthma          Past Surgical History:  Past Surgical History:   Procedure Laterality Date    EYE FOREIGN BODY REMOVAL Right     EYE SURGERY  2009    Im not sure what year    HYSTERECTOMY  2020    OOPHORECTOMY      ROBOT-ASSISTED LAPAROSCOPIC ABDOMINAL HYSTERECTOMY USING DA ALYSA XI N/A 12/06/2019    Procedure: XI ROBOTIC HYSTERECTOMY;  Surgeon: VENESSA Davidson MD;  Location: [facility] OR;  Service: OB/GYN;  Laterality: N/A;    ROBOT-ASSISTED SURGICAL REMOVAL OF FALLOPIAN TUBE USING DA ALYSA XI Bilateral 12/06/2019    Procedure: XI ROBOTIC SALPINGECTOMY;  Surgeon: VENESSA Davidson MD;  Location: [facility] OR;  Service: OB/GYN;  Laterality: Bilateral;    SURGICAL REMOVAL OF MASS OF AXILLA Right 11/12/2018    Procedure: EXCISION, MASS, AXILLARY;  Surgeon: Alis Tian MD;  Location: [facility] OR;  Service: General;  Laterality: Right;    TUBAL LIGATION             Family History:  Family History   Problem Relation Age of Onset     Diabetes Mother     Sarcoidosis Mother     COPD Father     Hypertension Father     Asthma Father     Breast cancer Neg Hx     Colon cancer Neg Hx     Ovarian cancer Neg Hx     Thrombosis Neg Hx            Social History:  Social History     Tobacco Use    Smoking status: Never    Smokeless tobacco: Never   Substance and Sexual Activity    Alcohol use: No    Drug use: No    Sexual activity: Yes     Partners: Male     Birth control/protection: None     Comment: Sometimes used condoms       ROS   Review of Systems   Constitutional: Negative for activity change, appetite change, chills and fever.   HENT: Positive for congestion and rhinorrhea. Negative for ear discharge, facial swelling and trouble swallowing.    Eyes: Negative for pain, discharge and visual disturbance.   Respiratory: Negative for chest tightness and shortness of breath.    Cardiovascular: Negative for chest pain and palpitations.   Gastrointestinal: Negative for abdominal distention, abdominal pain and vomiting.   Endocrine: Negative for cold intolerance and heat intolerance.   Genitourinary: Negative for dysuria, flank pain and hematuria.   Musculoskeletal: Negative for neck pain and neck stiffness.   Skin: Negative for color change, pallor and rash.   Neurological: Negative for syncope and weakness.   Hematological: Does not bruise/bleed easily.   All other systems reviewed and are negative.    Review of Systems    Physical Exam      Initial Vitals [01/19/23 0739]   BP Pulse Resp Temp SpO2   (!) 144/94 94 16 97.9 °F (36.6 °C) 96 %      MAP       --         Physical Exam  Vital signs and nursing notes reviewed.  Constitutional: Patient is in NAD. Awake and alert. Well-developed and well-nourished.  Head: Atraumatic. Normocephalic.  Eyes: PERRL. EOM intact. Conjunctivae nl. No scleral icterus.  ENT: Mucous membranes are moist. Oropharynx is clear.  Nasal congestion.  No facial ttp or edema.  Neck: Supple. No JVD. No lymphadenopathy.  No  meningismus  Cardiovascular: Regular rate and rhythm. No murmurs, rubs, or gallops. Distal pulses are 2+ and symmetric.  Pulmonary/Chest: No respiratory distress. Clear to auscultation bilaterally. No wheezing, rales, or rhonchi.  Abdominal: Soft. Non-distended. No TTP. No rebound, guarding, or rigidity. Good bowel sounds.  Genitourinary: No CVA tenderness  Musculoskeletal: Moves all extremities. No edema.   Skin: Warm and dry.  Neurological: Awake and alert. No acute focal neurological deficits are appreciated.  Psychiatric: Normal affect. Good eye contact. Appropriate in content.      ED Course      Procedures  ED Vital Signs:  Vitals:    01/19/23 0739   BP: (!) 144/94   Pulse: 94   Resp: 16   Temp: 97.9 °F (36.6 °C)   TempSrc: Oral   SpO2: 96%   Weight: 87.4 kg (192 lb 10.9 oz)         Results for orders placed or performed during the hospital encounter of 01/19/23   Influenza A & B by Molecular    Specimen: Nasopharyngeal Swab   Result Value Ref Range    Influenza A, Molecular Negative Negative    Influenza B, Molecular Negative Negative    Flu A & B Source Nasal swab    COVID-19 Rapid Screening   Result Value Ref Range    SARS-CoV-2 RNA, Amplification, Qual Negative Negative             Imaging Results:  Imaging Results    None            The Emergency Provider reviewed the vital signs and test results, which are outlined above.    ED Discussion             Medication(s) given in the ER:  Medications - No data to display         Follow-up Information       Vivi Chand MD In 2 days.    Specialty: Family Medicine  Contact information:  [address] [place] [address] [address] [place] [id number] [phone]                                 Discharge Medication List as of 1/19/2023  8:28 AM             Medical Decision Making    Medical Decision Making:   Clinical Tests:   Lab Tests: Ordered and Reviewed       <> Summary of Lab: Covid, influenza   MDM                 Clinical Impression:        ICD-10-CM ICD-9-CM   1.  Viral URI with cough  J06.9 465.9            Disposition  Stable  Discharged       Jackie Milner PA-C  01/19/23 0982

## 2023-01-19 NOTE — TELEPHONE ENCOUNTER
----- Message from Dona Marshall sent at 1/19/2023  8:43 AM CST -----  Contact: Bianca  Type:  Same Day Appointment Request    Caller is requesting a same day appointment.  Caller declined first available appointment listed below.    Name of Caller:Bianca   When is the first available appointment?no appt, pt has Medicaid   Symptoms:Chest pain, went to ER this morning   Best Call Back Number:[phone]  Additional Information:

## 2023-01-19 NOTE — TELEPHONE ENCOUNTER
Returned call to patient in regards to same day appointment for chest pain. Patient states she went to ER last night due to having symptoms of cough, congestion, dizziness, fatigue. Patient states she was tested negative  for Covid, Flu and was requesting medication for cough. Informed patient that she can be scheduled the earliest available for new patient but advised to report back to ER immediately in the event she is having chest pain for assessment and treatment. Also gave information for nearest UC to her location, but strongly advised to report back to the ER for chest pains. She voiced understanding.

## 2023-02-02 ENCOUNTER — PATIENT MESSAGE (OUTPATIENT)
Dept: ADMINISTRATIVE | Facility: HOSPITAL | Age: 43
End: 2023-02-02
Payer: MEDICAID

## 2023-02-07 ENCOUNTER — PATIENT MESSAGE (OUTPATIENT)
Dept: INTERNAL MEDICINE | Facility: CLINIC | Age: 43
End: 2023-02-07
Payer: MEDICAID

## 2023-02-08 ENCOUNTER — TELEPHONE (OUTPATIENT)
Dept: INTERNAL MEDICINE | Facility: CLINIC | Age: 43
End: 2023-02-08
Payer: MEDICAID

## 2023-02-08 NOTE — TELEPHONE ENCOUNTER
----- Message from Remedios Greer sent at 2/8/2023  3:18 PM CST -----  Contact: [phone]  Pt is calling in regards to rescheduling her appt. Pt would like to be seen tomorrow if possible in the morning. Please call pt back at [phone]. Thanks KB

## 2023-02-09 ENCOUNTER — PATIENT MESSAGE (OUTPATIENT)
Dept: INTERNAL MEDICINE | Facility: CLINIC | Age: 43
End: 2023-02-09
Payer: MEDICAID

## 2023-02-10 ENCOUNTER — PATIENT MESSAGE (OUTPATIENT)
Dept: INTERNAL MEDICINE | Facility: CLINIC | Age: 43
End: 2023-02-10
Payer: MEDICAID

## 2023-02-14 ENCOUNTER — OFFICE VISIT (OUTPATIENT)
Dept: INTERNAL MEDICINE | Facility: CLINIC | Age: 43
End: 2023-02-14
Payer: MEDICAID

## 2023-02-14 VITALS
OXYGEN SATURATION: 98 % | SYSTOLIC BLOOD PRESSURE: 124 MMHG | TEMPERATURE: 98 F | WEIGHT: 190.13 LBS | DIASTOLIC BLOOD PRESSURE: 84 MMHG | BODY MASS INDEX: 31.68 KG/M2 | HEART RATE: 88 BPM | HEIGHT: 65 IN

## 2023-02-14 DIAGNOSIS — M54.6 ACUTE MIDLINE THORACIC BACK PAIN: Primary | ICD-10-CM

## 2023-02-14 PROCEDURE — 99999 PR PBB SHADOW E&M-EST. PATIENT-LVL IV: ICD-10-PCS | Mod: PBBFAC,,, | Performed by: PHYSICIAN ASSISTANT

## 2023-02-14 PROCEDURE — 3074F SYST BP LT 130 MM HG: CPT | Mod: CPTII,,, | Performed by: PHYSICIAN ASSISTANT

## 2023-02-14 PROCEDURE — 99213 OFFICE O/P EST LOW 20 MIN: CPT | Mod: S$PBB,,, | Performed by: PHYSICIAN ASSISTANT

## 2023-02-14 PROCEDURE — 3008F BODY MASS INDEX DOCD: CPT | Mod: CPTII,,, | Performed by: PHYSICIAN ASSISTANT

## 2023-02-14 PROCEDURE — 3074F PR MOST RECENT SYSTOLIC BLOOD PRESSURE < 130 MM HG: ICD-10-PCS | Mod: CPTII,,, | Performed by: PHYSICIAN ASSISTANT

## 2023-02-14 PROCEDURE — 3008F PR BODY MASS INDEX (BMI) DOCUMENTED: ICD-10-PCS | Mod: CPTII,,, | Performed by: PHYSICIAN ASSISTANT

## 2023-02-14 PROCEDURE — 1159F MED LIST DOCD IN RCRD: CPT | Mod: CPTII,,, | Performed by: PHYSICIAN ASSISTANT

## 2023-02-14 PROCEDURE — 3079F PR MOST RECENT DIASTOLIC BLOOD PRESSURE 80-89 MM HG: ICD-10-PCS | Mod: CPTII,,, | Performed by: PHYSICIAN ASSISTANT

## 2023-02-14 PROCEDURE — 1160F RVW MEDS BY RX/DR IN RCRD: CPT | Mod: CPTII,,, | Performed by: PHYSICIAN ASSISTANT

## 2023-02-14 PROCEDURE — 99213 PR OFFICE/OUTPT VISIT, EST, LEVL III, 20-29 MIN: ICD-10-PCS | Mod: S$PBB,,, | Performed by: PHYSICIAN ASSISTANT

## 2023-02-14 PROCEDURE — 99999 PR PBB SHADOW E&M-EST. PATIENT-LVL IV: CPT | Mod: PBBFAC,,, | Performed by: PHYSICIAN ASSISTANT

## 2023-02-14 PROCEDURE — 1160F PR REVIEW ALL MEDS BY PRESCRIBER/CLIN PHARMACIST DOCUMENTED: ICD-10-PCS | Mod: CPTII,,, | Performed by: PHYSICIAN ASSISTANT

## 2023-02-14 PROCEDURE — 99214 OFFICE O/P EST MOD 30 MIN: CPT | Mod: PBBFAC | Performed by: PHYSICIAN ASSISTANT

## 2023-02-14 PROCEDURE — 1159F PR MEDICATION LIST DOCUMENTED IN MEDICAL RECORD: ICD-10-PCS | Mod: CPTII,,, | Performed by: PHYSICIAN ASSISTANT

## 2023-02-14 PROCEDURE — 3079F DIAST BP 80-89 MM HG: CPT | Mod: CPTII,,, | Performed by: PHYSICIAN ASSISTANT

## 2023-02-14 RX ORDER — MELOXICAM 7.5 MG/1
7.5 TABLET ORAL DAILY PRN
Qty: 30 TABLET | Refills: 0 | Status: SHIPPED | OUTPATIENT
Start: 2023-02-14 | End: 2023-03-15 | Stop reason: SDUPTHER

## 2023-02-14 NOTE — PROGRESS NOTES
Subjective:       Patient ID: Bianca Amato is a 43 y.o. female.    Chief Complaint: Back Pain      HPI    Bianca Amato is a 43 y.o. female who presents today with complaints of Back Pain  Reports 3 week history of bilateral mid back pain that started without injury or trauma. Reports aching, nonradiating and worse with lying down at night.  No treatments tried.  Denies paresthesia, weakness or incontinence.    Last thoracic spine imaging 09/2019 results:  Minimal spurring of the vertebral body endplates.  Vertebral body heights and intervertebral disc spaces are maintained.  No fracture or listhesis.    Review of Systems   Constitutional:  Negative for chills, fatigue, fever and unexpected weight change.   Eyes:  Negative for visual disturbance.   Respiratory:  Negative for shortness of breath.    Cardiovascular:  Negative for chest pain.   Musculoskeletal:  Positive for back pain and myalgias. Negative for gait problem.   Neurological:  Negative for weakness, numbness and headaches.     Objective:      Physical Exam  Vitals and nursing note reviewed.   Constitutional:       General: She is not in acute distress.     Appearance: She is well-developed.   HENT:      Head: Normocephalic and atraumatic.   Eyes:      General: Lids are normal. No scleral icterus.     Extraocular Movements: Extraocular movements intact.      Conjunctiva/sclera: Conjunctivae normal.   Cardiovascular:      Rate and Rhythm: Normal rate and regular rhythm.      Pulses:           Radial pulses are 2+ on the right side and 2+ on the left side.        Dorsalis pedis pulses are 2+ on the right side and 2+ on the left side.        Posterior tibial pulses are 2+ on the right side and 2+ on the left side.   Pulmonary:      Effort: Pulmonary effort is normal.      Breath sounds: Normal breath sounds. No decreased breath sounds, wheezing, rhonchi or rales.   Musculoskeletal:      Cervical back: Normal. No tenderness or bony tenderness.       Thoracic back: Normal. No tenderness or bony tenderness.      Lumbar back: Normal. No tenderness or bony tenderness.      Right lower leg: No edema.      Left lower leg: No edema.      Comments: Upper and lower extremity strength 5/5 bilaterally   Neurological:      Mental Status: She is alert.      Cranial Nerves: No cranial nerve deficit.   Psychiatric:         Mood and Affect: Mood and affect normal.       Assessment:       1. Acute midline thoracic back pain        Plan:   1. Acute midline thoracic back pain  -     meloxicam (MOBIC) 7.5 MG tablet; Take 1 tablet (7.5 mg total) by mouth daily as needed for Pain.  Dispense: 30 tablet; Refill: 0  -     Urinalysis, Reflex to Urine Culture Urine, Clean Catch; Future; Expected date: 02/14/2023      Recommend warm Epsom salt soaks nightly and stretches daily    Lab Results   Component Value Date    COLORU Yellow 02/14/2023    APPEARANCEUA Clear 02/14/2023    PHUR 7.0 02/14/2023    SPECGRAV 1.020 02/14/2023    PROTEINUA Negative 02/14/2023    GLUCUA Negative 02/14/2023    KETONESU Negative 02/14/2023    BILIRUBINUA Negative 02/14/2023    OCCULTUA Negative 02/14/2023    NITRITE Negative 02/14/2023    UROBILINOGEN Negative 02/14/2023    LEUKOCYTESUR Negative 02/14/2023

## 2023-02-24 DIAGNOSIS — L30.4 INTERTRIGO: ICD-10-CM

## 2023-02-24 RX ORDER — NYSTATIN 100000 [USP'U]/G
POWDER TOPICAL 2 TIMES DAILY
Qty: 60 G | Refills: 2 | Status: SHIPPED | OUTPATIENT
Start: 2023-02-24

## 2023-03-02 ENCOUNTER — TELEPHONE (OUTPATIENT)
Dept: INTERNAL MEDICINE | Facility: CLINIC | Age: 43
End: 2023-03-02
Payer: MEDICAID

## 2023-03-02 DIAGNOSIS — Z12.31 SCREENING MAMMOGRAM FOR BREAST CANCER: Primary | ICD-10-CM

## 2023-03-02 NOTE — TELEPHONE ENCOUNTER
Returned call to patient mammogram scheduled. Patient voiced understanding of appointment date time and location.

## 2023-03-09 ENCOUNTER — PATIENT MESSAGE (OUTPATIENT)
Dept: INTERNAL MEDICINE | Facility: CLINIC | Age: 43
End: 2023-03-09
Payer: MEDICAID

## 2023-03-09 ENCOUNTER — TELEPHONE (OUTPATIENT)
Dept: INTERNAL MEDICINE | Facility: CLINIC | Age: 43
End: 2023-03-09
Payer: MEDICAID

## 2023-03-09 NOTE — TELEPHONE ENCOUNTER
Returned call to patient regarding information about breast surgery. Informed her I did not see a fax but will continue to check and and give her a return call. Patient voiced understanding. Patient stated she is trying to find a plastic surgeon covered by her insurance plan.

## 2023-03-15 ENCOUNTER — LAB VISIT (OUTPATIENT)
Dept: LAB | Facility: HOSPITAL | Age: 43
End: 2023-03-15
Attending: PHYSICIAN ASSISTANT
Payer: MEDICAID

## 2023-03-15 DIAGNOSIS — I10 ESSENTIAL HYPERTENSION: ICD-10-CM

## 2023-03-15 DIAGNOSIS — M54.6 ACUTE MIDLINE THORACIC BACK PAIN: ICD-10-CM

## 2023-03-15 DIAGNOSIS — R73.03 PREDIABETES: ICD-10-CM

## 2023-03-15 LAB
ALBUMIN SERPL BCP-MCNC: 4.2 G/DL (ref 3.5–5.2)
ALP SERPL-CCNC: 84 U/L (ref 55–135)
ALT SERPL W/O P-5'-P-CCNC: 18 U/L (ref 10–44)
ANION GAP SERPL CALC-SCNC: 8 MMOL/L (ref 8–16)
AST SERPL-CCNC: 15 U/L (ref 10–40)
BASOPHILS # BLD AUTO: 0.03 K/UL (ref 0–0.2)
BASOPHILS NFR BLD: 0.4 % (ref 0–1.9)
BILIRUB SERPL-MCNC: 0.4 MG/DL (ref 0.1–1)
BUN SERPL-MCNC: 11 MG/DL (ref 6–20)
CALCIUM SERPL-MCNC: 10 MG/DL (ref 8.7–10.5)
CHLORIDE SERPL-SCNC: 102 MMOL/L (ref 95–110)
CHOLEST SERPL-MCNC: 197 MG/DL (ref 120–199)
CHOLEST/HDLC SERPL: 4.1 {RATIO} (ref 2–5)
CO2 SERPL-SCNC: 29 MMOL/L (ref 23–29)
CREAT SERPL-MCNC: 0.8 MG/DL (ref 0.5–1.4)
DIFFERENTIAL METHOD: ABNORMAL
EOSINOPHIL # BLD AUTO: 0.2 K/UL (ref 0–0.5)
EOSINOPHIL NFR BLD: 3.2 % (ref 0–8)
ERYTHROCYTE [DISTWIDTH] IN BLOOD BY AUTOMATED COUNT: 14.1 % (ref 11.5–14.5)
EST. GFR  (NO RACE VARIABLE): >60 ML/MIN/1.73 M^2
ESTIMATED AVG GLUCOSE: 114 MG/DL (ref 68–131)
GLUCOSE SERPL-MCNC: 86 MG/DL (ref 70–110)
HBA1C MFR BLD: 5.6 % (ref 4–5.6)
HCT VFR BLD AUTO: 42.2 % (ref 37–48.5)
HDLC SERPL-MCNC: 48 MG/DL (ref 40–75)
HDLC SERPL: 24.4 % (ref 20–50)
HGB BLD-MCNC: 13.2 G/DL (ref 12–16)
IMM GRANULOCYTES # BLD AUTO: 0.01 K/UL (ref 0–0.04)
IMM GRANULOCYTES NFR BLD AUTO: 0.1 % (ref 0–0.5)
LDLC SERPL CALC-MCNC: 123.4 MG/DL (ref 63–159)
LYMPHOCYTES # BLD AUTO: 2.7 K/UL (ref 1–4.8)
LYMPHOCYTES NFR BLD: 38.1 % (ref 18–48)
MCH RBC QN AUTO: 28.8 PG (ref 27–31)
MCHC RBC AUTO-ENTMCNC: 31.3 G/DL (ref 32–36)
MCV RBC AUTO: 92 FL (ref 82–98)
MONOCYTES # BLD AUTO: 0.5 K/UL (ref 0.3–1)
MONOCYTES NFR BLD: 6.5 % (ref 4–15)
NEUTROPHILS # BLD AUTO: 3.7 K/UL (ref 1.8–7.7)
NEUTROPHILS NFR BLD: 51.7 % (ref 38–73)
NONHDLC SERPL-MCNC: 149 MG/DL
NRBC BLD-RTO: 0 /100 WBC
PLATELET # BLD AUTO: 524 K/UL (ref 150–450)
PMV BLD AUTO: 9.8 FL (ref 9.2–12.9)
POTASSIUM SERPL-SCNC: 4.4 MMOL/L (ref 3.5–5.1)
PROT SERPL-MCNC: 7.4 G/DL (ref 6–8.4)
RBC # BLD AUTO: 4.58 M/UL (ref 4–5.4)
SODIUM SERPL-SCNC: 139 MMOL/L (ref 136–145)
T4 FREE SERPL-MCNC: 0.85 NG/DL (ref 0.71–1.51)
TRIGL SERPL-MCNC: 128 MG/DL (ref 30–150)
TSH SERPL DL<=0.005 MIU/L-ACNC: 0.34 UIU/ML (ref 0.4–4)
WBC # BLD AUTO: 7.13 K/UL (ref 3.9–12.7)

## 2023-03-15 PROCEDURE — 85025 COMPLETE CBC W/AUTO DIFF WBC: CPT | Performed by: PHYSICIAN ASSISTANT

## 2023-03-15 PROCEDURE — 84439 ASSAY OF FREE THYROXINE: CPT | Performed by: PHYSICIAN ASSISTANT

## 2023-03-15 PROCEDURE — 80053 COMPREHEN METABOLIC PANEL: CPT | Performed by: PHYSICIAN ASSISTANT

## 2023-03-15 PROCEDURE — 84443 ASSAY THYROID STIM HORMONE: CPT | Performed by: PHYSICIAN ASSISTANT

## 2023-03-15 PROCEDURE — 83036 HEMOGLOBIN GLYCOSYLATED A1C: CPT | Performed by: PHYSICIAN ASSISTANT

## 2023-03-15 PROCEDURE — 80061 LIPID PANEL: CPT | Performed by: PHYSICIAN ASSISTANT

## 2023-03-15 PROCEDURE — 36415 COLL VENOUS BLD VENIPUNCTURE: CPT | Performed by: PHYSICIAN ASSISTANT

## 2023-03-15 RX ORDER — MELOXICAM 7.5 MG/1
7.5 TABLET ORAL DAILY PRN
Qty: 30 TABLET | Refills: 0 | Status: SHIPPED | OUTPATIENT
Start: 2023-03-15 | End: 2023-09-20 | Stop reason: SDUPTHER

## 2023-03-17 ENCOUNTER — PATIENT MESSAGE (OUTPATIENT)
Dept: INTERNAL MEDICINE | Facility: CLINIC | Age: 43
End: 2023-03-17
Payer: MEDICAID

## 2023-03-17 ENCOUNTER — PATIENT MESSAGE (OUTPATIENT)
Dept: ADMINISTRATIVE | Facility: OTHER | Age: 43
End: 2023-03-17
Payer: MEDICAID

## 2023-03-17 RX ORDER — AMLODIPINE BESYLATE 10 MG/1
10 TABLET ORAL DAILY
Qty: 90 TABLET | Refills: 0 | Status: SHIPPED | OUTPATIENT
Start: 2023-03-17 | End: 2023-06-23 | Stop reason: SDUPTHER

## 2023-03-17 RX ORDER — LOSARTAN POTASSIUM 50 MG/1
50 TABLET ORAL DAILY
Qty: 90 TABLET | Refills: 0 | Status: SHIPPED | OUTPATIENT
Start: 2023-03-17 | End: 2023-06-23 | Stop reason: SDUPTHER

## 2023-03-23 ENCOUNTER — OFFICE VISIT (OUTPATIENT)
Dept: INTERNAL MEDICINE | Facility: CLINIC | Age: 43
End: 2023-03-23
Payer: MEDICAID

## 2023-03-23 ENCOUNTER — OFFICE VISIT (OUTPATIENT)
Dept: OTOLARYNGOLOGY | Facility: CLINIC | Age: 43
End: 2023-03-23
Payer: MEDICAID

## 2023-03-23 VITALS
DIASTOLIC BLOOD PRESSURE: 90 MMHG | HEART RATE: 88 BPM | SYSTOLIC BLOOD PRESSURE: 116 MMHG | WEIGHT: 194.44 LBS | RESPIRATION RATE: 16 BRPM | TEMPERATURE: 97 F | WEIGHT: 192.38 LBS | HEIGHT: 65 IN | TEMPERATURE: 98 F | OXYGEN SATURATION: 99 % | BODY MASS INDEX: 32.36 KG/M2 | BODY MASS INDEX: 32.05 KG/M2

## 2023-03-23 DIAGNOSIS — Z00.00 ROUTINE GENERAL MEDICAL EXAMINATION AT A HEALTH CARE FACILITY: Primary | ICD-10-CM

## 2023-03-23 DIAGNOSIS — E05.90 SUBCLINICAL HYPERTHYROIDISM: ICD-10-CM

## 2023-03-23 DIAGNOSIS — I10 ESSENTIAL HYPERTENSION: ICD-10-CM

## 2023-03-23 DIAGNOSIS — G47.33 OSA ON CPAP: ICD-10-CM

## 2023-03-23 DIAGNOSIS — Z12.11 COLON CANCER SCREENING: ICD-10-CM

## 2023-03-23 DIAGNOSIS — R79.89 ELEVATED PLATELET COUNT: ICD-10-CM

## 2023-03-23 DIAGNOSIS — Z23 NEED FOR PNEUMOCOCCAL VACCINATION: ICD-10-CM

## 2023-03-23 DIAGNOSIS — E04.1 THYROID NODULE: ICD-10-CM

## 2023-03-23 DIAGNOSIS — E66.9 CLASS 1 OBESITY WITH SERIOUS COMORBIDITY AND BODY MASS INDEX (BMI) OF 32.0 TO 32.9 IN ADULT, UNSPECIFIED OBESITY TYPE: ICD-10-CM

## 2023-03-23 DIAGNOSIS — J45.20 MILD INTERMITTENT ASTHMA WITHOUT COMPLICATION: ICD-10-CM

## 2023-03-23 DIAGNOSIS — H61.23 BILATERAL IMPACTED CERUMEN: Primary | ICD-10-CM

## 2023-03-23 DIAGNOSIS — R73.03 PREDIABETES: ICD-10-CM

## 2023-03-23 PROCEDURE — 99499 UNLISTED E&M SERVICE: CPT | Mod: S$PBB,,, | Performed by: PHYSICIAN ASSISTANT

## 2023-03-23 PROCEDURE — 99215 OFFICE O/P EST HI 40 MIN: CPT | Mod: PBBFAC,27 | Performed by: FAMILY MEDICINE

## 2023-03-23 PROCEDURE — 3044F PR MOST RECENT HEMOGLOBIN A1C LEVEL <7.0%: ICD-10-PCS | Mod: CPTII,,, | Performed by: FAMILY MEDICINE

## 2023-03-23 PROCEDURE — 3074F SYST BP LT 130 MM HG: CPT | Mod: CPTII,,, | Performed by: FAMILY MEDICINE

## 2023-03-23 PROCEDURE — 4010F PR ACE/ARB THEARPY RXD/TAKEN: ICD-10-PCS | Mod: CPTII,,, | Performed by: PHYSICIAN ASSISTANT

## 2023-03-23 PROCEDURE — 4010F PR ACE/ARB THEARPY RXD/TAKEN: ICD-10-PCS | Mod: CPTII,,, | Performed by: FAMILY MEDICINE

## 2023-03-23 PROCEDURE — 1159F PR MEDICATION LIST DOCUMENTED IN MEDICAL RECORD: ICD-10-PCS | Mod: CPTII,,, | Performed by: FAMILY MEDICINE

## 2023-03-23 PROCEDURE — 3080F DIAST BP >= 90 MM HG: CPT | Mod: CPTII,,, | Performed by: FAMILY MEDICINE

## 2023-03-23 PROCEDURE — 99396 PREV VISIT EST AGE 40-64: CPT | Mod: S$PBB,,, | Performed by: FAMILY MEDICINE

## 2023-03-23 PROCEDURE — 99396 PR PREVENTIVE VISIT,EST,40-64: ICD-10-PCS | Mod: S$PBB,,, | Performed by: FAMILY MEDICINE

## 2023-03-23 PROCEDURE — 99999 PR PBB SHADOW E&M-EST. PATIENT-LVL III: CPT | Mod: PBBFAC,,, | Performed by: PHYSICIAN ASSISTANT

## 2023-03-23 PROCEDURE — 3080F PR MOST RECENT DIASTOLIC BLOOD PRESSURE >= 90 MM HG: ICD-10-PCS | Mod: CPTII,,, | Performed by: FAMILY MEDICINE

## 2023-03-23 PROCEDURE — 99999 PR PBB SHADOW E&M-EST. PATIENT-LVL V: CPT | Mod: PBBFAC,,, | Performed by: FAMILY MEDICINE

## 2023-03-23 PROCEDURE — 99999 PR PBB SHADOW E&M-EST. PATIENT-LVL V: ICD-10-PCS | Mod: PBBFAC,,, | Performed by: FAMILY MEDICINE

## 2023-03-23 PROCEDURE — 99999 PR PBB SHADOW E&M-EST. PATIENT-LVL III: ICD-10-PCS | Mod: PBBFAC,,, | Performed by: PHYSICIAN ASSISTANT

## 2023-03-23 PROCEDURE — 99499 NO LOS: ICD-10-PCS | Mod: S$PBB,,, | Performed by: PHYSICIAN ASSISTANT

## 2023-03-23 PROCEDURE — 69210 PR REMOVAL IMPACTED CERUMEN REQUIRING INSTRUMENTATION, UNILATERAL: ICD-10-PCS | Mod: S$PBB,,, | Performed by: PHYSICIAN ASSISTANT

## 2023-03-23 PROCEDURE — 3044F PR MOST RECENT HEMOGLOBIN A1C LEVEL <7.0%: ICD-10-PCS | Mod: CPTII,,, | Performed by: PHYSICIAN ASSISTANT

## 2023-03-23 PROCEDURE — 1160F PR REVIEW ALL MEDS BY PRESCRIBER/CLIN PHARMACIST DOCUMENTED: ICD-10-PCS | Mod: CPTII,,, | Performed by: FAMILY MEDICINE

## 2023-03-23 PROCEDURE — 69210 REMOVE IMPACTED EAR WAX UNI: CPT | Mod: PBBFAC | Performed by: PHYSICIAN ASSISTANT

## 2023-03-23 PROCEDURE — 99213 OFFICE O/P EST LOW 20 MIN: CPT | Mod: PBBFAC,25 | Performed by: PHYSICIAN ASSISTANT

## 2023-03-23 PROCEDURE — 69210 REMOVE IMPACTED EAR WAX UNI: CPT | Mod: S$PBB,,, | Performed by: PHYSICIAN ASSISTANT

## 2023-03-23 PROCEDURE — 3008F PR BODY MASS INDEX (BMI) DOCUMENTED: ICD-10-PCS | Mod: CPTII,,, | Performed by: PHYSICIAN ASSISTANT

## 2023-03-23 PROCEDURE — 3008F PR BODY MASS INDEX (BMI) DOCUMENTED: ICD-10-PCS | Mod: CPTII,,, | Performed by: FAMILY MEDICINE

## 2023-03-23 PROCEDURE — 1159F MED LIST DOCD IN RCRD: CPT | Mod: CPTII,,, | Performed by: FAMILY MEDICINE

## 2023-03-23 PROCEDURE — 3074F PR MOST RECENT SYSTOLIC BLOOD PRESSURE < 130 MM HG: ICD-10-PCS | Mod: CPTII,,, | Performed by: FAMILY MEDICINE

## 2023-03-23 PROCEDURE — 3008F BODY MASS INDEX DOCD: CPT | Mod: CPTII,,, | Performed by: PHYSICIAN ASSISTANT

## 2023-03-23 PROCEDURE — 4010F ACE/ARB THERAPY RXD/TAKEN: CPT | Mod: CPTII,,, | Performed by: PHYSICIAN ASSISTANT

## 2023-03-23 PROCEDURE — 1160F RVW MEDS BY RX/DR IN RCRD: CPT | Mod: CPTII,,, | Performed by: FAMILY MEDICINE

## 2023-03-23 PROCEDURE — 4010F ACE/ARB THERAPY RXD/TAKEN: CPT | Mod: CPTII,,, | Performed by: FAMILY MEDICINE

## 2023-03-23 PROCEDURE — 3044F HG A1C LEVEL LT 7.0%: CPT | Mod: CPTII,,, | Performed by: PHYSICIAN ASSISTANT

## 2023-03-23 PROCEDURE — 3008F BODY MASS INDEX DOCD: CPT | Mod: CPTII,,, | Performed by: FAMILY MEDICINE

## 2023-03-23 PROCEDURE — 3044F HG A1C LEVEL LT 7.0%: CPT | Mod: CPTII,,, | Performed by: FAMILY MEDICINE

## 2023-03-23 NOTE — PROGRESS NOTES
Subjective:       Patient ID: Bianca Amato is a 43 y.o. female.    Chief Complaint: Annual Exam    Patient presents to clinic today for annual physical exam.      Review of Systems   Constitutional:  Negative for chills, fatigue, fever and unexpected weight change.   HENT:  Negative for congestion, dental problem, ear pain, hearing loss, rhinorrhea and trouble swallowing.    Eyes:  Negative for pain and visual disturbance.   Respiratory:  Negative for cough and shortness of breath.    Cardiovascular:  Negative for chest pain, palpitations and leg swelling.   Gastrointestinal:  Positive for constipation (sometimes). Negative for abdominal distention, abdominal pain, blood in stool, diarrhea, nausea and vomiting.   Genitourinary:  Negative for difficulty urinating and vaginal discharge.   Musculoskeletal:  Negative for arthralgias and myalgias.   Skin:  Positive for rash (breast, currently treating).   Neurological:  Negative for dizziness, weakness, numbness and headaches.   Hematological:  Negative for adenopathy. Does not bruise/bleed easily.   Psychiatric/Behavioral:  Negative for dysphoric mood and sleep disturbance. The patient is not nervous/anxious.        Objective:      Physical Exam  Vitals reviewed.   Constitutional:       General: She is not in acute distress.     Appearance: Normal appearance. She is well-developed.   HENT:      Head: Normocephalic and atraumatic.      Right Ear: Tympanic membrane, ear canal and external ear normal.      Left Ear: Tympanic membrane, ear canal and external ear normal.      Nose: Nose normal. No mucosal edema or rhinorrhea.      Mouth/Throat:      Pharynx: Uvula midline.   Eyes:      General: Lids are normal. No scleral icterus.     Extraocular Movements: Extraocular movements intact.      Conjunctiva/sclera: Conjunctivae normal.      Pupils: Pupils are equal, round, and reactive to light.   Neck:      Thyroid: No thyromegaly.   Cardiovascular:      Rate and Rhythm:  Normal rate and regular rhythm.      Heart sounds: No murmur heard.    No friction rub. No gallop.   Pulmonary:      Effort: Pulmonary effort is normal.      Breath sounds: Normal breath sounds. No wheezing, rhonchi or rales.   Abdominal:      General: Bowel sounds are normal. There is no distension.      Palpations: Abdomen is soft. There is no mass.      Tenderness: There is no abdominal tenderness.   Musculoskeletal:         General: Normal range of motion.      Cervical back: Normal range of motion and neck supple.   Lymphadenopathy:      Cervical: No cervical adenopathy.   Skin:     General: Skin is warm and dry.      Findings: No lesion or rash.      Nails: There is no clubbing.   Neurological:      Mental Status: She is alert and oriented to person, place, and time.      Cranial Nerves: No cranial nerve deficit.      Sensory: No sensory deficit.      Gait: Gait normal.   Psychiatric:         Mood and Affect: Mood and affect normal.       Assessment:       1. Routine general medical examination at a health care facility    2. Essential hypertension    3. Subclinical hyperthyroidism    4. Prediabetes    5. Elevated platelet count    6. Mild intermittent asthma without complication    7. DONNY on CPAP    8. Thyroid nodule    9. Class 1 obesity with serious comorbidity and body mass index (BMI) of 32.0 to 32.9 in adult, unspecified obesity type    10. Need for pneumococcal vaccination    11. Colon cancer screening        Plan:   1. Routine general medical examination at a health care facility    2. Essential hypertension  Assessment & Plan:  Borderline, continue current medications and monitor    Orders:  -     Comprehensive Metabolic Panel; Future; Expected date: 09/23/2023    3. Subclinical hyperthyroidism  Assessment & Plan:  Asymptomatic; will monitor    Lab Results   Component Value Date    TSH 0.335 (L) 03/15/2023    FREET4 0.85 03/15/2023         Orders:  -     TSH; Future; Expected date: 09/23/2023  -      T4, Free; Future; Expected date: 09/23/2023    4. Prediabetes  Assessment & Plan:  Normal a1c    Lab Results   Component Value Date    HGBA1C 5.6 03/15/2023    HGBA1C 5.4 08/11/2022    LDLCALC 123.4 03/15/2023         Orders:  -     Hemoglobin A1C; Future; Expected date: 09/23/2023    5. Elevated platelet count  Assessment & Plan:  Lab Results   Component Value Date    WBC 7.13 03/15/2023    HGB 13.2 03/15/2023    HCT 42.2 03/15/2023    MCV 92 03/15/2023     (H) 03/15/2023           Orders:  -     Ambulatory referral/consult to Hematology / Oncology; Future; Expected date: 03/30/2023  -     CBC Auto Differential; Future; Expected date: 09/23/2023    6. Mild intermittent asthma without complication  Overview:  Followed by Pulmonology, continue current treatment plan    Assessment & Plan:  stable      7. DONNY on CPAP  Overview:  Followed by Pulmonology, continue current treatment plan     2/10/2021 Home Sleep Study 1 night study  MILD/BORDERLINE OBSTRUCTIVE SLEEP APNEA with overall AHI 9.3/hr ( 59 events): night #1  Oxygen desaturation: 79%. SpO2 between 85% to 89% for 1 min.  Patient snored 99% time above 50 .  Heart rate range: 80 bpm - 107 bpm  On Auto CPAP 5-20 cm   1/19/2022 changed to Auto CPAP 5-12 cm   Full face mask      8. Thyroid nodule  Overview:  Followed by ENT, continue current treatment plan       9. Class 1 obesity with serious comorbidity and body mass index (BMI) of 32.0 to 32.9 in adult, unspecified obesity type  Assessment & Plan:  Body mass index is 32.01 kg/m².    Wt Readings from Last 10 Encounters:   03/23/23 87.2 kg (192 lb 5.6 oz)   03/23/23 88.2 kg (194 lb 7.1 oz)   02/14/23 86.3 kg (190 lb 2.4 oz)   01/19/23 87.4 kg (192 lb 10.9 oz)   12/21/22 86.3 kg (190 lb 2.4 oz)   12/07/22 86.6 kg (190 lb 14.7 oz)   09/22/22 86.5 kg (190 lb 11.2 oz)   09/22/22 85.3 kg (188 lb 0.8 oz)   08/11/22 84.6 kg (186 lb 8.2 oz)   08/08/22 86.5 kg (190 lb 11.2 oz)           10. Need for pneumococcal  vaccination  -     Pneumococcal Conjugate Vaccine (20 Valent) (IM); Future; Expected date: 03/30/2023    11. Colon cancer screening  -     Ambulatory referral/consult to Endo Procedure ; Future; Expected date: 03/24/2023        Health Maintenance reviewed/updated.

## 2023-03-24 ENCOUNTER — TELEPHONE (OUTPATIENT)
Dept: HEMATOLOGY/ONCOLOGY | Facility: CLINIC | Age: 43
End: 2023-03-24
Payer: MEDICAID

## 2023-03-24 DIAGNOSIS — D75.839 THROMBOCYTOSIS: ICD-10-CM

## 2023-03-24 DIAGNOSIS — D50.9 IRON DEFICIENCY ANEMIA: Primary | ICD-10-CM

## 2023-03-24 NOTE — PROGRESS NOTES
Subjective:   Cerumen impactions     Patient ID: Bianca Amato is a 43 y.o. female.    Chief Complaint:  Excessive ear wax     Bianca Amato is a 43 y.o. female here to see me today for evaluation of a possible wax impaction in bilateral ears.  She has complaints of hearing loss in the affected ears, but denies pain or drainage.  This has been an issue in the past.  The patient has not been using any sort of ear drop to soften the wax.  She usually comes in every 6 months for ear cleaning.      HPI  Review of Systems   HENT: Positive for hearing loss. Negative for ear discharge, ear pain and tinnitus.        Objective:     Physical Exam   HENT:   Right Ear: External ear and ear canal normal. Decreased hearing is noted.   Left Ear: External ear and ear canal normal. Decreased hearing is noted.   Bilateral complete cerumen impactions, removal described below       Procedure Note    CHIEF COMPLAINT:  Cerumen Impaction    Description:  The patient was seated in an exam chair.  An ear speculum was placed in the right EAC and was examined under the microscope.  Suction and/or loop curettes were used to remove a large cerumen impaction.  The tympanic membrane was visualized and was normal in appearance.  The procedure was repeated on the left side in a similar fashion.  The TM was intact and normal on this side as well.  The patient tolerated the procedure well.           Assessment:     1. Bilateral impacted cerumen        Plan:     1.  Cerumen impaction:  Removed today without difficulty.  I would recommend the use of a wax softening drop, either over the counter Debrox or mineral oil, on a weekly basis.  I also instructed the patient to avoid Qtips.  RTC in 6 months for ear cleaning.

## 2023-03-24 NOTE — TELEPHONE ENCOUNTER
Spoke to patient in reference to Hematology referral from Dr. Chand.  Appointment scheduled per patient's request next available on [facility].  Appointment notice via pt portal.

## 2023-03-28 PROBLEM — R79.89 ELEVATED PLATELET COUNT: Status: ACTIVE | Noted: 2023-03-28

## 2023-03-28 PROBLEM — E05.90 SUBCLINICAL HYPERTHYROIDISM: Status: ACTIVE | Noted: 2023-03-28

## 2023-03-29 ENCOUNTER — LAB VISIT (OUTPATIENT)
Dept: LAB | Facility: HOSPITAL | Age: 43
End: 2023-03-29
Attending: FAMILY MEDICINE
Payer: MEDICAID

## 2023-03-29 ENCOUNTER — TELEPHONE (OUTPATIENT)
Dept: INTERNAL MEDICINE | Facility: CLINIC | Age: 43
End: 2023-03-29
Payer: MEDICAID

## 2023-03-29 DIAGNOSIS — D50.9 IRON DEFICIENCY ANEMIA: ICD-10-CM

## 2023-03-29 DIAGNOSIS — D75.839 THROMBOCYTOSIS: ICD-10-CM

## 2023-03-29 LAB
ALBUMIN SERPL BCP-MCNC: 4 G/DL (ref 3.5–5.2)
ALP SERPL-CCNC: 72 U/L (ref 55–135)
ALT SERPL W/O P-5'-P-CCNC: 16 U/L (ref 10–44)
ANION GAP SERPL CALC-SCNC: 8 MMOL/L (ref 8–16)
AST SERPL-CCNC: 13 U/L (ref 10–40)
BASOPHILS # BLD AUTO: 0.02 K/UL (ref 0–0.2)
BASOPHILS NFR BLD: 0.3 % (ref 0–1.9)
BILIRUB SERPL-MCNC: 0.4 MG/DL (ref 0.1–1)
BUN SERPL-MCNC: 11 MG/DL (ref 6–20)
CALCIUM SERPL-MCNC: 9.6 MG/DL (ref 8.7–10.5)
CHLORIDE SERPL-SCNC: 106 MMOL/L (ref 95–110)
CO2 SERPL-SCNC: 25 MMOL/L (ref 23–29)
CREAT SERPL-MCNC: 0.7 MG/DL (ref 0.5–1.4)
CRP SERPL-MCNC: 18.7 MG/L (ref 0–8.2)
DIFFERENTIAL METHOD: ABNORMAL
EOSINOPHIL # BLD AUTO: 0.3 K/UL (ref 0–0.5)
EOSINOPHIL NFR BLD: 3.4 % (ref 0–8)
ERYTHROCYTE [DISTWIDTH] IN BLOOD BY AUTOMATED COUNT: 13.7 % (ref 11.5–14.5)
ERYTHROCYTE [SEDIMENTATION RATE] IN BLOOD BY WESTERGREN METHOD: 18 MM/HR (ref 0–20)
EST. GFR  (NO RACE VARIABLE): >60 ML/MIN/1.73 M^2
FERRITIN SERPL-MCNC: 300 NG/ML (ref 20–300)
GLUCOSE SERPL-MCNC: 94 MG/DL (ref 70–110)
HCT VFR BLD AUTO: 38.8 % (ref 37–48.5)
HGB BLD-MCNC: 13 G/DL (ref 12–16)
IMM GRANULOCYTES # BLD AUTO: 0.07 K/UL (ref 0–0.04)
IMM GRANULOCYTES NFR BLD AUTO: 1 % (ref 0–0.5)
IRON SERPL-MCNC: 50 UG/DL (ref 30–160)
LYMPHOCYTES # BLD AUTO: 2.9 K/UL (ref 1–4.8)
LYMPHOCYTES NFR BLD: 39.2 % (ref 18–48)
MCH RBC QN AUTO: 29.3 PG (ref 27–31)
MCHC RBC AUTO-ENTMCNC: 33.5 G/DL (ref 32–36)
MCV RBC AUTO: 87 FL (ref 82–98)
MONOCYTES # BLD AUTO: 0.5 K/UL (ref 0.3–1)
MONOCYTES NFR BLD: 7 % (ref 4–15)
NEUTROPHILS # BLD AUTO: 3.6 K/UL (ref 1.8–7.7)
NEUTROPHILS NFR BLD: 49.1 % (ref 38–73)
NRBC BLD-RTO: 0 /100 WBC
PATH REV BLD -IMP: NORMAL
PATH REV BLD -IMP: NORMAL
PLATELET # BLD AUTO: 462 K/UL (ref 150–450)
PMV BLD AUTO: 9.1 FL (ref 9.2–12.9)
POTASSIUM SERPL-SCNC: 4.1 MMOL/L (ref 3.5–5.1)
PROT SERPL-MCNC: 7.1 G/DL (ref 6–8.4)
RBC # BLD AUTO: 4.44 M/UL (ref 4–5.4)
SATURATED IRON: 16 % (ref 20–50)
SODIUM SERPL-SCNC: 139 MMOL/L (ref 136–145)
TOTAL IRON BINDING CAPACITY: 311 UG/DL (ref 250–450)
TRANSFERRIN SERPL-MCNC: 210 MG/DL (ref 200–375)
WBC # BLD AUTO: 7.32 K/UL (ref 3.9–12.7)

## 2023-03-29 PROCEDURE — 85025 COMPLETE CBC W/AUTO DIFF WBC: CPT | Performed by: INTERNAL MEDICINE

## 2023-03-29 PROCEDURE — 84466 ASSAY OF TRANSFERRIN: CPT | Performed by: INTERNAL MEDICINE

## 2023-03-29 PROCEDURE — 85060 BLOOD SMEAR INTERPRETATION: CPT | Mod: ,,, | Performed by: PATHOLOGY

## 2023-03-29 PROCEDURE — 36415 COLL VENOUS BLD VENIPUNCTURE: CPT | Performed by: INTERNAL MEDICINE

## 2023-03-29 PROCEDURE — 82728 ASSAY OF FERRITIN: CPT | Performed by: INTERNAL MEDICINE

## 2023-03-29 PROCEDURE — 86140 C-REACTIVE PROTEIN: CPT | Performed by: INTERNAL MEDICINE

## 2023-03-29 PROCEDURE — 80053 COMPREHEN METABOLIC PANEL: CPT | Performed by: INTERNAL MEDICINE

## 2023-03-29 PROCEDURE — 85060 PATHOLOGIST REVIEW: ICD-10-PCS | Mod: ,,, | Performed by: PATHOLOGY

## 2023-03-29 PROCEDURE — 85651 RBC SED RATE NONAUTOMATED: CPT | Performed by: INTERNAL MEDICINE

## 2023-03-29 NOTE — TELEPHONE ENCOUNTER
"Your fax has been successfully sent to [phone] at [phone].  ------------------------------------------------------------  From: [id number]  ------------------------------------------------------------  3/29/2023 10:33:36 AM Transmission Record   Sent to [phone] with remote ID "[id number]"   Result: (0/339;0/0) Success   Page record: 1 - 20   Elapsed time: 06:49 on channel 59      Your fax has been successfully sent to [phone] at [phone].  ------------------------------------------------------------  From: [id number]  ------------------------------------------------------------  3/29/2023 10:33:37 AM Transmission Record   Sent to [phone] with remote ID ""   Result: (0/339;0/0) Success   Page record: 1 - 20   Elapsed time: 06:15 on channel 31        Faxed referral over to Dr. Burnett office    "

## 2023-03-29 NOTE — ASSESSMENT & PLAN NOTE
Asymptomatic; will monitor    Lab Results   Component Value Date    TSH 0.335 (L) 03/15/2023    FREET4 0.85 03/15/2023

## 2023-03-29 NOTE — ASSESSMENT & PLAN NOTE
Lab Results   Component Value Date    WBC 7.13 03/15/2023    HGB 13.2 03/15/2023    HCT 42.2 03/15/2023    MCV 92 03/15/2023     (H) 03/15/2023

## 2023-03-29 NOTE — ASSESSMENT & PLAN NOTE
Body mass index is 32.01 kg/m².    Wt Readings from Last 10 Encounters:   03/23/23 87.2 kg (192 lb 5.6 oz)   03/23/23 88.2 kg (194 lb 7.1 oz)   02/14/23 86.3 kg (190 lb 2.4 oz)   01/19/23 87.4 kg (192 lb 10.9 oz)   12/21/22 86.3 kg (190 lb 2.4 oz)   12/07/22 86.6 kg (190 lb 14.7 oz)   09/22/22 86.5 kg (190 lb 11.2 oz)   09/22/22 85.3 kg (188 lb 0.8 oz)   08/11/22 84.6 kg (186 lb 8.2 oz)   08/08/22 86.5 kg (190 lb 11.2 oz)

## 2023-03-29 NOTE — ASSESSMENT & PLAN NOTE
Normal a1c    Lab Results   Component Value Date    HGBA1C 5.6 03/15/2023    HGBA1C 5.4 08/11/2022    LDLCALC 123.4 03/15/2023

## 2023-03-30 ENCOUNTER — OFFICE VISIT (OUTPATIENT)
Dept: HEMATOLOGY/ONCOLOGY | Facility: CLINIC | Age: 43
End: 2023-03-30
Payer: MEDICAID

## 2023-03-30 VITALS
BODY MASS INDEX: 31.99 KG/M2 | TEMPERATURE: 99 F | DIASTOLIC BLOOD PRESSURE: 81 MMHG | WEIGHT: 192.25 LBS | HEART RATE: 89 BPM | SYSTOLIC BLOOD PRESSURE: 119 MMHG | OXYGEN SATURATION: 98 %

## 2023-03-30 DIAGNOSIS — R79.89 ELEVATED PLATELET COUNT: ICD-10-CM

## 2023-03-30 DIAGNOSIS — D50.0 IRON DEFICIENCY ANEMIA DUE TO CHRONIC BLOOD LOSS: Primary | ICD-10-CM

## 2023-03-30 PROCEDURE — 1159F MED LIST DOCD IN RCRD: CPT | Mod: CPTII,,, | Performed by: INTERNAL MEDICINE

## 2023-03-30 PROCEDURE — 1160F PR REVIEW ALL MEDS BY PRESCRIBER/CLIN PHARMACIST DOCUMENTED: ICD-10-PCS | Mod: CPTII,,, | Performed by: INTERNAL MEDICINE

## 2023-03-30 PROCEDURE — 3079F DIAST BP 80-89 MM HG: CPT | Mod: CPTII,,, | Performed by: INTERNAL MEDICINE

## 2023-03-30 PROCEDURE — 4010F ACE/ARB THERAPY RXD/TAKEN: CPT | Mod: CPTII,,, | Performed by: INTERNAL MEDICINE

## 2023-03-30 PROCEDURE — 99999 PR PBB SHADOW E&M-EST. PATIENT-LVL III: ICD-10-PCS | Mod: PBBFAC,,, | Performed by: INTERNAL MEDICINE

## 2023-03-30 PROCEDURE — 3074F SYST BP LT 130 MM HG: CPT | Mod: CPTII,,, | Performed by: INTERNAL MEDICINE

## 2023-03-30 PROCEDURE — 3008F PR BODY MASS INDEX (BMI) DOCUMENTED: ICD-10-PCS | Mod: CPTII,,, | Performed by: INTERNAL MEDICINE

## 2023-03-30 PROCEDURE — 3074F PR MOST RECENT SYSTOLIC BLOOD PRESSURE < 130 MM HG: ICD-10-PCS | Mod: CPTII,,, | Performed by: INTERNAL MEDICINE

## 2023-03-30 PROCEDURE — 3008F BODY MASS INDEX DOCD: CPT | Mod: CPTII,,, | Performed by: INTERNAL MEDICINE

## 2023-03-30 PROCEDURE — 3044F PR MOST RECENT HEMOGLOBIN A1C LEVEL <7.0%: ICD-10-PCS | Mod: CPTII,,, | Performed by: INTERNAL MEDICINE

## 2023-03-30 PROCEDURE — 1159F PR MEDICATION LIST DOCUMENTED IN MEDICAL RECORD: ICD-10-PCS | Mod: CPTII,,, | Performed by: INTERNAL MEDICINE

## 2023-03-30 PROCEDURE — 3044F HG A1C LEVEL LT 7.0%: CPT | Mod: CPTII,,, | Performed by: INTERNAL MEDICINE

## 2023-03-30 PROCEDURE — 1160F RVW MEDS BY RX/DR IN RCRD: CPT | Mod: CPTII,,, | Performed by: INTERNAL MEDICINE

## 2023-03-30 PROCEDURE — 99203 OFFICE O/P NEW LOW 30 MIN: CPT | Mod: S$PBB,,, | Performed by: INTERNAL MEDICINE

## 2023-03-30 PROCEDURE — 99999 PR PBB SHADOW E&M-EST. PATIENT-LVL III: CPT | Mod: PBBFAC,,, | Performed by: INTERNAL MEDICINE

## 2023-03-30 PROCEDURE — 99213 OFFICE O/P EST LOW 20 MIN: CPT | Mod: PBBFAC | Performed by: INTERNAL MEDICINE

## 2023-03-30 PROCEDURE — 99203 PR OFFICE/OUTPT VISIT, NEW, LEVL III, 30-44 MIN: ICD-10-PCS | Mod: S$PBB,,, | Performed by: INTERNAL MEDICINE

## 2023-03-30 PROCEDURE — 3079F PR MOST RECENT DIASTOLIC BLOOD PRESSURE 80-89 MM HG: ICD-10-PCS | Mod: CPTII,,, | Performed by: INTERNAL MEDICINE

## 2023-03-30 PROCEDURE — 4010F PR ACE/ARB THEARPY RXD/TAKEN: ICD-10-PCS | Mod: CPTII,,, | Performed by: INTERNAL MEDICINE

## 2023-03-30 RX ORDER — FERROUS SULFATE 325(65) MG
325 TABLET ORAL DAILY
Qty: 30 TABLET | Refills: 3 | Status: SHIPPED | OUTPATIENT
Start: 2023-03-30 | End: 2023-08-03

## 2023-03-30 NOTE — PROGRESS NOTES
Subjective:      Patient ID: Bianca Amato is a 43 y.o. female.    Chief Complaint: No chief complaint on file.      HPI: This is a 43 year old woman referred by Dr. Vivi Chand for mild thrombocytosis. She has history of iron deficiency anemia. Labs on 3/29/2023 show WBC 7.3, Hb 13, platelets 462, iron saturation 16 %.    Review of Systems   Constitutional:  Negative for chills and fever.   HENT:  Negative for mouth sores.    Respiratory:  Negative for cough and shortness of breath.    Gastrointestinal:  Negative for blood in stool.   Genitourinary:  Negative for hematuria.   Neurological:  Negative for dizziness and headaches.   Psychiatric/Behavioral:  Negative for agitation and confusion.      Objective:     Physical Exam  Constitutional:       Appearance: Normal appearance.   HENT:      Head: Normocephalic and atraumatic.      Mouth/Throat:      Pharynx: Oropharynx is clear. No oropharyngeal exudate or posterior oropharyngeal erythema.   Cardiovascular:      Heart sounds:     No friction rub. No gallop.   Pulmonary:      Breath sounds: Normal breath sounds. No wheezing.   Abdominal:      Palpations: Abdomen is soft.      Tenderness: There is no guarding or rebound.   Musculoskeletal:      Cervical back: Neck supple.      Right lower leg: No edema.      Left lower leg: No edema.   Skin:     Findings: No erythema or rash.   Neurological:      Mental Status: She is alert and oriented to person, place, and time.   Psychiatric:         Mood and Affect: Mood normal.         Behavior: Behavior normal.         Thought Content: Thought content normal.         Judgment: Judgment normal.       Assessment:     Problem List Items Addressed This Visit          Oncology    Iron deficiency anemia - Primary    Relevant Medications    ferrous sulfate (FEOSOL) 325 mg (65 mg iron) Tab tablet    Elevated platelet count    Relevant Medications    ferrous sulfate (FEOSOL) 325 mg (65 mg iron) Tab tablet        Mild  Thrombocytosis - likely reactive.  History of iron deficiency anemia     Plan:     Mild thrombocytosis appear reactive in etiology. Monitor CBC.  She has history of iron deficiency. Iron saturation was low.  To resume oral ferrous sulfate.  RTC: 2 months - CBC/iron studies.

## 2023-03-31 ENCOUNTER — HOSPITAL ENCOUNTER (OUTPATIENT)
Dept: PREADMISSION TESTING | Facility: HOSPITAL | Age: 43
Discharge: HOME | End: 2023-03-31
Attending: INTERNAL MEDICINE
Payer: MEDICAID

## 2023-03-31 DIAGNOSIS — Z12.11 COLON CANCER SCREENING: Primary | ICD-10-CM

## 2023-03-31 RX ORDER — POLYETHYLENE GLYCOL 3350, SODIUM SULFATE ANHYDROUS, SODIUM BICARBONATE, SODIUM CHLORIDE, POTASSIUM CHLORIDE 236; 22.74; 6.74; 5.86; 2.97 G/4L; G/4L; G/4L; G/4L; G/4L
4 POWDER, FOR SOLUTION ORAL ONCE
Qty: 4000 ML | Refills: 0 | Status: SHIPPED | OUTPATIENT
Start: 2023-03-31 | End: 2023-04-04

## 2023-04-03 DIAGNOSIS — J45.20 MILD INTERMITTENT ASTHMA WITHOUT COMPLICATION: ICD-10-CM

## 2023-04-05 ENCOUNTER — NURSE TRIAGE (OUTPATIENT)
Dept: ADMINISTRATIVE | Facility: CLINIC | Age: 43
End: 2023-04-05
Payer: MEDICAID

## 2023-04-05 ENCOUNTER — CLINICAL SUPPORT (OUTPATIENT)
Dept: INTERNAL MEDICINE | Facility: CLINIC | Age: 43
End: 2023-04-05
Payer: MEDICAID

## 2023-04-05 VITALS — SYSTOLIC BLOOD PRESSURE: 126 MMHG | DIASTOLIC BLOOD PRESSURE: 84 MMHG

## 2023-04-05 DIAGNOSIS — Z01.30 BLOOD PRESSURE CHECK: Primary | ICD-10-CM

## 2023-04-05 PROCEDURE — 99999 PR PBB SHADOW E&M-EST. PATIENT-LVL I: CPT | Mod: PBBFAC,,,

## 2023-04-05 PROCEDURE — 99211 OFF/OP EST MAY X REQ PHY/QHP: CPT | Mod: PBBFAC

## 2023-04-05 PROCEDURE — 99999 PR PBB SHADOW E&M-EST. PATIENT-LVL I: ICD-10-PCS | Mod: PBBFAC,,,

## 2023-04-05 NOTE — PROGRESS NOTES
Patient presented in clinic today for nurse visit, two patient identifiers used, patient blood pressure reading was within normal range.

## 2023-04-06 RX ORDER — NEBULIZER AND COMPRESSOR
1 EACH MISCELLANEOUS EVERY 4 HOURS PRN
Qty: 1 EACH | Refills: 0 | Status: SHIPPED | OUTPATIENT
Start: 2023-04-06

## 2023-04-19 ENCOUNTER — HOSPITAL ENCOUNTER (OUTPATIENT)
Dept: RADIOLOGY | Facility: HOSPITAL | Age: 43
Discharge: HOME | End: 2023-04-19
Attending: FAMILY MEDICINE
Payer: MEDICAID

## 2023-04-19 DIAGNOSIS — Z12.31 SCREENING MAMMOGRAM FOR BREAST CANCER: ICD-10-CM

## 2023-04-19 PROCEDURE — 77067 SCR MAMMO BI INCL CAD: CPT | Mod: TC

## 2023-04-19 PROCEDURE — 77063 BREAST TOMOSYNTHESIS BI: CPT | Mod: 26,,, | Performed by: RADIOLOGY

## 2023-04-19 PROCEDURE — 77067 SCR MAMMO BI INCL CAD: CPT | Mod: 26,,, | Performed by: RADIOLOGY

## 2023-04-19 PROCEDURE — 77067 MAMMO DIGITAL SCREENING BILAT WITH TOMO: ICD-10-PCS | Mod: 26,,, | Performed by: RADIOLOGY

## 2023-04-19 PROCEDURE — 77063 MAMMO DIGITAL SCREENING BILAT WITH TOMO: ICD-10-PCS | Mod: 26,,, | Performed by: RADIOLOGY

## 2023-04-28 ENCOUNTER — HOSPITAL ENCOUNTER (OUTPATIENT)
Facility: HOSPITAL | Age: 43
Discharge: HOME | End: 2023-04-28
Attending: COLON & RECTAL SURGERY | Admitting: COLON & RECTAL SURGERY
Payer: MEDICAID

## 2023-04-28 ENCOUNTER — ANESTHESIA EVENT (OUTPATIENT)
Dept: ENDOSCOPY | Facility: HOSPITAL | Age: 43
End: 2023-04-28
Payer: MEDICAID

## 2023-04-28 ENCOUNTER — ANESTHESIA (OUTPATIENT)
Dept: ENDOSCOPY | Facility: HOSPITAL | Age: 43
End: 2023-04-28
Payer: MEDICAID

## 2023-04-28 DIAGNOSIS — Z12.11 SCREENING FOR COLON CANCER: ICD-10-CM

## 2023-04-28 PROCEDURE — 37000009 HC ANESTHESIA EA ADD 15 MINS: Performed by: COLON & RECTAL SURGERY

## 2023-04-28 PROCEDURE — 88305 TISSUE EXAM BY PATHOLOGIST: ICD-10-PCS | Mod: 26,,, | Performed by: PATHOLOGY

## 2023-04-28 PROCEDURE — 88305 TISSUE EXAM BY PATHOLOGIST: CPT | Performed by: PATHOLOGY

## 2023-04-28 PROCEDURE — 37000008 HC ANESTHESIA 1ST 15 MINUTES: Performed by: COLON & RECTAL SURGERY

## 2023-04-28 PROCEDURE — 45380 COLONOSCOPY AND BIOPSY: CPT | Mod: ,,, | Performed by: COLON & RECTAL SURGERY

## 2023-04-28 PROCEDURE — 63600175 PHARM REV CODE 636 W HCPCS: Performed by: NURSE ANESTHETIST, CERTIFIED REGISTERED

## 2023-04-28 PROCEDURE — 88305 TISSUE EXAM BY PATHOLOGIST: CPT | Mod: 26,,, | Performed by: PATHOLOGY

## 2023-04-28 PROCEDURE — 25000003 PHARM REV CODE 250: Performed by: COLON & RECTAL SURGERY

## 2023-04-28 PROCEDURE — 45380 PR COLONOSCOPY,BIOPSY: ICD-10-PCS | Mod: ,,, | Performed by: COLON & RECTAL SURGERY

## 2023-04-28 PROCEDURE — 27201012 HC FORCEPS, HOT/COLD, DISP: Performed by: COLON & RECTAL SURGERY

## 2023-04-28 PROCEDURE — 00811 ANES LWR INTST NDSC NOS: CPT | Performed by: COLON & RECTAL SURGERY

## 2023-04-28 PROCEDURE — 45380 COLONOSCOPY AND BIOPSY: CPT | Performed by: COLON & RECTAL SURGERY

## 2023-04-28 RX ORDER — PROPOFOL 10 MG/ML
VIAL (ML) INTRAVENOUS
Status: DISCONTINUED | OUTPATIENT
Start: 2023-04-28 | End: 2023-04-28

## 2023-04-28 RX ORDER — DEXTROMETHORPHAN/PSEUDOEPHED 2.5-7.5/.8
DROPS ORAL
Status: COMPLETED | OUTPATIENT
Start: 2023-04-28 | End: 2023-04-28

## 2023-04-28 RX ADMIN — PROPOFOL 50 MG: 10 INJECTION, EMULSION INTRAVENOUS at 11:04

## 2023-04-28 RX ADMIN — SODIUM CHLORIDE, POTASSIUM CHLORIDE, SODIUM LACTATE AND CALCIUM CHLORIDE: 600; 310; 30; 20 INJECTION, SOLUTION INTRAVENOUS at 11:04

## 2023-04-28 RX ADMIN — PROPOFOL 100 MG: 10 INJECTION, EMULSION INTRAVENOUS at 11:04

## 2023-04-28 NOTE — ANESTHESIA POSTPROCEDURE EVALUATION
Anesthesia Post Evaluation    Patient: Bianca Amato    Procedure(s) Performed: Procedure(s) (LRB):  COLONOSCOPY (N/A)    Final Anesthesia Type: MAC      Patient location during evaluation: GI PACU  Patient participation: Yes- Able to Participate  Level of consciousness: awake and alert  Post-procedure vital signs: reviewed and stable  Pain management: adequate  Airway patency: patent    PONV status at discharge: No PONV  Anesthetic complications: no      Cardiovascular status: hemodynamically stable  Respiratory status: unassisted, room air and spontaneous ventilation  Hydration status: euvolemic  Follow-up not needed.          Vitals Value Taken Time   /63 04/28/23 1135   Temp 36.7 °C (98.1 °F) 04/28/23 1135   Pulse 92 04/28/23 1135   Resp 17 04/28/23 1135   SpO2 100 % 04/28/23 1135         No case tracking events are documented in the log.      Pain/Artem Score: Artem Score: 9 (4/28/2023 11:35 AM)

## 2023-04-28 NOTE — ANESTHESIA PREPROCEDURE EVALUATION
04/28/2023  Bianca Amato is a 43 y.o., female.      Pre-op Assessment    I have reviewed the Patient Summary Reports.     I have reviewed the Nursing Notes. I have reviewed the NPO Status.   I have reviewed the Medications.     Review of Systems  Anesthesia Hx:  No problems with previous Anesthesia    Social:  Non-Smoker    Hematology/Oncology:         -- Anemia:   Cardiovascular:   Hypertension Normal sinus rhythm   Septal infarct (cited on or before 27-APR-2021)   Abnormal ECG   When compared with ECG of 12-AUG-2020 10:29,   Questionable change in initial forces of Anterior-septal leads   Inverted T waves have replaced nonspecific T wave abnormality in Inferior   leads   Confirmed by MD CHON, JUAN ([id number]) on 4/28/2021 8:40:04 PM     Stress test negative     Summary    ? Concentric left ventricular hypertrophy.  ? Normal left ventricular systolic function. The estimated ejection fraction is 55%.  ? Normal LV diastolic function.  ? No wall motion abnormalities.  ? Normal right ventricular systolic function.  ? Normal central venous pressure (3 mmHg).  ? The estimated PA systolic pressure is 14 mmHg.   Pulmonary:   Asthma mild Sleep Apnea, CPAP    Renal/:  Renal/ Normal     Hepatic/GI:   Bowel Prep.    Musculoskeletal:  Musculoskeletal Normal    Neurological:  Neurology Normal    Endocrine:   Hyperthyroidism  Obesity / BMI > 30  Psych:  Psychiatric Normal           Physical Exam  General: Well nourished, Cooperative, Alert and Oriented    Airway:  Mallampati: II   Mouth Opening: Normal  TM Distance: Normal  Tongue: Normal  Neck ROM: Normal ROM    Dental:  Intact, Caps / Implants  Upper front caps ans lower front caps       Anesthesia Plan  Type of Anesthesia, risks & benefits discussed:    Anesthesia Type: Gen Natural Airway, MAC  Intra-op Monitoring Plan: Standard ASA Monitors  Post Op Pain Control  Plan: IV/PO Opioids PRN  Induction:  IV  Informed Consent: Informed consent signed with the Patient and all parties understand the risks and agree with anesthesia plan.  All questions answered.   ASA Score: 3  Day of Surgery Review of History & Physical: H&P Update referred to the surgeon/provider.I have interviewed and examined the patient. I have reviewed the patient's H&P dated: There are no significant changes.     Ready For Surgery From Anesthesia Perspective.     .

## 2023-04-28 NOTE — BRIEF OP NOTE
[facility])  Brief Operative Note     SUMMARY     Surgery Date: 4/28/2023     Surgeon(s) and Role:     * Miladys Cox MD - Primary    Assisting Surgeon: None    Pre-op Diagnosis:  Colon cancer screening [Z12.11]    Post-op Diagnosis:  Post-Op Diagnosis Codes:     * Colon cancer screening [Z12.11]    Procedure(s) (LRB):  COLONOSCOPY (N/A)    Anesthesia: Choice    Description of the findings of the procedure: one polyp removed    Estimated Blood Loss: * No values recorded between 4/28/2023 11:13 AM and 4/28/2023 11:31 AM *         Specimens:   Specimen (24h ago, onward)       Start     Ordered    04/28/23 1130  Specimen to Pathology, Surgery Gastrointestinal tract  Once        Comments: Pre-op Diagnosis: Colon cancer screening [Z12.11]Procedure(s):COLONOSCOPY Number of specimens: 1Name of specimens:#1 Splenic Flexure Polypectomy     References:    Click here for ordering Quick Tip   Question Answer Comment   Procedure Type: Gastrointestinal tract    Which provider would you like to cc? MILADYS COX    Release to patient Immediate        04/28/23 1130                    Discharge Note    SUMMARY     Admit Date: 4/28/2023    Discharge Date and Time: 4/28/2023 11:32 AM    Hospital Course Patient was seen in the preoperative area by both myself and anesthesia. All consents were verified and all questions appropriately answered. All risks, benefits and alternatives explained to patient. Patient proceeded to endoscopy suite for colonoscopy and was discharged home postoperative once cleared by anesthesia.    Final Diagnosis: Post-Op Diagnosis Codes:     * Colon cancer screening [Z12.11]    Disposition: Home or Self Care    Follow Up/Patient Instructions: See Provation report    Medications:  Reconciled Home Medications:      Medication List        CONTINUE taking these medications      albuterol 90 mcg/actuation inhaler  Commonly known as: VENTOLIN HFA  Inhale 2 puffs into the lungs every 4  (four) hours as needed for Wheezing or Shortness of Breath.     amLODIPine 10 MG tablet  Commonly known as: NORVASC  Take 1 tablet (10 mg total) by mouth once daily.     clotrimazole 1 % cream  Commonly known as: LOTRIMIN  Apply topically 2 (two) times daily. Until rash is clear for 7 days     FeroSuL 325 mg (65 mg iron) Tab tablet  Generic drug: ferrous sulfate  Take 1 tablet (325 mg total) by mouth once daily.     hydroCHLOROthiazide 25 MG tablet  Commonly known as: HYDRODIURIL  Take 1 tablet (25 mg total) by mouth once daily.     ketoconazole 2 % cream  Commonly known as: NIZORAL  Apply topically to affected area(s) 2 (two) times daily.     levocetirizine 5 MG tablet  Commonly known as: XYZAL  Take 1 tablet (5 mg total) by mouth every evening.     losartan 50 MG tablet  Commonly known as: COZAAR  Take 1 tablet (50 mg total) by mouth once daily.     meloxicam 7.5 MG tablet  Commonly known as: MOBIC  Take 1 tablet (7.5 mg total) by mouth daily as needed for Pain.     nebulizer and compressor Meagan  Use as directed every 4 hours as needed     NYSTOP powder  Generic drug: nystatin  Apply topically 2 (two) times daily.            Discharge Procedure Orders   Diet general     Call MD for:  temperature >100.4     Call MD for:  persistent nausea and vomiting     Call MD for:  severe uncontrolled pain     Call MD for:  difficulty breathing, headache or visual disturbances     Call MD for:  redness, tenderness, or signs of infection (pain, swelling, redness, odor or green/yellow discharge around incision site)     Call MD for:  hives     Call MD for:  persistent dizziness or light-headedness     Call MD for:  extreme fatigue     Activity as tolerated      Follow-up Information       Vivi Chand MD Follow up.    Specialty: Family Medicine  Why: As needed  Contact information:  [address] [place] [address] [address] Ebenezer LEGGETT [id number] [phone]

## 2023-04-28 NOTE — PLAN OF CARE
Dr Cox came to bedside and discussed findings. NO N/V,  no abdominal pain, no GI bleeding, and vitals stable.  Pt discharged from unit.

## 2023-04-28 NOTE — TRANSFER OF CARE
"Anesthesia Transfer of Care Note    Patient: Bianca Amato    Procedure(s) Performed: Procedure(s) (LRB):  COLONOSCOPY (N/A)    Patient location: GI    Anesthesia Type: MAC    Transport from OR: Transported from OR on room air with adequate spontaneous ventilation    Post pain: adequate analgesia    Post assessment: no apparent anesthetic complications    Post vital signs: stable    Level of consciousness: awake, alert and oriented    Nausea/Vomiting: no nausea/vomiting    Complications: none    Transfer of care protocol was followed      Last vitals:   Visit Vitals  /76 (BP Location: Left arm, Patient Position: Lying)   Pulse 90   Temp 36.5 °C (97.7 °F) (Temporal)   Resp 18   Ht 5' 5" (1.651 m)   Wt 87.1 kg (192 lb)   LMP 11/07/2019 (Approximate)   SpO2 99%   Breastfeeding No   BMI 31.95 kg/m²     "

## 2023-04-28 NOTE — H&P
[facility] ([facility])  Colon and Rectal Surgery  History & Physical    Patient Name: Bianca Amato  MRN: [id number]  Admission Date: 4/28/2023  Attending Physician: Maximo Cox MD  Primary Care Provider: Vivi Chand MD    Patient information was obtained from patient and medical records.    Subjective:     Chief Complaint/Reason for Admission: Here for Colonoscopy    History of Present Illness:  Patient is a 43 y.o. female presents for colonoscopy. Never had colonoscopy. No hematochezia, melena or change in bowel habits. No personal or fam hx of CRC, polyps or IBD.    No current facility-administered medications on file prior to encounter.     Current Outpatient Medications on File Prior to Encounter   Medication Sig    albuterol (VENTOLIN HFA) 90 mcg/actuation inhaler Inhale 2 puffs into the lungs every 4 (four) hours as needed for Wheezing or Shortness of Breath.    amLODIPine (NORVASC) 10 MG tablet Take 1 tablet (10 mg total) by mouth once daily.    clotrimazole (LOTRIMIN) 1 % cream Apply topically 2 (two) times daily. Until rash is clear for 7 days    ferrous sulfate (FEOSOL) 325 mg (65 mg iron) Tab tablet Take 1 tablet (325 mg total) by mouth once daily.    hydroCHLOROthiazide (HYDRODIURIL) 25 MG tablet Take 1 tablet (25 mg total) by mouth once daily.    ketoconazole (NIZORAL) 2 % cream Apply topically to affected area(s) 2 (two) times daily.    levocetirizine (XYZAL) 5 MG tablet Take 1 tablet (5 mg total) by mouth every evening.    losartan (COZAAR) 50 MG tablet Take 1 tablet (50 mg total) by mouth once daily.    meloxicam (MOBIC) 7.5 MG tablet Take 1 tablet (7.5 mg total) by mouth daily as needed for Pain.    nystatin (NYAMYC) powder Apply topically 2 (two) times daily.       Review of patient's allergies indicates:   Allergen Reactions    Latex Rash and Shortness Of Breath    Latex, natural rubber Hives    Penicillins Hives, Rash and Shortness Of Breath    Sulfa (sulfonamide antibiotics)  Hives, Swelling, Rash and Shortness Of Breath       Past Medical History:   Diagnosis Date    Carpal tunnel syndrome     bilateral    Hypertension     Iron deficiency anemia     Mild intermittent asthma      Past Surgical History:   Procedure Laterality Date    EYE FOREIGN BODY REMOVAL Right     EYE SURGERY  2009    Im not sure what year    HYSTERECTOMY  2020    OOPHORECTOMY      ROBOT-ASSISTED LAPAROSCOPIC ABDOMINAL HYSTERECTOMY USING DA ALYSA XI N/A 12/06/2019    Procedure: XI ROBOTIC HYSTERECTOMY;  Surgeon: VENESSA Davidson MD;  Location: [facility];  Service: OB/GYN;  Laterality: N/A;    ROBOT-ASSISTED SURGICAL REMOVAL OF FALLOPIAN TUBE USING DA ALYSA XI Bilateral 12/06/2019    Procedure: XI ROBOTIC SALPINGECTOMY;  Surgeon: VENESSA Davidson MD;  Location: [facility];  Service: OB/GYN;  Laterality: Bilateral;    SURGICAL REMOVAL OF MASS OF AXILLA Right 11/12/2018    Procedure: EXCISION, MASS, AXILLARY;  Surgeon: Alis Tian MD;  Location: [facility];  Service: General;  Laterality: Right;    TUBAL LIGATION       Family History       Problem Relation (Age of Onset)    Asthma Father    COPD Father    Diabetes Mother    Hypertension Mother, Father    Sarcoidosis Mother          Tobacco Use    Smoking status: Never    Smokeless tobacco: Never   Substance and Sexual Activity    Alcohol use: No    Drug use: No    Sexual activity: Yes     Partners: Male     Birth control/protection: None     Comment: Sometimes used condoms     Review of Systems   Constitutional:  Negative for activity change, appetite change, chills, fatigue, fever and unexpected weight change.   HENT:  Negative for congestion, ear pain, sore throat and trouble swallowing.    Eyes:  Negative for pain, redness and itching.   Respiratory:  Negative for cough, shortness of breath and wheezing.    Cardiovascular:  Negative for chest pain, palpitations and leg swelling.   Gastrointestinal:  Negative for abdominal distention, abdominal pain, anal  "bleeding, blood in stool, constipation, diarrhea, nausea, rectal pain and vomiting.   Endocrine: Negative for cold intolerance, heat intolerance and polyuria.   Genitourinary:  Negative for dysuria, flank pain, frequency and hematuria.   Musculoskeletal:  Negative for gait problem, joint swelling and neck pain.   Skin:  Negative for color change, rash and wound.   Allergic/Immunologic: Negative for environmental allergies and immunocompromised state.   Neurological:  Negative for dizziness, speech difficulty, weakness and numbness.   Psychiatric/Behavioral:  Negative for agitation, confusion and hallucinations.    Objective:     /76 (BP Location: Left arm, Patient Position: Lying)   Pulse 90   Temp 97.7 °F (36.5 °C) (Temporal)   Resp 18   Ht 5' 5" (1.651 m)   Wt 87.1 kg (192 lb)   LMP 11/07/2019 (Approximate)   SpO2 99%   Breastfeeding No   BMI 31.95 kg/m²       Physical Exam  Constitutional:       Appearance: She is well-developed.   HENT:      Head: Normocephalic and atraumatic.   Eyes:      Conjunctiva/sclera: Conjunctivae normal.   Neck:      Thyroid: No thyromegaly.   Cardiovascular:      Rate and Rhythm: Normal rate and regular rhythm.   Pulmonary:      Effort: Pulmonary effort is normal. No respiratory distress.   Abdominal:      General: There is no distension.      Palpations: Abdomen is soft. There is no mass.      Tenderness: There is no abdominal tenderness.   Musculoskeletal:         General: No tenderness. Normal range of motion.      Cervical back: Normal range of motion.   Skin:     General: Skin is warm and dry.      Capillary Refill: Capillary refill takes less than 2 seconds.      Findings: No rash.   Neurological:      Mental Status: She is alert and oriented to person, place, and time.         Assessment/Plan:     Patient is a 43 y.o. female who presents for colonoscopy     - Ok to proceed to endoscopy suite for colonoscopy  - Consent obtained. All risks, benefits and alternatives " fully explained to patient, including but not limited to bleeding, infection, perforation, and missed polyps. All questions appropriately answered to patient's satisfaction. Consent signed and placed on chart.    There are no hospital problems to display for this patient.    VTE Risk Mitigation (From admission, onward)      None            Maximo Cox MD  Colon and Rectal Surgery  [facility] ([facility])

## 2023-04-28 NOTE — [employer]ATION PATIENT INSTRUCTIONS
Discharge Summary/Instructions after an Endoscopic Procedure  Patient Name: Bianca Amato  Patient MRN: [id number]  Patient YOB: 1980 Friday, April 28, 2023 Maximo Cox MD  Dear patient,  As a result of recent federal legislation (The Federal Cures Act), you may   receive lab or pathology results from your procedure in your [facility]   account before your physician is able to contact you. Your physician or   their representative will relay the results to you with their   recommendations at their soonest availability.  Thank you,  RESTRICTIONS:  During your procedure today, you received medications for sedation.  These   medications may affect your judgment, balance and coordination.  Therefore,   for 24 hours, you have the following restrictions:   - DO NOT drive a car, operate machinery, make legal/financial decisions,   sign important papers or drink alcohol.    ACTIVITY:  Today: no heavy lifting, straining or running due to procedural   sedation/anesthesia.  The following day: return to full activity including work.  DIET:  Eat and drink normally unless instructed otherwise.     TREATMENT FOR COMMON SIDE EFFECTS:  - Mild abdominal pain, nausea, belching, bloating or excessive gas:  rest,   eat lightly and use a heating pad.  - Sore Throat: treat with throat lozenges and/or gargle with warm salt   water.  - Because air was used during the procedure, expelling large amounts of air   from your rectum or belching is normal.  - If a bowel prep was taken, you may not have a bowel movement for 1-3 days.    This is normal.  SYMPTOMS TO WATCH FOR AND REPORT TO YOUR PHYSICIAN:  1. Abdominal pain or bloating, other than gas cramps.  2. Chest pain.  3. Back pain.  4. Signs of infection such as: chills or fever occurring within 24 hours   after the procedure.  5. Rectal bleeding, which would show as bright red, maroon, or black stools.   (A tablespoon of blood from the rectum is not serious, especially if    hemorrhoids are present.)  6. Vomiting.  7. Weakness or dizziness.  GO DIRECTLY TO THE NEAREST EMERGENCY ROOM IF YOU HAVE ANY OF THE FOLLOWING:      Difficulty breathing              Chills and/or fever over 101 F   Persistent vomiting and/or vomiting blood   Severe abdominal pain   Severe chest pain   Black, tarry stools   Bleeding- more than one tablespoon   Any other symptom or condition that you feel may need urgent attention  Your doctor recommends these additional instructions:  If any biopsies were taken, your doctors clinic will contact you in 1 to 2   weeks with any results.  - Discharge patient to home.   - Resume previous diet.   - Continue present medications.   - Await pathology results.   - Repeat colonoscopy in 10 years for screening purposes.   - Return to primary care physician PRN.  For questions, problems or results please call your physician Maximo Cox MD at Work:  [phone]  If you have any questions about the above instructions, call the GI   department at [phone] or call the endoscopy unit at [phone]   from 7am until 3 pm.  [facility], EMERGENCY ROOM PHONE NUMBER:   [phone]  IF A COMPLICATION OR EMERGENCY SITUATION ARISES AND YOU ARE UNABLE TO REACH   YOUR PHYSICIAN - GO DIRECTLY TO THE EMERGENCY ROOM.  I have read or have had read to me these discharge instructions for my   procedure and have received a written copy.  I understand these   instructions and will follow-up with my physician if I have any questions.     __________________________________       _____________________________________  Nurse Signature                                          Patient/Designated   Responsible Party Signature  MD Maximo Pina MD  4/28/2023 11:32:21 AM  This report has been verified and signed electronically.  Dear patient,  As a result of recent federal legislation (The Federal Cures Act), you may   receive lab or  pathology results from your procedure in your [facility]   account before your physician is able to contact you. Your physician or   their representative will relay the results to you with their   recommendations at their soonest availability.  Thank you,  PROVATION

## 2023-05-01 VITALS
HEIGHT: 65 IN | SYSTOLIC BLOOD PRESSURE: 112 MMHG | TEMPERATURE: 98 F | OXYGEN SATURATION: 100 % | WEIGHT: 192 LBS | BODY MASS INDEX: 31.99 KG/M2 | RESPIRATION RATE: 18 BRPM | DIASTOLIC BLOOD PRESSURE: 79 MMHG | HEART RATE: 88 BPM

## 2023-05-03 LAB
FINAL PATHOLOGIC DIAGNOSIS: NORMAL
Lab: NORMAL

## 2023-05-23 ENCOUNTER — OFFICE VISIT (OUTPATIENT)
Dept: PODIATRY | Facility: CLINIC | Age: 43
End: 2023-05-23
Payer: MEDICAID

## 2023-05-23 ENCOUNTER — LAB VISIT (OUTPATIENT)
Dept: LAB | Facility: HOSPITAL | Age: 43
End: 2023-05-23
Attending: INTERNAL MEDICINE
Payer: MEDICAID

## 2023-05-23 DIAGNOSIS — B35.1 ONYCHOMYCOSIS: ICD-10-CM

## 2023-05-23 DIAGNOSIS — D50.0 IRON DEFICIENCY ANEMIA DUE TO CHRONIC BLOOD LOSS: ICD-10-CM

## 2023-05-23 DIAGNOSIS — B35.3 TINEA PEDIS OF BOTH FEET: Primary | ICD-10-CM

## 2023-05-23 LAB
BASOPHILS # BLD AUTO: 0.02 K/UL (ref 0–0.2)
BASOPHILS NFR BLD: 0.3 % (ref 0–1.9)
DIFFERENTIAL METHOD: ABNORMAL
EOSINOPHIL # BLD AUTO: 0.4 K/UL (ref 0–0.5)
EOSINOPHIL NFR BLD: 5.1 % (ref 0–8)
ERYTHROCYTE [DISTWIDTH] IN BLOOD BY AUTOMATED COUNT: 13.7 % (ref 11.5–14.5)
HCT VFR BLD AUTO: 37.3 % (ref 37–48.5)
HGB BLD-MCNC: 12.4 G/DL (ref 12–16)
IMM GRANULOCYTES # BLD AUTO: 0.02 K/UL (ref 0–0.04)
IMM GRANULOCYTES NFR BLD AUTO: 0.3 % (ref 0–0.5)
LYMPHOCYTES # BLD AUTO: 3.1 K/UL (ref 1–4.8)
LYMPHOCYTES NFR BLD: 42.5 % (ref 18–48)
MCH RBC QN AUTO: 29.3 PG (ref 27–31)
MCHC RBC AUTO-ENTMCNC: 33.2 G/DL (ref 32–36)
MCV RBC AUTO: 88 FL (ref 82–98)
MONOCYTES # BLD AUTO: 0.5 K/UL (ref 0.3–1)
MONOCYTES NFR BLD: 6.1 % (ref 4–15)
NEUTROPHILS # BLD AUTO: 3.4 K/UL (ref 1.8–7.7)
NEUTROPHILS NFR BLD: 45.7 % (ref 38–73)
NRBC BLD-RTO: 0 /100 WBC
PLATELET # BLD AUTO: 491 K/UL (ref 150–450)
PMV BLD AUTO: 8.9 FL (ref 9.2–12.9)
RBC # BLD AUTO: 4.23 M/UL (ref 4–5.4)
WBC # BLD AUTO: 7.39 K/UL (ref 3.9–12.7)

## 2023-05-23 PROCEDURE — 1159F MED LIST DOCD IN RCRD: CPT | Mod: CPTII,,, | Performed by: PODIATRIST

## 2023-05-23 PROCEDURE — 4010F PR ACE/ARB THEARPY RXD/TAKEN: ICD-10-PCS | Mod: CPTII,,, | Performed by: PODIATRIST

## 2023-05-23 PROCEDURE — 4010F ACE/ARB THERAPY RXD/TAKEN: CPT | Mod: CPTII,,, | Performed by: PODIATRIST

## 2023-05-23 PROCEDURE — 99999 PR PBB SHADOW E&M-EST. PATIENT-LVL III: CPT | Mod: PBBFAC,,, | Performed by: PODIATRIST

## 2023-05-23 PROCEDURE — 3044F PR MOST RECENT HEMOGLOBIN A1C LEVEL <7.0%: ICD-10-PCS | Mod: CPTII,,, | Performed by: PODIATRIST

## 2023-05-23 PROCEDURE — 1159F PR MEDICATION LIST DOCUMENTED IN MEDICAL RECORD: ICD-10-PCS | Mod: CPTII,,, | Performed by: PODIATRIST

## 2023-05-23 PROCEDURE — 99213 OFFICE O/P EST LOW 20 MIN: CPT | Mod: PBBFAC | Performed by: PODIATRIST

## 2023-05-23 PROCEDURE — 99214 OFFICE O/P EST MOD 30 MIN: CPT | Mod: S$PBB,,, | Performed by: PODIATRIST

## 2023-05-23 PROCEDURE — 36415 COLL VENOUS BLD VENIPUNCTURE: CPT | Performed by: INTERNAL MEDICINE

## 2023-05-23 PROCEDURE — 99999 PR PBB SHADOW E&M-EST. PATIENT-LVL III: ICD-10-PCS | Mod: PBBFAC,,, | Performed by: PODIATRIST

## 2023-05-23 PROCEDURE — 99214 PR OFFICE/OUTPT VISIT, EST, LEVL IV, 30-39 MIN: ICD-10-PCS | Mod: S$PBB,,, | Performed by: PODIATRIST

## 2023-05-23 PROCEDURE — 1160F RVW MEDS BY RX/DR IN RCRD: CPT | Mod: CPTII,,, | Performed by: PODIATRIST

## 2023-05-23 PROCEDURE — 82728 ASSAY OF FERRITIN: CPT | Performed by: INTERNAL MEDICINE

## 2023-05-23 PROCEDURE — 85025 COMPLETE CBC W/AUTO DIFF WBC: CPT | Performed by: INTERNAL MEDICINE

## 2023-05-23 PROCEDURE — 84466 ASSAY OF TRANSFERRIN: CPT | Performed by: INTERNAL MEDICINE

## 2023-05-23 PROCEDURE — 1160F PR REVIEW ALL MEDS BY PRESCRIBER/CLIN PHARMACIST DOCUMENTED: ICD-10-PCS | Mod: CPTII,,, | Performed by: PODIATRIST

## 2023-05-23 PROCEDURE — 3044F HG A1C LEVEL LT 7.0%: CPT | Mod: CPTII,,, | Performed by: PODIATRIST

## 2023-05-23 RX ORDER — KETOCONAZOLE 20 MG/G
CREAM TOPICAL 2 TIMES DAILY
Qty: 30 G | Refills: 2 | Status: SHIPPED | OUTPATIENT
Start: 2023-05-23 | End: 2023-10-02 | Stop reason: SDUPTHER

## 2023-05-23 RX ORDER — TERBINAFINE HYDROCHLORIDE 250 MG/1
250 TABLET ORAL DAILY
Qty: 90 TABLET | Refills: 0 | Status: SHIPPED | OUTPATIENT
Start: 2023-05-23 | End: 2023-08-25

## 2023-05-23 NOTE — PROGRESS NOTES
Subjective:       Patient ID: Bianca Amato is a 43 y.o. female.    Chief Complaint: Nail Care (Patient in for follow up, toe nails clipped and just wants you to look at the one that you pulled off to make sure everything is good, not a diabetic and was last seen by pcp on 3/23/23)      HPI:  Patient presents to the office this afternoon, with complaint of elongated and thickened nail plates to the left and right. The patient states slight discomfort due to the nail thickening and/or elongation.Patient is interested in the definitive medical diagnosis.  Symptoms are exacerbated with tight shoe gear. Pains are approximately 1/10. This patient's PMD is Vivi Chand MD. Also requests refill for topical antifungal cream.      Review of patient's allergies indicates:   Allergen Reactions    Latex Rash and Shortness Of Breath    Latex, natural rubber Hives    Penicillins Hives, Rash and Shortness Of Breath    Sulfa (sulfonamide antibiotics) Hives, Swelling, Rash and Shortness Of Breath       Past Medical History:   Diagnosis Date    Carpal tunnel syndrome     bilateral    Hypertension     Iron deficiency anemia     Mild intermittent asthma        Family History   Problem Relation Age of Onset    Diabetes Mother     Sarcoidosis Mother     Hypertension Mother     COPD Father     Hypertension Father     Asthma Father     Breast cancer Neg Hx     Colon cancer Neg Hx     Ovarian cancer Neg Hx     Thrombosis Neg Hx        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   Tobacco Use    Smoking status: Never    Smokeless tobacco: Never   Substance and Sexual Activity    Alcohol use: No    Drug use: No    Sexual activity: Yes     Partners: Male     Birth control/protection: None     Comment: Sometimes used condoms     Social Determinants of Health     Financial Resource Strain: Low  Risk     Difficulty of Paying Living Expenses: Not hard at all   Food Insecurity: No Food Insecurity    Worried About Running Out of Food in the Last Year: Never true    Ran Out of Food in the Last Year: Never true   Transportation Needs: No Transportation Needs    Lack of Transportation (Medical): No    Lack of Transportation (Non-Medical): No   Physical Activity: Sufficiently Active    Days of Exercise per Week: 7 days    Minutes of Exercise per Session: 30 min   Stress: No Stress Concern Present    Feeling of Stress : Only a little   Social Connections: Unknown    Frequency of Communication with Friends and Family: More than three times a week    Frequency of Social Gatherings with Friends and Family: More than three times a week    Active Member of Clubs or Organizations: No    Attends Club or Organization Meetings: Never    Marital Status: Living with partner   Housing Stability: Low Risk     Unable to Pay for Housing in the Last Year: No    Number of Places Lived in the Last Year: 1    Unstable Housing in the Last Year: No       Past Surgical History:   Procedure Laterality Date    COLONOSCOPY N/A 4/28/2023    Procedure: COLONOSCOPY;  Surgeon: Maximo Cox MD;  Location: [facility];  Service: Endoscopy;  Laterality: N/A;    EYE FOREIGN BODY REMOVAL Right     EYE SURGERY  2009    Im not sure what year    HYSTERECTOMY  2020    OOPHORECTOMY      ROBOT-ASSISTED LAPAROSCOPIC ABDOMINAL HYSTERECTOMY USING DA ALYSA XI N/A 12/06/2019    Procedure: XI ROBOTIC HYSTERECTOMY;  Surgeon: VENESSA Davidson MD;  Location: [facility];  Service: OB/GYN;  Laterality: N/A;    ROBOT-ASSISTED SURGICAL REMOVAL OF FALLOPIAN TUBE USING DA ALYSA XI Bilateral 12/06/2019    Procedure: XI ROBOTIC SALPINGECTOMY;  Surgeon: VENESSA Davidson MD;  Location: [facility];  Service: OB/GYN;  Laterality: Bilateral;    SURGICAL REMOVAL OF MASS OF AXILLA Right 11/12/2018    Procedure: EXCISION, MASS, AXILLARY;  Surgeon: Alis Tian MD;   Location: [facility];  Service: General;  Laterality: Right;    TUBAL LIGATION         Review of Systems   Constitutional:  Negative for chills, fatigue and fever.   HENT:  Negative for hearing loss.    Eyes:  Negative for photophobia and visual disturbance.   Respiratory:  Negative for cough, chest tightness, shortness of breath and wheezing.    Cardiovascular:  Negative for chest pain and palpitations.   Gastrointestinal:  Negative for constipation, diarrhea, nausea and vomiting.   Endocrine: Negative for cold intolerance and heat intolerance.   Genitourinary:  Negative for flank pain.   Musculoskeletal:  Negative for neck pain and neck stiffness.   Neurological:  Negative for light-headedness and headaches.   Psychiatric/Behavioral:  Negative for sleep disturbance.         Objective:   LMP 11/07/2019 (Approximate)       Physical Exam    LOWER EXTREMITY PHYSICAL EXAMINATION  DERMATOLOGY:  Skin is supple, dry and intact. No ulcerations are noted. No hyperkeratosis or calluses are noted. No ecchymosis appreciated.  There are nail changes which are consistent with onychomycosis on the left and right foot. On the left foot, nail #1 is/are thickened, is/are dystrophic, with yellow discoloration, and with malodorous subungual debris. On the right foot, nail #1 is/are thickened, is/are dystrophic, with yellow discoloration, and with malodorous subungual debris. Mild webspace maceration and tinea pedis is noted.     Assessment:     1. Tinea pedis of both feet    2. Onychomycosis        Plan:     Tinea pedis of both feet  -     ketoconazole (NIZORAL) 2 % cream; Apply topically to affected area(s) 2 (two) times daily.  Dispense: 30 g; Refill: 2    Onychomycosis      Thorough discussion is had with the patient today, concerning the diagnosis, its etiology, and the treatment algorithm at present.     Most recent labs reviewed in detail with the patient. All questions and concerns regarding findings and its/their implications are  outlined and discussed.    Discussed the various treatment options concerning onychomycosis, these being over-the-counter topicals (efficacy is low in regards to cure), prescription strength topicals (better efficacy as compared to OTC versions, but only slightly so, and potentially costly), laser therapy (which is not covered by insurance companies), oral medications (patient is advised that there is a potential, though less than 5%, for the potential of adverse health and side effects; namely liver pathology) and doing nothing (frequent debridements) as onychomycosis is a cosmetic ailment, and has no potential for long-term deleterious effects on the health. Did discuss the sides effects of PO therapy and what to monitor for, namely, headache, diarrhea, itching and rash, indigestion, liver enzyme abnormalities, taste disturbance, nausea, abdominal pain, flatulence (gas), vomiting and upper respiratory tract infection. Rx. for 90 days course is provided.    Recommend moisture wicking socks to alleviate the hyperhidrosis which makes one prone to recurrent tinea pedis.  I also recommend to alternate shoe gear, meaning, Monday, Wednesday, Friday, Saturday/Sunday and Tuesday, Thursday, Saturday/Sunday.  I do also recommend Tinactin antifungal spray to shoes daily.  After the aforementioned, put the shoes aside and allow to dry overnight and/or one full day.  Patient may purchase OTC Gold Bond Powder and use to his/her shoes daily prior to putting them on. He/She may also sprinkle a slight amount of powder to the foot prior to putting on socks.          Future Appointments   Date Time Provider Department Center   5/25/2023  1:30 PM Beatriz Louis NP [facility] HEM ONC [facility]   9/29/2023  9:20 AM Luana Mera PA-C ON ENT [facility]   12/11/2023  1:00 PM MAGNUS NEELY [facility]   12/11/2023  1:15 PM PULMONARY LAB, [place] [facility] PULMFS [facility]   12/11/2023  2:00 PM Jolynn Trinidad NP [facility]  Medical C

## 2023-05-24 LAB
FERRITIN SERPL-MCNC: 217 NG/ML (ref 20–300)
IRON SERPL-MCNC: 41 UG/DL (ref 30–160)
SATURATED IRON: 15 % (ref 20–50)
TOTAL IRON BINDING CAPACITY: 281 UG/DL (ref 250–450)
TRANSFERRIN SERPL-MCNC: 190 MG/DL (ref 200–375)

## 2023-05-25 ENCOUNTER — OFFICE VISIT (OUTPATIENT)
Dept: HEMATOLOGY/ONCOLOGY | Facility: CLINIC | Age: 43
End: 2023-05-25
Payer: MEDICAID

## 2023-05-25 ENCOUNTER — PATIENT MESSAGE (OUTPATIENT)
Dept: INTERNAL MEDICINE | Facility: CLINIC | Age: 43
End: 2023-05-25
Payer: MEDICAID

## 2023-05-25 VITALS
WEIGHT: 191.81 LBS | HEIGHT: 65 IN | HEART RATE: 77 BPM | SYSTOLIC BLOOD PRESSURE: 130 MMHG | OXYGEN SATURATION: 98 % | BODY MASS INDEX: 31.96 KG/M2 | DIASTOLIC BLOOD PRESSURE: 86 MMHG | TEMPERATURE: 98 F

## 2023-05-25 DIAGNOSIS — D50.9 IRON DEFICIENCY ANEMIA, UNSPECIFIED IRON DEFICIENCY ANEMIA TYPE: ICD-10-CM

## 2023-05-25 DIAGNOSIS — E61.1 IRON DEFICIENCY: ICD-10-CM

## 2023-05-25 DIAGNOSIS — R79.89 ELEVATED PLATELET COUNT: Primary | ICD-10-CM

## 2023-05-25 DIAGNOSIS — E66.9 CLASS 1 OBESITY WITH SERIOUS COMORBIDITY AND BODY MASS INDEX (BMI) OF 32.0 TO 32.9 IN ADULT, UNSPECIFIED OBESITY TYPE: ICD-10-CM

## 2023-05-25 PROCEDURE — 1159F PR MEDICATION LIST DOCUMENTED IN MEDICAL RECORD: ICD-10-PCS | Mod: CPTII,,, | Performed by: NURSE PRACTITIONER

## 2023-05-25 PROCEDURE — 3008F PR BODY MASS INDEX (BMI) DOCUMENTED: ICD-10-PCS | Mod: CPTII,,, | Performed by: NURSE PRACTITIONER

## 2023-05-25 PROCEDURE — 99214 PR OFFICE/OUTPT VISIT, EST, LEVL IV, 30-39 MIN: ICD-10-PCS | Mod: S$PBB,,, | Performed by: NURSE PRACTITIONER

## 2023-05-25 PROCEDURE — 3044F HG A1C LEVEL LT 7.0%: CPT | Mod: CPTII,,, | Performed by: NURSE PRACTITIONER

## 2023-05-25 PROCEDURE — 3079F DIAST BP 80-89 MM HG: CPT | Mod: CPTII,,, | Performed by: NURSE PRACTITIONER

## 2023-05-25 PROCEDURE — 3079F PR MOST RECENT DIASTOLIC BLOOD PRESSURE 80-89 MM HG: ICD-10-PCS | Mod: CPTII,,, | Performed by: NURSE PRACTITIONER

## 2023-05-25 PROCEDURE — 4010F ACE/ARB THERAPY RXD/TAKEN: CPT | Mod: CPTII,,, | Performed by: NURSE PRACTITIONER

## 2023-05-25 PROCEDURE — 3044F PR MOST RECENT HEMOGLOBIN A1C LEVEL <7.0%: ICD-10-PCS | Mod: CPTII,,, | Performed by: NURSE PRACTITIONER

## 2023-05-25 PROCEDURE — 99999 PR PBB SHADOW E&M-EST. PATIENT-LVL IV: ICD-10-PCS | Mod: PBBFAC,,, | Performed by: NURSE PRACTITIONER

## 2023-05-25 PROCEDURE — 3008F BODY MASS INDEX DOCD: CPT | Mod: CPTII,,, | Performed by: NURSE PRACTITIONER

## 2023-05-25 PROCEDURE — 99999 PR PBB SHADOW E&M-EST. PATIENT-LVL IV: CPT | Mod: PBBFAC,,, | Performed by: NURSE PRACTITIONER

## 2023-05-25 PROCEDURE — 1159F MED LIST DOCD IN RCRD: CPT | Mod: CPTII,,, | Performed by: NURSE PRACTITIONER

## 2023-05-25 PROCEDURE — 1160F PR REVIEW ALL MEDS BY PRESCRIBER/CLIN PHARMACIST DOCUMENTED: ICD-10-PCS | Mod: CPTII,,, | Performed by: NURSE PRACTITIONER

## 2023-05-25 PROCEDURE — 3075F SYST BP GE 130 - 139MM HG: CPT | Mod: CPTII,,, | Performed by: NURSE PRACTITIONER

## 2023-05-25 PROCEDURE — 3075F PR MOST RECENT SYSTOLIC BLOOD PRESS GE 130-139MM HG: ICD-10-PCS | Mod: CPTII,,, | Performed by: NURSE PRACTITIONER

## 2023-05-25 PROCEDURE — 99214 OFFICE O/P EST MOD 30 MIN: CPT | Mod: PBBFAC | Performed by: NURSE PRACTITIONER

## 2023-05-25 PROCEDURE — 1160F RVW MEDS BY RX/DR IN RCRD: CPT | Mod: CPTII,,, | Performed by: NURSE PRACTITIONER

## 2023-05-25 PROCEDURE — 4010F PR ACE/ARB THEARPY RXD/TAKEN: ICD-10-PCS | Mod: CPTII,,, | Performed by: NURSE PRACTITIONER

## 2023-05-25 PROCEDURE — 99214 OFFICE O/P EST MOD 30 MIN: CPT | Mod: S$PBB,,, | Performed by: NURSE PRACTITIONER

## 2023-05-25 RX ORDER — SODIUM CHLORIDE 0.9 % (FLUSH) 0.9 %
10 SYRINGE (ML) INJECTION
OUTPATIENT
Start: 2023-07-06

## 2023-05-25 RX ORDER — HEPARIN 100 UNIT/ML
500 SYRINGE INTRAVENOUS
OUTPATIENT
Start: 2023-06-15

## 2023-05-25 RX ORDER — HEPARIN 100 UNIT/ML
500 SYRINGE INTRAVENOUS
OUTPATIENT
Start: 2023-06-22

## 2023-05-25 RX ORDER — SODIUM CHLORIDE 0.9 % (FLUSH) 0.9 %
10 SYRINGE (ML) INJECTION
OUTPATIENT
Start: 2023-06-01

## 2023-05-25 RX ORDER — SODIUM CHLORIDE 0.9 % (FLUSH) 0.9 %
10 SYRINGE (ML) INJECTION
OUTPATIENT
Start: 2023-06-15

## 2023-05-25 RX ORDER — SODIUM CHLORIDE 0.9 % (FLUSH) 0.9 %
10 SYRINGE (ML) INJECTION
OUTPATIENT
Start: 2023-06-08

## 2023-05-25 RX ORDER — SODIUM CHLORIDE 0.9 % (FLUSH) 0.9 %
10 SYRINGE (ML) INJECTION
OUTPATIENT
Start: 2023-06-29

## 2023-05-25 RX ORDER — HEPARIN 100 UNIT/ML
500 SYRINGE INTRAVENOUS
OUTPATIENT
Start: 2023-07-13

## 2023-05-25 RX ORDER — HEPARIN 100 UNIT/ML
500 SYRINGE INTRAVENOUS
OUTPATIENT
Start: 2023-07-06

## 2023-05-25 RX ORDER — HEPARIN 100 UNIT/ML
500 SYRINGE INTRAVENOUS
OUTPATIENT
Start: 2023-06-08

## 2023-05-25 RX ORDER — HEPARIN 100 UNIT/ML
500 SYRINGE INTRAVENOUS
OUTPATIENT
Start: 2023-06-29

## 2023-05-25 RX ORDER — SODIUM CHLORIDE 0.9 % (FLUSH) 0.9 %
10 SYRINGE (ML) INJECTION
OUTPATIENT
Start: 2023-05-25

## 2023-05-25 RX ORDER — HEPARIN 100 UNIT/ML
500 SYRINGE INTRAVENOUS
OUTPATIENT
Start: 2023-05-25

## 2023-05-25 RX ORDER — HEPARIN 100 UNIT/ML
500 SYRINGE INTRAVENOUS
OUTPATIENT
Start: 2023-06-01

## 2023-05-25 RX ORDER — SODIUM CHLORIDE 0.9 % (FLUSH) 0.9 %
10 SYRINGE (ML) INJECTION
OUTPATIENT
Start: 2023-06-22

## 2023-05-25 RX ORDER — SODIUM CHLORIDE 0.9 % (FLUSH) 0.9 %
10 SYRINGE (ML) INJECTION
OUTPATIENT
Start: 2023-07-13

## 2023-05-25 NOTE — ASSESSMENT & PLAN NOTE
Patient reports intolerance to oral iron supplementation due to constipation, upset stomach.    Persistent mild iron deficiency noted with saturated iron 15%    Trial Venofer weekly x 4. F/u 8 weeks after last dose IV iron with cbc, iron studies, CRP, sed rate. If platelet count remains elevated consider further workup to r/o plasma cell dyscrasia.

## 2023-05-25 NOTE — PROGRESS NOTES
Subjective:       Patient ID: Bianca Amato is a 43 y.o. female.    Chief Complaint: thrombocytosis     HPI: 43 y.o female originally referred by PCP for thrombocytosis. Workup revealed iron deficiency. She was asked to take oral iron but reports intolerance due to constipation and GI upset    Today she presents for follow up lab review and further recommendations.   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   Tobacco Use    Smoking status: Never    Smokeless tobacco: Never   Substance and Sexual Activity    Alcohol use: No    Drug use: No    Sexual activity: Yes     Partners: Male     Birth control/protection: None     Comment: Sometimes used condoms     Social Determinants of Health     Financial Resource Strain: Low Risk     Difficulty of Paying Living Expenses: Not hard at all   Food Insecurity: No Food Insecurity    Worried About Running Out of Food in the Last Year: Never true    Ran Out of Food in the Last Year: Never true   Transportation Needs: No Transportation Needs    Lack of Transportation (Medical): No    Lack of Transportation (Non-Medical): No   Physical Activity: Sufficiently Active    Days of Exercise per Week: 7 days    Minutes of Exercise per Session: 30 min   Stress: No Stress Concern Present    Feeling of Stress : Only a little   Social Connections: Unknown    Frequency of Communication with Friends and Family: More than three times a week    Frequency of Social Gatherings with Friends and Family: More than three times a week    Active Member of Clubs or Organizations: No    Attends Club or Organization Meetings: Never    Marital Status: Living with partner   Housing Stability: Low Risk     Unable to Pay for Housing in the Last Year: No    Number of Places Lived in the Last Year: 1    Unstable Housing in the Last Year: No       Past Medical History:    Diagnosis Date    Carpal tunnel syndrome     bilateral    Hypertension     Iron deficiency anemia     Mild intermittent asthma        Family History   Problem Relation Age of Onset    Diabetes Mother     Sarcoidosis Mother     Hypertension Mother     COPD Father     Hypertension Father     Asthma Father     Breast cancer Neg Hx     Colon cancer Neg Hx     Ovarian cancer Neg Hx     Thrombosis Neg Hx        Past Surgical History:   Procedure Laterality Date    COLONOSCOPY N/A 4/28/2023    Procedure: COLONOSCOPY;  Surgeon: Maximo Cox MD;  Location: [facility];  Service: Endoscopy;  Laterality: N/A;    EYE FOREIGN BODY REMOVAL Right     EYE SURGERY  2009    Im not sure what year    HYSTERECTOMY  2020    OOPHORECTOMY      ROBOT-ASSISTED LAPAROSCOPIC ABDOMINAL HYSTERECTOMY USING DA ALYSA XI N/A 12/06/2019    Procedure: XI ROBOTIC HYSTERECTOMY;  Surgeon: VENESSA Davidson MD;  Location: [facility] OR;  Service: OB/GYN;  Laterality: N/A;    ROBOT-ASSISTED SURGICAL REMOVAL OF FALLOPIAN TUBE USING DA ALYSA XI Bilateral 12/06/2019    Procedure: XI ROBOTIC SALPINGECTOMY;  Surgeon: VENESSA Davidson MD;  Location: [facility] OR;  Service: OB/GYN;  Laterality: Bilateral;    SURGICAL REMOVAL OF MASS OF AXILLA Right 11/12/2018    Procedure: EXCISION, MASS, AXILLARY;  Surgeon: Alis Tian MD;  Location: [facility] OR;  Service: General;  Laterality: Right;    TUBAL LIGATION         Review of Systems   Constitutional:  Negative for activity change, appetite change, chills, fatigue, fever and unexpected weight change.   HENT:  Negative for congestion, mouth sores, nosebleeds, sore throat, trouble swallowing and voice change.    Eyes:  Negative for visual disturbance.   Respiratory:  Negative for cough, chest tightness, shortness of breath and wheezing.    Cardiovascular:  Negative for chest pain and leg swelling.   Gastrointestinal:  Negative for abdominal distention, abdominal pain, blood in stool, constipation, diarrhea, nausea  and vomiting.   Genitourinary:  Negative for difficulty urinating, dysuria and hematuria.   Musculoskeletal:  Negative for arthralgias, back pain and myalgias.   Skin:  Negative for pallor, rash and wound.   Neurological:  Negative for dizziness, syncope, weakness and headaches.   Hematological:  Negative for adenopathy. Does not bruise/bleed easily.   Psychiatric/Behavioral:  The patient is nervous/anxious.        Medication List with Changes/Refills   Current Medications    ALBUTEROL (VENTOLIN HFA) 90 MCG/ACTUATION INHALER    Inhale 2 puffs into the lungs every 4 (four) hours as needed for Wheezing or Shortness of Breath.    AMLODIPINE (NORVASC) 10 MG TABLET    Take 1 tablet (10 mg total) by mouth once daily.    CLOTRIMAZOLE (LOTRIMIN) 1 % CREAM    Apply topically 2 (two) times daily. Until rash is clear for 7 days    FERROUS SULFATE (FEOSOL) 325 MG (65 MG IRON) TAB TABLET    Take 1 tablet (325 mg total) by mouth once daily.    HYDROCHLOROTHIAZIDE (HYDRODIURIL) 25 MG TABLET    Take 1 tablet (25 mg total) by mouth once daily.    KETOCONAZOLE (NIZORAL) 2 % CREAM    Apply topically to affected area(s) 2 (two) times daily.    LEVOCETIRIZINE (XYZAL) 5 MG TABLET    Take 1 tablet (5 mg total) by mouth every evening.    LOSARTAN (COZAAR) 50 MG TABLET    Take 1 tablet (50 mg total) by mouth once daily.    MELOXICAM (MOBIC) 7.5 MG TABLET    Take 1 tablet (7.5 mg total) by mouth daily as needed for Pain.    NEBULIZER AND COMPRESSOR ECHO    Use as directed every 4 hours as needed    NYSTATIN (NYAMYC) POWDER    Apply topically 2 (two) times daily.    TERBINAFINE HCL (LAMISIL) 250 MG TABLET    Take 1 tablet (250 mg total) by mouth once daily.     Objective:     Vitals:    05/25/23 1313   BP: 130/86   Pulse: 77   Temp: 97.6 °F (36.4 °C)     Lab Results   Component Value Date    WBC 7.39 05/23/2023    HGB 12.4 05/23/2023    HCT 37.3 05/23/2023    MCV 88 05/23/2023     (H) 05/23/2023       Lab Results   Component Value Date     IRON 41 05/23/2023    TRANSFERRIN 190 (L) 05/23/2023    TIBC 281 05/23/2023    FESATURATED 15 (L) 05/23/2023      BMP  Lab Results   Component Value Date     03/29/2023    K 4.1 03/29/2023     03/29/2023    CO2 25 03/29/2023    BUN 11 03/29/2023    CREATININE 0.7 03/29/2023    CALCIUM 9.6 03/29/2023    ANIONGAP 8 03/29/2023    EGFRNORACEVR >60 03/29/2023     Lab Results   Component Value Date    ALT 16 03/29/2023    AST 13 03/29/2023    ALKPHOS 72 03/29/2023    BILITOT 0.4 03/29/2023         Physical Exam  Vitals reviewed.   Constitutional:       Appearance: She is well-developed.   HENT:      Head: Normocephalic.      Right Ear: External ear normal.      Left Ear: External ear normal.      Nose: Nose normal.   Eyes:      General: Lids are normal. No scleral icterus.        Right eye: No discharge.         Left eye: No discharge.      Conjunctiva/sclera: Conjunctivae normal.   Neck:      Thyroid: No thyroid mass.   Cardiovascular:      Rate and Rhythm: Normal rate and regular rhythm.      Heart sounds: Normal heart sounds. No murmur heard.  Pulmonary:      Effort: Pulmonary effort is normal. No respiratory distress.      Breath sounds: Normal breath sounds. No wheezing or rales.   Abdominal:      General: Bowel sounds are normal. There is no distension.      Palpations: Abdomen is soft.   Genitourinary:     Comments: deferred  Musculoskeletal:         General: Normal range of motion.      Cervical back: Normal range of motion.   Lymphadenopathy:      Head:      Right side of head: No submandibular, preauricular or posterior auricular adenopathy.      Left side of head: No submandibular, preauricular or posterior auricular adenopathy.   Skin:     General: Skin is warm and dry.   Neurological:      Mental Status: She is alert and oriented to person, place, and time.   Psychiatric:         Speech: Speech normal.         Behavior: Behavior normal. Behavior is cooperative.         Thought Content: Thought  content normal.        Assessment:     Problem List Items Addressed This Visit          Oncology    Iron deficiency anemia    Elevated platelet count - Primary     Patient reports intolerance to oral iron supplementation due to constipation, upset stomach.    Persistent mild iron deficiency noted with saturated iron 15%    Trial Venofer weekly x 4. F/u 8 weeks after last dose IV iron with cbc, iron studies, CRP, sed rate. If platelet count remains elevated consider further workup to r/o plasma cell dyscrasia.            Relevant Orders    CBC Auto Differential    Iron and TIBC    Ferritin    C-REACTIVE PROTEIN    Sedimentation rate    Iron deficiency    Relevant Orders    CBC Auto Differential    Iron and TIBC    Ferritin    C-REACTIVE PROTEIN    Sedimentation rate       Endocrine    Class 1 obesity with serious comorbidity and body mass index (BMI) of 32.0 to 32.9 in adult     Encourage healthy nutrition along with portion control. Encourage daily exercise                Plan:     Elevated platelet count  -     CBC Auto Differential; Future; Expected date: 05/25/2023  -     Iron and TIBC; Future; Expected date: 05/25/2023  -     Ferritin; Future; Expected date: 05/25/2023  -     C-REACTIVE PROTEIN; Future; Expected date: 05/25/2023  -     Sedimentation rate; Future; Expected date: 05/25/2023    Iron deficiency  -     CBC Auto Differential; Future; Expected date: 05/25/2023  -     Iron and TIBC; Future; Expected date: 05/25/2023  -     Ferritin; Future; Expected date: 05/25/2023  -     C-REACTIVE PROTEIN; Future; Expected date: 05/25/2023  -     Sedimentation rate; Future; Expected date: 05/25/2023    Iron deficiency anemia, unspecified iron deficiency anemia type    Class 1 obesity with serious comorbidity and body mass index (BMI) of 32.0 to 32.9 in adult, unspecified obesity type    Other orders  -     methylPREDNISolone sodium succinate injection 40 mg  -     iron sucrose injection 200 mg  -     sodium chloride  0.9% 250 mL flush bag  -     sodium chloride 0.9% flush 10 mL  -     heparin, porcine (PF) 100 unit/mL injection flush 500 Units  -     alteplase injection 2 mg  -     methylPREDNISolone sodium succinate injection 40 mg  -     iron sucrose injection 200 mg  -     sodium chloride 0.9% 250 mL flush bag  -     sodium chloride 0.9% flush 10 mL  -     heparin, porcine (PF) 100 unit/mL injection flush 500 Units  -     alteplase injection 2 mg  -     methylPREDNISolone sodium succinate injection 40 mg  -     iron sucrose injection 200 mg  -     sodium chloride 0.9% 250 mL flush bag  -     sodium chloride 0.9% flush 10 mL  -     heparin, porcine (PF) 100 unit/mL injection flush 500 Units  -     alteplase injection 2 mg  -     methylPREDNISolone sodium succinate injection 40 mg  -     iron sucrose injection 200 mg  -     sodium chloride 0.9% 250 mL flush bag  -     sodium chloride 0.9% flush 10 mL  -     heparin, porcine (PF) 100 unit/mL injection flush 500 Units  -     alteplase injection 2 mg  -     methylPREDNISolone sodium succinate injection 40 mg  -     iron sucrose injection 200 mg  -     sodium chloride 0.9% 250 mL flush bag  -     sodium chloride 0.9% flush 10 mL  -     heparin, porcine (PF) 100 unit/mL injection flush 500 Units  -     alteplase injection 2 mg  -     methylPREDNISolone sodium succinate injection 40 mg  -     iron sucrose injection 200 mg  -     sodium chloride 0.9% 250 mL flush bag  -     sodium chloride 0.9% flush 10 mL  -     heparin, porcine (PF) 100 unit/mL injection flush 500 Units  -     alteplase injection 2 mg  -     methylPREDNISolone sodium succinate injection 40 mg  -     iron sucrose injection 200 mg  -     sodium chloride 0.9% 250 mL flush bag  -     sodium chloride 0.9% flush 10 mL  -     heparin, porcine (PF) 100 unit/mL injection flush 500 Units  -     alteplase injection 2 mg  -     methylPREDNISolone sodium succinate injection 40 mg  -     iron sucrose injection 200 mg  -      sodium chloride 0.9% 250 mL flush bag  -     sodium chloride 0.9% flush 10 mL  -     heparin, porcine (PF) 100 unit/mL injection flush 500 Units  -     alteplase injection 2 mg            Med Onc Chart Routing      Follow up with physician    Follow up with AGUEDA Other. 8 weeks after last dose of venofer   Infusion scheduling note    Injection scheduling note venofer weekly x 4   Labs CBC, ferritin, iron and TIBC and other   Scheduling:  Preferred lab:  Lab interval:  1-2 days prior to appt   Imaging None      Pharmacy appointment No pharmacy appointment needed      Other referrals  No additional referrals needed           ZENIA Henderson-C

## 2023-05-29 ENCOUNTER — OFFICE VISIT (OUTPATIENT)
Dept: INTERNAL MEDICINE | Facility: CLINIC | Age: 43
End: 2023-05-29
Payer: MEDICAID

## 2023-05-29 VITALS
HEART RATE: 84 BPM | OXYGEN SATURATION: 98 % | BODY MASS INDEX: 31.55 KG/M2 | WEIGHT: 189.38 LBS | TEMPERATURE: 98 F | RESPIRATION RATE: 18 BRPM | SYSTOLIC BLOOD PRESSURE: 128 MMHG | DIASTOLIC BLOOD PRESSURE: 94 MMHG | HEIGHT: 65 IN

## 2023-05-29 DIAGNOSIS — Z01.419 WELL WOMAN EXAM: ICD-10-CM

## 2023-05-29 DIAGNOSIS — R35.0 URINARY FREQUENCY: ICD-10-CM

## 2023-05-29 DIAGNOSIS — I10 ESSENTIAL HYPERTENSION: Primary | ICD-10-CM

## 2023-05-29 DIAGNOSIS — Z23 NEED FOR PNEUMOCOCCAL VACCINATION: ICD-10-CM

## 2023-05-29 DIAGNOSIS — N89.8 VAGINAL IRRITATION: ICD-10-CM

## 2023-05-29 PROCEDURE — 3044F PR MOST RECENT HEMOGLOBIN A1C LEVEL <7.0%: ICD-10-PCS | Mod: CPTII,,, | Performed by: PHYSICIAN ASSISTANT

## 2023-05-29 PROCEDURE — 3008F BODY MASS INDEX DOCD: CPT | Mod: CPTII,,, | Performed by: PHYSICIAN ASSISTANT

## 2023-05-29 PROCEDURE — 3008F PR BODY MASS INDEX (BMI) DOCUMENTED: ICD-10-PCS | Mod: CPTII,,, | Performed by: PHYSICIAN ASSISTANT

## 2023-05-29 PROCEDURE — 3074F SYST BP LT 130 MM HG: CPT | Mod: CPTII,,, | Performed by: PHYSICIAN ASSISTANT

## 2023-05-29 PROCEDURE — 99215 OFFICE O/P EST HI 40 MIN: CPT | Mod: PBBFAC | Performed by: PHYSICIAN ASSISTANT

## 2023-05-29 PROCEDURE — 90677 PCV20 VACCINE IM: CPT | Mod: PBBFAC

## 2023-05-29 PROCEDURE — 3074F PR MOST RECENT SYSTOLIC BLOOD PRESSURE < 130 MM HG: ICD-10-PCS | Mod: CPTII,,, | Performed by: PHYSICIAN ASSISTANT

## 2023-05-29 PROCEDURE — 4010F ACE/ARB THERAPY RXD/TAKEN: CPT | Mod: CPTII,,, | Performed by: PHYSICIAN ASSISTANT

## 2023-05-29 PROCEDURE — 99999 PR PBB SHADOW E&M-EST. PATIENT-LVL V: ICD-10-PCS | Mod: PBBFAC,,, | Performed by: PHYSICIAN ASSISTANT

## 2023-05-29 PROCEDURE — 1159F PR MEDICATION LIST DOCUMENTED IN MEDICAL RECORD: ICD-10-PCS | Mod: CPTII,,, | Performed by: PHYSICIAN ASSISTANT

## 2023-05-29 PROCEDURE — 99999 PR PBB SHADOW E&M-EST. PATIENT-LVL V: CPT | Mod: PBBFAC,,, | Performed by: PHYSICIAN ASSISTANT

## 2023-05-29 PROCEDURE — 4010F PR ACE/ARB THEARPY RXD/TAKEN: ICD-10-PCS | Mod: CPTII,,, | Performed by: PHYSICIAN ASSISTANT

## 2023-05-29 PROCEDURE — 3080F PR MOST RECENT DIASTOLIC BLOOD PRESSURE >= 90 MM HG: ICD-10-PCS | Mod: CPTII,,, | Performed by: PHYSICIAN ASSISTANT

## 2023-05-29 PROCEDURE — 99214 OFFICE O/P EST MOD 30 MIN: CPT | Mod: S$PBB,,, | Performed by: PHYSICIAN ASSISTANT

## 2023-05-29 PROCEDURE — 3044F HG A1C LEVEL LT 7.0%: CPT | Mod: CPTII,,, | Performed by: PHYSICIAN ASSISTANT

## 2023-05-29 PROCEDURE — 99214 PR OFFICE/OUTPT VISIT, EST, LEVL IV, 30-39 MIN: ICD-10-PCS | Mod: S$PBB,,, | Performed by: PHYSICIAN ASSISTANT

## 2023-05-29 PROCEDURE — 1159F MED LIST DOCD IN RCRD: CPT | Mod: CPTII,,, | Performed by: PHYSICIAN ASSISTANT

## 2023-05-29 PROCEDURE — 3080F DIAST BP >= 90 MM HG: CPT | Mod: CPTII,,, | Performed by: PHYSICIAN ASSISTANT

## 2023-05-29 RX ORDER — CLOTRIMAZOLE 1 %
CREAM (GRAM) TOPICAL 2 TIMES DAILY
Qty: 113 G | Refills: 5 | Status: SHIPPED | OUTPATIENT
Start: 2023-05-29 | End: 2023-08-03

## 2023-05-29 NOTE — ASSESSMENT & PLAN NOTE
/94, uncontrolled, encouraged to take medication as soon as she was able today. [facility] in 2 weeks for BP recheck

## 2023-05-29 NOTE — PROGRESS NOTES
Subjective:       Patient ID: Bianca Amato is a 43 y.o. female.    Chief Complaint: Referral      HPI  44 y/o female with HTN and BARBARA presents for GYN referral. Last seen in January 2020. Also reports 2-3 day history of urinary frequency and slight dysuria, no F/C, hematuria, or abdominal pain. Reports burning and irritation to left inguinal area for 1 week. No treatments tried. Reports did not take her HTN medications this morning yet.    Review of Systems   Constitutional:  Negative for chills and fever.   Cardiovascular:  Negative for chest pain.   Gastrointestinal:  Negative for abdominal pain.   Genitourinary:  Positive for dysuria and frequency. Negative for hematuria and urgency.     Objective:      Physical Exam  Vitals and nursing note reviewed.   Constitutional:       General: She is not in acute distress.     Appearance: She is well-developed.   HENT:      Head: Normocephalic and atraumatic.   Eyes:      General: Lids are normal. No scleral icterus.     Extraocular Movements: Extraocular movements intact.      Conjunctiva/sclera: Conjunctivae normal.   Pulmonary:      Effort: Pulmonary effort is normal.   Neurological:      Mental Status: She is alert.      Cranial Nerves: No cranial nerve deficit.   Psychiatric:         Mood and Affect: Mood and affect normal.       Assessment:       1. Essential hypertension    2. Vaginal irritation    3. Urinary frequency    4. Well woman exam    5. Need for pneumococcal vaccination        Plan:   1. Essential hypertension  Assessment & Plan:  /94, uncontrolled, encouraged to take medication as soon as she was able today. [facility] in 2 weeks for BP recheck      2. Vaginal irritation  -     clotrimazole (LOTRIMIN) 1 % cream; Apply topically 2 (two) times daily.  Dispense: 113 g; Refill: 5    3. Urinary frequency  -     Urinalysis, Reflex to Urine Culture Urine, Clean Catch; Future; Expected date: 05/29/2023    4. Well woman exam  -     Ambulatory  referral/consult to Obstetrics / Gynecology; Future; Expected date: 06/05/2023    5. Need for pneumococcal vaccination  -     Pneumococcal Conjugate Vaccine (20 Valent) (IM)

## 2023-05-30 ENCOUNTER — PATIENT MESSAGE (OUTPATIENT)
Dept: INTERNAL MEDICINE | Facility: CLINIC | Age: 43
End: 2023-05-30
Payer: MEDICAID

## 2023-06-05 ENCOUNTER — PATIENT MESSAGE (OUTPATIENT)
Dept: INTERNAL MEDICINE | Facility: CLINIC | Age: 43
End: 2023-06-05
Payer: MEDICAID

## 2023-06-07 ENCOUNTER — OFFICE VISIT (OUTPATIENT)
Dept: INTERNAL MEDICINE | Facility: CLINIC | Age: 43
End: 2023-06-07
Payer: MEDICAID

## 2023-06-07 VITALS
WEIGHT: 187.81 LBS | HEART RATE: 100 BPM | SYSTOLIC BLOOD PRESSURE: 124 MMHG | TEMPERATURE: 97 F | BODY MASS INDEX: 31.26 KG/M2 | OXYGEN SATURATION: 98 % | DIASTOLIC BLOOD PRESSURE: 70 MMHG

## 2023-06-07 DIAGNOSIS — B96.89 BACTERIAL RESPIRATORY INFECTION: Primary | ICD-10-CM

## 2023-06-07 DIAGNOSIS — J98.8 BACTERIAL RESPIRATORY INFECTION: Primary | ICD-10-CM

## 2023-06-07 PROCEDURE — 3044F PR MOST RECENT HEMOGLOBIN A1C LEVEL <7.0%: ICD-10-PCS | Mod: CPTII,,, | Performed by: PHYSICIAN ASSISTANT

## 2023-06-07 PROCEDURE — 1159F MED LIST DOCD IN RCRD: CPT | Mod: CPTII,,, | Performed by: PHYSICIAN ASSISTANT

## 2023-06-07 PROCEDURE — 3044F HG A1C LEVEL LT 7.0%: CPT | Mod: CPTII,,, | Performed by: PHYSICIAN ASSISTANT

## 2023-06-07 PROCEDURE — 3074F SYST BP LT 130 MM HG: CPT | Mod: CPTII,,, | Performed by: PHYSICIAN ASSISTANT

## 2023-06-07 PROCEDURE — 99999 PR PBB SHADOW E&M-EST. PATIENT-LVL IV: ICD-10-PCS | Mod: PBBFAC,,, | Performed by: PHYSICIAN ASSISTANT

## 2023-06-07 PROCEDURE — 1159F PR MEDICATION LIST DOCUMENTED IN MEDICAL RECORD: ICD-10-PCS | Mod: CPTII,,, | Performed by: PHYSICIAN ASSISTANT

## 2023-06-07 PROCEDURE — 3074F PR MOST RECENT SYSTOLIC BLOOD PRESSURE < 130 MM HG: ICD-10-PCS | Mod: CPTII,,, | Performed by: PHYSICIAN ASSISTANT

## 2023-06-07 PROCEDURE — 3078F DIAST BP <80 MM HG: CPT | Mod: CPTII,,, | Performed by: PHYSICIAN ASSISTANT

## 2023-06-07 PROCEDURE — 99213 OFFICE O/P EST LOW 20 MIN: CPT | Mod: S$PBB,,, | Performed by: PHYSICIAN ASSISTANT

## 2023-06-07 PROCEDURE — 3078F PR MOST RECENT DIASTOLIC BLOOD PRESSURE < 80 MM HG: ICD-10-PCS | Mod: CPTII,,, | Performed by: PHYSICIAN ASSISTANT

## 2023-06-07 PROCEDURE — 1160F RVW MEDS BY RX/DR IN RCRD: CPT | Mod: CPTII,,, | Performed by: PHYSICIAN ASSISTANT

## 2023-06-07 PROCEDURE — 4010F PR ACE/ARB THEARPY RXD/TAKEN: ICD-10-PCS | Mod: CPTII,,, | Performed by: PHYSICIAN ASSISTANT

## 2023-06-07 PROCEDURE — 3008F BODY MASS INDEX DOCD: CPT | Mod: CPTII,,, | Performed by: PHYSICIAN ASSISTANT

## 2023-06-07 PROCEDURE — 99999 PR PBB SHADOW E&M-EST. PATIENT-LVL IV: CPT | Mod: PBBFAC,,, | Performed by: PHYSICIAN ASSISTANT

## 2023-06-07 PROCEDURE — 4010F ACE/ARB THERAPY RXD/TAKEN: CPT | Mod: CPTII,,, | Performed by: PHYSICIAN ASSISTANT

## 2023-06-07 PROCEDURE — 99214 OFFICE O/P EST MOD 30 MIN: CPT | Mod: PBBFAC | Performed by: PHYSICIAN ASSISTANT

## 2023-06-07 PROCEDURE — 1160F PR REVIEW ALL MEDS BY PRESCRIBER/CLIN PHARMACIST DOCUMENTED: ICD-10-PCS | Mod: CPTII,,, | Performed by: PHYSICIAN ASSISTANT

## 2023-06-07 PROCEDURE — 3008F PR BODY MASS INDEX (BMI) DOCUMENTED: ICD-10-PCS | Mod: CPTII,,, | Performed by: PHYSICIAN ASSISTANT

## 2023-06-07 PROCEDURE — 99213 PR OFFICE/OUTPT VISIT, EST, LEVL III, 20-29 MIN: ICD-10-PCS | Mod: S$PBB,,, | Performed by: PHYSICIAN ASSISTANT

## 2023-06-07 RX ORDER — PREDNISONE 10 MG/1
10 TABLET ORAL 2 TIMES DAILY
Qty: 10 TABLET | Refills: 0 | Status: SHIPPED | OUTPATIENT
Start: 2023-06-07 | End: 2023-06-12

## 2023-06-07 RX ORDER — PROMETHAZINE HYDROCHLORIDE AND DEXTROMETHORPHAN HYDROBROMIDE 6.25; 15 MG/5ML; MG/5ML
5 SYRUP ORAL EVERY 6 HOURS PRN
Qty: 118 ML | Refills: 0 | Status: SHIPPED | OUTPATIENT
Start: 2023-06-07 | End: 2023-08-03

## 2023-06-07 RX ORDER — AZITHROMYCIN 250 MG/1
TABLET, FILM COATED ORAL
Qty: 6 TABLET | Refills: 0 | Status: SHIPPED | OUTPATIENT
Start: 2023-06-07 | End: 2023-08-03

## 2023-06-07 NOTE — PROGRESS NOTES
"Subjective:       Patient ID: Bianca Amato is a 43 y.o. female.    Chief Complaint: Cough (Started about 3 days ago. Feels a little bit like chest congestion. Denies fever, nasal drainage, body aches. She stated that she tries to make herself cough up mucus. "Its better out than in")      Cough  This is a new problem. The current episode started in the past 7 days. The problem has been gradually worsening. The problem occurs constantly. The cough is Productive of sputum. Associated symptoms include headaches, postnasal drip and wheezing. Pertinent negatives include no chills, ear pain, fever, nasal congestion, sore throat or shortness of breath. The symptoms are aggravated by lying down. She has tried a beta-agonist inhaler for the symptoms. The treatment provided mild relief. Her past medical history is significant for asthma.     Review of Systems   Constitutional:  Negative for chills, fatigue and fever.   HENT:  Positive for postnasal drip. Negative for congestion, ear pain and sore throat.    Respiratory:  Positive for cough and wheezing. Negative for chest tightness and shortness of breath.    Gastrointestinal:  Negative for nausea and vomiting.   Musculoskeletal:  Negative for neck pain.   Neurological:  Positive for headaches. Negative for dizziness, facial asymmetry, speech difficulty, weakness and light-headedness.     Objective:      Physical Exam  Vitals and nursing note reviewed.   Constitutional:       General: She is not in acute distress.     Appearance: She is well-developed.   HENT:      Head: Normocephalic and atraumatic.      Right Ear: Tympanic membrane, ear canal and external ear normal.      Left Ear: Tympanic membrane, ear canal and external ear normal.      Nose: Nose normal.      Mouth/Throat:      Lips: Pink.      Mouth: Mucous membranes are moist.      Pharynx: Oropharynx is clear.   Eyes:      General: Lids are normal. No scleral icterus.     Extraocular Movements: Extraocular " movements intact.      Conjunctiva/sclera: Conjunctivae normal.   Cardiovascular:      Rate and Rhythm: Normal rate and regular rhythm.   Pulmonary:      Effort: Pulmonary effort is normal.      Breath sounds: No decreased breath sounds, wheezing, rhonchi or rales.      Comments: Coarse bronchovesicular sounds auscultated throughout all lung fields  Neurological:      Mental Status: She is alert.      Cranial Nerves: No cranial nerve deficit.   Psychiatric:         Mood and Affect: Mood and affect normal.       Assessment:       1. Bacterial respiratory infection        Plan:   1. Bacterial respiratory infection  -     azithromycin (Z-JARRELL) 250 MG tablet; Take 2 tablets by mouth on day 1 THEN Take 1 tablet by mouth on days 2-5  Dispense: 6 tablet; Refill: 0  -     predniSONE (DELTASONE) 10 MG tablet; Take 1 tablet (10 mg total) by mouth 2 (two) times daily. for 5 days  Dispense: 10 tablet; Refill: 0  -     promethazine-dextromethorphan (PROMETHAZINE-DM) 6.25-15 mg/5 mL Syrp; Take 5 mLs by mouth every 6 (six) hours as needed (cough).  Dispense: 118 mL; Refill: 0

## 2023-06-14 ENCOUNTER — CLINICAL SUPPORT (OUTPATIENT)
Dept: INTERNAL MEDICINE | Facility: CLINIC | Age: 43
End: 2023-06-14
Payer: MEDICAID

## 2023-06-14 VITALS — SYSTOLIC BLOOD PRESSURE: 122 MMHG | DIASTOLIC BLOOD PRESSURE: 86 MMHG

## 2023-06-14 PROCEDURE — 99211 OFF/OP EST MAY X REQ PHY/QHP: CPT | Mod: PBBFAC

## 2023-06-14 PROCEDURE — 99999 PR PBB SHADOW E&M-EST. PATIENT-LVL I: CPT | Mod: PBBFAC,,,

## 2023-06-14 PROCEDURE — 99999 PR PBB SHADOW E&M-EST. PATIENT-LVL I: ICD-10-PCS | Mod: PBBFAC,,,

## 2023-06-14 NOTE — PROGRESS NOTES
Patient roomed using two patient identifier method:  and spelling of first/last name. Pleasant mood. Manual BP taken on right arm using large cuff with reading of 122/86.

## 2023-06-23 DIAGNOSIS — I10 ESSENTIAL HYPERTENSION: ICD-10-CM

## 2023-06-23 RX ORDER — LOSARTAN POTASSIUM 50 MG/1
50 TABLET ORAL DAILY
Qty: 90 TABLET | Refills: 3 | Status: SHIPPED | OUTPATIENT
Start: 2023-06-23 | End: 2024-06-17

## 2023-06-23 RX ORDER — AMLODIPINE BESYLATE 10 MG/1
10 TABLET ORAL DAILY
Qty: 90 TABLET | Refills: 3 | Status: SHIPPED | OUTPATIENT
Start: 2023-06-23 | End: 2024-06-17

## 2023-06-23 NOTE — TELEPHONE ENCOUNTER
No care due was identified.  [facility] Embedded Care Due Messages. Reference number: [id number].   6/23/2023 9:01:47 AM CDT

## 2023-06-26 ENCOUNTER — TELEPHONE (OUTPATIENT)
Dept: OBSTETRICS AND GYNECOLOGY | Facility: CLINIC | Age: 43
End: 2023-06-26
Payer: MEDICAID

## 2023-06-26 NOTE — TELEPHONE ENCOUNTER
----- Message from Lili Reid sent at 6/23/2023  9:24 AM CDT -----  Contact: krishna  Patient is calling to speak with the nurse regarding appointemnt. Reports having abdominal pain and needs to get checked, stats her doctor retired from here an needs a new one as well and needing a annual check up. Please give the patient a call back at [phone]   Thanks mari

## 2023-06-26 NOTE — TELEPHONE ENCOUNTER
Called patient and scheduled appointment.  Advised patient if she is having severe pain to go to ED for eval.  Patient verbalized understanding.

## 2023-07-07 ENCOUNTER — PATIENT MESSAGE (OUTPATIENT)
Dept: INFECTIOUS DISEASES | Facility: CLINIC | Age: 43
End: 2023-07-07
Payer: MEDICAID

## 2023-08-03 ENCOUNTER — OFFICE VISIT (OUTPATIENT)
Dept: OBSTETRICS AND GYNECOLOGY | Facility: CLINIC | Age: 43
End: 2023-08-03
Payer: MEDICAID

## 2023-08-03 ENCOUNTER — LAB VISIT (OUTPATIENT)
Dept: LAB | Facility: HOSPITAL | Age: 43
End: 2023-08-03
Attending: NURSE PRACTITIONER
Payer: MEDICAID

## 2023-08-03 VITALS
DIASTOLIC BLOOD PRESSURE: 78 MMHG | WEIGHT: 194.25 LBS | SYSTOLIC BLOOD PRESSURE: 120 MMHG | BODY MASS INDEX: 32.36 KG/M2 | HEIGHT: 65 IN

## 2023-08-03 DIAGNOSIS — Z11.3 SCREEN FOR STD (SEXUALLY TRANSMITTED DISEASE): ICD-10-CM

## 2023-08-03 DIAGNOSIS — Z01.419 ROUTINE GYNECOLOGICAL EXAMINATION: Primary | ICD-10-CM

## 2023-08-03 LAB
HAV IGM SERPL QL IA: NORMAL
HBV CORE IGM SERPL QL IA: NORMAL
HBV SURFACE AG SERPL QL IA: NORMAL
HCV AB SERPL QL IA: NORMAL
HIV 1+2 AB+HIV1 P24 AG SERPL QL IA: NORMAL

## 2023-08-03 PROCEDURE — 87389 HIV-1 AG W/HIV-1&-2 AB AG IA: CPT | Performed by: NURSE PRACTITIONER

## 2023-08-03 PROCEDURE — 3044F HG A1C LEVEL LT 7.0%: CPT | Mod: CPTII,,, | Performed by: NURSE PRACTITIONER

## 2023-08-03 PROCEDURE — 87591 N.GONORRHOEAE DNA AMP PROB: CPT | Performed by: NURSE PRACTITIONER

## 2023-08-03 PROCEDURE — 36415 COLL VENOUS BLD VENIPUNCTURE: CPT | Performed by: NURSE PRACTITIONER

## 2023-08-03 PROCEDURE — 99386 PREV VISIT NEW AGE 40-64: CPT | Mod: S$PBB,,, | Performed by: NURSE PRACTITIONER

## 2023-08-03 PROCEDURE — 4010F PR ACE/ARB THEARPY RXD/TAKEN: ICD-10-PCS | Mod: CPTII,,, | Performed by: NURSE PRACTITIONER

## 2023-08-03 PROCEDURE — 80074 ACUTE HEPATITIS PANEL: CPT | Performed by: NURSE PRACTITIONER

## 2023-08-03 PROCEDURE — 99999 PR PBB SHADOW E&M-EST. PATIENT-LVL III: ICD-10-PCS | Mod: PBBFAC,,, | Performed by: NURSE PRACTITIONER

## 2023-08-03 PROCEDURE — 1160F RVW MEDS BY RX/DR IN RCRD: CPT | Mod: CPTII,,, | Performed by: NURSE PRACTITIONER

## 2023-08-03 PROCEDURE — 3008F PR BODY MASS INDEX (BMI) DOCUMENTED: ICD-10-PCS | Mod: CPTII,,, | Performed by: NURSE PRACTITIONER

## 2023-08-03 PROCEDURE — 3044F PR MOST RECENT HEMOGLOBIN A1C LEVEL <7.0%: ICD-10-PCS | Mod: CPTII,,, | Performed by: NURSE PRACTITIONER

## 2023-08-03 PROCEDURE — 1159F MED LIST DOCD IN RCRD: CPT | Mod: CPTII,,, | Performed by: NURSE PRACTITIONER

## 2023-08-03 PROCEDURE — 3078F PR MOST RECENT DIASTOLIC BLOOD PRESSURE < 80 MM HG: ICD-10-PCS | Mod: CPTII,,, | Performed by: NURSE PRACTITIONER

## 2023-08-03 PROCEDURE — 1159F PR MEDICATION LIST DOCUMENTED IN MEDICAL RECORD: ICD-10-PCS | Mod: CPTII,,, | Performed by: NURSE PRACTITIONER

## 2023-08-03 PROCEDURE — 4010F ACE/ARB THERAPY RXD/TAKEN: CPT | Mod: CPTII,,, | Performed by: NURSE PRACTITIONER

## 2023-08-03 PROCEDURE — 3078F DIAST BP <80 MM HG: CPT | Mod: CPTII,,, | Performed by: NURSE PRACTITIONER

## 2023-08-03 PROCEDURE — 1160F PR REVIEW ALL MEDS BY PRESCRIBER/CLIN PHARMACIST DOCUMENTED: ICD-10-PCS | Mod: CPTII,,, | Performed by: NURSE PRACTITIONER

## 2023-08-03 PROCEDURE — 3008F BODY MASS INDEX DOCD: CPT | Mod: CPTII,,, | Performed by: NURSE PRACTITIONER

## 2023-08-03 PROCEDURE — 99386 PR PREVENTIVE VISIT,NEW,40-64: ICD-10-PCS | Mod: S$PBB,,, | Performed by: NURSE PRACTITIONER

## 2023-08-03 PROCEDURE — 99999 PR PBB SHADOW E&M-EST. PATIENT-LVL III: CPT | Mod: PBBFAC,,, | Performed by: NURSE PRACTITIONER

## 2023-08-03 PROCEDURE — 3074F PR MOST RECENT SYSTOLIC BLOOD PRESSURE < 130 MM HG: ICD-10-PCS | Mod: CPTII,,, | Performed by: NURSE PRACTITIONER

## 2023-08-03 PROCEDURE — 3074F SYST BP LT 130 MM HG: CPT | Mod: CPTII,,, | Performed by: NURSE PRACTITIONER

## 2023-08-03 PROCEDURE — 99213 OFFICE O/P EST LOW 20 MIN: CPT | Mod: PBBFAC | Performed by: NURSE PRACTITIONER

## 2023-08-03 PROCEDURE — 86592 SYPHILIS TEST NON-TREP QUAL: CPT | Performed by: NURSE PRACTITIONER

## 2023-08-03 NOTE — PROGRESS NOTES
"  Subjective:       Patient ID: Bianca Amato is a 43 y.o. female.    Chief Complaint:  Abdominal Pain and Annual Exam      History of Present Illness  Abdominal Pain    Hysterectomy re:  bleeding  Desires std screening     Health Maintenance   Topic Date Due    Mammogram  2024    Lipid Panel  03/15/2028    TETANUS VACCINE  2028    Hepatitis C Screening  Completed     GYN & OB History  Patient's last menstrual period was 2019 (approximate).   Date of Last Pap: 2018    OB History    Para Term  AB Living   4 4 4     4   SAB IAB Ectopic Multiple Live Births                  # Outcome Date GA Lbr Ashwin/2nd Weight Sex Delivery Anes PTL Lv   4 Term            3 Term            2 Term            1 Term                Review of Systems  Review of Systems   Gastrointestinal:  Positive for abdominal pain.         Objective:   /78   Ht 5' 5" (1.651 m)   Wt 88.1 kg (194 lb 3.6 oz)   LMP 2019 (Approximate)   BMI 32.32 kg/m²    Physical Exam:   Constitutional: She is oriented to person, place, and time. She appears well-developed and well-nourished.        Pulmonary/Chest: Right breast exhibits no inverted nipple, no mass, no nipple discharge, no skin change, no tenderness and no swelling. Left breast exhibits no inverted nipple, no mass, no nipple discharge, no skin change, no tenderness and no swelling.        Abdominal: Soft.     Genitourinary:    Inguinal canal, vagina, right adnexa and left adnexa normal.      Pelvic exam was performed with patient supine.   The external female genitalia was normal.   Genitalia hair distrobution normal .   Labial bartholins normal.No no adexnal prolapse. Right adnexum displays no mass, no tenderness and no fullness. Left adnexum displays no mass, no tenderness and no fullness. Vaginal cuff normal.  No erythema,  no vaginal discharge, bleeding, rectocele, cystocele or unspecified prolapse of vaginal walls in the vagina.    No foreign " body in the vagina.      No signs of injury in the vagina.   Cervix is absent.Uterus is absent.               Neurological: She is alert and oriented to person, place, and time.    Skin: Skin is warm and dry.    Psychiatric: She has a normal mood and affect. Her behavior is normal. Judgment and thought content normal.      Assessment:        1. Routine gynecological examination    2. Screen for STD (sexually transmitted disease)               Plan:           Bianca was seen today for abdominal pain and annual exam.    Diagnoses and all orders for this visit:    Routine gynecological examination    Screen for STD (sexually transmitted disease)  -     C. trachomatis/N. gonorrhoeae by AMP DNA [facility]; Vagina  -     HIV 1/2 Ag/Ab (4th Gen); Future  -     RPR; Future  -     Hepatitis Panel, Acute; Future      Return to clinic in one year for WWE

## 2023-08-04 LAB
C TRACH DNA SPEC QL NAA+PROBE: NOT DETECTED
N GONORRHOEA DNA SPEC QL NAA+PROBE: NOT DETECTED
RPR SER QL: NORMAL

## 2023-08-14 ENCOUNTER — PATIENT MESSAGE (OUTPATIENT)
Dept: PODIATRY | Facility: CLINIC | Age: 43
End: 2023-08-14
Payer: MEDICAID

## 2023-08-14 DIAGNOSIS — J01.90 ACUTE SINUSITIS, RECURRENCE NOT SPECIFIED, UNSPECIFIED LOCATION: ICD-10-CM

## 2023-08-14 DIAGNOSIS — B35.1 ONYCHOMYCOSIS: ICD-10-CM

## 2023-08-14 NOTE — TELEPHONE ENCOUNTER
No care due was identified.  [facility] Embedded Care Due Messages. Reference number: [id number].   8/14/2023 6:29:45 PM CDT

## 2023-08-15 RX ORDER — LEVOCETIRIZINE DIHYDROCHLORIDE 5 MG/1
5 TABLET, FILM COATED ORAL NIGHTLY
Qty: 90 TABLET | Refills: 2 | Status: SHIPPED | OUTPATIENT
Start: 2023-08-15 | End: 2024-01-09

## 2023-08-15 RX ORDER — TERBINAFINE HYDROCHLORIDE 250 MG/1
250 TABLET ORAL DAILY
Qty: 90 TABLET | Refills: 0 | OUTPATIENT
Start: 2023-08-15 | End: 2023-11-13

## 2023-08-15 NOTE — TELEPHONE ENCOUNTER
Refill Decision Note   Bianca Lima  is requesting a refill authorization.  Brief Assessment and Rationale for Refill:  Approve     Medication Therapy Plan:         Comments:     Note composed:4:20 AM 08/15/2023

## 2023-08-16 DIAGNOSIS — B35.1 ONYCHOMYCOSIS: ICD-10-CM

## 2023-08-16 RX ORDER — TERBINAFINE HYDROCHLORIDE 250 MG/1
250 TABLET ORAL DAILY
Qty: 90 TABLET | Refills: 0 | OUTPATIENT
Start: 2023-08-16 | End: 2023-11-14

## 2023-09-11 ENCOUNTER — PATIENT MESSAGE (OUTPATIENT)
Dept: INTERNAL MEDICINE | Facility: CLINIC | Age: 43
End: 2023-09-11
Payer: MEDICAID

## 2023-09-11 DIAGNOSIS — J45.20 MILD INTERMITTENT ASTHMA WITHOUT COMPLICATION: Primary | ICD-10-CM

## 2023-09-14 NOTE — TELEPHONE ENCOUNTER
Please contact patient for more details regarding nebulizer request. When did she get her last machine? Is she currently having a problem? What happened to her current machine? Old? Broken?

## 2023-09-15 ENCOUNTER — PATIENT MESSAGE (OUTPATIENT)
Dept: ADMINISTRATIVE | Facility: OTHER | Age: 43
End: 2023-09-15
Payer: MEDICAID

## 2023-09-15 RX ORDER — ALBUTEROL SULFATE 1.25 MG/3ML
1.25 SOLUTION RESPIRATORY (INHALATION) EVERY 6 HOURS PRN
Qty: 75 ML | Refills: 2 | Status: SHIPPED | OUTPATIENT
Start: 2023-09-15 | End: 2024-09-14

## 2023-09-15 NOTE — TELEPHONE ENCOUNTER
No care due was identified.  [facility] Embedded Care Due Messages. Reference number: [id number].   9/15/2023 9:53:04 AM CDT

## 2023-09-20 DIAGNOSIS — M54.6 ACUTE MIDLINE THORACIC BACK PAIN: ICD-10-CM

## 2023-09-20 RX ORDER — MELOXICAM 7.5 MG/1
7.5 TABLET ORAL DAILY PRN
Qty: 30 TABLET | Refills: 0 | Status: SHIPPED | OUTPATIENT
Start: 2023-09-20

## 2023-09-26 ENCOUNTER — OFFICE VISIT (OUTPATIENT)
Dept: OTOLARYNGOLOGY | Facility: CLINIC | Age: 43
End: 2023-09-26
Payer: MEDICAID

## 2023-09-26 VITALS — WEIGHT: 189.63 LBS | BODY MASS INDEX: 31.55 KG/M2

## 2023-09-26 DIAGNOSIS — H61.23 BILATERAL IMPACTED CERUMEN: Primary | ICD-10-CM

## 2023-09-26 PROCEDURE — 99499 NO LOS: ICD-10-PCS | Mod: S$PBB,,, | Performed by: PHYSICIAN ASSISTANT

## 2023-09-26 PROCEDURE — 99213 OFFICE O/P EST LOW 20 MIN: CPT | Mod: 25,PBBFAC | Performed by: PHYSICIAN ASSISTANT

## 2023-09-26 PROCEDURE — 69210 REMOVE IMPACTED EAR WAX UNI: CPT | Mod: PBBFAC | Performed by: PHYSICIAN ASSISTANT

## 2023-09-26 PROCEDURE — 69210 REMOVE IMPACTED EAR WAX UNI: CPT | Mod: S$PBB,,, | Performed by: PHYSICIAN ASSISTANT

## 2023-09-26 PROCEDURE — 69210 PR REMOVAL IMPACTED CERUMEN REQUIRING INSTRUMENTATION, UNILATERAL: ICD-10-PCS | Mod: S$PBB,,, | Performed by: PHYSICIAN ASSISTANT

## 2023-09-26 PROCEDURE — 99999 PR PBB SHADOW E&M-EST. PATIENT-LVL III: CPT | Mod: PBBFAC,,, | Performed by: PHYSICIAN ASSISTANT

## 2023-09-26 PROCEDURE — 99999 PR PBB SHADOW E&M-EST. PATIENT-LVL III: ICD-10-PCS | Mod: PBBFAC,,, | Performed by: PHYSICIAN ASSISTANT

## 2023-09-26 PROCEDURE — 99499 UNLISTED E&M SERVICE: CPT | Mod: S$PBB,,, | Performed by: PHYSICIAN ASSISTANT

## 2023-09-26 NOTE — PROGRESS NOTES
Subjective:   Cerumen impactions     Patient ID: Bianca Amato is a 43 y.o. female.    Chief Complaint:  Excessive ear wax     Bianca Amato is a 43 y.o. female here to see me today for evaluation of a possible wax impaction in bilateral ears.  She has complaints of hearing loss in the affected ears, but denies pain or drainage.  This has been an issue in the past.  The patient has not been using any sort of ear drop to soften the wax.  She usually comes in every six months for ear cleaning.      HPI  Review of Systems   HENT: Positive for hearing loss. Negative for ear discharge, ear pain and tinnitus.        Objective:     Physical Exam   HENT:   Right Ear: External ear and ear canal normal. Decreased hearing is noted.   Left Ear: External ear and ear canal normal. Decreased hearing is noted.   Bilateral complete cerumen impactions, removal described below       Procedure Note    CHIEF COMPLAINT:  Cerumen Impaction    Description:  The patient was seated in an exam chair.  An ear speculum was placed in the right EAC and was examined under the microscope.  Suction and/or loop curettes were used to remove a large cerumen impaction.  The tympanic membrane was visualized and was normal in appearance.  The procedure was repeated on the left side in a similar fashion.  The TM was intact and normal on this side as well.  The patient tolerated the procedure well.           Assessment:     1. Bilateral impacted cerumen        Plan:     1.  Cerumen impaction:  Removed today without difficulty.  I would recommend the use of a wax softening drop, either over the counter Debrox or mineral oil, on a weekly basis.  I also instructed the patient to avoid Qtips.  RTC in six months for repeat ear cleaning.

## 2023-10-02 ENCOUNTER — PATIENT MESSAGE (OUTPATIENT)
Dept: INTERNAL MEDICINE | Facility: CLINIC | Age: 43
End: 2023-10-02
Payer: MEDICAID

## 2023-10-02 DIAGNOSIS — B35.3 TINEA PEDIS OF BOTH FEET: ICD-10-CM

## 2023-10-02 RX ORDER — KETOCONAZOLE 20 MG/G
CREAM TOPICAL 2 TIMES DAILY
Qty: 30 G | Refills: 2 | Status: SHIPPED | OUTPATIENT
Start: 2023-10-02 | End: 2024-01-16

## 2023-10-24 ENCOUNTER — PATIENT MESSAGE (OUTPATIENT)
Dept: INTERNAL MEDICINE | Facility: CLINIC | Age: 43
End: 2023-10-24
Payer: MEDICAID

## 2023-10-25 ENCOUNTER — PATIENT MESSAGE (OUTPATIENT)
Dept: INTERNAL MEDICINE | Facility: CLINIC | Age: 43
End: 2023-10-25
Payer: MEDICAID

## 2023-12-07 ENCOUNTER — PATIENT MESSAGE (OUTPATIENT)
Dept: PULMONOLOGY | Facility: CLINIC | Age: 43
End: 2023-12-07
Payer: MEDICAID

## 2023-12-11 ENCOUNTER — HOSPITAL ENCOUNTER (OUTPATIENT)
Dept: RADIOLOGY | Facility: HOSPITAL | Age: 43
Discharge: HOME | End: 2023-12-11
Attending: NURSE PRACTITIONER
Payer: MEDICAID

## 2023-12-11 ENCOUNTER — CLINICAL SUPPORT (OUTPATIENT)
Dept: PULMONOLOGY | Facility: CLINIC | Age: 43
End: 2023-12-11
Payer: MEDICAID

## 2023-12-11 ENCOUNTER — OFFICE VISIT (OUTPATIENT)
Dept: PULMONOLOGY | Facility: CLINIC | Age: 43
End: 2023-12-11
Payer: MEDICAID

## 2023-12-11 VITALS
HEART RATE: 77 BPM | SYSTOLIC BLOOD PRESSURE: 142 MMHG | OXYGEN SATURATION: 98 % | RESPIRATION RATE: 18 BRPM | WEIGHT: 194.44 LBS | DIASTOLIC BLOOD PRESSURE: 88 MMHG | HEIGHT: 65 IN | BODY MASS INDEX: 32.4 KG/M2

## 2023-12-11 DIAGNOSIS — J45.20 MILD INTERMITTENT ASTHMA WITHOUT COMPLICATION: ICD-10-CM

## 2023-12-11 DIAGNOSIS — G47.33 OSA ON CPAP: Primary | ICD-10-CM

## 2023-12-11 DIAGNOSIS — R73.03 PREDIABETES: ICD-10-CM

## 2023-12-11 PROCEDURE — 1160F RVW MEDS BY RX/DR IN RCRD: CPT | Mod: CPTII,,, | Performed by: NURSE PRACTITIONER

## 2023-12-11 PROCEDURE — 3079F PR MOST RECENT DIASTOLIC BLOOD PRESSURE 80-89 MM HG: ICD-10-PCS | Mod: CPTII,,, | Performed by: NURSE PRACTITIONER

## 2023-12-11 PROCEDURE — 3077F PR MOST RECENT SYSTOLIC BLOOD PRESSURE >= 140 MM HG: ICD-10-PCS | Mod: CPTII,,, | Performed by: NURSE PRACTITIONER

## 2023-12-11 PROCEDURE — 3044F HG A1C LEVEL LT 7.0%: CPT | Mod: CPTII,,, | Performed by: NURSE PRACTITIONER

## 2023-12-11 PROCEDURE — 99214 OFFICE O/P EST MOD 30 MIN: CPT | Mod: PBBFAC,25,27 | Performed by: NURSE PRACTITIONER

## 2023-12-11 PROCEDURE — 3079F DIAST BP 80-89 MM HG: CPT | Mod: CPTII,,, | Performed by: NURSE PRACTITIONER

## 2023-12-11 PROCEDURE — 99214 PR OFFICE/OUTPT VISIT, EST, LEVL IV, 30-39 MIN: ICD-10-PCS | Mod: 25,S$PBB,, | Performed by: NURSE PRACTITIONER

## 2023-12-11 PROCEDURE — 99214 OFFICE O/P EST MOD 30 MIN: CPT | Mod: 25,S$PBB,, | Performed by: NURSE PRACTITIONER

## 2023-12-11 PROCEDURE — 1159F MED LIST DOCD IN RCRD: CPT | Mod: CPTII,,, | Performed by: NURSE PRACTITIONER

## 2023-12-11 PROCEDURE — 3008F BODY MASS INDEX DOCD: CPT | Mod: CPTII,,, | Performed by: NURSE PRACTITIONER

## 2023-12-11 PROCEDURE — 3008F PR BODY MASS INDEX (BMI) DOCUMENTED: ICD-10-PCS | Mod: CPTII,,, | Performed by: NURSE PRACTITIONER

## 2023-12-11 PROCEDURE — 71046 XR CHEST PA AND LATERAL: ICD-10-PCS | Mod: 26,,, | Performed by: RADIOLOGY

## 2023-12-11 PROCEDURE — 99999 PR PBB SHADOW E&M-EST. PATIENT-LVL IV: ICD-10-PCS | Mod: PBBFAC,,, | Performed by: NURSE PRACTITIONER

## 2023-12-11 PROCEDURE — 1159F PR MEDICATION LIST DOCUMENTED IN MEDICAL RECORD: ICD-10-PCS | Mod: CPTII,,, | Performed by: NURSE PRACTITIONER

## 2023-12-11 PROCEDURE — 99999 PR PBB SHADOW E&M-EST. PATIENT-LVL IV: CPT | Mod: PBBFAC,,, | Performed by: NURSE PRACTITIONER

## 2023-12-11 PROCEDURE — 4010F ACE/ARB THERAPY RXD/TAKEN: CPT | Mod: CPTII,,, | Performed by: NURSE PRACTITIONER

## 2023-12-11 PROCEDURE — 3077F SYST BP >= 140 MM HG: CPT | Mod: CPTII,,, | Performed by: NURSE PRACTITIONER

## 2023-12-11 PROCEDURE — 99211 OFF/OP EST MAY X REQ PHY/QHP: CPT | Mod: PBBFAC,25

## 2023-12-11 PROCEDURE — 3044F PR MOST RECENT HEMOGLOBIN A1C LEVEL <7.0%: ICD-10-PCS | Mod: CPTII,,, | Performed by: NURSE PRACTITIONER

## 2023-12-11 PROCEDURE — 1160F PR REVIEW ALL MEDS BY PRESCRIBER/CLIN PHARMACIST DOCUMENTED: ICD-10-PCS | Mod: CPTII,,, | Performed by: NURSE PRACTITIONER

## 2023-12-11 PROCEDURE — 4010F PR ACE/ARB THEARPY RXD/TAKEN: ICD-10-PCS | Mod: CPTII,,, | Performed by: NURSE PRACTITIONER

## 2023-12-11 PROCEDURE — 71046 X-RAY EXAM CHEST 2 VIEWS: CPT | Mod: 26,,, | Performed by: RADIOLOGY

## 2023-12-11 PROCEDURE — 99999 PR PBB SHADOW E&M-EST. PATIENT-LVL I: CPT | Mod: PBBFAC,,,

## 2023-12-11 PROCEDURE — 99999 PR PBB SHADOW E&M-EST. PATIENT-LVL I: ICD-10-PCS | Mod: PBBFAC,,,

## 2023-12-11 PROCEDURE — 94060 EVALUATION OF WHEEZING: CPT | Mod: PBBFAC

## 2023-12-11 PROCEDURE — 71046 X-RAY EXAM CHEST 2 VIEWS: CPT | Mod: TC

## 2023-12-11 RX ORDER — ALBUTEROL SULFATE 90 UG/1
2 AEROSOL, METERED RESPIRATORY (INHALATION) EVERY 4 HOURS PRN
Qty: 18 G | Refills: 11 | Status: SHIPPED | OUTPATIENT
Start: 2023-12-11

## 2023-12-11 NOTE — PROGRESS NOTES
Subjective:      Patient ID: Bianca Amato is a 43 y.o. female.    Chief Complaint: Sleep Apnea and Asthma      HPI: Bianca Amato presents to clinic for follow up for DONNY with CPAP complaince assessment.  She is on Auto CPAP of 5-12 cmH2O pressure   Patient reports benefit from CPAP use. Helped her sleep longer on nights tolerated.   She is motivation to work on adherence to CPAP use.   Has not used CPAP regular since Dec 2021. She tired again in Dec 2022, but did not tolerate. She does not like wearing mask for sleep.   She feels change of setting from 5-20 to 5-12 was somewhat more tolerated, however did not continue use.  Full face mask is used.        2/10/2021 Home Sleep Study 1 night study  MILD/BORDERLINE OBSTRUCTIVE SLEEP APNEA with overall AHI 9.3/hr ( 59 events): night #1  Oxygen desaturation: 79%. SpO2 between 85% to 89% for 1 min.  Patient snored 99% time above 50 .  Heart rate range: 80 bpm - 107 bpm    Compliance  Payor Standard  Usage 09/21/2021 - 12/19/2021  Usage days 7/90 days (8%)  >= 4 hours 1 days (1%)  < 4 hours 6 days (7%)  Usage hours 12 hours 17 minutes  Average usage (total days) 8 minutes  Average usage (days used) 1 hours 45 minutes  Median usage (days used) 1 hours 34 minutes  Total used hours (value since last reset - 12/19/2021) 287 hours  AirSense 10 AutoSet  Serial number [id number]  Mode AutoSet  Min Pressure 5 cmH2O  Max Pressure 20 cmH2O  EPR Fulltime  EPR level 3  Response Standard  Therapy  Pressure - cmH2O Median: 7.0 95th percentile: 12.1 Maximum: 13.2  Leaks - L/min Median: 1.2 95th percentile: 3.6 Maximum: 6.9  Events per hour AI: 1.0 HI: 0.0 AHI: 1.0  Apnea Index Central: 0.0 Obstructive: 1.0 Unknown: 0.0  RERA Index 0.5    Asthma  Mild intermittent controlled on occasional use Albuterol inhaler or albuterol nebs   There is no persistent cough, no wheezing, no shortness of breath, no hemoptysis, no sputum production, no weight loss, noted TB exposure, no  asbestos exposure, no chest pain.   Triggers: strong purfumes, wearing certain face covering mask.     12/11/2023 chest xray lungs clear.  12/11/2023 Spirometry is normal.   7/27/2021 spirometry was normal.     Previous Report Reviewed: lab reports and office notes     Past Medical History: The following portions of the patient's history were reviewed and updated as appropriate:   She  has a past surgical history that includes Tubal ligation; Surgical removal of mass of axilla (Right, 11/12/2018); Eye foreign body removal (Right); Robot-assisted laparoscopic abdominal hysterectomy using da Kaushik Xi (N/A, 12/06/2019); Robot-assisted surgical removal of fallopian tube using da Kaushik Xi (Bilateral, 12/06/2019); Hysterectomy (2020); Eye surgery (2009); Oophorectomy; and Colonoscopy (N/A, 4/28/2023).  Her family history includes Asthma in her father; COPD in her father; Diabetes in her mother; Hypertension in her father and mother; Sarcoidosis in her mother.  She  reports that she has never smoked. She has never used smokeless tobacco. She reports that she does not drink alcohol and does not use drugs.  She has a current medication list which includes the following prescription(s): albuterol, amlodipine, clotrimazole, hydrochlorothiazide, levocetirizine, losartan, meloxicam, nebulizer and compressor, nystatin, albuterol, and ketoconazole.  She is allergic to latex; latex, natural rubber; penicillins; and sulfa (sulfonamide antibiotics)..    The following portions of the patient's history were reviewed and updated as appropriate: allergies, current medications, past family history, past medical history, past social history, past surgical history and problem list.    Review of Systems   Constitutional:  Negative for fever, chills, weight loss, weight gain, activity change, appetite change, fatigue and night sweats.   HENT:  Negative for postnasal drip, rhinorrhea, sinus pressure, voice change and congestion.    Eyes:   "Negative for redness and itching.   Respiratory:  Negative for snoring, cough, sputum production, chest tightness, shortness of breath, wheezing, orthopnea, asthma nighttime symptoms, dyspnea on extertion, use of rescue inhaler and somnolence.    Cardiovascular: Negative.  Negative for chest pain, palpitations and leg swelling.   Genitourinary:  Negative for difficulty urinating and hematuria.   Endocrine:  Negative for cold intolerance and heat intolerance.    Musculoskeletal:  Negative for arthralgias, gait problem, joint swelling and myalgias.   Skin: Negative.    Gastrointestinal:  Negative for nausea, vomiting, abdominal pain and acid reflux.   Neurological:  Negative for dizziness, weakness, light-headedness and headaches.   Hematological:  Negative for adenopathy. No excessive bruising.   All other systems reviewed and are negative.     Objective:   BP (!) 142/88   Pulse 77   Resp 18   Ht 5' 5" (1.651 m)   Wt 88.2 kg (194 lb 7.1 oz)   LMP 11/07/2019 (Approximate)   SpO2 98%   BMI 32.36 kg/m²   Physical Exam  Vitals and nursing note reviewed.   Constitutional:       General: She is not in acute distress.     Appearance: She is well-developed. She is obese. She is not ill-appearing or toxic-appearing.   HENT:      Head: Normocephalic.      Right Ear: External ear normal.      Left Ear: External ear normal.      Nose: Nose normal.      Mouth/Throat:      Pharynx: No oropharyngeal exudate.   Eyes:      Conjunctiva/sclera: Conjunctivae normal.   Cardiovascular:      Rate and Rhythm: Normal rate and regular rhythm.      Heart sounds: Normal heart sounds.   Pulmonary:      Effort: Pulmonary effort is normal.      Breath sounds: Normal breath sounds. No stridor.   Abdominal:      Palpations: Abdomen is soft.   Musculoskeletal:         General: Normal range of motion.      Cervical back: Normal range of motion and neck supple.   Lymphadenopathy:      Cervical: No cervical adenopathy.   Skin:     General: Skin " is warm and dry.   Neurological:      Mental Status: She is alert and oriented to person, place, and time.   Psychiatric:         Behavior: Behavior normal. Behavior is cooperative.         Thought Content: Thought content normal.         Judgment: Judgment normal.         Personal Diagnostic Review  CPAP download    2/10/2021 Home Sleep Study 1 night study  MILD/BORDERLINE OBSTRUCTIVE SLEEP APNEA with overall AHI 9.3/hr ( 59 events): night #1  Oxygen desaturation: 79%. SpO2 between 85% to 89% for 1 min.  Patient snored 99% time above 50 .  Heart rate range: 80 bpm - 107 bpm      X-Ray Chest PA And Lateral  Narrative: EXAMINATION:  XR CHEST PA AND LATERAL    CLINICAL HISTORY:  Mild intermittent asthma, uncomplicated    TECHNIQUE:  PA and lateral views of the chest were performed.    COMPARISON:  07/14/2022    FINDINGS:  The lungs are clear and free of infiltrate.  No pleural effusion or pneumothorax. The heart is mildly enlarged.  There is mild tortuosity of the descending thoracic aorta.  Impression: 1.  No acute cardiopulmonary process.    Electronically signed by: Reid Trivedi DO  Date:    12/11/2023  Time:    13:36    Assessment:     1. DONNY on CPAP    2. Mild intermittent asthma without complication    3. Prediabetes        Orders Placed This Encounter   Procedures    CPAP/BIPAP SUPPLIES     Benefits   90 day supply. 4 refills  HME: [facility]     Order Specific Question:   Length of need (1-99 months):     Answer:   99     Order Specific Question:   Choose ONE mask type and its corresponding cushions and/or pillows:     Answer:    Full Face Mask, 1 per 90 days:  Full Face Cushion, (3 per 90 days)     Order Specific Question:   Choose EITHER Heated or Non-Heated Tubjing     Answer:    Non-Heated Tubing, 1 per 90 days     Order Specific Question:   Number of Days Needed:     Answer:   99     Order Specific Question:   All other supplies as needed as listed below:     Answer:    Headgear, 1  per 180 days     Order Specific Question:   All other supplies as needed as listed below:     Answer:    Chin Strap, 1 per 180 days     Order Specific Question:   All other supplies as needed as listed below:     Answer:    Disposable Filter, 6 per 90 days     Order Specific Question:   All other supplies as needed as listed below:     Answer:    Humidifier Chamber, 1 per 180 days     Order Specific Question:   All other supplies as needed as listed below:     Answer:    Non-Disposable Filter, 1 per 180 days    X-Ray Chest PA And Lateral     Standing Status:   Future     Standing Expiration Date:   12/10/2025     Order Specific Question:   Reason for Exam:     Answer:   SOB    Spirometry with/without bronchodilator     Standing Status:   Future     Standing Expiration Date:   2024     Order Specific Question:   Release to patient     Answer:   Immediate    Fraction of  Nitric Oxide     Standing Status:   Future     Standing Expiration Date:   2024     Order Specific Question:   Release to patient     Answer:   Immediate       Plan:     Problem List Items Addressed This Visit       Prediabetes     Stable and controlled. Continue current treatment plan as previously prescribed with your PCP.   Hemoglobin A1C   Date Value Ref Range Status   03/15/2023 5.6 4.0 - 5.6 % Final     Comment:     ADA Screening Guidelines:  5.7-6.4%  Consistent with prediabetes  >or=6.5%  Consistent with diabetes    High levels of fetal hemoglobin interfere with the HbA1C  assay. Heterozygous hemoglobin variants (HbS, HgC, etc)do  not significantly interfere with this assay.   However, presence of multiple variants may affect accuracy.     2022 5.4 4.0 - 5.6 % Final     Comment:     ADA Screening Guidelines:  5.7-6.4%  Consistent with prediabetes  >or=6.5%  Consistent with diabetes    High levels of fetal hemoglobin interfere with the HbA1C  assay. Heterozygous hemoglobin variants (HbS, HgC,  etc)do  not significantly interfere with this assay.   However, presence of multiple variants may affect accuracy.     2022 5.7 (H) 4.0 - 5.6 % Final     Comment:     ADA Screening Guidelines:  5.7-6.4%  Consistent with prediabetes  >or=6.5%  Consistent with diabetes    High levels of fetal hemoglobin interfere with the HbA1C  assay. Heterozygous hemoglobin variants (HbS, HgC, etc)do  not significantly interfere with this assay.   However, presence of multiple variants may affect accuracy.              DONNY on CPAP - Primary     On auto CPAP 5 -12 cm   Full face mask  Adherence     Risk of untreated sleep apnea reviewed: Left untreated, sleep apnea can have serious and life-shortening consequences: depression, headaches, diabetes, high blood pressure, heart disease, heart failure, irregular heart beats, and heart attacks, stroke, automobile accidents caused by falling asleep at the wheel and low productivity at work and at home.     (DME) - [facility]  Reviewed therapeutic goals for positive airway pressure therapy Auto CPAP  Ideal is usage 100% of nights for 6 - 8 hours per night. Minimum usage is 70% of night for at least 4 hours per night used.            Relevant Orders    CPAP/BIPAP SUPPLIES    Mild intermittent asthma without complication    Relevant Medications    albuterol (VENTOLIN HFA) 90 mcg/actuation inhaler    Other Relevant Orders    Spirometry with/without bronchodilator    Fraction of  Nitric Oxide    X-Ray Chest PA And Lateral     I spent a total of 35 minutes on the day of the visit.  This includes face to face time and non-face to face time preparing to see the patient (eg, review of tests), obtaining and/or reviewing separately obtained history, documenting clinical information in the electronic or other health record, independently interpreting results and communicating results to the patient/family/caregiver, or care coordinator.    Follow up in about 1 year (around 2024) for  Asthma mark/feno/cxr, CPAP compliance dnld.

## 2023-12-11 NOTE — ASSESSMENT & PLAN NOTE
Stable and controlled. Continue current treatment plan as previously prescribed with your PCP.   Hemoglobin A1C   Date Value Ref Range Status   03/15/2023 5.6 4.0 - 5.6 % Final     Comment:     ADA Screening Guidelines:  5.7-6.4%  Consistent with prediabetes  >or=6.5%  Consistent with diabetes    High levels of fetal hemoglobin interfere with the HbA1C  assay. Heterozygous hemoglobin variants (HbS, HgC, etc)do  not significantly interfere with this assay.   However, presence of multiple variants may affect accuracy.     08/11/2022 5.4 4.0 - 5.6 % Final     Comment:     ADA Screening Guidelines:  5.7-6.4%  Consistent with prediabetes  >or=6.5%  Consistent with diabetes    High levels of fetal hemoglobin interfere with the HbA1C  assay. Heterozygous hemoglobin variants (HbS, HgC, etc)do  not significantly interfere with this assay.   However, presence of multiple variants may affect accuracy.     02/08/2022 5.7 (H) 4.0 - 5.6 % Final     Comment:     ADA Screening Guidelines:  5.7-6.4%  Consistent with prediabetes  >or=6.5%  Consistent with diabetes    High levels of fetal hemoglobin interfere with the HbA1C  assay. Heterozygous hemoglobin variants (HbS, HgC, etc)do  not significantly interfere with this assay.   However, presence of multiple variants may affect accuracy.

## 2023-12-12 LAB
BRPFT: NORMAL
FEF 25 75 CHG: 17.5 %
FEF 25 75 LLN: 1.34
FEF 25 75 POST REF: 94.8 %
FEF 25 75 PRE REF: 80.7 %
FEF 25 75 REF: 2.64
FET100 CHG: 0.1 %
FEV1 CHG: 1.3 %
FEV1 FVC CHG: 2.7 %
FEV1 FVC LLN: 71
FEV1 FVC POST REF: 104 %
FEV1 FVC PRE REF: 101.3 %
FEV1 FVC REF: 81
FEV1 LLN: 1.95
FEV1 POST REF: 74.1 %
FEV1 PRE REF: 73.2 %
FEV1 REF: 2.55
FVC CHG: -1.3 %
FVC LLN: 2.43
FVC POST REF: 71 %
FVC PRE REF: 71.9 %
FVC REF: 3.15
PEF CHG: 5.8 %
PEF LLN: 4.51
PEF POST REF: 83.6 %
PEF PRE REF: 79.1 %
PEF REF: 6.58
POST FEF 25 75: 2.5 L/S
POST FET 100: 7.06 SEC
POST FEV1 FVC: 84.7 %
POST FEV1: 1.89 L
POST FVC: 2.24 L
POST PEF: 5.5 L/S
PRE FEF 25 75: 2.13 L/S
PRE FET 100: 7.06 SEC
PRE FEV1 FVC: 82.52 %
PRE FEV1: 1.87 L
PRE FVC: 2.27 L
PRE PEF: 5.2 L/S

## 2023-12-12 PROCEDURE — 94060 EVALUATION OF WHEEZING: CPT | Mod: 26,S$PBB,, | Performed by: INTERNAL MEDICINE

## 2023-12-12 PROCEDURE — 94060 PR EVAL OF BRONCHOSPASM: ICD-10-PCS | Mod: 26,S$PBB,, | Performed by: INTERNAL MEDICINE

## 2024-01-03 ENCOUNTER — PATIENT MESSAGE (OUTPATIENT)
Dept: INTERNAL MEDICINE | Facility: CLINIC | Age: 44
End: 2024-01-03
Payer: MEDICAID

## 2024-01-04 ENCOUNTER — PATIENT MESSAGE (OUTPATIENT)
Dept: INTERNAL MEDICINE | Facility: CLINIC | Age: 44
End: 2024-01-04
Payer: MEDICAID

## 2024-01-09 ENCOUNTER — LAB VISIT (OUTPATIENT)
Dept: LAB | Facility: HOSPITAL | Age: 44
End: 2024-01-09
Attending: FAMILY MEDICINE
Payer: MEDICAID

## 2024-01-09 ENCOUNTER — OFFICE VISIT (OUTPATIENT)
Dept: INTERNAL MEDICINE | Facility: CLINIC | Age: 44
End: 2024-01-09
Payer: MEDICAID

## 2024-01-09 ENCOUNTER — TELEPHONE (OUTPATIENT)
Dept: CARDIOLOGY | Facility: CLINIC | Age: 44
End: 2024-01-09
Payer: MEDICAID

## 2024-01-09 VITALS
WEIGHT: 184.31 LBS | SYSTOLIC BLOOD PRESSURE: 128 MMHG | BODY MASS INDEX: 30.71 KG/M2 | HEART RATE: 91 BPM | TEMPERATURE: 98 F | DIASTOLIC BLOOD PRESSURE: 84 MMHG | RESPIRATION RATE: 18 BRPM | OXYGEN SATURATION: 97 % | HEIGHT: 65 IN

## 2024-01-09 DIAGNOSIS — R94.31 ABNORMAL EKG: ICD-10-CM

## 2024-01-09 DIAGNOSIS — R73.03 PREDIABETES: ICD-10-CM

## 2024-01-09 DIAGNOSIS — Z76.89 ENCOUNTER TO ESTABLISH CARE: ICD-10-CM

## 2024-01-09 DIAGNOSIS — J45.20 MILD INTERMITTENT ASTHMA WITHOUT COMPLICATION: ICD-10-CM

## 2024-01-09 DIAGNOSIS — G47.33 OSA ON CPAP: ICD-10-CM

## 2024-01-09 DIAGNOSIS — Z01.818 PREOP GENERAL PHYSICAL EXAM: ICD-10-CM

## 2024-01-09 DIAGNOSIS — I10 ESSENTIAL HYPERTENSION: ICD-10-CM

## 2024-01-09 DIAGNOSIS — Z01.818 PREOP GENERAL PHYSICAL EXAM: Primary | ICD-10-CM

## 2024-01-09 DIAGNOSIS — Z01.818 PRE-OP EXAM: Primary | ICD-10-CM

## 2024-01-09 DIAGNOSIS — E05.90 SUBCLINICAL HYPERTHYROIDISM: ICD-10-CM

## 2024-01-09 LAB
ALBUMIN SERPL BCP-MCNC: 4.1 G/DL (ref 3.5–5.2)
ALP SERPL-CCNC: 75 U/L (ref 55–135)
ALT SERPL W/O P-5'-P-CCNC: 12 U/L (ref 10–44)
ANION GAP SERPL CALC-SCNC: 12 MMOL/L (ref 8–16)
AST SERPL-CCNC: 14 U/L (ref 10–40)
BILIRUB SERPL-MCNC: 0.4 MG/DL (ref 0.1–1)
BUN SERPL-MCNC: 10 MG/DL (ref 6–20)
CALCIUM SERPL-MCNC: 10 MG/DL (ref 8.7–10.5)
CHLORIDE SERPL-SCNC: 101 MMOL/L (ref 95–110)
CO2 SERPL-SCNC: 25 MMOL/L (ref 23–29)
CREAT SERPL-MCNC: 0.7 MG/DL (ref 0.5–1.4)
EST. GFR  (NO RACE VARIABLE): >60 ML/MIN/1.73 M^2
ESTIMATED AVG GLUCOSE: 114 MG/DL (ref 68–131)
GLUCOSE SERPL-MCNC: 83 MG/DL (ref 70–110)
HBA1C MFR BLD: 5.6 % (ref 4–5.6)
POTASSIUM SERPL-SCNC: 3.6 MMOL/L (ref 3.5–5.1)
PROT SERPL-MCNC: 7.6 G/DL (ref 6–8.4)
SODIUM SERPL-SCNC: 138 MMOL/L (ref 136–145)

## 2024-01-09 PROCEDURE — 36415 COLL VENOUS BLD VENIPUNCTURE: CPT | Performed by: FAMILY MEDICINE

## 2024-01-09 PROCEDURE — 99214 OFFICE O/P EST MOD 30 MIN: CPT | Mod: S$PBB,,, | Performed by: FAMILY MEDICINE

## 2024-01-09 PROCEDURE — 80053 COMPREHEN METABOLIC PANEL: CPT | Performed by: FAMILY MEDICINE

## 2024-01-09 PROCEDURE — 3074F SYST BP LT 130 MM HG: CPT | Mod: CPTII,,, | Performed by: FAMILY MEDICINE

## 2024-01-09 PROCEDURE — 84443 ASSAY THYROID STIM HORMONE: CPT | Performed by: FAMILY MEDICINE

## 2024-01-09 PROCEDURE — 84439 ASSAY OF FREE THYROXINE: CPT | Performed by: FAMILY MEDICINE

## 2024-01-09 PROCEDURE — 1160F RVW MEDS BY RX/DR IN RCRD: CPT | Mod: CPTII,,, | Performed by: FAMILY MEDICINE

## 2024-01-09 PROCEDURE — 3079F DIAST BP 80-89 MM HG: CPT | Mod: CPTII,,, | Performed by: FAMILY MEDICINE

## 2024-01-09 PROCEDURE — 99214 OFFICE O/P EST MOD 30 MIN: CPT | Mod: PBBFAC | Performed by: FAMILY MEDICINE

## 2024-01-09 PROCEDURE — 99999 PR PBB SHADOW E&M-EST. PATIENT-LVL IV: CPT | Mod: PBBFAC,,, | Performed by: FAMILY MEDICINE

## 2024-01-09 PROCEDURE — 85025 COMPLETE CBC W/AUTO DIFF WBC: CPT | Performed by: FAMILY MEDICINE

## 2024-01-09 PROCEDURE — 1159F MED LIST DOCD IN RCRD: CPT | Mod: CPTII,,, | Performed by: FAMILY MEDICINE

## 2024-01-09 PROCEDURE — 3008F BODY MASS INDEX DOCD: CPT | Mod: CPTII,,, | Performed by: FAMILY MEDICINE

## 2024-01-09 PROCEDURE — 83036 HEMOGLOBIN GLYCOSYLATED A1C: CPT | Performed by: FAMILY MEDICINE

## 2024-01-09 NOTE — TELEPHONE ENCOUNTER
Called and spoke to pt in regards to scheduling her a pre-op appointment that needs to be completed prior to Tuesday 01/16/24. Pt scheduled for Wednesday 01/10/24 with Catina Gomez.     ----- Message from Tanvi Bright MA sent at 1/9/2024  2:43 PM CST -----  Regarding: Pre Op Appt  Hello, pt needs an appt for a pre op (she had an abnormal EKG) that is due 01/16/2024 and she states she has seen Dr. Childs previously. Please contact patient to schedule a pre op appt asap. Thank you!

## 2024-01-09 NOTE — Clinical Note
Needs pulm clearance for upcoming breast reduction; please advise if patient needs to be seen again or if last visit can be addended. Thank you

## 2024-01-09 NOTE — ASSESSMENT & PLAN NOTE
Further discussed nonpharmacologic intervention.  Diet is high in sodium at this time.  Patient information regarding low sodium diet provided.  Discussed positive impact even 5 pounds weight loss will have.  In reviewing his vitals trend his BP was better at a lower weight.  Encourage regular exercise.  Check home blood pressure with goal of less than 140/90 consistently.  His goal would be to remain off of medication.  Call if blood pressures are higher than goal.   Status pending lab

## 2024-01-09 NOTE — PROGRESS NOTES
Subjective:       Patient ID: Bianca Amato is a 43 y.o. female.    Chief Complaint: Pre-op Exam    HPI  Patient presents to clinic today for preop exam. Patient is having breast reduction by Dr. Burnett on 1/24/24. Patient denies personal or family history of problems with anesthesia.     Past Medical History:   Diagnosis Date    Carpal tunnel syndrome     bilateral    Hypertension     Iron deficiency anemia     Mild intermittent asthma        Past Surgical History:   Procedure Laterality Date    COLONOSCOPY N/A 4/28/2023    Procedure: COLONOSCOPY;  Surgeon: Maximo Cox MD;  Location: [facility];  Service: Endoscopy;  Laterality: N/A;    EYE FOREIGN BODY REMOVAL Right     EYE SURGERY  2009    Im not sure what year    HYSTERECTOMY  2020    OOPHORECTOMY      ROBOT-ASSISTED LAPAROSCOPIC ABDOMINAL HYSTERECTOMY USING DA ALYSA XI N/A 12/06/2019    Procedure: XI ROBOTIC HYSTERECTOMY;  Surgeon: VENESSA Davidson MD;  Location: [facility] OR;  Service: OB/GYN;  Laterality: N/A;    ROBOT-ASSISTED SURGICAL REMOVAL OF FALLOPIAN TUBE USING DA ALYSA XI Bilateral 12/06/2019    Procedure: XI ROBOTIC SALPINGECTOMY;  Surgeon: VENESSA Davidson MD;  Location: [facility] OR;  Service: OB/GYN;  Laterality: Bilateral;    SURGICAL REMOVAL OF MASS OF AXILLA Right 11/12/2018    Procedure: EXCISION, MASS, AXILLARY;  Surgeon: Alis Tian MD;  Location: [facility];  Service: General;  Laterality: Right;    TUBAL LIGATION           Current Outpatient Medications:     albuterol (ACCUNEB) 1.25 mg/3 mL Nebu, Take 3 mLs (1.25 mg total) by nebulization every 6 (six) hours as needed (wheezing/cough). Rescue, Disp: 75 mL, Rfl: 2    albuterol (VENTOLIN HFA) 90 mcg/actuation inhaler, Inhale 2 puffs into the lungs every 4 (four) hours as needed for Wheezing or Shortness of Breath., Disp: 18 g, Rfl: 11    amLODIPine (NORVASC) 10 MG tablet, Take 1 tablet (10 mg total) by mouth once daily., Disp: 90 tablet, Rfl: 3    hydroCHLOROthiazide (HYDRODIURIL)  25 MG tablet, Take 1 tablet (25 mg total) by mouth once daily., Disp: 90 tablet, Rfl: 3    losartan (COZAAR) 50 MG tablet, Take 1 tablet (50 mg total) by mouth once daily., Disp: 90 tablet, Rfl: 3    meloxicam (MOBIC) 7.5 MG tablet, Take 1 tablet (7.5 mg total) by mouth daily as needed for Pain., Disp: 30 tablet, Rfl: 0    nebulizer and compressor Meagan, Use as directed every 4 hours as needed, Disp: 1 each, Rfl: 0    nystatin (NYAMYC) powder, Apply topically 2 (two) times daily., Disp: 60 g, Rfl: 2    ketoconazole (NIZORAL) 2 % cream, Apply topically to affected area(s) 2 (two) times daily., Disp: 30 g, Rfl: 2    Review of patient's allergies indicates:   Allergen Reactions    Latex Rash and Shortness Of Breath    Latex, natural rubber Hives    Penicillins Hives, Rash and Shortness Of Breath    Sulfa (sulfonamide antibiotics) Hives, Swelling, Rash and Shortness Of Breath       Review of Systems   Constitutional:  Negative for chills, fatigue, fever and unexpected weight change.   HENT:  Negative for congestion, dental problem, ear pain, hearing loss, rhinorrhea and trouble swallowing.    Eyes:  Negative for pain and visual disturbance.   Respiratory:  Negative for cough and shortness of breath.    Cardiovascular:  Negative for chest pain, palpitations and leg swelling.   Gastrointestinal:  Negative for abdominal distention, abdominal pain, blood in stool, constipation, diarrhea, nausea and vomiting.   Genitourinary:  Negative for difficulty urinating and vaginal discharge.   Musculoskeletal:  Negative for arthralgias and myalgias.   Skin:  Negative for rash.   Neurological:  Negative for dizziness, weakness, numbness and headaches.   Hematological:  Negative for adenopathy. Does not bruise/bleed easily.   Psychiatric/Behavioral:  Negative for dysphoric mood and sleep disturbance. The patient is not nervous/anxious.        Objective:     Vitals:    01/09/24 1343   BP: 128/84   Pulse: 91   Resp: 18   Temp: 98 °F (36.7  "°C)   TempSrc: Tympanic   SpO2: 97%   Weight: 83.6 kg (184 lb 4.9 oz)   Height: 5' 5" (1.651 m)        Physical Exam  Vitals reviewed.   Constitutional:       General: She is not in acute distress.     Appearance: Normal appearance. She is well-developed.   HENT:      Head: Normocephalic and atraumatic.      Right Ear: Tympanic membrane, ear canal and external ear normal.      Left Ear: Tympanic membrane, ear canal and external ear normal.      Nose: Nose normal. No mucosal edema or rhinorrhea.      Mouth/Throat:      Pharynx: Uvula midline.   Eyes:      General: Lids are normal. No scleral icterus.     Extraocular Movements: Extraocular movements intact.      Conjunctiva/sclera: Conjunctivae normal.      Pupils: Pupils are equal, round, and reactive to light.   Neck:      Thyroid: No thyromegaly.   Cardiovascular:      Rate and Rhythm: Normal rate and regular rhythm.      Heart sounds: No murmur heard.     No friction rub. No gallop.   Pulmonary:      Effort: Pulmonary effort is normal.      Breath sounds: Normal breath sounds. No wheezing, rhonchi or rales.   Abdominal:      General: Bowel sounds are normal. There is no distension.      Palpations: Abdomen is soft. There is no mass.      Tenderness: There is no abdominal tenderness.   Musculoskeletal:         General: Normal range of motion.      Cervical back: Normal range of motion and neck supple.   Lymphadenopathy:      Cervical: No cervical adenopathy.   Skin:     General: Skin is warm and dry.      Findings: No lesion or rash.      Nails: There is no clubbing.   Neurological:      Mental Status: She is alert and oriented to person, place, and time.      Cranial Nerves: No cranial nerve deficit.      Sensory: No sensory deficit.      Gait: Gait normal.   Psychiatric:         Mood and Affect: Mood and affect normal.         Assessment:       1. Preop general physical exam    2. Abnormal EKG    3. Essential hypertension    4. Prediabetes    5. Subclinical " hyperthyroidism    6. Mild intermittent asthma without complication    7. DONNY on CPAP        Plan:   1. Preop general physical exam  -     Comprehensive Metabolic Panel; Future; Expected date: 01/09/2024  -     CBC Auto Differential; Future; Expected date: 01/09/2024    2. Abnormal EKG  -     Ambulatory referral/consult to Cardiology; Future; Expected date: 01/16/2024    3. Essential hypertension  Assessment & Plan:  Controlled, continue amlodipine, hydrochlorothiazide and losartan      4. Prediabetes  Assessment & Plan:  Status pending lab     Orders:  -     Hemoglobin A1C; Future; Expected date: 01/09/2024    5. Subclinical hyperthyroidism  Assessment & Plan:  Status pending lab    Orders:  -     T4, Free; Future; Expected date: 01/09/2024  -     TSH; Future; Expected date: 01/09/2024    6. Mild intermittent asthma without complication  Overview:  Followed by Pulmonology, continue current treatment plan      7. DONNY on CPAP  Overview:  Followed by Pulmonology, continue current treatment plan     2/10/2021 Home Sleep Study 1 night study  MILD/BORDERLINE OBSTRUCTIVE SLEEP APNEA with overall AHI 9.3/hr ( 59 events): night #1  Oxygen desaturation: 79%. SpO2 between 85% to 89% for 1 min.  Patient snored 99% time above 50 .  Heart rate range: 80 bpm - 107 bpm  On Auto CPAP 5-20 cm   1/19/2022 changed to Auto CPAP 5-12 cm   Full face mask        Will make recommendations on surgical risk after review of labs, notes from Cardiology and Pulmonology.

## 2024-01-10 ENCOUNTER — HOSPITAL ENCOUNTER (OUTPATIENT)
Dept: CARDIOLOGY | Facility: HOSPITAL | Age: 44
Discharge: HOME | End: 2024-01-10
Payer: MEDICAID

## 2024-01-10 ENCOUNTER — TELEPHONE (OUTPATIENT)
Dept: PULMONOLOGY | Facility: CLINIC | Age: 44
End: 2024-01-10
Payer: MEDICAID

## 2024-01-10 ENCOUNTER — OFFICE VISIT (OUTPATIENT)
Dept: CARDIOLOGY | Facility: CLINIC | Age: 44
End: 2024-01-10
Payer: MEDICAID

## 2024-01-10 VITALS
HEIGHT: 65 IN | SYSTOLIC BLOOD PRESSURE: 124 MMHG | OXYGEN SATURATION: 98 % | HEART RATE: 92 BPM | BODY MASS INDEX: 31.36 KG/M2 | DIASTOLIC BLOOD PRESSURE: 89 MMHG | WEIGHT: 188.25 LBS

## 2024-01-10 DIAGNOSIS — J45.20 MILD INTERMITTENT ASTHMA WITHOUT COMPLICATION: Primary | ICD-10-CM

## 2024-01-10 DIAGNOSIS — R94.31 ABNORMAL EKG: ICD-10-CM

## 2024-01-10 DIAGNOSIS — I10 ESSENTIAL HYPERTENSION: ICD-10-CM

## 2024-01-10 DIAGNOSIS — R94.31 ABNORMAL EKG: Primary | ICD-10-CM

## 2024-01-10 DIAGNOSIS — Z01.818 PRE-OP EXAM: ICD-10-CM

## 2024-01-10 DIAGNOSIS — Z01.810 PRE-OPERATIVE CARDIOVASCULAR EXAMINATION: ICD-10-CM

## 2024-01-10 DIAGNOSIS — Z76.89 ENCOUNTER TO ESTABLISH CARE: ICD-10-CM

## 2024-01-10 LAB
BASOPHILS # BLD AUTO: 0.04 K/UL (ref 0–0.2)
BASOPHILS NFR BLD: 0.5 % (ref 0–1.9)
DIFFERENTIAL METHOD BLD: ABNORMAL
EOSINOPHIL # BLD AUTO: 0.2 K/UL (ref 0–0.5)
EOSINOPHIL NFR BLD: 2.8 % (ref 0–8)
ERYTHROCYTE [DISTWIDTH] IN BLOOD BY AUTOMATED COUNT: 13.5 % (ref 11.5–14.5)
HCT VFR BLD AUTO: 40.5 % (ref 37–48.5)
HGB BLD-MCNC: 13.5 G/DL (ref 12–16)
IMM GRANULOCYTES # BLD AUTO: 0.01 K/UL (ref 0–0.04)
IMM GRANULOCYTES NFR BLD AUTO: 0.1 % (ref 0–0.5)
LYMPHOCYTES # BLD AUTO: 3.5 K/UL (ref 1–4.8)
LYMPHOCYTES NFR BLD: 42.6 % (ref 18–48)
MCH RBC QN AUTO: 29.3 PG (ref 27–31)
MCHC RBC AUTO-ENTMCNC: 33.3 G/DL (ref 32–36)
MCV RBC AUTO: 88 FL (ref 82–98)
MONOCYTES # BLD AUTO: 0.5 K/UL (ref 0.3–1)
MONOCYTES NFR BLD: 5.7 % (ref 4–15)
NEUTROPHILS # BLD AUTO: 4 K/UL (ref 1.8–7.7)
NEUTROPHILS NFR BLD: 48.3 % (ref 38–73)
NRBC BLD-RTO: 0 /100 WBC
PLATELET # BLD AUTO: 502 K/UL (ref 150–450)
PMV BLD AUTO: 10 FL (ref 9.2–12.9)
RBC # BLD AUTO: 4.61 M/UL (ref 4–5.4)
T4 FREE SERPL-MCNC: 0.92 NG/DL (ref 0.71–1.51)
TSH SERPL DL<=0.005 MIU/L-ACNC: 0.22 UIU/ML (ref 0.4–4)
WBC # BLD AUTO: 8.21 K/UL (ref 3.9–12.7)

## 2024-01-10 PROCEDURE — 3079F DIAST BP 80-89 MM HG: CPT | Mod: CPTII,,,

## 2024-01-10 PROCEDURE — 93010 ELECTROCARDIOGRAM REPORT: CPT | Mod: ,,, | Performed by: INTERNAL MEDICINE

## 2024-01-10 PROCEDURE — 99213 OFFICE O/P EST LOW 20 MIN: CPT | Mod: PBBFAC

## 2024-01-10 PROCEDURE — 93005 ELECTROCARDIOGRAM TRACING: CPT

## 2024-01-10 PROCEDURE — 1160F RVW MEDS BY RX/DR IN RCRD: CPT | Mod: CPTII,,,

## 2024-01-10 PROCEDURE — 3074F SYST BP LT 130 MM HG: CPT | Mod: CPTII,,,

## 2024-01-10 PROCEDURE — 3008F BODY MASS INDEX DOCD: CPT | Mod: CPTII,,,

## 2024-01-10 PROCEDURE — 3044F HG A1C LEVEL LT 7.0%: CPT | Mod: CPTII,,,

## 2024-01-10 PROCEDURE — 99999 PR PBB SHADOW E&M-EST. PATIENT-LVL III: CPT | Mod: PBBFAC,,,

## 2024-01-10 PROCEDURE — 1159F MED LIST DOCD IN RCRD: CPT | Mod: CPTII,,,

## 2024-01-10 PROCEDURE — 99215 OFFICE O/P EST HI 40 MIN: CPT | Mod: S$PBB,,,

## 2024-01-10 NOTE — PROGRESS NOTES
Orders Placed This Encounter   Procedures    PFT - related Arterial Blood Gas     Standing Status:   Future     Standing Expiration Date:   1/10/2025     Order Specific Question:   Release to patient     Answer:   Immediate    Stress test, pulmonary     Standing Status:   Future     Standing Expiration Date:   1/10/2025     Order Specific Question:   Reason for study     Answer:   Oxygen prescription     Order Specific Question:   Release to patient     Answer:   Immediate    Fraction of  Nitric Oxide     Standing Status:   Future     Standing Expiration Date:   1/10/2025     Order Specific Question:   Release to patient     Answer:   Immediate     1. Mild intermittent asthma without complication  Fraction of  Nitric Oxide      2. Abnormal EKG  PFT - related Arterial Blood Gas    Stress test, pulmonary

## 2024-01-10 NOTE — PROGRESS NOTES
Subjective:   Patient ID:  Bianca Amato is a 43 y.o. female who presents for evaluation of No chief complaint on file.      HPI 44y/o AA F with PMHx of HTN, BARBARA, asthma followed by Dr. Childs in [facility]. Here today for pre op cardiac clearance for breast reduction with . Denies any CP, angina or anginal equivalent at this time    Nonsmoker  FH HTN  Walks daily  EKG today NSR no acute dynamic changes noted  Echo 2020 EF nml 55%  Stress test 2020 neg for ischemia    Past Medical History:   Diagnosis Date    Carpal tunnel syndrome     bilateral    Hypertension     Iron deficiency anemia     Mild intermittent asthma        Past Surgical History:   Procedure Laterality Date    COLONOSCOPY N/A 4/28/2023    Procedure: COLONOSCOPY;  Surgeon: Maximo Cox MD;  Location: [facility];  Service: Endoscopy;  Laterality: N/A;    EYE FOREIGN BODY REMOVAL Right     EYE SURGERY  2009    Im not sure what year    HYSTERECTOMY  2020    OOPHORECTOMY      ROBOT-ASSISTED LAPAROSCOPIC ABDOMINAL HYSTERECTOMY USING DA ALYSA XI N/A 12/06/2019    Procedure: XI ROBOTIC HYSTERECTOMY;  Surgeon: VENESSA Davidson MD;  Location: [facility];  Service: OB/GYN;  Laterality: N/A;    ROBOT-ASSISTED SURGICAL REMOVAL OF FALLOPIAN TUBE USING DA ALYSA XI Bilateral 12/06/2019    Procedure: XI ROBOTIC SALPINGECTOMY;  Surgeon: VENESSA Davidson MD;  Location: [facility];  Service: OB/GYN;  Laterality: Bilateral;    SURGICAL REMOVAL OF MASS OF AXILLA Right 11/12/2018    Procedure: EXCISION, MASS, AXILLARY;  Surgeon: Alis Tian MD;  Location: [facility];  Service: General;  Laterality: Right;    TUBAL LIGATION         Social History     Tobacco Use    Smoking status: Never    Smokeless tobacco: Never   Substance Use Topics    Alcohol use: No    Drug use: No       Family History   Problem Relation Age of Onset    Diabetes Mother     Sarcoidosis Mother     Hypertension Mother     COPD Father     Hypertension Father     Asthma Father      Breast cancer Neg Hx     Colon cancer Neg Hx     Ovarian cancer Neg Hx     Thrombosis Neg Hx        Current Outpatient Medications on File Prior to Visit   Medication Sig Dispense Refill    albuterol (ACCUNEB) 1.25 mg/3 mL Nebu Take 3 mLs (1.25 mg total) by nebulization every 6 (six) hours as needed (wheezing/cough). Rescue 75 mL 2    albuterol (VENTOLIN HFA) 90 mcg/actuation inhaler Inhale 2 puffs into the lungs every 4 (four) hours as needed for Wheezing or Shortness of Breath. 18 g 11    amLODIPine (NORVASC) 10 MG tablet Take 1 tablet (10 mg total) by mouth once daily. 90 tablet 3    hydroCHLOROthiazide (HYDRODIURIL) 25 MG tablet Take 1 tablet (25 mg total) by mouth once daily. 90 tablet 3    ketoconazole (NIZORAL) 2 % cream Apply topically to affected area(s) 2 (two) times daily. 30 g 2    losartan (COZAAR) 50 MG tablet Take 1 tablet (50 mg total) by mouth once daily. 90 tablet 3    meloxicam (MOBIC) 7.5 MG tablet Take 1 tablet (7.5 mg total) by mouth daily as needed for Pain. 30 tablet 0    nebulizer and compressor Meagan Use as directed every 4 hours as needed 1 each 0    nystatin (NYAMYC) powder Apply topically 2 (two) times daily. 60 g 2     No current facility-administered medications on file prior to visit.      Wt Readings from Last 3 Encounters:   01/09/24 83.6 kg (184 lb 4.9 oz)   12/11/23 88.2 kg (194 lb 7.1 oz)   09/26/23 86 kg (189 lb 9.5 oz)     Temp Readings from Last 3 Encounters:   01/09/24 98 °F (36.7 °C) (Tympanic)   06/07/23 97.4 °F (36.3 °C) (Tympanic)   05/29/23 97.8 °F (36.6 °C) (Temporal)     BP Readings from Last 3 Encounters:   01/09/24 128/84   12/11/23 (!) 142/88   08/03/23 120/78     Pulse Readings from Last 3 Encounters:   01/09/24 91   12/11/23 77   06/07/23 100        Review of Systems   Constitutional: Negative.   HENT: Negative.     Eyes: Negative.    Cardiovascular: Negative.    Respiratory: Negative.     Skin: Negative.    Musculoskeletal: Negative.    Gastrointestinal: Negative.     Genitourinary: Negative.    Neurological: Negative.    Psychiatric/Behavioral: Negative.         Objective:   Physical Exam  Vitals and nursing note reviewed.   Constitutional:       Appearance: Normal appearance.   HENT:      Head: Normocephalic.   Eyes:      Pupils: Pupils are equal, round, and reactive to light.   Cardiovascular:      Rate and Rhythm: Normal rate and regular rhythm.      Heart sounds: Normal heart sounds, S1 normal and S2 normal. No murmur heard.     No S3 or S4 sounds.   Pulmonary:      Effort: Pulmonary effort is normal.      Breath sounds: Normal breath sounds.   Abdominal:      General: Bowel sounds are normal.      Palpations: Abdomen is soft.   Musculoskeletal:         General: Normal range of motion.      Cervical back: Normal range of motion.   Skin:     Capillary Refill: Capillary refill takes less than 2 seconds.   Neurological:      General: No focal deficit present.      Mental Status: She is alert and oriented to person, place, and time.   Psychiatric:         Mood and Affect: Mood normal.         Behavior: Behavior normal.         Thought Content: Thought content normal.         Lab Results   Component Value Date    CHOL 197 03/15/2023    CHOL 199 02/08/2022    CHOL 172 10/30/2019     Lab Results   Component Value Date    HDL 48 03/15/2023    HDL 42 02/08/2022    HDL 49 10/30/2019     Lab Results   Component Value Date    LDLCALC 123.4 03/15/2023    LDLCALC 128.2 02/08/2022    LDLCALC 102.6 10/30/2019     Lab Results   Component Value Date    TRIG 128 03/15/2023    TRIG 144 02/08/2022    TRIG 102 10/30/2019     Lab Results   Component Value Date    CHOLHDL 24.4 03/15/2023    CHOLHDL 21.1 02/08/2022    CHOLHDL 28.5 10/30/2019       Chemistry        Component Value Date/Time     01/09/2024 1447    K 3.6 01/09/2024 1447     01/09/2024 1447    CO2 25 01/09/2024 1447    BUN 10 01/09/2024 1447    CREATININE 0.7 01/09/2024 1447    GLU 83 01/09/2024 1447        Component  "Value Date/Time    CALCIUM 10.0 01/09/2024 1447    ALKPHOS 75 01/09/2024 1447    AST 14 01/09/2024 1447    ALT 12 01/09/2024 1447    BILITOT 0.4 01/09/2024 1447    ESTGFRAFRICA >60.0 02/08/2022 0826    EGFRNONAA >60.0 02/08/2022 0826          Lab Results   Component Value Date    TSH 0.219 (L) 01/09/2024     No results found for: "INR", "PROTIME"  @RESUFAST(WBC,HGB,HCT,MCV,PLT)  @LABRCNTIP(BNP,BNPTRIAGEBLO)@  CrCl cannot be calculated (Unknown ideal weight.).     Results for orders placed during the hospital encounter of 08/12/20    Echo Color Flow Doppler? Yes    Interpretation Summary  · Concentric left ventricular hypertrophy.  · Normal left ventricular systolic function. The estimated ejection fraction is 55%.  · Normal LV diastolic function.  · No wall motion abnormalities.  · Normal right ventricular systolic function.  · Normal central venous pressure (3 mmHg).  · The estimated PA systolic pressure is 14 mmHg.     Results for orders placed during the hospital encounter of 08/12/20    Exercise Stress - EKG    Interpretation Summary    The EKG portion of this study is negative for ischemia.    The patient reported no chest pain during the stress test.     Assessment:      1. Abnormal EKG    2. Pre-operative cardiovascular examination        Plan:   Abnormal EKG  -     Ambulatory referral/consult to Cardiology    Pre-operative cardiovascular examination      Cont current CV medications  RF modifications  Daily exercise as tolerated  Low Na low fat diet    RTC 6 mos or sooner if needed    Pt is moderate risk for edmundo and post op cardiac complications    Courtney Guillot, FNP-C [facility], Cardiology    "

## 2024-01-16 ENCOUNTER — PATIENT MESSAGE (OUTPATIENT)
Dept: INTERNAL MEDICINE | Facility: CLINIC | Age: 44
End: 2024-01-16
Payer: MEDICAID

## 2024-01-16 ENCOUNTER — HOSPITAL ENCOUNTER (OUTPATIENT)
Dept: RADIOLOGY | Facility: HOSPITAL | Age: 44
Discharge: HOME | End: 2024-01-16
Attending: NURSE PRACTITIONER
Payer: MEDICAID

## 2024-01-16 ENCOUNTER — OFFICE VISIT (OUTPATIENT)
Dept: INTERNAL MEDICINE | Facility: CLINIC | Age: 44
End: 2024-01-16
Payer: MEDICAID

## 2024-01-16 ENCOUNTER — CLINICAL SUPPORT (OUTPATIENT)
Dept: PULMONOLOGY | Facility: CLINIC | Age: 44
End: 2024-01-16
Attending: NURSE PRACTITIONER
Payer: MEDICAID

## 2024-01-16 ENCOUNTER — CLINICAL SUPPORT (OUTPATIENT)
Dept: PULMONOLOGY | Facility: CLINIC | Age: 44
End: 2024-01-16
Payer: MEDICAID

## 2024-01-16 VITALS
HEIGHT: 65 IN | TEMPERATURE: 96 F | SYSTOLIC BLOOD PRESSURE: 124 MMHG | DIASTOLIC BLOOD PRESSURE: 86 MMHG | WEIGHT: 188.19 LBS | HEART RATE: 115 BPM | BODY MASS INDEX: 32.01 KG/M2 | BODY MASS INDEX: 31.31 KG/M2 | OXYGEN SATURATION: 98 % | WEIGHT: 192.13 LBS

## 2024-01-16 DIAGNOSIS — J00 ACUTE NASOPHARYNGITIS: Primary | ICD-10-CM

## 2024-01-16 DIAGNOSIS — J02.9 SORE THROAT: ICD-10-CM

## 2024-01-16 DIAGNOSIS — J45.20 MILD INTERMITTENT ASTHMA WITHOUT COMPLICATION: ICD-10-CM

## 2024-01-16 DIAGNOSIS — R94.31 ABNORMAL EKG: ICD-10-CM

## 2024-01-16 PROBLEM — Z01.810 PRE-OPERATIVE CARDIOVASCULAR EXAMINATION: Status: RESOLVED | Noted: 2024-01-10 | Resolved: 2024-01-16

## 2024-01-16 LAB
ALLENS TEST: NORMAL
BRPFT: NORMAL
CTP QC/QA: YES
DELSYS: NORMAL
FEF 25 75 CHG: 13.1 %
FEF 25 75 LLN: 1.34
FEF 25 75 POST REF: 105.4 %
FEF 25 75 PRE REF: 93.1 %
FEF 25 75 REF: 2.63
FET100 CHG: -0.3 %
FEV1 CHG: 0.8 %
FEV1 FVC CHG: 4.3 %
FEV1 FVC LLN: 71
FEV1 FVC POST REF: 107 %
FEV1 FVC PRE REF: 102.6 %
FEV1 FVC REF: 81
FEV1 LLN: 1.95
FEV1 POST REF: 80.6 %
FEV1 PRE REF: 79.9 %
FEV1 REF: 2.55
FIO2: 21
FVC CHG: -3.3 %
FVC LLN: 2.43
FVC POST REF: 75 %
FVC PRE REF: 77.5 %
FVC REF: 3.15
GROUP A STREP, MOLECULAR: NEGATIVE
HCO3 UR-SCNC: 26.6 MMOL/L (ref 24–28)
MODE: NORMAL
PCO2 BLDA: 43.2 MMHG (ref 35–45)
PEF CHG: 14.3 %
PEF LLN: 4.51
PEF POST REF: 76.4 %
PEF PRE REF: 66.8 %
PEF REF: 6.58
PH SMN: 7.4 [PH] (ref 7.35–7.45)
PO2 BLDA: 92 MMHG (ref 80–100)
POC BE: 2 MMOL/L
POC MOLECULAR INFLUENZA A AGN: NEGATIVE
POC MOLECULAR INFLUENZA B AGN: NEGATIVE
POC SATURATED O2: 97 % (ref 95–100)
POST FEF 25 75: 2.77 L/S
POST FET 100: 6.98 SEC
POST FEV1 FVC: 87.16 %
POST FEV1: 2.06 L
POST FVC: 2.36 L
POST PEF: 5.02 L/S
PRE FEF 25 75: 2.45 L/S
PRE FET 100: 7.01 SEC
PRE FEV1 FVC: 83.6 %
PRE FEV1: 2.04 L
PRE FVC: 2.44 L
PRE PEF: 4.39 L/S
SAMPLE: NORMAL
SITE: NORMAL

## 2024-01-16 PROCEDURE — 94618 PULMONARY STRESS TESTING: CPT | Mod: 26,S$PBB,, | Performed by: INTERNAL MEDICINE

## 2024-01-16 PROCEDURE — 3074F SYST BP LT 130 MM HG: CPT | Mod: CPTII,,, | Performed by: PHYSICIAN ASSISTANT

## 2024-01-16 PROCEDURE — 99211 OFF/OP EST MAY X REQ PHY/QHP: CPT | Mod: PBBFAC,25

## 2024-01-16 PROCEDURE — 99214 OFFICE O/P EST MOD 30 MIN: CPT | Mod: PBBFAC,25,27 | Performed by: PHYSICIAN ASSISTANT

## 2024-01-16 PROCEDURE — 99212 OFFICE O/P EST SF 10 MIN: CPT | Mod: PBBFAC,25,27

## 2024-01-16 PROCEDURE — 87502 INFLUENZA DNA AMP PROBE: CPT | Mod: PBBFAC | Performed by: PHYSICIAN ASSISTANT

## 2024-01-16 PROCEDURE — 99999 PR PBB SHADOW E&M-EST. PATIENT-LVL I: CPT | Mod: PBBFAC,,,

## 2024-01-16 PROCEDURE — 1160F RVW MEDS BY RX/DR IN RCRD: CPT | Mod: CPTII,,, | Performed by: PHYSICIAN ASSISTANT

## 2024-01-16 PROCEDURE — 94060 EVALUATION OF WHEEZING: CPT | Mod: PBBFAC

## 2024-01-16 PROCEDURE — 95012 NITRIC OXIDE EXP GAS DETER: CPT | Mod: PBBFAC

## 2024-01-16 PROCEDURE — 94618 PULMONARY STRESS TESTING: CPT | Mod: PBBFAC

## 2024-01-16 PROCEDURE — 71046 X-RAY EXAM CHEST 2 VIEWS: CPT | Mod: 26,,, | Performed by: RADIOLOGY

## 2024-01-16 PROCEDURE — 94060 EVALUATION OF WHEEZING: CPT | Mod: 26,S$PBB,59, | Performed by: INTERNAL MEDICINE

## 2024-01-16 PROCEDURE — 99211 OFF/OP EST MAY X REQ PHY/QHP: CPT | Mod: PBBFAC,25,27

## 2024-01-16 PROCEDURE — 3079F DIAST BP 80-89 MM HG: CPT | Mod: CPTII,,, | Performed by: PHYSICIAN ASSISTANT

## 2024-01-16 PROCEDURE — 99999PBSHW POCT INFLUENZA A/B MOLECULAR: Mod: PBBFAC,,,

## 2024-01-16 PROCEDURE — 3044F HG A1C LEVEL LT 7.0%: CPT | Mod: CPTII,,, | Performed by: PHYSICIAN ASSISTANT

## 2024-01-16 PROCEDURE — 99213 OFFICE O/P EST LOW 20 MIN: CPT | Mod: S$PBB,,, | Performed by: PHYSICIAN ASSISTANT

## 2024-01-16 PROCEDURE — 36600 WITHDRAWAL OF ARTERIAL BLOOD: CPT | Mod: PBBFAC

## 2024-01-16 PROCEDURE — 99999 PR PBB SHADOW E&M-EST. PATIENT-LVL II: CPT | Mod: PBBFAC,,,

## 2024-01-16 PROCEDURE — 99999 PR PBB SHADOW E&M-EST. PATIENT-LVL IV: CPT | Mod: PBBFAC,,, | Performed by: PHYSICIAN ASSISTANT

## 2024-01-16 PROCEDURE — 87651 STREP A DNA AMP PROBE: CPT | Performed by: PHYSICIAN ASSISTANT

## 2024-01-16 PROCEDURE — 99999PBSHW PR PBB SHADOW TECHNICAL ONLY FILED TO HB: Mod: PBBFAC,,,

## 2024-01-16 PROCEDURE — 71046 X-RAY EXAM CHEST 2 VIEWS: CPT | Mod: TC

## 2024-01-16 PROCEDURE — 1159F MED LIST DOCD IN RCRD: CPT | Mod: CPTII,,, | Performed by: PHYSICIAN ASSISTANT

## 2024-01-16 PROCEDURE — 3008F BODY MASS INDEX DOCD: CPT | Mod: CPTII,,, | Performed by: PHYSICIAN ASSISTANT

## 2024-01-16 PROCEDURE — 4010F ACE/ARB THERAPY RXD/TAKEN: CPT | Mod: CPTII,,, | Performed by: PHYSICIAN ASSISTANT

## 2024-01-16 PROCEDURE — 82803 BLOOD GASES ANY COMBINATION: CPT | Mod: PBBFAC

## 2024-01-16 PROCEDURE — 36600 WITHDRAWAL OF ARTERIAL BLOOD: CPT | Mod: S$PBB,59,, | Performed by: INTERNAL MEDICINE

## 2024-01-16 RX ORDER — PROMETHAZINE HYDROCHLORIDE AND DEXTROMETHORPHAN HYDROBROMIDE 6.25; 15 MG/5ML; MG/5ML
5 SYRUP ORAL EVERY 6 HOURS PRN
Qty: 118 ML | Refills: 0 | Status: SHIPPED | OUTPATIENT
Start: 2024-01-16 | End: 2024-05-09

## 2024-01-16 RX ORDER — DEXBROMPHENIRAMINE MALEATE, PHENYLEPHRINE HYDROCHLORIDE 2; 7.5 MG/1; MG/1
1 TABLET ORAL
Qty: 20 TABLET | Refills: 0 | Status: SHIPPED | OUTPATIENT
Start: 2024-01-16 | End: 2024-05-09

## 2024-01-16 NOTE — PROGRESS NOTES
Subjective:       Patient ID: Bianca Amato is a 43 y.o. female.    Chief Complaint: Nasal Congestion (Patient stated that she started having a runny nose day before yesterday. Swollen lymph nodes)      HPI  42 y/o female presents to clinic with c/o 2-3 day history of nasal congestion, sore throat, dry cough and swollen lymph nodes under her chin. She denies fever, chills, NVD. No treatments tried.     Review of Systems   Constitutional:  Negative for chills, fatigue and fever.   HENT:  Positive for congestion and sore throat. Negative for ear pain and trouble swallowing.    Respiratory:  Positive for cough. Negative for shortness of breath.    Cardiovascular:  Negative for chest pain.   Gastrointestinal:  Negative for diarrhea, nausea and vomiting.       Objective:      Physical Exam  Vitals and nursing note reviewed.   Constitutional:       General: She is not in acute distress.     Appearance: She is well-developed.   HENT:      Head: Normocephalic and atraumatic.      Right Ear: Tympanic membrane, ear canal and external ear normal.      Left Ear: Tympanic membrane, ear canal and external ear normal.      Nose: No mucosal edema.      Comments: Erythematous nasal mucosa noted     Mouth/Throat:      Lips: Pink.      Mouth: Mucous membranes are moist.      Pharynx: No posterior oropharyngeal erythema.      Comments: Clear PND noted to posterior pharynx  Eyes:      General: Lids are normal. No scleral icterus.     Extraocular Movements: Extraocular movements intact.      Conjunctiva/sclera: Conjunctivae normal.   Cardiovascular:      Rate and Rhythm: Normal rate and regular rhythm.   Pulmonary:      Effort: Pulmonary effort is normal.      Breath sounds: Normal breath sounds. No decreased breath sounds, wheezing, rhonchi or rales.   Neurological:      Mental Status: She is alert.      Cranial Nerves: No cranial nerve deficit.   Psychiatric:         Mood and Affect: Mood and affect normal.         Assessment:        1. Acute nasopharyngitis    2. Sore throat        Plan:   1. Acute nasopharyngitis  -     dexbrompheniramine-phenyleph (ALAHIST PE) 2-7.5 mg Tab; Take 1 tablet by mouth every 4 to 6 hours as needed (congestion).  Dispense: 20 tablet; Refill: 0  -     promethazine-dextromethorphan (PROMETHAZINE-DM) 6.25-15 mg/5 mL Syrp; Take 5 mLs by mouth every 6 (six) hours as needed (cough).  Dispense: 118 mL; Refill: 0    2. Sore throat  -     Group A Strep, Molecular  -     POCT Influenza A/B Molecular      Neg flu and strep  Reassured patient. Discussed natural history of viral URI. Advised to push fluids and get plenty of rest. If symptoms worse or not improving after 7-10 days patient will notify me.

## 2024-01-16 NOTE — PROCEDURES
ABG completed. See ABG Below.    Component      Latest Ref Rng 1/16/2024   POC PH      7.35 - 7.45  7.397    POC PCO2      35 - 45 mmHg 43.2    POC PO2      80 - 100 mmHg 92    POC HCO3      24 - 28 mmol/L 26.6    POC BE      -2 to 2 mmol/L 2    POC SATURATED O2      95 - 100 % 97    Sample ARTERIAL    Site RR    Allens Test Pass    DelSys Room Air    Mode SPONT    FiO2 21            Interpretation:  [] Arterial blood gases on room air demonstrate normal pCO2 and pO2  [] Arterial blood gases on room air are abnormal demonstrating hypercarbia (pCO2 >45 mmHg)  [] Arterial blood gases on room air are abnormal demonstrating hypocarbia (pCO2 < 35 mmHg)  [] Arterial blood gases on room air are abnormal demonstrating hypoxemia (pO2 < 80 mmHg)  [] Arterial blood gases on room air are abnormal demonstrating hyperoxemia (pO2 >120 mmHg)  [] Arterial blood gases on room air are abnormal demonstrating hypoxemia (pO2 < 80 mmHg) and hypercarbia (pCO2>45 mmHg)    The table below summarizes the main interpretations of the relationship between the arterial blood gases, pH, pCO2 and HCO-3    []    I

## 2024-01-16 NOTE — PROCEDURES
Clinical Guide to Interpretation or FeNO Levels :    FeNO  (ppb) LOW INTERMEDIATE HIGH   ADULT VALUES < 25 25-50          > 50   Th2-driven Inflammation Unlikely Likely Significant     Patients FeNO level at this visit : __76__ (ppb)    Interpretation of FeNO measurement in adults:  [] FENO is less than 25 ppb implies non eosinophilic airway inflammation or the absence of airway inflammation.    Comment: Low FENO (<25 ppb) in adult asthmatics with persistent symptoms suggests other etiologies for these symptoms and a lower likelihood of benefit from adding or increasing inhaled glucocorticoids.    [] FENO between 25 and 50 ppb in adults should be interpreted cautiously with reference to the clinical situation (eg, symptomatic, on or off therapy, current smoking).    [] FENO greater than 50 ppb in adults  suggests eosinophilic airway inflammation   Comment: High FENO (>50 ppb) in adult asthmatics even with atypical symptoms suggests glucocorticoid responsiveness. High FENO (>50 ppb) can help identify poor adherence or uncontrolled inflammation in asthma patients with otherwise seemingly controlled asthma.    Discussion:  A FENO less than 25 ppb in adults and less than 20 ppb in children younger than 12 years of age implies noneosinophilic airway inflammation or the absence of airway inflammation.  A FENO greater than 50 ppb in adults or greater than 35 ppb in children suggests eosinophilic airway inflammation.   Values of FENO between 25 and 50 ppb in adults (20 to 35 ppb in children) should be interpreted cautiously with reference to the clinical situation (eg, symptomatic, on or off therapy, current smoking).  A rising FENO with a greater than 20 percent change and more than 25 ppb (20 ppb in children) from a previously stable level suggests increasing eosinophilic airway inflammation, but there are wide inter-individual differences.  A decrease in FENO greater than 20 percent for values over 50 ppb or more than  10 ppb for values less than 50 ppb may be clinically important.  ?FENO in other respiratory diseases - Several other diseases are associated with altered levels of exhaled NO: low levels of FENO have been noted in cystic fibrosis, current smoking, pulmonary hypertension, hypothermia, primary ciliary dyskinesia, and bronchopulmonary dysplasia. Elevated FENO has been noted in atopy, nonasthmatic eosinophilic bronchitis, COPD exacerbations, noncystic fibrosis bronchiectasis, and viral upper respiratory infections.    REFERENCE:  ATS Board of Directors, December 2004, and by the ERS Executive Committee, June 2004. ATS/ERS Recommendations for Standardized Procedures for the Online and Offline Measurement of Exhaled Lower Respiratory Nitric Oxide and Nasal Nitric Oxide. Guideline 2005

## 2024-01-16 NOTE — PROCEDURES
"[facility] - Pulmonary Function  Six Minute Walk     SUMMARY     Ordering Provider: CHANTAL Trinidad   Interpreting Provider: Dr. Byrnes  Performing nurse/tech/RT: ROMEO Hayward RRT  Diagnosis:  (abnormal EKG)  Height: 5' 5" (165.1 cm)  Weight: 87.1 kg (192 lb 2.1 oz)  BMI (Calculated): 32   Patient Race:             Phase Oxygen Assessment Supplemental O2 Heart   Rate Blood Pressure Violette Dyspnea Scale Rating   Resting 99 % Room Air 106 bpm 137/73 0   Exercise        Minute        1 96 % Room Air 124 bpm     2 97 % Room Air 124 bpm     3 96 % Room Air 125 bpm     4 98 % Room Air 129 bpm     5 97 % Room Air 128 bpm     6  96 % Room Air 129 bpm 134/80 2   Recovery        Minute        1 98 % Room Air 114 bpm     2 98 % Room Air 114 bpm     3 99 % Room Air 117 bpm     4 99 % Room Air 122 bpm 132/75 0     Six Minute Walk Summary  6MWT Status: completed without stopping  Patient Reported: No complaints     Interpretation:  Did the patient stop or pause?: No                                         Total Time Walked (Calculated): 360 seconds  Final Partial Lap Distance (feet): 25 feet  Total Distance Meters (Calculated): 495.3 meters  Predicted Distance Meters (Calculated): 555.69 meters  Percentage of Predicted (Calculated): 89.13  Peak VO2 (Calculated): 18.84  Mets: 5.38  Has The Patient Had a Previous Six Minute Walk Test?: No       Previous 6MWT Results  Has The Patient Had a Previous Six Minute Walk Test?: No    "

## 2024-01-18 ENCOUNTER — OFFICE VISIT (OUTPATIENT)
Dept: PULMONOLOGY | Facility: CLINIC | Age: 44
End: 2024-01-18
Attending: NURSE PRACTITIONER
Payer: MEDICAID

## 2024-01-18 VITALS
RESPIRATION RATE: 15 BRPM | HEART RATE: 90 BPM | WEIGHT: 190.25 LBS | SYSTOLIC BLOOD PRESSURE: 126 MMHG | DIASTOLIC BLOOD PRESSURE: 88 MMHG | OXYGEN SATURATION: 98 % | BODY MASS INDEX: 31.7 KG/M2 | HEIGHT: 65 IN

## 2024-01-18 DIAGNOSIS — G47.33 OSA ON CPAP: Primary | ICD-10-CM

## 2024-01-18 DIAGNOSIS — Z01.811 ENCOUNTER FOR PREOPERATIVE PULMONARY EXAMINATION: ICD-10-CM

## 2024-01-18 DIAGNOSIS — J45.20 MILD INTERMITTENT ASTHMA WITHOUT COMPLICATION: ICD-10-CM

## 2024-01-18 PROCEDURE — 99999 PR PBB SHADOW E&M-EST. PATIENT-LVL V: CPT | Mod: PBBFAC,,, | Performed by: HOSPITALIST

## 2024-01-18 PROCEDURE — 3044F HG A1C LEVEL LT 7.0%: CPT | Mod: CPTII,,, | Performed by: HOSPITALIST

## 2024-01-18 PROCEDURE — 1160F RVW MEDS BY RX/DR IN RCRD: CPT | Mod: CPTII,,, | Performed by: HOSPITALIST

## 2024-01-18 PROCEDURE — 3079F DIAST BP 80-89 MM HG: CPT | Mod: CPTII,,, | Performed by: HOSPITALIST

## 2024-01-18 PROCEDURE — 4010F ACE/ARB THERAPY RXD/TAKEN: CPT | Mod: CPTII,,, | Performed by: HOSPITALIST

## 2024-01-18 PROCEDURE — 1159F MED LIST DOCD IN RCRD: CPT | Mod: CPTII,,, | Performed by: HOSPITALIST

## 2024-01-18 PROCEDURE — 99215 OFFICE O/P EST HI 40 MIN: CPT | Mod: PBBFAC | Performed by: HOSPITALIST

## 2024-01-18 PROCEDURE — 3074F SYST BP LT 130 MM HG: CPT | Mod: CPTII,,, | Performed by: HOSPITALIST

## 2024-01-18 PROCEDURE — 3008F BODY MASS INDEX DOCD: CPT | Mod: CPTII,,, | Performed by: HOSPITALIST

## 2024-01-18 PROCEDURE — 99215 OFFICE O/P EST HI 40 MIN: CPT | Mod: S$PBB,,, | Performed by: HOSPITALIST

## 2024-01-18 NOTE — PROGRESS NOTES
Subjective:      Patient ID: Bianca Amato is a 43 y.o. female.    Chief Complaint: Pre-op Pulmonary Eval    43 year old female with history of HTN, BARBARA, DONNY (aPAP), asthma (followed by Jolynn Trinidad NP) who presents to Pulmonary clinic for pre-op pulmonary evaluation. Her pre-op testing was performed 1/16- same day she presented to internal medicine with cough and sore throat (flu and strep negative,home covid neg). No fever, no shortness of breath or wheezing. At baseline doesn't routinely use GEN. Has been coughing with URI and using albuterol pump and neb over the past several days.  Feeling better today.  No chest tightness, feels like she is breathing fine.     Pre-Op Testing:   - FeNO 1/16/24- 76ppb (done same day as dx viral URI which can alter result)  - 6MW 1/16/24- Resting sat 99%, Range 96-99%, walked 555m, 89% predicted, no pauses  - ABG 1/16/24- 7.397/43/92/26  - CXR 1/16/24- clear chest, no acute findings    Pulmonary Interventions:   Accuneb- twice today  Albuterol HFA- once today    PRE OPERATIVE PULMONARY RISK ASSESSMENT:      Surgeon: Dr. Burnett  Procedure: Breast reduction  Procedure Date: 1/24/24  Anesthesia: General  Duration: <3 hours     ARISCAT Score: 17, Low risk     0 to 25 points: Low risk: 1.6% pulmonary complication rate   26 to 44 points: Intermediate risk: 13.3% pulmonary complication rate   45 to 123 points: High risk: 42.1% pulmonary complication rate          Barrios Post-Operative Respiratory Risk Score: 0% risk of mechanical ventilation >48 hours after surgery         Risks and possible pulmonary complications of surgery include risk of respiratory failure requiring mechanical ventilation, post operative pneumonia, post operative atelectasis, deep venous thrombosis, pulmonary embolism and death.     Their physiology is not prohibitive and there is no absolute contraindication to proposed surgery and/or anesthesia.  Would recommend albuterol 2.5 mg and Solu-Medrol 60 mg  "on-call to the OR as a pretreatment to maximize lung function prior to surgery.           Review of Systems   HENT:  Positive for sore throat and congestion.    Respiratory:  Positive for cough. Negative for sputum production, wheezing and dyspnea on extertion.      Objective:     Physical Exam   Constitutional: She is oriented to person, place, and time. She appears well-developed and well-nourished.   Appears well She is not obese.   Cardiovascular: Normal rate and regular rhythm.   Pulmonary/Chest: Normal expansion, effort normal and breath sounds normal.   Musculoskeletal:         General: No edema.   Neurological: She is alert and oriented to person, place, and time.   Skin: Skin is warm and dry.     Personal Diagnostic Review  As Above      1/16/2024     2:42 PM 1/16/2024     2:31 PM 1/10/2024    10:53 AM 1/9/2024     1:43 PM 12/11/2023     1:52 PM 9/26/2023    10:01 AM 8/3/2023     9:00 AM   Pulmonary Function Tests   SpO2 98 %  98 % 97 % 98 %     Ordering Provider  CHANTAL Trinidad        Performing nurse/tech/RT  C. Credeur, RRT        Height  5' 5" (1.651 m) 5' 5" (1.651 m) 5' 5" (1.651 m) 5' 5" (1.651 m)  5' 5" (1.651 m)   Weight 85.3 kg (188 lb 2.6 oz) 87.1 kg (192 lb 2.1 oz) 85.4 kg (188 lb 4.4 oz) 83.6 kg (184 lb 4.9 oz) 88.2 kg (194 lb 7.1 oz) 86 kg (189 lb 9.5 oz) 88.1 kg (194 lb 3.6 oz)   BMI (Calculated) 31.3 32 31.3 30.7 32.4  32.3   Patient Race          6MWT Status  completed without stopping        Patient Reported  No complaints        Was O2 used?  No        6MW Distance walked (feet)  1625 feet        Distance walked (meters)  495.3 meters        Did patient stop?  No        Type of assistive device(s) used?  no assistive devices        Oxygen Saturation  99 %        Supplemental Oxygen  Room Air        Heart Rate  106 bpm        Blood Pressure  137/73        Violette Dyspnea Rating   nothing at all        Oxygen Saturation  96 %        Supplemental Oxygen  Room Air        Heart Rate  " 129 bpm        Blood Pressure  134/80        Violette Dyspnea Rating   light        Recovery Time (seconds)  240 seconds        Oxygen Saturation  99 %        Supplemental Oxygen  Room Air        Heart Rate  122 bpm        Blood Pressure  132/75        Violette Dyspnea Rating   nothing at all        Is procedure ready for interpretation?  Yes        Oxygen Qualification?  No             Assessment:     No diagnosis found.     Outpatient Encounter Medications as of 1/18/2024   Medication Sig Dispense Refill    albuterol (ACCUNEB) 1.25 mg/3 mL Nebu Take 3 mLs (1.25 mg total) by nebulization every 6 (six) hours as needed (wheezing/cough). Rescue 75 mL 2    albuterol (VENTOLIN HFA) 90 mcg/actuation inhaler Inhale 2 puffs into the lungs every 4 (four) hours as needed for Wheezing or Shortness of Breath. 18 g 11    amLODIPine (NORVASC) 10 MG tablet Take 1 tablet (10 mg total) by mouth once daily. 90 tablet 3    dexbrompheniramine-phenyleph (ALAHIST PE) 2-7.5 mg Tab Take 1 tablet by mouth every 4 to 6 hours as needed (congestion). 20 tablet 0    hydroCHLOROthiazide (HYDRODIURIL) 25 MG tablet Take 1 tablet (25 mg total) by mouth once daily. 90 tablet 3    ketoconazole (NIZORAL) 2 % cream Apply topically to affected area(s) 2 (two) times daily. 30 g 2    losartan (COZAAR) 50 MG tablet Take 1 tablet (50 mg total) by mouth once daily. 90 tablet 3    meloxicam (MOBIC) 7.5 MG tablet Take 1 tablet (7.5 mg total) by mouth daily as needed for Pain. 30 tablet 0    nebulizer and compressor Meagan Use as directed every 4 hours as needed 1 each 0    nystatin (NYAMYC) powder Apply topically 2 (two) times daily. 60 g 2    promethazine-dextromethorphan (PROMETHAZINE-DM) 6.25-15 mg/5 mL Syrp Take 5 mLs by mouth every 6 (six) hours as needed (cough). 118 mL 0     No facility-administered encounter medications on file as of 1/18/2024.     No orders of the defined types were placed in this encounter.        Plan:     Problem List Items Addressed This  Visit          Pulmonary    Mild intermittent asthma without complication     - usually well controlled with rare use of GEN         Encounter for preoperative pulmonary examination     - Testing reviewed  - Not wheezing on exam, I suspect FeNO is mostly skewed by viral URI- no fever, no sob, appears well and symptoms improving  - continue albuterol as needed  - Remains low risk for surgery with recent URI (Ariscat 17)  - I will call on Monday to confirm she has continued to improve over the weekend            Other    DONNY on CPAP - Primary     - continues to struggle with cpap compliance, asking about mandibular advancement device  - placed referral and provided info for local dentist practices         Relevant Orders    Ambulatory referral/consult to Dentistry         Plan discussed with pt and she expressed understanding, all questions answered. RTC in December for CPAP review or sooner as needed.     Addendum 1/22/24:   Called patient, she is feeling much better today, no issues with her breathing, nasal congestion has improved, she is looking forward to her surgery. No pulmonary contraindications, low pulmonary risk for procedure.

## 2024-01-18 NOTE — PATIENT INSTRUCTIONS
For Mandibular Advancement Device    [facility] Kurt A. LeJeune D.S.S.  [address], [place] [address]  Phone:[phone]

## 2024-01-18 NOTE — ASSESSMENT & PLAN NOTE
- continues to struggle with cpap compliance, asking about mandibular advancement device  - placed referral and provided info for local dentist practices

## 2024-01-18 NOTE — ASSESSMENT & PLAN NOTE
- Testing reviewed  - Not wheezing on exam, I suspect FeNO is mostly skewed by viral URI- no fever, no sob, appears well and symptoms improving  - continue albuterol as needed  - Remains low risk for surgery with recent URI (Ariscat 17)  - I will call on Monday to confirm she has continued to improve over the weekend

## 2024-04-03 ENCOUNTER — PATIENT MESSAGE (OUTPATIENT)
Dept: PULMONOLOGY | Facility: CLINIC | Age: 44
End: 2024-04-03
Payer: MEDICAID

## 2024-05-08 ENCOUNTER — PATIENT MESSAGE (OUTPATIENT)
Dept: INTERNAL MEDICINE | Facility: CLINIC | Age: 44
End: 2024-05-08
Payer: MEDICAID

## 2024-05-08 ENCOUNTER — PATIENT MESSAGE (OUTPATIENT)
Dept: ADMINISTRATIVE | Facility: OTHER | Age: 44
End: 2024-05-08
Payer: MEDICAID

## 2024-05-08 NOTE — TELEPHONE ENCOUNTER
Contacted the patient in regard to an inquiry about an overdue mammogram. She mentioned that she had a breast reduction in February & was concerned of having to do a mammogram, but was being notified that it had to be completed. She also requested an appointment with Dr. Chand, which I sent in a request for after she approved of the date/time.

## 2024-05-09 ENCOUNTER — OFFICE VISIT (OUTPATIENT)
Dept: INTERNAL MEDICINE | Facility: CLINIC | Age: 44
End: 2024-05-09
Payer: MEDICAID

## 2024-05-09 VITALS
OXYGEN SATURATION: 97 % | DIASTOLIC BLOOD PRESSURE: 82 MMHG | SYSTOLIC BLOOD PRESSURE: 120 MMHG | RESPIRATION RATE: 15 BRPM | HEIGHT: 65 IN | WEIGHT: 188.5 LBS | BODY MASS INDEX: 31.4 KG/M2 | TEMPERATURE: 98 F | HEART RATE: 95 BPM

## 2024-05-09 DIAGNOSIS — I10 ESSENTIAL HYPERTENSION: ICD-10-CM

## 2024-05-09 PROCEDURE — 1160F RVW MEDS BY RX/DR IN RCRD: CPT | Mod: CPTII,,, | Performed by: FAMILY MEDICINE

## 2024-05-09 PROCEDURE — 99999 PR PBB SHADOW E&M-EST. PATIENT-LVL IV: CPT | Mod: PBBFAC,,, | Performed by: FAMILY MEDICINE

## 2024-05-09 PROCEDURE — 99213 OFFICE O/P EST LOW 20 MIN: CPT | Mod: S$PBB,,, | Performed by: FAMILY MEDICINE

## 2024-05-09 PROCEDURE — 1159F MED LIST DOCD IN RCRD: CPT | Mod: CPTII,,, | Performed by: FAMILY MEDICINE

## 2024-05-09 PROCEDURE — 3044F HG A1C LEVEL LT 7.0%: CPT | Mod: CPTII,,, | Performed by: FAMILY MEDICINE

## 2024-05-09 PROCEDURE — 4010F ACE/ARB THERAPY RXD/TAKEN: CPT | Mod: CPTII,,, | Performed by: FAMILY MEDICINE

## 2024-05-09 PROCEDURE — 3079F DIAST BP 80-89 MM HG: CPT | Mod: CPTII,,, | Performed by: FAMILY MEDICINE

## 2024-05-09 PROCEDURE — 3008F BODY MASS INDEX DOCD: CPT | Mod: CPTII,,, | Performed by: FAMILY MEDICINE

## 2024-05-09 PROCEDURE — 99214 OFFICE O/P EST MOD 30 MIN: CPT | Mod: PBBFAC | Performed by: FAMILY MEDICINE

## 2024-05-09 PROCEDURE — 3074F SYST BP LT 130 MM HG: CPT | Mod: CPTII,,, | Performed by: FAMILY MEDICINE

## 2024-05-09 RX ORDER — LOSARTAN POTASSIUM 50 MG/1
50 TABLET ORAL DAILY
Qty: 90 TABLET | Refills: 0 | Status: SHIPPED | OUTPATIENT
Start: 2024-05-09 | End: 2025-05-04

## 2024-05-09 NOTE — PROGRESS NOTES
Subjective:       Patient ID: Bianca Amato is a 44 y.o. female.    Chief Complaint: Post-op Evaluation (Breast Reduction, Jan or Feb.)    History of Present Illness    Patient presents today for follow up after breast reduction surgery. Patient underwent breast reduction surgery in January, she has upcoming follow up with her surgeon. She was called to schedule mammogram but is uncertain about the appropriate timing for a mammogram post-surgery and intends to inquire with her surgeon. Patient requires a refill of her Losartan prescription for blood pressure management. Patient is otherwise without concerns today.        Review of Systems   Constitutional:  Negative for chills, fatigue, fever and unexpected weight change.   Eyes:  Negative for visual disturbance.   Respiratory:  Negative for shortness of breath.    Cardiovascular:  Negative for chest pain.   Musculoskeletal:  Negative for myalgias.   Neurological:  Negative for headaches.         Objective:      Physical Exam  Vitals reviewed.   Constitutional:       General: She is not in acute distress.     Appearance: She is well-developed.   HENT:      Head: Normocephalic and atraumatic.   Eyes:      General: Lids are normal. No scleral icterus.     Extraocular Movements: Extraocular movements intact.      Conjunctiva/sclera: Conjunctivae normal.      Pupils: Pupils are equal, round, and reactive to light.   Pulmonary:      Effort: Pulmonary effort is normal.   Neurological:      Mental Status: She is alert and oriented to person, place, and time.      Cranial Nerves: No cranial nerve deficit.      Gait: Gait normal.   Psychiatric:         Mood and Affect: Mood and affect normal.         Assessment:       1. Essential hypertension        Plan:   1. Essential hypertension  -     losartan (COZAAR) 50 MG tablet; Take 1 tablet (50 mg total) by mouth once daily.  Dispense: 90 tablet; Refill: 0       Deferred the patient's mammogram due to recent breast reduction  surgery, with consideration for future ordering dependent on the surgeon's advice.      Patient expressed understanding and agreement with plan.    Health Maintenance reviewed/updated.    Follow up in about 2 months (around 7/9/2024), or if symptoms worsen or fail to improve, for EPP/annual with Shawna.    This note was generated with the assistance of ambient listening technology. Verbal consent was obtained by the patient and accompanying visitor(s) for the recording of patient appointment to facilitate this note. I attest to having reviewed and edited the generated note for accuracy, though some syntax or spelling errors may persist. Please contact the author of this note for any clarification.

## 2024-05-29 ENCOUNTER — OFFICE VISIT (OUTPATIENT)
Dept: PODIATRY | Facility: CLINIC | Age: 44
End: 2024-05-29
Payer: MEDICAID

## 2024-05-29 DIAGNOSIS — M79.672 PAIN IN LEFT FOOT: ICD-10-CM

## 2024-05-29 DIAGNOSIS — B35.1 ONYCHOMYCOSIS: Primary | ICD-10-CM

## 2024-05-29 DIAGNOSIS — M79.671 PAIN IN RIGHT FOOT: ICD-10-CM

## 2024-05-29 DIAGNOSIS — M72.2 PLANTAR FASCIITIS: ICD-10-CM

## 2024-05-29 DIAGNOSIS — M79.675 PAIN OF LEFT GREAT TOE: ICD-10-CM

## 2024-05-29 DIAGNOSIS — L60.3 ONYCHODYSTROPHY: ICD-10-CM

## 2024-05-29 PROCEDURE — 99214 OFFICE O/P EST MOD 30 MIN: CPT | Mod: S$PBB,,, | Performed by: PODIATRIST

## 2024-05-29 PROCEDURE — 1160F RVW MEDS BY RX/DR IN RCRD: CPT | Mod: CPTII,,, | Performed by: PODIATRIST

## 2024-05-29 PROCEDURE — 99999 PR PBB SHADOW E&M-EST. PATIENT-LVL III: CPT | Mod: PBBFAC,,, | Performed by: PODIATRIST

## 2024-05-29 PROCEDURE — 1159F MED LIST DOCD IN RCRD: CPT | Mod: CPTII,,, | Performed by: PODIATRIST

## 2024-05-29 PROCEDURE — 99213 OFFICE O/P EST LOW 20 MIN: CPT | Mod: PBBFAC | Performed by: PODIATRIST

## 2024-05-29 PROCEDURE — 4010F ACE/ARB THERAPY RXD/TAKEN: CPT | Mod: CPTII,,, | Performed by: PODIATRIST

## 2024-05-29 PROCEDURE — 3044F HG A1C LEVEL LT 7.0%: CPT | Mod: CPTII,,, | Performed by: PODIATRIST

## 2024-05-29 RX ORDER — DICLOFENAC SODIUM 75 MG/1
75 TABLET, DELAYED RELEASE ORAL 2 TIMES DAILY
Qty: 60 TABLET | Refills: 1 | Status: SHIPPED | OUTPATIENT
Start: 2024-05-29 | End: 2024-07-06

## 2024-05-29 RX ORDER — TERBINAFINE HYDROCHLORIDE 250 MG/1
250 TABLET ORAL DAILY
Qty: 90 TABLET | Refills: 0 | Status: SHIPPED | OUTPATIENT
Start: 2024-05-29 | End: 2024-09-04

## 2024-05-29 NOTE — PROGRESS NOTES
Subjective:       Patient ID: Bianca Amato is a 44 y.o. female.    Chief Complaint: Nail Problem (Nail problem, rates pain 0, nondaibetic, )      HPI:  Patient presents to the office this afternoon with complaint of thickening with dystrophy and discoloration of the left great toenail plate.  Patient does have history of 90 days course of oral Lamisil without side effects.  She requests to repeat that therapy.     Review of patient's allergies indicates:   Allergen Reactions    Latex Rash and Shortness Of Breath    Latex, natural rubber Hives    Penicillins Hives, Rash and Shortness Of Breath    Sulfa (sulfonamide antibiotics) Hives, Swelling, Rash and Shortness Of Breath       Past Medical History:   Diagnosis Date    Carpal tunnel syndrome     bilateral    Hypertension     Iron deficiency anemia     Mild intermittent asthma        Family History   Problem Relation Name Age of Onset    Diabetes Mother Ying Amato     Sarcoidosis Mother Ying Amato     Hypertension Mother Ying Amato     COPD Father [place] Natalia     Hypertension Father Nathan Natalia     Asthma Father Nathan Natalia     Breast cancer Neg Hx      Colon cancer Neg Hx      Ovarian cancer Neg Hx      Thrombosis Neg Hx         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   Tobacco Use    Smoking status: Never    Smokeless tobacco: Never   Substance and Sexual Activity    Alcohol use: No    Drug use: No    Sexual activity: Yes     Partners: Male     Birth control/protection: None     Comment: Sometimes used condoms     Social Determinants of Health     Financial Resource Strain: Low Risk  (1/4/2024)    Overall Financial Resource Strain (CARDIA)     Difficulty of Paying Living Expenses: Not hard at all   Recent Concern: Financial Resource Strain - High Risk (10/25/2023)    Overall Financial Resource Strain  (CARDIA)     Difficulty of Paying Living Expenses: Very hard   Food Insecurity: No Food Insecurity (1/4/2024)    Hunger Vital Sign     Worried About Running Out of Food in the Last Year: Never true     Ran Out of Food in the Last Year: Never true   Transportation Needs: No Transportation Needs (1/4/2024)    PRAPARE - Transportation     Lack of Transportation (Medical): No     Lack of Transportation (Non-Medical): No   Physical Activity: Sufficiently Active (1/4/2024)    Exercise Vital Sign     Days of Exercise per Week: 7 days     Minutes of Exercise per Session: 70 min   Recent Concern: Physical Activity - Insufficiently Active (10/25/2023)    Exercise Vital Sign     Days of Exercise per Week: 3 days     Minutes of Exercise per Session: 10 min   Stress: No Stress Concern Present (1/4/2024)    Citizen of [place] [employer] - Occupational Stress Questionnaire     Feeling of Stress : Not at all   Housing Stability: Low Risk  (1/4/2024)    Housing Stability Vital Sign     Unable to Pay for Housing in the Last Year: No     Number of Places Lived in the Last Year: 0     Unstable Housing in the Last Year: No       Past Surgical History:   Procedure Laterality Date    COLONOSCOPY N/A 4/28/2023    Procedure: COLONOSCOPY;  Surgeon: Maximo Cox MD;  Location: [facility];  Service: Endoscopy;  Laterality: N/A;    EYE FOREIGN BODY REMOVAL Right     EYE SURGERY  2009    Im not sure what year    HYSTERECTOMY  2020    OOPHORECTOMY      ROBOT-ASSISTED LAPAROSCOPIC ABDOMINAL HYSTERECTOMY USING DA ALYSA XI N/A 12/06/2019    Procedure: XI ROBOTIC HYSTERECTOMY;  Surgeon: VENESSA Davidson MD;  Location: [facility] OR;  Service: OB/GYN;  Laterality: N/A;    ROBOT-ASSISTED SURGICAL REMOVAL OF FALLOPIAN TUBE USING DA ALYSA XI Bilateral 12/06/2019    Procedure: XI ROBOTIC SALPINGECTOMY;  Surgeon: VENESSA Davidson MD;  Location: [facility] OR;  Service: OB/GYN;  Laterality: Bilateral;    SURGICAL REMOVAL OF MASS OF AXILLA Right  11/12/2018    Procedure: EXCISION, MASS, AXILLARY;  Surgeon: Alis Tian MD;  Location: [facility];  Service: General;  Laterality: Right;    TUBAL LIGATION         Review of Systems   Constitutional:  Negative for chills, fatigue and fever.   HENT:  Negative for hearing loss.    Eyes:  Negative for photophobia and visual disturbance.   Respiratory:  Negative for cough, chest tightness, shortness of breath and wheezing.    Cardiovascular:  Negative for chest pain and palpitations.   Gastrointestinal:  Negative for constipation, diarrhea, nausea and vomiting.   Endocrine: Negative for cold intolerance and heat intolerance.   Genitourinary:  Negative for flank pain.   Musculoskeletal:  Negative for neck pain and neck stiffness.   Neurological:  Negative for light-headedness and headaches.   Psychiatric/Behavioral:  Negative for sleep disturbance.           Objective:   LMP 11/07/2019 (Approximate)     Physical Exam    LOWER EXTREMITY PHYSICAL EXAMINATION  DERMATOLOGY:  Skin is supple, dry and intact. There are nail changes which are consistent with onychomycosis on the left foot. On the left foot, nail #1 is/are thickened, is/are dystrophic, with yellow discoloration, and with malodorous subungual debris.     ORTHOPEDIC:  Discomfort palpation of bilateral plantar fascia origin, right greater than left.  No plantar fibromas noted.  Rectus foot type.  Gastroc equinus contracture is noted.    Assessment:     1. Onychomycosis    2. Pain of left great toe    3. Onychodystrophy    4. Plantar fasciitis    5. Pain in left foot    6. Pain in right foot        Plan:     Onychomycosis  -     terbinafine HCL (LAMISIL) 250 mg tablet; Take 1 tablet (250 mg total) by mouth once daily.  Dispense: 90 tablet; Refill: 0  -     Hepatic Function Panel; Future; Expected date: 05/29/2024    Pain of left great toe    Onychodystrophy    Plantar fasciitis  -     diclofenac (VOLTAREN) 75 MG EC tablet; Take 1 tablet (75 mg total) by mouth 2  (two) times daily.  Dispense: 60 tablet; Refill: 1    Pain in left foot  -     diclofenac (VOLTAREN) 75 MG EC tablet; Take 1 tablet (75 mg total) by mouth 2 (two) times daily.  Dispense: 60 tablet; Refill: 1    Pain in right foot  -     diclofenac (VOLTAREN) 75 MG EC tablet; Take 1 tablet (75 mg total) by mouth 2 (two) times daily.  Dispense: 60 tablet; Refill: 1      Most recent labs reviewed in detail with the patient. All questions and concerns regarding findings and its/their implications are outlined and discussed.    Discussed the various treatment options concerning onychomycosis, these being over-the-counter topicals (efficacy is low in regards to cure), prescription strength topicals (better efficacy as compared to OTC versions, but only slightly so, and potentially costly), laser therapy (which is not covered by insurance companies), oral medications (patient is advised that there is a potential, though less than 5%, for the potential of adverse health and side effects; namely liver pathology) and doing nothing (frequent debridements) as onychomycosis is a cosmetic ailment, and has no potential for long-term deleterious effects on the health. Did discuss the sides effects of PO therapy and what to monitor for, namely, headache, diarrhea, itching and rash, indigestion, liver enzyme abnormalities, taste disturbance, nausea, abdominal pain, flatulence (gas), vomiting and upper respiratory tract infection. Rx. for 90 days course is provided.    Will plan to treat for 90 days on, 60 days off, and 90 days on.     Repeat LFTs in 45 days.    Did discuss proper and supportive shoe gear in detail and at length with the patient.  These are shoes with firm and robust arch support; medial counter.  Shoes which only bend at the metatarsophalangeal joint and which are rigid in the midfoot and hindfoot. Patient urged to purchase running type or cross training type shoes gear which are designed for pronation  control.    Stretching exercises are discussed, taught, and are demonstrated to the patient this afternoon due to concomitant diagnosis of equinus contracture. The relationship between equinus contracture and the other aforementioned pathologies are detailed and outlined to the patient. The patient does acknowledge understanding, and we will embark on a vigorous stretching algorithm for the lower extremity.          Future Appointments   Date Time Provider Department Center   7/9/2024  2:00 PM Shawna Esteban PA-C ON [facility]   7/11/2024  8:20 AM De Childs MD ON [facility]   7/15/2024 10:40 AM [facility]   9/11/2024  9:00 AM Luana Mera PA-C [facility]  [place]   12/24/2024  1:30 PM Natalie Elias PA-C [facility] [place]

## 2024-06-03 ENCOUNTER — PATIENT MESSAGE (OUTPATIENT)
Dept: INTERNAL MEDICINE | Facility: CLINIC | Age: 44
End: 2024-06-03
Payer: MEDICAID

## 2024-06-03 ENCOUNTER — TELEPHONE (OUTPATIENT)
Dept: INTERNAL MEDICINE | Facility: CLINIC | Age: 44
End: 2024-06-03
Payer: MEDICAID

## 2024-06-03 DIAGNOSIS — L30.4 INTERTRIGO: ICD-10-CM

## 2024-06-04 RX ORDER — CLOTRIMAZOLE 1 %
CREAM (GRAM) TOPICAL 2 TIMES DAILY
Qty: 90 G | Refills: 5 | Status: SHIPPED | OUTPATIENT
Start: 2024-06-04

## 2024-06-11 ENCOUNTER — TELEPHONE (OUTPATIENT)
Dept: OTOLARYNGOLOGY | Facility: CLINIC | Age: 44
End: 2024-06-11
Payer: MEDICAID

## 2024-06-28 ENCOUNTER — TELEPHONE (OUTPATIENT)
Dept: INTERNAL MEDICINE | Facility: CLINIC | Age: 44
End: 2024-06-28
Payer: MEDICAID

## 2024-06-28 ENCOUNTER — HOSPITAL ENCOUNTER (EMERGENCY)
Facility: HOSPITAL | Age: 44
Discharge: HOME | End: 2024-06-28
Attending: EMERGENCY MEDICINE
Payer: MEDICAID

## 2024-06-28 VITALS
TEMPERATURE: 98 F | BODY MASS INDEX: 31.51 KG/M2 | DIASTOLIC BLOOD PRESSURE: 104 MMHG | WEIGHT: 189.38 LBS | OXYGEN SATURATION: 99 % | HEART RATE: 86 BPM | SYSTOLIC BLOOD PRESSURE: 155 MMHG | RESPIRATION RATE: 18 BRPM

## 2024-06-28 DIAGNOSIS — M77.52 TENDONITIS OF ANKLE, LEFT: Primary | ICD-10-CM

## 2024-06-28 PROCEDURE — 99281 EMR DPT VST MAYX REQ PHY/QHP: CPT

## 2024-06-28 NOTE — TELEPHONE ENCOUNTER
I informed Ms. Valenzuela that Dr. Chand does not work on Fridays and Mrs. Matos is out for the day. Ms. Valenzuela was just evaluated at urgent but stated that she does have an appointment on Tuesday the 2nd. She verbalized understanding.

## 2024-06-28 NOTE — TELEPHONE ENCOUNTER
----- Message from Eliu Fernandes sent at 6/28/2024 12:24 PM CDT -----  Contact: Bianca  Type:  Patient Returning Call    Who Called:Bianca  Who Left Message for Patient:Ronal  Does the patient know what this is regarding?: appointment  Would the patient rather a call back or a response via [facility]?  Call back  Best Call Back Number: [phone]  Additional Information:     Thanks   Am

## 2024-06-28 NOTE — TELEPHONE ENCOUNTER
----- Message from Ashley Hyatt sent at 6/28/2024  9:07 AM CDT -----  The pt is looking to be seen today he ws recently released  from urgent care and was told her BP was high which is why she is having leg pain and swelling. Please give a call back at [phone]

## 2024-06-28 NOTE — ED PROVIDER NOTES
Encounter Date: 6/28/2024       History     Chief Complaint   Patient presents with    Leg Pain     Pt c/o left leg pain around ankle, pt denies injury or trauma to leg. Noted ace wrap around leg, states she went to urgent care had an xray with no abnormalities.      44-year-old female with complaint of pain to the top of left foot over the past couple days.  Patient reports she was seen at the urgent care and had an x-ray that was negative.  Patient denies fall or trauma.  Patient also reports elevated blood pressure but patient has not been taking her blood pressure medication.  Patient was concerned because she would tingling and numbness in fingers of left hand today but currently does not have any numbness or tingling.  Patient denies any weakness.        Review of patient's allergies indicates:   Allergen Reactions    Latex Rash and Shortness Of Breath    Latex, natural rubber Hives    Penicillins Hives, Rash and Shortness Of Breath    Sulfa (sulfonamide antibiotics) Hives, Swelling, Rash and Shortness Of Breath     Past Medical History:   Diagnosis Date    Carpal tunnel syndrome     bilateral    Hypertension     Iron deficiency anemia     Mild intermittent asthma      Past Surgical History:   Procedure Laterality Date    COLONOSCOPY N/A 4/28/2023    Procedure: COLONOSCOPY;  Surgeon: Maximo Cox MD;  Location: [facility];  Service: Endoscopy;  Laterality: N/A;    EYE FOREIGN BODY REMOVAL Right     EYE SURGERY  2009    Im not sure what year    HYSTERECTOMY  2020    OOPHORECTOMY      ROBOT-ASSISTED LAPAROSCOPIC ABDOMINAL HYSTERECTOMY USING DA ALYSA XI N/A 12/06/2019    Procedure: XI ROBOTIC HYSTERECTOMY;  Surgeon: VENESSA Davidson MD;  Location: [facility] OR;  Service: OB/GYN;  Laterality: N/A;    ROBOT-ASSISTED SURGICAL REMOVAL OF FALLOPIAN TUBE USING DA ALYSA XI Bilateral 12/06/2019    Procedure: XI ROBOTIC SALPINGECTOMY;  Surgeon: VENESSA Davidson MD;  Location: [facility] OR;  Service: OB/GYN;  Laterality:  Bilateral;    SURGICAL REMOVAL OF MASS OF AXILLA Right 11/12/2018    Procedure: EXCISION, MASS, AXILLARY;  Surgeon: Alis Tian MD;  Location: [facility];  Service: General;  Laterality: Right;    TUBAL LIGATION       Family History   Problem Relation Name Age of Onset    Diabetes Mother Ying Amato     Sarcoidosis Mother Ying Amato     Hypertension Mother Ying Amato     COPD Father Nathan Natalia     Hypertension Father [place] Natalia     Asthma Father Nathan Natalia     Breast cancer Neg Hx      Colon cancer Neg Hx      Ovarian cancer Neg Hx      Thrombosis Neg Hx       Social History     Tobacco Use    Smoking status: Never    Smokeless tobacco: Never   Substance Use Topics    Alcohol use: No    Drug use: No     Review of Systems   Constitutional:  Negative for fever.   HENT:  Negative for sore throat.    Respiratory:  Negative for shortness of breath.    Cardiovascular:  Negative for chest pain.   Gastrointestinal:  Negative for nausea.   Genitourinary:  Negative for dysuria.   Musculoskeletal:  Negative for back pain.        Left foot pain    Skin:  Negative for rash.   Neurological:  Negative for weakness.   Hematological:  Does not bruise/bleed easily.       Physical Exam     Initial Vitals   BP Pulse Resp Temp SpO2   06/28/24 1016 06/28/24 1016 06/28/24 1020 06/28/24 1016 06/28/24 1016   (!) 155/104 86 18 98.1 °F (36.7 °C) 99 %      MAP       --                Physical Exam    Nursing note and vitals reviewed.  Constitutional: She appears well-developed and well-nourished.   HENT:   Head: Normocephalic and atraumatic.   Eyes: Conjunctivae and EOM are normal. Pupils are equal, round, and reactive to light.   Neck: Neck supple.   Normal range of motion.  Cardiovascular:  Normal rate, regular rhythm, normal heart sounds and intact distal pulses.           Pulmonary/Chest: Breath sounds normal.   Abdominal: Abdomen is soft. There is no abdominal tenderness. There is no rebound and no guarding.    Musculoskeletal:         General: Normal range of motion.      Cervical back: Normal range of motion and neck supple.      Comments: Tenderness left anterior ankle, no swelling, no erythema, pain with ROM of left foot, 2+ left dorsalis pedis pulse     Neurological: She is alert and oriented to person, place, and time. She has normal strength and normal reflexes.   Skin: Skin is warm and dry.   Psychiatric: She has a normal mood and affect. Her behavior is normal. Thought content normal.         ED Course   Procedures  Labs Reviewed - No data to display       Imaging Results    None          Medications - No data to display  Medical Decision Making                                    Clinical Impression:  Final diagnoses:  [M77.52] Tendonitis of ankle, left (Primary)          ED Disposition Condition    Discharge Stable          ED Prescriptions    None       Follow-up Information       Follow up With Specialties Details Why Contact Info    Vivi Chand MD Family Medicine Schedule an appointment as soon as possible for a visit  As needed [id number] [place] [address] [address] Ebenezer LEGGETT [id number]  [phone]               Cecil Ga NP  06/28/24 1023

## 2024-06-28 NOTE — TELEPHONE ENCOUNTER
----- Message from Airam Pratt sent at 6/28/2024 10:57 AM CDT -----  .Type:  Patient Returning Call    Who Called:.Bianca Amato   Who Left Message for Patient:LUIS  Does the patient know what this is regarding?:an appt today  Would the patient rather a call back or a response via [facility]? Call back  Best Call Back Number:.[phone]   Additional Information:

## 2024-07-03 ENCOUNTER — OFFICE VISIT (OUTPATIENT)
Dept: INTERNAL MEDICINE | Facility: CLINIC | Age: 44
End: 2024-07-03
Payer: MEDICAID

## 2024-07-03 ENCOUNTER — LAB VISIT (OUTPATIENT)
Dept: LAB | Facility: HOSPITAL | Age: 44
End: 2024-07-03
Attending: PHYSICIAN ASSISTANT
Payer: MEDICAID

## 2024-07-03 VITALS
HEART RATE: 82 BPM | SYSTOLIC BLOOD PRESSURE: 142 MMHG | WEIGHT: 192.69 LBS | BODY MASS INDEX: 32.06 KG/M2 | DIASTOLIC BLOOD PRESSURE: 96 MMHG | OXYGEN SATURATION: 98 % | TEMPERATURE: 97 F

## 2024-07-03 DIAGNOSIS — I10 ESSENTIAL HYPERTENSION: Primary | ICD-10-CM

## 2024-07-03 DIAGNOSIS — I10 ESSENTIAL HYPERTENSION: ICD-10-CM

## 2024-07-03 DIAGNOSIS — L72.3 SEBACEOUS CYST: ICD-10-CM

## 2024-07-03 DIAGNOSIS — G47.33 OSA ON CPAP: ICD-10-CM

## 2024-07-03 DIAGNOSIS — Z12.31 OTHER SCREENING MAMMOGRAM: ICD-10-CM

## 2024-07-03 DIAGNOSIS — M77.52 TENDONITIS OF ANKLE, LEFT: ICD-10-CM

## 2024-07-03 PROBLEM — Z01.811 ENCOUNTER FOR PREOPERATIVE PULMONARY EXAMINATION: Status: RESOLVED | Noted: 2024-01-18 | Resolved: 2024-07-03

## 2024-07-03 LAB
ALBUMIN SERPL BCP-MCNC: 3.7 G/DL (ref 3.5–5.2)
ALP SERPL-CCNC: 70 U/L (ref 55–135)
ALT SERPL W/O P-5'-P-CCNC: 11 U/L (ref 10–44)
ANION GAP SERPL CALC-SCNC: 8 MMOL/L (ref 8–16)
AST SERPL-CCNC: 15 U/L (ref 10–40)
BASOPHILS # BLD AUTO: 0.02 K/UL (ref 0–0.2)
BASOPHILS NFR BLD: 0.3 % (ref 0–1.9)
BILIRUB SERPL-MCNC: 0.3 MG/DL (ref 0.1–1)
BUN SERPL-MCNC: 9 MG/DL (ref 6–20)
CALCIUM SERPL-MCNC: 9.4 MG/DL (ref 8.7–10.5)
CHLORIDE SERPL-SCNC: 107 MMOL/L (ref 95–110)
CHOLEST SERPL-MCNC: 173 MG/DL (ref 120–199)
CHOLEST/HDLC SERPL: 4 {RATIO} (ref 2–5)
CO2 SERPL-SCNC: 23 MMOL/L (ref 23–29)
CREAT SERPL-MCNC: 0.7 MG/DL (ref 0.5–1.4)
DIFFERENTIAL METHOD BLD: NORMAL
EOSINOPHIL # BLD AUTO: 0.2 K/UL (ref 0–0.5)
EOSINOPHIL NFR BLD: 3.3 % (ref 0–8)
ERYTHROCYTE [DISTWIDTH] IN BLOOD BY AUTOMATED COUNT: 14.2 % (ref 11.5–14.5)
EST. GFR  (NO RACE VARIABLE): >60 ML/MIN/1.73 M^2
ESTIMATED AVG GLUCOSE: 114 MG/DL (ref 68–131)
FERRITIN SERPL-MCNC: 236 NG/ML (ref 20–300)
GLUCOSE SERPL-MCNC: 85 MG/DL (ref 70–110)
HBA1C MFR BLD: 5.6 % (ref 4–5.6)
HCT VFR BLD AUTO: 38.6 % (ref 37–48.5)
HDLC SERPL-MCNC: 43 MG/DL (ref 40–75)
HDLC SERPL: 24.9 % (ref 20–50)
HGB BLD-MCNC: 12.6 G/DL (ref 12–16)
IMM GRANULOCYTES # BLD AUTO: 0.01 K/UL (ref 0–0.04)
IMM GRANULOCYTES NFR BLD AUTO: 0.2 % (ref 0–0.5)
IRON SERPL-MCNC: 67 UG/DL (ref 30–160)
LDLC SERPL CALC-MCNC: 102.8 MG/DL (ref 63–159)
LYMPHOCYTES # BLD AUTO: 2.5 K/UL (ref 1–4.8)
LYMPHOCYTES NFR BLD: 41.4 % (ref 18–48)
MCH RBC QN AUTO: 29.2 PG (ref 27–31)
MCHC RBC AUTO-ENTMCNC: 32.6 G/DL (ref 32–36)
MCV RBC AUTO: 89 FL (ref 82–98)
MONOCYTES # BLD AUTO: 0.4 K/UL (ref 0.3–1)
MONOCYTES NFR BLD: 7.2 % (ref 4–15)
NEUTROPHILS # BLD AUTO: 2.9 K/UL (ref 1.8–7.7)
NEUTROPHILS NFR BLD: 47.6 % (ref 38–73)
NONHDLC SERPL-MCNC: 130 MG/DL
NRBC BLD-RTO: 0 /100 WBC
PLATELET # BLD AUTO: 435 K/UL (ref 150–450)
PMV BLD AUTO: 9.9 FL (ref 9.2–12.9)
POTASSIUM SERPL-SCNC: 4.3 MMOL/L (ref 3.5–5.1)
PROT SERPL-MCNC: 6.9 G/DL (ref 6–8.4)
RBC # BLD AUTO: 4.32 M/UL (ref 4–5.4)
SATURATED IRON: 22 % (ref 20–50)
SODIUM SERPL-SCNC: 138 MMOL/L (ref 136–145)
T4 FREE SERPL-MCNC: 0.91 NG/DL (ref 0.71–1.51)
TOTAL IRON BINDING CAPACITY: 300 UG/DL (ref 250–450)
TRANSFERRIN SERPL-MCNC: 203 MG/DL (ref 200–375)
TRIGL SERPL-MCNC: 136 MG/DL (ref 30–150)
TSH SERPL DL<=0.005 MIU/L-ACNC: 0.34 UIU/ML (ref 0.4–4)
WBC # BLD AUTO: 6.07 K/UL (ref 3.9–12.7)

## 2024-07-03 PROCEDURE — 85025 COMPLETE CBC W/AUTO DIFF WBC: CPT | Performed by: PHYSICIAN ASSISTANT

## 2024-07-03 PROCEDURE — 3044F HG A1C LEVEL LT 7.0%: CPT | Mod: CPTII,,, | Performed by: PHYSICIAN ASSISTANT

## 2024-07-03 PROCEDURE — 84466 ASSAY OF TRANSFERRIN: CPT | Performed by: PHYSICIAN ASSISTANT

## 2024-07-03 PROCEDURE — 36415 COLL VENOUS BLD VENIPUNCTURE: CPT | Mod: PO | Performed by: PHYSICIAN ASSISTANT

## 2024-07-03 PROCEDURE — 1160F RVW MEDS BY RX/DR IN RCRD: CPT | Mod: CPTII,,, | Performed by: PHYSICIAN ASSISTANT

## 2024-07-03 PROCEDURE — 3008F BODY MASS INDEX DOCD: CPT | Mod: CPTII,,, | Performed by: PHYSICIAN ASSISTANT

## 2024-07-03 PROCEDURE — G2211 COMPLEX E/M VISIT ADD ON: HCPCS | Mod: S$PBB,,, | Performed by: PHYSICIAN ASSISTANT

## 2024-07-03 PROCEDURE — 82728 ASSAY OF FERRITIN: CPT | Performed by: PHYSICIAN ASSISTANT

## 2024-07-03 PROCEDURE — 80061 LIPID PANEL: CPT | Performed by: PHYSICIAN ASSISTANT

## 2024-07-03 PROCEDURE — 3077F SYST BP >= 140 MM HG: CPT | Mod: CPTII,,, | Performed by: PHYSICIAN ASSISTANT

## 2024-07-03 PROCEDURE — 1159F MED LIST DOCD IN RCRD: CPT | Mod: CPTII,,, | Performed by: PHYSICIAN ASSISTANT

## 2024-07-03 PROCEDURE — 99999 PR PBB SHADOW E&M-EST. PATIENT-LVL IV: CPT | Mod: PBBFAC,,, | Performed by: PHYSICIAN ASSISTANT

## 2024-07-03 PROCEDURE — 99214 OFFICE O/P EST MOD 30 MIN: CPT | Mod: PBBFAC,PO | Performed by: PHYSICIAN ASSISTANT

## 2024-07-03 PROCEDURE — 80053 COMPREHEN METABOLIC PANEL: CPT | Performed by: PHYSICIAN ASSISTANT

## 2024-07-03 PROCEDURE — 84439 ASSAY OF FREE THYROXINE: CPT | Performed by: PHYSICIAN ASSISTANT

## 2024-07-03 PROCEDURE — 99214 OFFICE O/P EST MOD 30 MIN: CPT | Mod: S$PBB,,, | Performed by: PHYSICIAN ASSISTANT

## 2024-07-03 PROCEDURE — 83036 HEMOGLOBIN GLYCOSYLATED A1C: CPT | Performed by: PHYSICIAN ASSISTANT

## 2024-07-03 PROCEDURE — 4010F ACE/ARB THERAPY RXD/TAKEN: CPT | Mod: CPTII,,, | Performed by: PHYSICIAN ASSISTANT

## 2024-07-03 PROCEDURE — 3080F DIAST BP >= 90 MM HG: CPT | Mod: CPTII,,, | Performed by: PHYSICIAN ASSISTANT

## 2024-07-03 PROCEDURE — 84443 ASSAY THYROID STIM HORMONE: CPT | Performed by: PHYSICIAN ASSISTANT

## 2024-07-03 RX ORDER — LOSARTAN POTASSIUM 100 MG/1
100 TABLET ORAL DAILY
Qty: 90 TABLET | Refills: 3 | Status: SHIPPED | OUTPATIENT
Start: 2024-07-03 | End: 2025-06-28

## 2024-07-03 NOTE — ASSESSMENT & PLAN NOTE
Inconsistent use of CPAP (uses maybe 2 times per week), this could be reflecting poorly on her blood pressure control.  She reports not tolerating mask.  Advised to follow up with Pulmonary team for other suggestions.

## 2024-07-03 NOTE — PROGRESS NOTES
Subjective:       Patient ID: Bianca Amato is a 44 y.o. female.    Chief Complaint: Follow-up      Patient presents to clinic today for followup of chronic conditions.        Review of Systems   Constitutional:  Negative for chills and fever.   Respiratory:  Negative for shortness of breath.    Cardiovascular:  Negative for chest pain.   Musculoskeletal:  Positive for arthralgias (left ankle, seen in UC and ER, dx with tendonitis). Negative for joint swelling.   Skin:  Positive for wound (cyst to scalp X2, 1 has resolved and the other remains, non-tender).       Objective:      Physical Exam  Vitals and nursing note reviewed.   Constitutional:       General: She is not in acute distress.     Appearance: She is well-developed.   HENT:      Head: Normocephalic and atraumatic.   Eyes:      General: Lids are normal. No scleral icterus.     Extraocular Movements: Extraocular movements intact.      Conjunctiva/sclera: Conjunctivae normal.   Cardiovascular:      Rate and Rhythm: Normal rate and regular rhythm.   Pulmonary:      Effort: Pulmonary effort is normal.      Breath sounds: Normal breath sounds. No decreased breath sounds, wheezing, rhonchi or rales.   Skin:         Neurological:      Mental Status: She is alert.      Cranial Nerves: No cranial nerve deficit.   Psychiatric:         Mood and Affect: Mood and affect normal.         Assessment:       1. Essential hypertension    2. DONNY on CPAP    3. Tendonitis of ankle, left    4. Sebaceous cyst        Plan:   1. Essential hypertension  Assessment & Plan:  /96, increase losartan, RTC 2 weeks for BP recheck    Orders:  -     Comprehensive Metabolic Panel; Future; Expected date: 07/03/2024  -     Lipid Panel; Future; Expected date: 07/03/2024  -     TSH; Future; Expected date: 07/03/2024  -     CBC Auto Differential; Future; Expected date: 07/03/2024  -     T4, Free; Future; Expected date: 07/03/2024  -     Hemoglobin A1C; Future; Expected date:  07/03/2024  -     Iron and TIBC; Future; Expected date: 07/03/2024  -     Ferritin; Future; Expected date: 07/03/2024  -     losartan (COZAAR) 100 MG tablet; Take 1 tablet (100 mg total) by mouth once daily.  Dispense: 90 tablet; Refill: 3    2. DONNY on CPAP  Overview:  Followed by Pulmonology, continue current treatment plan     2/10/2021 Home Sleep Study 1 night study  MILD/BORDERLINE OBSTRUCTIVE SLEEP APNEA with overall AHI 9.3/hr ( 59 events): night #1  Oxygen desaturation: 79%. SpO2 between 85% to 89% for 1 min.  Patient snored 99% time above 50 .  Heart rate range: 80 bpm - 107 bpm  On Auto CPAP 5-20 cm   1/19/2022 changed to Auto CPAP 5-12 cm   Full face mask    Assessment & Plan:  Inconsistent use of CPAP (uses maybe 2 times per week), this could be reflecting poorly on her blood pressure control.  She reports not tolerating mask.  Advised to follow up with Pulmonary team for other suggestions.      3. Tendonitis of ankle, left    4. Sebaceous cyst        Advised stretches daily for ankle and ice compresses. Naproxen daily for 5-7 days        Visit today included increased complexity associated with the care of the episodic problem HTN, which was addressed while instituting co-management of the longitudinal care of the patient due to the serious and/or complex managed problem(s) .    I have evaluated and discussed management associated with medical care services that serve as the continuing focal point for all needed health care services and/or with medical care services that are part of ongoing care related to my patient's single, serious condition or a complex condition(s).    I am providing ongoing care and I am the primary care provider for this patient, and they are being managed, monitored, and/or observed for their chronic conditions over time.     I have addressed their ongoing health maintenance requirements and needs for all health care services and reviewed co-management plans provided by  specialty providers when available.    Health Maintenance Due   Topic Date Due    Mammogram  04/19/2024

## 2024-07-09 ENCOUNTER — OFFICE VISIT (OUTPATIENT)
Dept: INTERNAL MEDICINE | Facility: CLINIC | Age: 44
End: 2024-07-09
Payer: MEDICAID

## 2024-07-09 ENCOUNTER — LAB VISIT (OUTPATIENT)
Dept: LAB | Facility: HOSPITAL | Age: 44
End: 2024-07-09
Attending: PHYSICIAN ASSISTANT
Payer: MEDICAID

## 2024-07-09 VITALS
SYSTOLIC BLOOD PRESSURE: 122 MMHG | OXYGEN SATURATION: 98 % | BODY MASS INDEX: 31 KG/M2 | DIASTOLIC BLOOD PRESSURE: 84 MMHG | HEART RATE: 94 BPM | WEIGHT: 186.31 LBS

## 2024-07-09 DIAGNOSIS — I10 ESSENTIAL HYPERTENSION: ICD-10-CM

## 2024-07-09 DIAGNOSIS — G47.33 OSA ON CPAP: ICD-10-CM

## 2024-07-09 DIAGNOSIS — E04.1 THYROID NODULE: ICD-10-CM

## 2024-07-09 DIAGNOSIS — R35.1 NOCTURIA: ICD-10-CM

## 2024-07-09 DIAGNOSIS — Z00.00 ROUTINE GENERAL MEDICAL EXAMINATION AT A HEALTH CARE FACILITY: Primary | ICD-10-CM

## 2024-07-09 DIAGNOSIS — R73.03 PREDIABETES: ICD-10-CM

## 2024-07-09 DIAGNOSIS — D50.9 IRON DEFICIENCY ANEMIA, UNSPECIFIED IRON DEFICIENCY ANEMIA TYPE: ICD-10-CM

## 2024-07-09 DIAGNOSIS — E05.90 SUBCLINICAL HYPERTHYROIDISM: ICD-10-CM

## 2024-07-09 DIAGNOSIS — J45.20 MILD INTERMITTENT ASTHMA WITHOUT COMPLICATION: ICD-10-CM

## 2024-07-09 PROBLEM — E66.9 CLASS 1 OBESITY WITH SERIOUS COMORBIDITY AND BODY MASS INDEX (BMI) OF 32.0 TO 32.9 IN ADULT: Status: RESOLVED | Noted: 2020-07-28 | Resolved: 2024-07-09

## 2024-07-09 PROBLEM — N64.4 MASTALGIA IN FEMALE: Status: RESOLVED | Noted: 2021-01-28 | Resolved: 2024-07-09

## 2024-07-09 PROBLEM — E66.811 CLASS 1 OBESITY WITH SERIOUS COMORBIDITY AND BODY MASS INDEX (BMI) OF 32.0 TO 32.9 IN ADULT: Status: RESOLVED | Noted: 2020-07-28 | Resolved: 2024-07-09

## 2024-07-09 PROBLEM — N62 MACROMASTIA: Status: RESOLVED | Noted: 2021-01-28 | Resolved: 2024-07-09

## 2024-07-09 LAB
BILIRUB UR QL STRIP: NEGATIVE
CLARITY UR: CLEAR
COLOR UR: YELLOW
GLUCOSE UR QL STRIP: NEGATIVE
HGB UR QL STRIP: NEGATIVE
KETONES UR QL STRIP: NEGATIVE
LEUKOCYTE ESTERASE UR QL STRIP: NEGATIVE
NITRITE UR QL STRIP: NEGATIVE
PH UR STRIP: 6 [PH] (ref 5–8)
PROT UR QL STRIP: NEGATIVE
SP GR UR STRIP: >=1.03 (ref 1–1.03)
URN SPEC COLLECT METH UR: ABNORMAL
UROBILINOGEN UR STRIP-ACNC: NEGATIVE EU/DL

## 2024-07-09 PROCEDURE — 99999 PR PBB SHADOW E&M-EST. PATIENT-LVL IV: CPT | Mod: PBBFAC,,, | Performed by: PHYSICIAN ASSISTANT

## 2024-07-09 PROCEDURE — 99396 PREV VISIT EST AGE 40-64: CPT | Mod: S$PBB,,, | Performed by: PHYSICIAN ASSISTANT

## 2024-07-09 PROCEDURE — 4010F ACE/ARB THERAPY RXD/TAKEN: CPT | Mod: CPTII,,, | Performed by: PHYSICIAN ASSISTANT

## 2024-07-09 PROCEDURE — 81002 URINALYSIS NONAUTO W/O SCOPE: CPT | Performed by: PHYSICIAN ASSISTANT

## 2024-07-09 PROCEDURE — 1160F RVW MEDS BY RX/DR IN RCRD: CPT | Mod: CPTII,,, | Performed by: PHYSICIAN ASSISTANT

## 2024-07-09 PROCEDURE — 3008F BODY MASS INDEX DOCD: CPT | Mod: CPTII,,, | Performed by: PHYSICIAN ASSISTANT

## 2024-07-09 PROCEDURE — 1159F MED LIST DOCD IN RCRD: CPT | Mod: CPTII,,, | Performed by: PHYSICIAN ASSISTANT

## 2024-07-09 PROCEDURE — 3074F SYST BP LT 130 MM HG: CPT | Mod: CPTII,,, | Performed by: PHYSICIAN ASSISTANT

## 2024-07-09 PROCEDURE — 99214 OFFICE O/P EST MOD 30 MIN: CPT | Mod: PBBFAC | Performed by: PHYSICIAN ASSISTANT

## 2024-07-09 PROCEDURE — 3044F HG A1C LEVEL LT 7.0%: CPT | Mod: CPTII,,, | Performed by: PHYSICIAN ASSISTANT

## 2024-07-09 PROCEDURE — 3079F DIAST BP 80-89 MM HG: CPT | Mod: CPTII,,, | Performed by: PHYSICIAN ASSISTANT

## 2024-07-09 NOTE — ASSESSMENT & PLAN NOTE
Stable  Lab Results   Component Value Date    RBC 4.32 07/03/2024    HGB 12.6 07/03/2024    HCT 38.6 07/03/2024    IRON 67 07/03/2024    FOLATE 13.2 09/17/2018    MCV 89 07/03/2024

## 2024-07-09 NOTE — ASSESSMENT & PLAN NOTE
Stable, diet controlled  Lab Results   Component Value Date    HGBA1C 5.6 07/03/2024    HGBA1C 5.6 01/09/2024    LDLCALC 102.8 07/03/2024

## 2024-07-09 NOTE — ASSESSMENT & PLAN NOTE
Stable, get updated thyroid scan  Lab Results   Component Value Date    TSH 0.343 (L) 07/03/2024    FREET4 0.91 07/03/2024

## 2024-07-09 NOTE — ASSESSMENT & PLAN NOTE
/84, controlled, continue amlodipine, HCTZ and losartan  Lab Results   Component Value Date     07/03/2024    K 4.3 07/03/2024    BUN 9 07/03/2024    CREATININE 0.7 07/03/2024    EGFRNORACEVR >60.0 07/03/2024

## 2024-07-09 NOTE — PROGRESS NOTES
Subjective:       Patient ID: Bianca Amato is a 44 y.o. female.    Chief Complaint: Annual Exam      Patient presents to clinic today for annual physical exam.        Review of Systems   Constitutional:  Negative for chills, fatigue, fever and unexpected weight change.   HENT:  Negative for congestion, dental problem, ear pain, hearing loss, rhinorrhea and trouble swallowing.    Eyes:  Negative for pain and visual disturbance.   Respiratory:  Negative for cough and shortness of breath.    Cardiovascular:  Negative for chest pain, palpitations and leg swelling.   Gastrointestinal:  Negative for abdominal distention, abdominal pain, blood in stool, constipation, diarrhea, nausea and vomiting.   Genitourinary:  Negative for difficulty urinating and vaginal discharge.   Musculoskeletal:  Positive for arthralgias. Negative for myalgias.   Skin:  Negative for rash.   Neurological:  Negative for dizziness, weakness, numbness and headaches.   Hematological:  Negative for adenopathy. Does not bruise/bleed easily.   Psychiatric/Behavioral:  Negative for dysphoric mood and sleep disturbance. The patient is not nervous/anxious.        Objective:      Physical Exam  Vitals and nursing note reviewed.   Constitutional:       General: She is not in acute distress.     Appearance: Normal appearance. She is well-developed.   HENT:      Head: Normocephalic and atraumatic.      Right Ear: Tympanic membrane, ear canal and external ear normal.      Left Ear: Tympanic membrane, ear canal and external ear normal.      Nose: Nose normal. No mucosal edema or rhinorrhea.      Mouth/Throat:      Lips: Pink.      Mouth: Mucous membranes are moist.      Pharynx: Oropharynx is clear. Uvula midline.   Eyes:      General: Lids are normal. No scleral icterus.     Extraocular Movements: Extraocular movements intact.      Conjunctiva/sclera: Conjunctivae normal.      Pupils: Pupils are equal, round, and reactive to light.   Neck:      Thyroid:  No thyromegaly.   Cardiovascular:      Rate and Rhythm: Normal rate and regular rhythm.      Pulses: Normal pulses.   Pulmonary:      Effort: Pulmonary effort is normal.      Breath sounds: Normal breath sounds. No wheezing, rhonchi or rales.   Abdominal:      General: Bowel sounds are normal. There is no distension.      Palpations: Abdomen is soft. There is no mass.      Tenderness: There is no abdominal tenderness.   Musculoskeletal:         General: Normal range of motion.      Cervical back: Normal range of motion and neck supple.      Right lower leg: No edema.      Left lower leg: No edema.   Lymphadenopathy:      Cervical: No cervical adenopathy.   Skin:     General: Skin is warm and dry.      Findings: No lesion or rash.      Nails: There is no clubbing.   Neurological:      Mental Status: She is alert.      Cranial Nerves: No cranial nerve deficit.      Sensory: No sensory deficit.   Psychiatric:         Mood and Affect: Mood and affect normal.         Assessment:       1. Routine general medical examination at a health care facility    2. Mild intermittent asthma without complication    3. Essential hypertension    4. Iron deficiency anemia, unspecified iron deficiency anemia type    5. Prediabetes    6. Subclinical hyperthyroidism    7. DONNY on CPAP    8. Thyroid nodule    9. Nocturia        Plan:   1. Routine general medical examination at a health care facility    2. Mild intermittent asthma without complication  Overview:  Followed by Pulmonology, continue current treatment plan      3. Essential hypertension  Assessment & Plan:  /84, controlled, continue amlodipine, HCTZ and losartan  Lab Results   Component Value Date     07/03/2024    K 4.3 07/03/2024    BUN 9 07/03/2024    CREATININE 0.7 07/03/2024    EGFRNORACEVR >60.0 07/03/2024        Orders:  -     Comprehensive Metabolic Panel; Future; Expected date: 01/09/2025    4. Iron deficiency anemia, unspecified iron deficiency anemia  "type  Assessment & Plan:  Stable  Lab Results   Component Value Date    RBC 4.32 07/03/2024    HGB 12.6 07/03/2024    HCT 38.6 07/03/2024    IRON 67 07/03/2024    FOLATE 13.2 09/17/2018    MCV 89 07/03/2024          5. Prediabetes  Assessment & Plan:  Stable, diet controlled  Lab Results   Component Value Date    HGBA1C 5.6 07/03/2024    HGBA1C 5.6 01/09/2024    LDLCALC 102.8 07/03/2024        Orders:  -     Hemoglobin A1C; Future; Expected date: 01/09/2025    6. Subclinical hyperthyroidism  Overview:  Followed by Endocrinology, continue current treatment plan     Assessment & Plan:  Stable, get updated thyroid scan  Lab Results   Component Value Date    TSH 0.343 (L) 07/03/2024    FREET4 0.91 07/03/2024        Orders:  -     TSH; Future; Expected date: 01/09/2025  -     T4, Free; Future; Expected date: 01/09/2025    7. DONNY on CPAP  Overview:  Followed by Pulmonology, continue current treatment plan     2/10/2021 Home Sleep Study 1 night study  MILD/BORDERLINE OBSTRUCTIVE SLEEP APNEA with overall AHI 9.3/hr ( 59 events): night #1  Oxygen desaturation: 79%. SpO2 between 85% to 89% for 1 min.  Patient snored 99% time above 50 .  Heart rate range: 80 bpm - 107 bpm  On Auto CPAP 5-20 cm   1/19/2022 changed to Auto CPAP 5-12 cm   Full face mask      8. Thyroid nodule  Overview:  Followed by ENT, continue current treatment plan   9/2022 Dr. Oreilly did FNA left thyroid nodule:  Path results: Left thyroid lobe, fine-needle aspiration (1 ThinPrep, 4 smears):   [facility] Thyroid Cytology Category: Benign   Other findings and comments:   Benign follicular epithelial cells.   Hurthle cells.   Macrophages.   ENT recommended "repeat ultrasound in 12-18 months"    Orders:  -     US Soft Tissue Head Neck; Future; Expected date: 07/09/2024    9. Nocturia  -     Urinalysis; Future; Expected date: 07/09/2024        Recent labs reviewed with patient.    6 month f/u with Dr. Chand scheduled with non-fasting labs [facility] " Maintenance reviewed/updated.

## 2024-07-10 NOTE — PROGRESS NOTES
Subjective:   Patient ID:  Bianca Amato is a 44 y.o. female who presents for cardiac consult of No chief complaint on file.    Referring Physician: Vivi Chand MD   Reason for consult: chest pain    Follow-up  Associated symptoms include chest pain.     The patient came in today for cardiac consult of No chief complaint on file.      Bianca Amato is a 44 y.o. female pt with HTN, obesity, PreDM, asthma, anemia , DONNY presents for follow up CV eval.         4/27/21  BP elevated 124/100 today. She was recently started on CPAP for DONNY but hard to wear face mask, is a mouth breathing.   ECG - NSR,      7/11/24  Follow up with me since 4/2021. She had CV eval in Jan 2024 for breast reduction surgery.   BP and HR stable. BMI 31 - 190 lbs   Last week she was having chest pain and tingling went to ER.     ECG -NSR, poor RWP       Was a .     Results for orders placed during the hospital encounter of 08/12/20   Echo Color Flow Doppler? Yes    Narrative · Concentric left ventricular hypertrophy.  · Normal left ventricular systolic function. The estimated ejection   fraction is 55%.  · Normal LV diastolic function.  · No wall motion abnormalities.  · Normal right ventricular systolic function.  · Normal central venous pressure (3 mmHg).  · The estimated PA systolic pressure is 14 mmHg.          Results for orders placed during the hospital encounter of 08/12/20   Exercise Stress - EKG    Narrative   The EKG portion of this study is negative for ischemia.    The patient reported no chest pain during the stress test.       HOLTER  Predominant Rhythm  Sinus rhythm with heart rates varying between 68 and 136 bpm with an average of 95 bpm.   PVC    Ventricular Arrhythmias  There were very rare PVCs totalling 149 and averaging 1.55 per hour.   PAC    Supraventricular Arrhythmias  There were very rare PACs totalling 28 and averaging 0.29 per hour.         Past Medical History:   Diagnosis Date     Carpal tunnel syndrome     bilateral    Hypertension     Iron deficiency anemia     Mild intermittent asthma        Past Surgical History:   Procedure Laterality Date    COLONOSCOPY N/A 4/28/2023    Procedure: COLONOSCOPY;  Surgeon: Maximo Cox MD;  Location: [facility];  Service: Endoscopy;  Laterality: N/A;    EYE FOREIGN BODY REMOVAL Right     EYE SURGERY  2009    Im not sure what year    HYSTERECTOMY  2020    OOPHORECTOMY      ROBOT-ASSISTED LAPAROSCOPIC ABDOMINAL HYSTERECTOMY USING DA ALYSA XI N/A 12/06/2019    Procedure: XI ROBOTIC HYSTERECTOMY;  Surgeon: VENESSA Davidson MD;  Location: [facility] OR;  Service: OB/GYN;  Laterality: N/A;    ROBOT-ASSISTED SURGICAL REMOVAL OF FALLOPIAN TUBE USING DA ALYSA XI Bilateral 12/06/2019    Procedure: XI ROBOTIC SALPINGECTOMY;  Surgeon: VENESSA Davidson MD;  Location: [facility] OR;  Service: OB/GYN;  Laterality: Bilateral;    SURGICAL REMOVAL OF MASS OF AXILLA Right 11/12/2018    Procedure: EXCISION, MASS, AXILLARY;  Surgeon: Alis Tian MD;  Location: [facility];  Service: General;  Laterality: Right;    TUBAL LIGATION         Social History     Tobacco Use    Smoking status: Never    Smokeless tobacco: Never   Substance Use Topics    Alcohol use: No    Drug use: No       Family History   Problem Relation Name Age of Onset    Diabetes Mother Ying Amato     Sarcoidosis Mother Ying Amato     Hypertension Mother Ying Amato     COPD Father Powderlyyessica Fisher     Hypertension Father Nathanyessica Fisher     Asthma Father Powderlyyessica Fisher     Breast cancer Neg Hx      Colon cancer Neg Hx      Ovarian cancer Neg Hx      Thrombosis Neg Hx         Patient's Medications   New Prescriptions    No medications on file   Previous Medications    ALBUTEROL (ACCUNEB) 1.25 MG/3 ML NEBU    Take 3 mLs (1.25 mg total) by nebulization every 6 (six) hours as needed (wheezing/cough). Rescue    ALBUTEROL (VENTOLIN HFA) 90 MCG/ACTUATION INHALER    Inhale 2 puffs into the lungs every 4 (four)  "hours as needed for Wheezing or Shortness of Breath.    AMLODIPINE (NORVASC) 10 MG TABLET    Take 1 tablet (10 mg total) by mouth once daily.    CLOTRIMAZOLE (LOTRIMIN) 1 % CREAM    Apply topically 2 (two) times daily. Until rash is clear for 7 days    DICLOFENAC (VOLTAREN) 75 MG EC TABLET    Take 1 tablet (75 mg total) by mouth 2 (two) times daily.    HYDROCHLOROTHIAZIDE (HYDRODIURIL) 25 MG TABLET    Take 1 tablet (25 mg total) by mouth once daily.    KETOCONAZOLE (NIZORAL) 2 % CREAM    Apply topically to affected area(s) 2 (two) times daily.    LOSARTAN (COZAAR) 100 MG TABLET    Take 1 tablet (100 mg total) by mouth once daily.    NEBULIZER AND COMPRESSOR ECHO    Use as directed every 4 hours as needed    TERBINAFINE HCL (LAMISIL) 250 MG TABLET    Take 1 tablet (250 mg total) by mouth once daily.   Modified Medications    No medications on file   Discontinued Medications    No medications on file       Review of Systems   Constitutional: Negative.    HENT: Negative.     Eyes: Negative.    Respiratory: Negative.     Cardiovascular:  Positive for chest pain.   Gastrointestinal: Negative.    Genitourinary: Negative.    Musculoskeletal: Negative.    Skin: Negative.    Neurological: Negative.    Endo/Heme/Allergies: Negative.    Psychiatric/Behavioral: Negative.     All 12 systems otherwise negative.      Wt Readings from Last 3 Encounters:   07/11/24 86.5 kg (190 lb 12.9 oz)   07/09/24 84.5 kg (186 lb 4.6 oz)   07/03/24 87.4 kg (192 lb 10.9 oz)     Temp Readings from Last 3 Encounters:   07/03/24 97.3 °F (36.3 °C) (Tympanic)   06/28/24 98.1 °F (36.7 °C) (Oral)   05/09/24 98.1 °F (36.7 °C) (Oral)     BP Readings from Last 3 Encounters:   07/11/24 126/88   07/09/24 122/84   07/03/24 (!) 142/96     Pulse Readings from Last 3 Encounters:   07/11/24 91   07/09/24 94   07/03/24 82       /88   Pulse 91   Resp 16   Ht 5' 5" (1.651 m)   Wt 86.5 kg (190 lb 12.9 oz)   LMP 11/07/2019 (Approximate)   SpO2 100%   BMI " 31.75 kg/m²     Objective:   Physical Exam  Vitals and nursing note reviewed.   Constitutional:       General: She is not in acute distress.     Appearance: She is well-developed. She is obese. She is not diaphoretic.   HENT:      Head: Normocephalic and atraumatic.      Nose: Nose normal.   Eyes:      General: No scleral icterus.     Conjunctiva/sclera: Conjunctivae normal.   Neck:      Thyroid: No thyromegaly.      Vascular: No JVD.   Cardiovascular:      Rate and Rhythm: Normal rate and regular rhythm.      Heart sounds: S1 normal and S2 normal. Murmur heard.      No friction rub. No gallop. No S3 or S4 sounds.   Pulmonary:      Effort: Pulmonary effort is normal. No respiratory distress.      Breath sounds: Normal breath sounds. No stridor. No wheezing or rales.   Chest:      Chest wall: No tenderness.   Abdominal:      General: Bowel sounds are normal. There is no distension.      Palpations: Abdomen is soft. There is no mass.      Tenderness: There is no abdominal tenderness. There is no rebound.   Genitourinary:     Comments: Deferred  Musculoskeletal:         General: No tenderness or deformity. Normal range of motion.      Cervical back: Normal range of motion and neck supple.   Lymphadenopathy:      Cervical: No cervical adenopathy.   Skin:     General: Skin is warm and dry.      Coloration: Skin is not pale.      Findings: No erythema or rash.   Neurological:      Mental Status: She is alert and oriented to person, place, and time.      Motor: No abnormal muscle tone.      Coordination: Coordination normal.   Psychiatric:         Behavior: Behavior normal.         Thought Content: Thought content normal.         Judgment: Judgment normal.         Lab Results   Component Value Date     07/03/2024    K 4.3 07/03/2024     07/03/2024    CO2 23 07/03/2024    BUN 9 07/03/2024    CREATININE 0.7 07/03/2024    GLU 85 07/03/2024    HGBA1C 5.6 07/03/2024    AST 15 07/03/2024    ALT 11 07/03/2024     ALBUMIN 3.7 07/03/2024    PROT 6.9 07/03/2024    BILITOT 0.3 07/03/2024    WBC 6.07 07/03/2024    HGB 12.6 07/03/2024    HCT 38.6 07/03/2024    MCV 89 07/03/2024     07/03/2024    TSH 0.343 (L) 07/03/2024    CHOL 173 07/03/2024    HDL 43 07/03/2024    LDLCALC 102.8 07/03/2024    TRIG 136 07/03/2024    BNP <10 (L) 11/20/2021    BNP <10 07/18/2020     Assessment:      1. Chest pain, unspecified type    2. Essential hypertension    3. Abnormal EKG    4. DONNY on CPAP    5. Prediabetes    6. Mild intermittent asthma without complication    7. Nonspecific abnormal electrocardiogram (ECG) (EKG)    8. Iron deficiency          Plan:   1. Chest pain with abnormal ECG - poor RWP, with palpitations   - heart racing at times  - ECG stress test and ECHO r/o cardiac etiologies  - prior Holter - negative  - need to r/o non cardiac etiologies     2. HTN - occ elevated  - tirate meds     3. Obesity, PreDM  BMI 31 - 190 lbs   -needs weight loss    4. Asthma with DNONY  - cont tx per PCP  - cont CPAP    5. Fe def anemia  - improved now with diet  - had iron infusions in the past     Visit today included increased complexity associated with the care of the episodic problem HTN addressed and managing the longitudinal care of the patient due to the serious and/or complex managed problem(s) .      Thank you for allowing me to participate in this patient's care. Please do not hesitate to contact me with any questions or concerns. Consult note has been forwarded to the referral physician.

## 2024-07-11 ENCOUNTER — HOSPITAL ENCOUNTER (OUTPATIENT)
Dept: CARDIOLOGY | Facility: HOSPITAL | Age: 44
Discharge: HOME | End: 2024-07-11
Attending: INTERNAL MEDICINE
Payer: MEDICAID

## 2024-07-11 ENCOUNTER — OFFICE VISIT (OUTPATIENT)
Dept: OTOLARYNGOLOGY | Facility: CLINIC | Age: 44
End: 2024-07-11
Payer: MEDICAID

## 2024-07-11 ENCOUNTER — OFFICE VISIT (OUTPATIENT)
Dept: CARDIOLOGY | Facility: CLINIC | Age: 44
End: 2024-07-11
Payer: MEDICAID

## 2024-07-11 VITALS
WEIGHT: 190.81 LBS | SYSTOLIC BLOOD PRESSURE: 126 MMHG | OXYGEN SATURATION: 100 % | HEIGHT: 65 IN | BODY MASS INDEX: 31.79 KG/M2 | HEART RATE: 91 BPM | RESPIRATION RATE: 16 BRPM | DIASTOLIC BLOOD PRESSURE: 88 MMHG

## 2024-07-11 VITALS — HEIGHT: 65 IN | BODY MASS INDEX: 31.75 KG/M2

## 2024-07-11 DIAGNOSIS — J45.20 MILD INTERMITTENT ASTHMA WITHOUT COMPLICATION: ICD-10-CM

## 2024-07-11 DIAGNOSIS — E61.1 IRON DEFICIENCY: ICD-10-CM

## 2024-07-11 DIAGNOSIS — R07.9 CHEST PAIN, UNSPECIFIED TYPE: Primary | ICD-10-CM

## 2024-07-11 DIAGNOSIS — I10 ESSENTIAL HYPERTENSION: ICD-10-CM

## 2024-07-11 DIAGNOSIS — H61.23 BILATERAL IMPACTED CERUMEN: Primary | ICD-10-CM

## 2024-07-11 DIAGNOSIS — R73.03 PREDIABETES: ICD-10-CM

## 2024-07-11 DIAGNOSIS — R94.31 NONSPECIFIC ABNORMAL ELECTROCARDIOGRAM (ECG) (EKG): ICD-10-CM

## 2024-07-11 DIAGNOSIS — G47.33 OSA ON CPAP: ICD-10-CM

## 2024-07-11 DIAGNOSIS — R94.31 ABNORMAL EKG: ICD-10-CM

## 2024-07-11 LAB
OHS QRS DURATION: 68 MS
OHS QTC CALCULATION: 430 MS

## 2024-07-11 PROCEDURE — 3079F DIAST BP 80-89 MM HG: CPT | Mod: CPTII,,, | Performed by: INTERNAL MEDICINE

## 2024-07-11 PROCEDURE — 99214 OFFICE O/P EST MOD 30 MIN: CPT | Mod: PBBFAC,25 | Performed by: INTERNAL MEDICINE

## 2024-07-11 PROCEDURE — 93010 ELECTROCARDIOGRAM REPORT: CPT | Mod: ,,, | Performed by: STUDENT IN AN ORGANIZED HEALTH CARE EDUCATION/TRAINING PROGRAM

## 2024-07-11 PROCEDURE — G2211 COMPLEX E/M VISIT ADD ON: HCPCS | Mod: S$PBB,,, | Performed by: INTERNAL MEDICINE

## 2024-07-11 PROCEDURE — 99214 OFFICE O/P EST MOD 30 MIN: CPT | Mod: S$PBB,,, | Performed by: INTERNAL MEDICINE

## 2024-07-11 PROCEDURE — 1160F RVW MEDS BY RX/DR IN RCRD: CPT | Mod: CPTII,,, | Performed by: INTERNAL MEDICINE

## 2024-07-11 PROCEDURE — 99213 OFFICE O/P EST LOW 20 MIN: CPT | Mod: PBBFAC,25,27 | Performed by: PHYSICIAN ASSISTANT

## 2024-07-11 PROCEDURE — 69210 REMOVE IMPACTED EAR WAX UNI: CPT | Mod: PBBFAC | Performed by: PHYSICIAN ASSISTANT

## 2024-07-11 PROCEDURE — 99999 PR PBB SHADOW E&M-EST. PATIENT-LVL III: CPT | Mod: PBBFAC,,, | Performed by: PHYSICIAN ASSISTANT

## 2024-07-11 PROCEDURE — 3008F BODY MASS INDEX DOCD: CPT | Mod: CPTII,,, | Performed by: INTERNAL MEDICINE

## 2024-07-11 PROCEDURE — 3044F HG A1C LEVEL LT 7.0%: CPT | Mod: CPTII,,, | Performed by: INTERNAL MEDICINE

## 2024-07-11 PROCEDURE — 99999 PR PBB SHADOW E&M-EST. PATIENT-LVL IV: CPT | Mod: PBBFAC,,, | Performed by: INTERNAL MEDICINE

## 2024-07-11 PROCEDURE — 4010F ACE/ARB THERAPY RXD/TAKEN: CPT | Mod: CPTII,,, | Performed by: INTERNAL MEDICINE

## 2024-07-11 PROCEDURE — 93005 ELECTROCARDIOGRAM TRACING: CPT

## 2024-07-11 PROCEDURE — 1159F MED LIST DOCD IN RCRD: CPT | Mod: CPTII,,, | Performed by: INTERNAL MEDICINE

## 2024-07-11 PROCEDURE — 3074F SYST BP LT 130 MM HG: CPT | Mod: CPTII,,, | Performed by: INTERNAL MEDICINE

## 2024-07-11 PROCEDURE — 99499 UNLISTED E&M SERVICE: CPT | Mod: 25,S$PBB,, | Performed by: PHYSICIAN ASSISTANT

## 2024-07-11 PROCEDURE — 69210 REMOVE IMPACTED EAR WAX UNI: CPT | Mod: S$PBB,,, | Performed by: PHYSICIAN ASSISTANT

## 2024-07-11 NOTE — Clinical Note
Please schedule ear cleaning w/me in 6 months at [facility], prefers AM appt.  She will see appt details in MyChart.  Thanks.

## 2024-07-11 NOTE — PROGRESS NOTES
Subjective:   Cerumen impactions     Patient ID: Bianca Amato is a 44 y.o. female.    Chief Complaint:  Excessive ear wax     Bianca Amato is a 44 y.o. female here to see me today for evaluation of a possible wax impaction in bilateral ears.  She has complaints of hearing loss in the affected ears, but denies pain or drainage.  This has been an issue in the past.  The patient has not been using any sort of ear drop to soften the wax.  She usually comes in every 6 months for ear cleaning; last here in 9/2023.    HPI  Review of Systems   HENT: Positive for hearing loss. Negative for ear discharge, ear pain and tinnitus.        Objective:     Physical Exam   HENT:   Right Ear: External ear and ear canal normal. Decreased hearing is noted.   Left Ear: External ear and ear canal normal. Decreased hearing is noted.   Bilateral complete cerumen impactions, removal described below       Procedure Note    CHIEF COMPLAINT:  Cerumen Impaction    Description:  The patient was seated in an exam chair.  An ear speculum was placed in the right EAC and was examined under the microscope.  Suction and/or loop curettes were used to remove a large cerumen impaction.  The tympanic membrane was visualized and was normal in appearance.  The procedure was repeated on the left side in a similar fashion.  The TM was intact and normal on this side as well.  The patient tolerated the procedure well.           Assessment:     1. Bilateral impacted cerumen        Plan:     1.  Cerumen impaction:  Removed today without difficulty.  I would recommend the use of a wax softening drop, either over the counter Debrox or mineral oil, on a weekly basis.  I also instructed the patient to avoid Qtips.  Recommend repeat ear cleaning in 6 months.

## 2024-07-15 ENCOUNTER — HOSPITAL ENCOUNTER (OUTPATIENT)
Dept: RADIOLOGY | Facility: HOSPITAL | Age: 44
Discharge: HOME | End: 2024-07-15
Attending: PHYSICIAN ASSISTANT
Payer: MEDICAID

## 2024-07-15 DIAGNOSIS — E04.1 THYROID NODULE: ICD-10-CM

## 2024-07-15 PROCEDURE — 76536 US EXAM OF HEAD AND NECK: CPT | Mod: TC

## 2024-07-15 PROCEDURE — 76536 US EXAM OF HEAD AND NECK: CPT | Mod: 26,,, | Performed by: STUDENT IN AN ORGANIZED HEALTH CARE EDUCATION/TRAINING PROGRAM

## 2024-07-16 DIAGNOSIS — E04.2 MULTIPLE THYROID NODULES: Primary | ICD-10-CM

## 2024-07-24 ENCOUNTER — HOSPITAL ENCOUNTER (OUTPATIENT)
Dept: CARDIOLOGY | Facility: HOSPITAL | Age: 44
Discharge: HOME | End: 2024-07-24
Attending: INTERNAL MEDICINE
Payer: MEDICAID

## 2024-07-24 VITALS
WEIGHT: 190 LBS | SYSTOLIC BLOOD PRESSURE: 147 MMHG | HEART RATE: 97 BPM | HEIGHT: 65 IN | BODY MASS INDEX: 31.65 KG/M2 | DIASTOLIC BLOOD PRESSURE: 52 MMHG

## 2024-07-24 VITALS
HEIGHT: 65 IN | WEIGHT: 190 LBS | SYSTOLIC BLOOD PRESSURE: 126 MMHG | BODY MASS INDEX: 31.65 KG/M2 | DIASTOLIC BLOOD PRESSURE: 88 MMHG | HEART RATE: 85 BPM

## 2024-07-24 DIAGNOSIS — R07.9 CHEST PAIN, UNSPECIFIED TYPE: ICD-10-CM

## 2024-07-24 DIAGNOSIS — G47.33 OSA ON CPAP: ICD-10-CM

## 2024-07-24 DIAGNOSIS — J45.20 MILD INTERMITTENT ASTHMA WITHOUT COMPLICATION: ICD-10-CM

## 2024-07-24 DIAGNOSIS — R73.03 PREDIABETES: ICD-10-CM

## 2024-07-24 DIAGNOSIS — I10 ESSENTIAL HYPERTENSION: ICD-10-CM

## 2024-07-24 DIAGNOSIS — R94.31 ABNORMAL EKG: ICD-10-CM

## 2024-07-24 LAB
AORTIC ROOT ANNULUS: 2.49 CM
ASCENDING AORTA: 2.65 CM
AV INDEX (PROSTH): 0.74
AV MEAN GRADIENT: 4 MMHG
AV PEAK GRADIENT: 7 MMHG
AV VALVE AREA BY VELOCITY RATIO: 2.16 CM²
AV VALVE AREA: 2.03 CM²
AV VELOCITY RATIO: 0.79
BSA FOR ECHO PROCEDURE: 1.99 M2
CV ECHO LV RWT: 0.46 CM
CV STRESS BASE HR: 97 BPM
DIASTOLIC BLOOD PRESSURE: 52 MMHG
DOP CALC AO PEAK VEL: 1.36 M/S
DOP CALC AO VTI: 25.8 CM
DOP CALC LVOT AREA: 2.7 CM2
DOP CALC LVOT DIAMETER: 1.87 CM
DOP CALC LVOT PEAK VEL: 1.07 M/S
DOP CALC LVOT STROKE VOLUME: 52.43 CM3
DOP CALC RVOT PEAK VEL: 0.44 M/S
DOP CALC RVOT VTI: 9.8 CM
DOP CALCLVOT PEAK VEL VTI: 19.1 CM
E WAVE DECELERATION TIME: 206.96 MSEC
E/A RATIO: 0.72
ECHO LV POSTERIOR WALL: 0.89 CM (ref 0.6–1.1)
FRACTIONAL SHORTENING: 35 % (ref 28–44)
INTERVENTRICULAR SEPTUM: 1.15 CM (ref 0.6–1.1)
IVRT: 82.78 MSEC
LA MAJOR: 3.93 CM
LA MINOR: 3.77 CM
LA WIDTH: 2.9 CM
LEFT ATRIUM AREA SYSTOLIC (APICAL 2 CHAMBER): 11.25 CM2
LEFT ATRIUM AREA SYSTOLIC (APICAL 4 CHAMBER): 10.5 CM2
LEFT ATRIUM SIZE: 2.92 CM
LEFT ATRIUM VOLUME INDEX MOD: 11.8 ML/M2
LEFT ATRIUM VOLUME INDEX: 14.3 ML/M2
LEFT ATRIUM VOLUME MOD: 22.81 CM3
LEFT ATRIUM VOLUME: 27.7 CM3
LEFT INTERNAL DIMENSION IN SYSTOLE: 2.49 CM (ref 2.1–4)
LEFT VENTRICLE DIASTOLIC VOLUME INDEX: 32.58 ML/M2
LEFT VENTRICLE DIASTOLIC VOLUME: 63.2 ML
LEFT VENTRICLE END SYSTOLIC VOLUME APICAL 2 CHAMBER: 24.22 ML
LEFT VENTRICLE END SYSTOLIC VOLUME APICAL 4 CHAMBER: 21.42 ML
LEFT VENTRICLE MASS INDEX: 63 G/M2
LEFT VENTRICLE SYSTOLIC VOLUME INDEX: 11.4 ML/M2
LEFT VENTRICLE SYSTOLIC VOLUME: 22.02 ML
LEFT VENTRICULAR INTERNAL DIMENSION IN DIASTOLE: 3.83 CM (ref 3.5–6)
LEFT VENTRICULAR MASS: 122.14 G
LV LATERAL E/E' RATIO: 8.71 M/S
LVED V (TEICH): 63.2 ML
LVES V (TEICH): 22.02 ML
LVOT MG: 2.86 MMHG
LVOT MV: 0.81 CM/S
MV PEAK A VEL: 0.85 M/S
MV PEAK E VEL: 0.61 M/S
MV STENOSIS PRESSURE HALF TIME: 60.02 MS
MV VALVE AREA P 1/2 METHOD: 3.67 CM2
OHS CV CPX 1 MINUTE RECOVERY HEART RATE: 139 BPM
OHS CV CPX 85 PERCENT MAX PREDICTED HEART RATE MALE: 148
OHS CV CPX ESTIMATED METS: 11
OHS CV CPX MAX PREDICTED HEART RATE: 174
OHS CV CPX PATIENT IS FEMALE: 1
OHS CV CPX PATIENT IS MALE: 0
OHS CV CPX PEAK DIASTOLIC BLOOD PRESSURE: 39 MMHG
OHS CV CPX PEAK HEAR RATE: 179 BPM
OHS CV CPX PEAK RATE PRESSURE PRODUCT: NORMAL
OHS CV CPX PEAK SYSTOLIC BLOOD PRESSURE: 226 MMHG
OHS CV CPX PERCENT MAX PREDICTED HEART RATE ACHIEVED: 108
OHS CV CPX RATE PRESSURE PRODUCT PRESENTING: NORMAL
OHS CV RV/LV RATIO: 0.74 CM
PISA TR MAX VEL: 1.65 M/S
PULM VEIN S/D RATIO: 1.46
PV MEAN GRADIENT: 0 MMHG
PV PEAK D VEL: 0.37 M/S
PV PEAK GRADIENT: 2 MMHG
PV PEAK S VEL: 0.54 M/S
PV PEAK VELOCITY: 0.69 M/S
RA MAJOR: 3.4 CM
RA PRESSURE ESTIMATED: 3 MMHG
RA WIDTH: 2.69 CM
RIGHT VENTRICULAR END-DIASTOLIC DIMENSION: 2.84 CM
RV TB RVSP: 5 MMHG
SINUS: 2.39 CM
STJ: 1.91 CM
STRESS ECHO POST EXERCISE DUR MIN: 9 MINUTES
STRESS ECHO POST EXERCISE DUR SEC: 33 SECONDS
SYSTOLIC BLOOD PRESSURE: 147 MMHG
TDI LATERAL: 0.07 M/S
TR MAX PG: 11 MMHG
TV REST PULMONARY ARTERY PRESSURE: 14 MMHG
Z-SCORE OF LEFT VENTRICULAR DIMENSION IN END DIASTOLE: -3.64
Z-SCORE OF LEFT VENTRICULAR DIMENSION IN END SYSTOLE: -2.44

## 2024-07-24 PROCEDURE — 93016 CV STRESS TEST SUPVJ ONLY: CPT | Mod: ,,, | Performed by: INTERNAL MEDICINE

## 2024-07-24 PROCEDURE — 93018 CV STRESS TEST I&R ONLY: CPT | Mod: ,,, | Performed by: INTERNAL MEDICINE

## 2024-07-24 PROCEDURE — 93306 TTE W/DOPPLER COMPLETE: CPT | Mod: 26,,, | Performed by: INTERNAL MEDICINE

## 2024-07-24 PROCEDURE — 93017 CV STRESS TEST TRACING ONLY: CPT

## 2024-07-24 PROCEDURE — 93306 TTE W/DOPPLER COMPLETE: CPT

## 2024-08-12 ENCOUNTER — PATIENT MESSAGE (OUTPATIENT)
Dept: PODIATRY | Facility: CLINIC | Age: 44
End: 2024-08-12
Payer: MEDICAID

## 2024-08-15 ENCOUNTER — HOSPITAL ENCOUNTER (OUTPATIENT)
Dept: RADIOLOGY | Facility: HOSPITAL | Age: 44
Discharge: HOME | End: 2024-08-15
Attending: PODIATRIST
Payer: MEDICAID

## 2024-08-15 ENCOUNTER — OFFICE VISIT (OUTPATIENT)
Dept: PODIATRY | Facility: CLINIC | Age: 44
End: 2024-08-15
Payer: MEDICAID

## 2024-08-15 VITALS — HEIGHT: 65 IN | WEIGHT: 190.06 LBS | BODY MASS INDEX: 31.67 KG/M2

## 2024-08-15 DIAGNOSIS — M77.52 TENDONITIS OF ANKLE, LEFT: ICD-10-CM

## 2024-08-15 DIAGNOSIS — M72.2 PLANTAR FASCIITIS: ICD-10-CM

## 2024-08-15 DIAGNOSIS — M65.9 SYNOVITIS OF LEFT ANKLE: ICD-10-CM

## 2024-08-15 DIAGNOSIS — M25.572 PAIN IN JOINT INVOLVING LEFT ANKLE AND FOOT: ICD-10-CM

## 2024-08-15 DIAGNOSIS — M25.572 PAIN IN JOINT INVOLVING LEFT ANKLE AND FOOT: Primary | ICD-10-CM

## 2024-08-15 DIAGNOSIS — G57.52 TARSAL TUNNEL SYNDROME, LEFT: ICD-10-CM

## 2024-08-15 PROCEDURE — 4010F ACE/ARB THERAPY RXD/TAKEN: CPT | Mod: CPTII,,, | Performed by: PODIATRIST

## 2024-08-15 PROCEDURE — 72100 X-RAY EXAM L-S SPINE 2/3 VWS: CPT | Mod: 26,,, | Performed by: RADIOLOGY

## 2024-08-15 PROCEDURE — 3044F HG A1C LEVEL LT 7.0%: CPT | Mod: CPTII,,, | Performed by: PODIATRIST

## 2024-08-15 PROCEDURE — 99214 OFFICE O/P EST MOD 30 MIN: CPT | Mod: S$PBB,,, | Performed by: PODIATRIST

## 2024-08-15 PROCEDURE — 72100 X-RAY EXAM L-S SPINE 2/3 VWS: CPT | Mod: TC

## 2024-08-15 PROCEDURE — 99999 PR PBB SHADOW E&M-EST. PATIENT-LVL III: CPT | Mod: PBBFAC,,, | Performed by: PODIATRIST

## 2024-08-15 PROCEDURE — 1160F RVW MEDS BY RX/DR IN RCRD: CPT | Mod: CPTII,,, | Performed by: PODIATRIST

## 2024-08-15 PROCEDURE — 1159F MED LIST DOCD IN RCRD: CPT | Mod: CPTII,,, | Performed by: PODIATRIST

## 2024-08-15 PROCEDURE — 99213 OFFICE O/P EST LOW 20 MIN: CPT | Mod: PBBFAC | Performed by: PODIATRIST

## 2024-08-15 PROCEDURE — 3008F BODY MASS INDEX DOCD: CPT | Mod: CPTII,,, | Performed by: PODIATRIST

## 2024-08-15 RX ORDER — NAPROXEN 500 MG/1
500 TABLET ORAL 2 TIMES DAILY WITH MEALS
Qty: 60 TABLET | Refills: 0 | Status: SHIPPED | OUTPATIENT
Start: 2024-08-15 | End: 2024-09-14

## 2024-08-15 RX ORDER — OXYCODONE AND ACETAMINOPHEN 5; 325 MG/1; MG/1
1 TABLET ORAL EVERY 6 HOURS PRN
Qty: 20 TABLET | Refills: 0 | Status: SHIPPED | OUTPATIENT
Start: 2024-08-15 | End: 2024-08-20

## 2024-08-15 NOTE — PROGRESS NOTES
Subjective:       Patient ID: Bianca Amato is a 44 y.o. female.    Chief Complaint: Foot Pain (Left foot pain, rate pain 6/10, nondiabetic, pt is wearing slippers )      HPI: Bianca Amato presents to the clinic today with the complaint of moderate pains to the left ankle, anterior aspect. Patient does not state recent trauma. Pains are rated at approx. 6/10. Pains are described as sharp and achy in nature. Walking, standing and prolonged weight bearing activities do exacerbate the symptoms. Patient states prior radiographic evaluation at [facility] and [facility]. The patient has been evaluated prior by a medical profession. NSAIDs have been taken for the symptomology. Pains have been present for several weeks. Patient's Primary Care Provider is Vivi Chand MD. Is on HCTZ. Denies a Hx of gout. WB with sneakers. Has seen my prior for plantar fasciitis. We have discussed possible nerve Dx. States pains at night that are positional.     Review of patient's allergies indicates:   Allergen Reactions    Latex Rash and Shortness Of Breath    Latex, natural rubber Hives    Penicillins Hives, Rash and Shortness Of Breath    Sulfa (sulfonamide antibiotics) Hives, Swelling, Rash and Shortness Of Breath       Past Medical History:   Diagnosis Date    Carpal tunnel syndrome     bilateral    Hypertension     Iron deficiency anemia     Mild intermittent asthma        Family History   Problem Relation Name Age of Onset    Diabetes Mother Ying Amato     Sarcoidosis Mother Ying Amato     Hypertension Mother Ying Amato     COPD Father [place] Fisher     Hypertension Father Nathan Fisher     Asthma Father Nathan Fisher     Breast cancer Neg Hx      Colon cancer Neg Hx      Ovarian cancer Neg Hx      Thrombosis Neg Hx         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   Tobacco  Use    Smoking status: Never    Smokeless tobacco: Never   Substance and Sexual Activity    Alcohol use: No    Drug use: No    Sexual activity: Yes     Partners: Male     Birth control/protection: None     Comment: Sometimes used condoms     Social Determinants of Health     Financial Resource Strain: Low Risk  (1/4/2024)    Overall Financial Resource Strain (CARDIA)     Difficulty of Paying Living Expenses: Not hard at all   Recent Concern: Financial Resource Strain - High Risk (10/25/2023)    Overall Financial Resource Strain (CARDIA)     Difficulty of Paying Living Expenses: Very hard   Food Insecurity: No Food Insecurity (1/4/2024)    Hunger Vital Sign     Worried About Running Out of Food in the Last Year: Never true     Ran Out of Food in the Last Year: Never true   Transportation Needs: No Transportation Needs (1/4/2024)    PRAPARE - Transportation     Lack of Transportation (Medical): No     Lack of Transportation (Non-Medical): No   Physical Activity: Sufficiently Active (1/4/2024)    Exercise Vital Sign     Days of Exercise per Week: 7 days     Minutes of Exercise per Session: 70 min   Recent Concern: Physical Activity - Insufficiently Active (10/25/2023)    Exercise Vital Sign     Days of Exercise per Week: 3 days     Minutes of Exercise per Session: 10 min   Stress: No Stress Concern Present (1/4/2024)    [employer] - Occupational Stress Questionnaire     Feeling of Stress : Not at all   Housing Stability: Low Risk  (1/4/2024)    Housing Stability Vital Sign     Unable to Pay for Housing in the Last Year: No     Number of Places Lived in the Last Year: 0     Unstable Housing in the Last Year: No       Past Surgical History:   Procedure Laterality Date    COLONOSCOPY N/A 4/28/2023    Procedure: COLONOSCOPY;  Surgeon: Maximo Cox MD;  Location: [facility];  Service: Endoscopy;  Laterality: N/A;    EYE FOREIGN BODY REMOVAL Right     EYE SURGERY  2009    Im not sure what  "year    HYSTERECTOMY  2020    OOPHORECTOMY      ROBOT-ASSISTED LAPAROSCOPIC ABDOMINAL HYSTERECTOMY USING DA ALYSA XI N/A 12/06/2019    Procedure: XI ROBOTIC HYSTERECTOMY;  Surgeon: VENESSA Davidson MD;  Location: [facility] OR;  Service: OB/GYN;  Laterality: N/A;    ROBOT-ASSISTED SURGICAL REMOVAL OF FALLOPIAN TUBE USING DA ALYSA XI Bilateral 12/06/2019    Procedure: XI ROBOTIC SALPINGECTOMY;  Surgeon: VENESSA Davidson MD;  Location: [facility] OR;  Service: OB/GYN;  Laterality: Bilateral;    SURGICAL REMOVAL OF MASS OF AXILLA Right 11/12/2018    Procedure: EXCISION, MASS, AXILLARY;  Surgeon: Alis Tian MD;  Location: [facility] OR;  Service: General;  Laterality: Right;    TUBAL LIGATION         Review of Systems   Constitutional:  Negative for chills, fatigue and fever.   HENT:  Negative for hearing loss.    Eyes:  Negative for photophobia and visual disturbance.   Respiratory:  Negative for cough, chest tightness, shortness of breath and wheezing.    Cardiovascular:  Negative for chest pain and palpitations.   Gastrointestinal:  Negative for constipation, diarrhea, nausea and vomiting.   Endocrine: Negative for cold intolerance and heat intolerance.   Genitourinary:  Negative for flank pain.   Musculoskeletal:  Positive for back pain and gait problem. Negative for neck pain and neck stiffness.   Neurological:  Negative for light-headedness and headaches.        Neuritis   Psychiatric/Behavioral:  Negative for sleep disturbance.          Objective:   Ht 5' 5" (1.651 m)   Wt 86.2 kg (190 lb 0.6 oz)   LMP 11/07/2019 (Approximate)   BMI 31.62 kg/m²     Physical Exam    LOWER EXTREMITY PHYSICAL EXAMINATION    DERMATOLOGY: Skin is supple, dry and intact. No hypertrophic skin formation. No erythema or cellulitis is noted.  There is no ecchymosis noted to the left ankle. No erythema is noted. No calor is noted.    ORTHOPEDIC:  There is moderate tenderness to palpation of the anteromedial and the anterolateral ankle " gutter on the left.  Mild pain to palpation of the central aspect of the ankle as well.  No crepitus noted.  No subtalar joint pathology is noted.  Gait is mildly antalgic.  No crepitus noted.  Ligamentous examination is normal.  Achilles tendon is normal.  Medial and lateral ankle tendons are within normal limits.    NEUROLOGY: Negative Tinel's Sign and negative Valleix Sign. No neurological sensations with compression of the area of Perez's Nerve in the area of the Abductor Hallucis muscle belly. Upon palpation of the interspaces, there are no neurological sensations stated that radiate proximal or distal. Upon compression of the metatarsal heads from medial to lateral, no neurological sensations or symptoms are stated.     VASCULAR: On the left foot, the dorsalis pedis pulse is 2/4 and the posterior tibial pulse is 2/4. Capillary refill time is less than 3 seconds. Hair growth is present on the dorsum of the foot and at the digits. Proximal to distal temperature is warm to warm.    Assessment:     1. Pain in joint involving left ankle and foot    2. Tendonitis of ankle, left    3. Synovitis of left ankle    4. Tarsal tunnel syndrome, left    5. Plantar fasciitis          Plan:     Pain in joint involving left ankle and foot  -     Uric acid; Future; Expected date: 08/15/2024  -     Sedimentation rate; Future; Expected date: 08/15/2024  -     C-reactive protein; Future; Expected date: 08/15/2024  -     X-Ray Lumbar Spine 2 Or 3 Views; Future; Expected date: 08/15/2024  -     oxyCODONE-acetaminophen (PERCOCET) 5-325 mg per tablet; Take 1 tablet by mouth every 6 (six) hours as needed for Pain.  Dispense: 20 tablet; Refill: 0  -     naproxen (NAPROSYN) 500 MG tablet; Take 1 tablet (500 mg total) by mouth 2 (two) times daily with meals.  Dispense: 60 tablet; Refill: 0  -     Nerve conduction test; Future; Expected date: 09/15/2024    Tendonitis of ankle, left  -     naproxen (NAPROSYN) 500 MG tablet; Take 1 tablet  (500 mg total) by mouth 2 (two) times daily with meals.  Dispense: 60 tablet; Refill: 0    Synovitis of left ankle  -     naproxen (NAPROSYN) 500 MG tablet; Take 1 tablet (500 mg total) by mouth 2 (two) times daily with meals.  Dispense: 60 tablet; Refill: 0    Tarsal tunnel syndrome, left  -     Nerve conduction test; Future; Expected date: 09/15/2024    Plantar fasciitis  -     Nerve conduction test; Future; Expected date: 09/15/2024      Thorough discussion is had with the patient today, concerning the diagnosis, its etiology, and the treatment algorithm at present.     External XRAYS are reviewed in detail with the patient. All questions and concerns regarding findings and its/their implications are outlined and discussed.    Denies CAM Walker.    Check Uric Acid.    NSAIDs + opioid prn.    May likely need EMG NCV.             Future Appointments   Date Time Provider Department Center   8/28/2024  9:00 AM Luana Mera PA-C [facility] E[facility]   10/15/2024  8:20 AM De Childs MD ON CARDIO [facility]   12/24/2024  1:30 PM Natalie Elias PA-C [facility] PULMS[facility]   1/3/2025  9:30 AM LABORATORY, [facility] ON [facility]   1/9/2025  8:00 AM Luana Mera PA-C ON ENT [facility]   1/9/2025  9:40 AM Vivi Chand MD ONLC IM [facility]   2/3/2025  8:40 AM [facility] MAMMO2 ON MAMMO [place]

## 2024-08-16 ENCOUNTER — TELEPHONE (OUTPATIENT)
Dept: PHYSICAL MEDICINE AND REHAB | Facility: CLINIC | Age: 44
End: 2024-08-16
Payer: MEDICAID

## 2024-08-28 ENCOUNTER — OFFICE VISIT (OUTPATIENT)
Dept: OTOLARYNGOLOGY | Facility: CLINIC | Age: 44
End: 2024-08-28
Payer: MEDICAID

## 2024-08-28 DIAGNOSIS — E04.2 MULTIPLE THYROID NODULES: ICD-10-CM

## 2024-08-28 PROCEDURE — 4010F ACE/ARB THERAPY RXD/TAKEN: CPT | Mod: CPTII,,, | Performed by: PHYSICIAN ASSISTANT

## 2024-08-28 PROCEDURE — 99213 OFFICE O/P EST LOW 20 MIN: CPT | Mod: PBBFAC | Performed by: PHYSICIAN ASSISTANT

## 2024-08-28 PROCEDURE — 3044F HG A1C LEVEL LT 7.0%: CPT | Mod: CPTII,,, | Performed by: PHYSICIAN ASSISTANT

## 2024-08-28 PROCEDURE — 99214 OFFICE O/P EST MOD 30 MIN: CPT | Mod: S$PBB,,, | Performed by: PHYSICIAN ASSISTANT

## 2024-08-28 PROCEDURE — 99999 PR PBB SHADOW E&M-EST. PATIENT-LVL III: CPT | Mod: PBBFAC,,, | Performed by: PHYSICIAN ASSISTANT

## 2024-08-28 PROCEDURE — 1159F MED LIST DOCD IN RCRD: CPT | Mod: CPTII,,, | Performed by: PHYSICIAN ASSISTANT

## 2024-08-28 NOTE — H&P (VIEW-ONLY)
Subjective     Patient ID: Bianca Amato is a 44 y.o. female.    Chief Complaint: Thyroid Problem (Nodules, no compressive symptoms)    Patient is a pleasant 44 year old female here to see me today for evaluation of multiple thyroid nodules.  She had previous FNA of left thyroid nodule in 2022 - benign.  Recent US showed increased size in bilateral nodules.  Patient does not appreciate any compressive symptoms - no dysphagia, change in voice or orthopnea.  She denies family hx of thyroid cancer; sister and aunt take thyroid medication.  She denies previous radiation to her head or neck.  Denies any recent change in size of neck or any associated pain.          Review of Systems   Constitutional: Negative.    HENT: Negative.     Eyes: Negative.    Respiratory: Negative.     Cardiovascular: Negative.    Gastrointestinal: Negative.    Endocrine: Negative.    Genitourinary: Negative.    Musculoskeletal: Negative.    Integumentary:  Negative.   Allergic/Immunologic: Negative.    Neurological: Negative.    Hematological: Negative.    Psychiatric/Behavioral: Negative.          Objective     Physical Exam  Constitutional:       Appearance: Normal appearance.   HENT:      Head: Normocephalic and atraumatic.      Right Ear: Tympanic membrane, ear canal and external ear normal.      Left Ear: Tympanic membrane, ear canal and external ear normal.      Nose: Nose normal.      Mouth/Throat:      Mouth: Mucous membranes are moist.      Pharynx: Oropharynx is clear.   Eyes:      Pupils: Pupils are equal, round, and reactive to light.   Neck:      Thyroid: No thyroid mass or thyroid tenderness.      Comments: No discrete nodules palpated  Pulmonary:      Effort: Pulmonary effort is normal.   Neurological:      General: No focal deficit present.      Mental Status: She is alert.       US Thyroid  Order: [id number]  Status: Final result       Visible to patient: Yes (seen)       Next appt: 09/10/2024 at 01:40 PM in Physical  Medicine and Rehabilitation (Kelly Rubin MD)       Dx: Thyroid nodule    2 Result Notes       1 Patient Communication  Details    Reading Physician Reading Date Result Priority   Sarah Yadav MD  [phone] 7/15/2024 Routine     Narrative & Impression  EXAMINATION:  US THYROID     CLINICAL HISTORY:  .  Nontoxic single thyroid nodule     TECHNIQUE:  Ultrasound of the thyroid was performed.     COMPARISON:  Thyroid ultrasound 07/26/2022     FINDINGS:  The thyroid is enlarged.  Right lobe of the thyroid measures 5.2 x 2.0 x 2.2 cm.  Left lobe of the thyroid measures 5.0 x 1.9 x 2.1 cm.  Isthmus measures 5 mm.  Homogeneous thyroid parenchyma.     There are bilateral nodules as follows:     Right:     1.8 x 1.2 x 1.4 cm mid solid hypoechoic nodule, TR 4 (previously 1.4 x 0.8 x 1.2 cm).  Meets criteria for FNA.     1.0 x 0.9 x 0.9 cm inferior solid very hypoechoic nodule with punctate echogenic foci and possible extrathyroidal extension, TR 5 (previously 0.6 x 0.6 x 0.7 cm).  Meets criteria for FNA.     Isthmus:     1.1 x 0.6 x 0.9 cm solid very hypoechoic nodule, TR 4 (previously 0.9 x 0.9 x 0.4 cm).     Left:     2.0 x 1.8 x 1.7 cm inferior solid hypoechoic nodule, TR 4 (previously 1.2 x 1.5 x 1.8 cm).  Meets criteria for FNA.  Previously sampled on 09/27/2022.     Impression:     Multinodular thyroid gland.  Multiple nodules meet criteria for fine-needle aspiration.  FNA is recommended of the 2 most concerning nodules including a 1.0 cm TR 5 right-sided nodule and repeat FNA of enlarging TR 4 left nodule.        Electronically signed by:Sarah Yadav  Date:                                            07/15/2024            FNA in 9/2022:  Cytology-FNA Non-Radiology Clinician Performed w/o on site  Order: [id number]  Status: Final result       Visible to patient: Yes (seen)       Next appt: 09/10/2024 at 01:40 PM in Physical Medicine and Rehabilitation (Kelly Rubin MD)    0 Result Notes       1  Patient Communication   important suggestion  Newer results are available. Click to view them now.         Component 1 yr ago   Final Pathologic Diagnosis 1. Left thyroid lobe, fine-needle aspiration (1 ThinPrep, 4 smears):  [facility] Thyroid Cytology Category: Benign  Other findings and comments:  Benign follicular epithelial cells.  Hurthle cells.  Macrophages.   Comment: Interp By Marcelle James M.D., Signed on 10/03/2022 at 10:30            Assessment and Plan     1. Multiple thyroid nodules  -     Ambulatory referral/consult to ENT        Benign FNA of left thyroid nodule in 9/2022.  Discussed with patient that her recent thyroid US shows increased change in size of bilateral nodules.  FNA is recommended of the 2 most concerning nodules including a 1.0 cm TR 5 right-sided nodule and repeat FNA of enlarging TR 4 left nodule.  We discussed the function of the thyroid gland, as well as relevant anatomy.    Risks and benefits of FNA were discussed in detail, and she agreed to the procedure.  We will schedule this at her convenience and she will return to clinic with Dr. Quintero to review the results two weeks following the biopsy.             No follow-ups on file.

## 2024-08-28 NOTE — PROGRESS NOTES
Subjective     Patient ID: Bianca Amato is a 44 y.o. female.    Chief Complaint: Thyroid Problem (Nodules, no compressive symptoms)    Patient is a pleasant 44 year old female here to see me today for evaluation of multiple thyroid nodules.  She had previous FNA of left thyroid nodule in 2022 - benign.  Recent US showed increased size in bilateral nodules.  Patient does not appreciate any compressive symptoms - no dysphagia, change in voice or orthopnea.  She denies family hx of thyroid cancer; sister and aunt take thyroid medication.  She denies previous radiation to her head or neck.  Denies any recent change in size of neck or any associated pain.          Review of Systems   Constitutional: Negative.    HENT: Negative.     Eyes: Negative.    Respiratory: Negative.     Cardiovascular: Negative.    Gastrointestinal: Negative.    Endocrine: Negative.    Genitourinary: Negative.    Musculoskeletal: Negative.    Integumentary:  Negative.   Allergic/Immunologic: Negative.    Neurological: Negative.    Hematological: Negative.    Psychiatric/Behavioral: Negative.          Objective     Physical Exam  Constitutional:       Appearance: Normal appearance.   HENT:      Head: Normocephalic and atraumatic.      Right Ear: Tympanic membrane, ear canal and external ear normal.      Left Ear: Tympanic membrane, ear canal and external ear normal.      Nose: Nose normal.      Mouth/Throat:      Mouth: Mucous membranes are moist.      Pharynx: Oropharynx is clear.   Eyes:      Pupils: Pupils are equal, round, and reactive to light.   Neck:      Thyroid: No thyroid mass or thyroid tenderness.      Comments: No discrete nodules palpated  Pulmonary:      Effort: Pulmonary effort is normal.   Neurological:      General: No focal deficit present.      Mental Status: She is alert.       US Thyroid  Order: [id number]  Status: Final result       Visible to patient: Yes (seen)       Next appt: 09/10/2024 at 01:40 PM in Physical  Medicine and Rehabilitation (Kelly Rubin MD)       Dx: Thyroid nodule    2 Result Notes       1 Patient Communication  Details    Reading Physician Reading Date Result Priority   Sarah Yadav MD  [phone] 7/15/2024 Routine     Narrative & Impression  EXAMINATION:  US THYROID     CLINICAL HISTORY:  .  Nontoxic single thyroid nodule     TECHNIQUE:  Ultrasound of the thyroid was performed.     COMPARISON:  Thyroid ultrasound 07/26/2022     FINDINGS:  The thyroid is enlarged.  Right lobe of the thyroid measures 5.2 x 2.0 x 2.2 cm.  Left lobe of the thyroid measures 5.0 x 1.9 x 2.1 cm.  Isthmus measures 5 mm.  Homogeneous thyroid parenchyma.     There are bilateral nodules as follows:     Right:     1.8 x 1.2 x 1.4 cm mid solid hypoechoic nodule, TR 4 (previously 1.4 x 0.8 x 1.2 cm).  Meets criteria for FNA.     1.0 x 0.9 x 0.9 cm inferior solid very hypoechoic nodule with punctate echogenic foci and possible extrathyroidal extension, TR 5 (previously 0.6 x 0.6 x 0.7 cm).  Meets criteria for FNA.     Isthmus:     1.1 x 0.6 x 0.9 cm solid very hypoechoic nodule, TR 4 (previously 0.9 x 0.9 x 0.4 cm).     Left:     2.0 x 1.8 x 1.7 cm inferior solid hypoechoic nodule, TR 4 (previously 1.2 x 1.5 x 1.8 cm).  Meets criteria for FNA.  Previously sampled on 09/27/2022.     Impression:     Multinodular thyroid gland.  Multiple nodules meet criteria for fine-needle aspiration.  FNA is recommended of the 2 most concerning nodules including a 1.0 cm TR 5 right-sided nodule and repeat FNA of enlarging TR 4 left nodule.        Electronically signed by:Sarah Yadav  Date:                                            07/15/2024            FNA in 9/2022:  Cytology-FNA Non-Radiology Clinician Performed w/o on site  Order: [id number]  Status: Final result       Visible to patient: Yes (seen)       Next appt: 09/10/2024 at 01:40 PM in Physical Medicine and Rehabilitation (Kelly Rubin MD)    0 Result Notes       1  Patient Communication   important suggestion  Newer results are available. Click to view them now.         Component 1 yr ago   Final Pathologic Diagnosis 1. Left thyroid lobe, fine-needle aspiration (1 ThinPrep, 4 smears):  [facility] Thyroid Cytology Category: Benign  Other findings and comments:  Benign follicular epithelial cells.  Hurthle cells.  Macrophages.   Comment: Interp By Marcelle James M.D., Signed on 10/03/2022 at 10:30            Assessment and Plan     1. Multiple thyroid nodules  -     Ambulatory referral/consult to ENT        Benign FNA of left thyroid nodule in 9/2022.  Discussed with patient that her recent thyroid US shows increased change in size of bilateral nodules.  FNA is recommended of the 2 most concerning nodules including a 1.0 cm TR 5 right-sided nodule and repeat FNA of enlarging TR 4 left nodule.  We discussed the function of the thyroid gland, as well as relevant anatomy.    Risks and benefits of FNA were discussed in detail, and she agreed to the procedure.  We will schedule this at her convenience and she will return to clinic with Dr. Quintero to review the results two weeks following the biopsy.             No follow-ups on file.

## 2024-09-06 ENCOUNTER — PATIENT MESSAGE (OUTPATIENT)
Dept: PODIATRY | Facility: CLINIC | Age: 44
End: 2024-09-06
Payer: MEDICAID

## 2024-09-10 ENCOUNTER — PATIENT MESSAGE (OUTPATIENT)
Dept: OTOLARYNGOLOGY | Facility: CLINIC | Age: 44
End: 2024-09-10
Payer: MEDICAID

## 2024-09-10 ENCOUNTER — TELEPHONE (OUTPATIENT)
Dept: PHYSICAL MEDICINE AND REHAB | Facility: CLINIC | Age: 44
End: 2024-09-10
Payer: MEDICAID

## 2024-09-10 NOTE — TELEPHONE ENCOUNTER
----- Message from Mary Willard sent at 9/10/2024  9:33 AM CDT -----  Contact: [phone]  [email or web address]     Patient is calling for Medical Advice regarding:reschedule the appt for today     How long has patient had these symptoms:    Pharmacy name and phone#:    Patient wants a call back or thru [facility]:call back     Comments:  No appts coming up for me please advise   Please advise patient replies from provider may take up to 48 hours.

## 2024-09-19 ENCOUNTER — HOSPITAL ENCOUNTER (OUTPATIENT)
Dept: RADIOLOGY | Facility: HOSPITAL | Age: 44
Discharge: HOME | End: 2024-09-19
Attending: PHYSICIAN ASSISTANT
Payer: MEDICAID

## 2024-09-19 DIAGNOSIS — E04.1 THYROID NODULE: ICD-10-CM

## 2024-09-19 DIAGNOSIS — E04.2 MULTIPLE THYROID NODULES: ICD-10-CM

## 2024-09-19 PROCEDURE — C1729 CATH, DRAINAGE: HCPCS

## 2024-09-19 PROCEDURE — 10005 FNA BX W/US GDN 1ST LES: CPT | Mod: ,,, | Performed by: RADIOLOGY

## 2024-09-19 PROCEDURE — 10006 FNA BX W/US GDN EA ADDL: CPT

## 2024-09-19 PROCEDURE — 10005 FNA BX W/US GDN 1ST LES: CPT

## 2024-09-19 NOTE — DISCHARGE SUMMARY
[facility] (Hospital)  Discharge Note  Short Stay    US FNA Thyroid, 1st Lesion  US FNA Biopsy w/ Addl Lesion      OUTCOME: Patient tolerated treatment/procedure well without complication and is now ready for discharge.    DISPOSITION: Home or Self Care    FINAL DIAGNOSIS:  <principal problem not specified>    FOLLOWUP: In clinic    DISCHARGE INSTRUCTIONS:  No discharge procedures on file.     TIME SPENT ON DISCHARGE: 15 minutes    Pre Op Diagnosis: b/l thyroid nodules     Post Op Diagnosis: same     Procedure:  FNA thyroid     Procedure performed by: Jasen PRINCE, Jeremiah STRONG     Written Informed Consent Obtained: Yes     Specimen Removed:  yes     Estimated Blood Loss:  minimal     Findings: Local anesthesia     Sedation:  no     The patient tolerated the procedure well and there were no complications.      Disposition:  F/U in clinic or with ordering physician    Discharge instructions:  Light activity for 24 hours.  Remove band aid in 24 hours.  No baths (showers are appropriate).      Sterile technique was performed in the anterior neck, lidocaine was used as a local anesthetic.  Multiple samples taken percutaneously from the b/l thyroid nodules.  Pt tolerated the procedure well without immediate complications.  Please see radiologist report for details. F/u with PCP and/or ordering physician.

## 2024-09-26 ENCOUNTER — OFFICE VISIT (OUTPATIENT)
Dept: OTOLARYNGOLOGY | Facility: CLINIC | Age: 44
End: 2024-09-26
Payer: MEDICAID

## 2024-09-26 VITALS — WEIGHT: 196.63 LBS | BODY MASS INDEX: 32.72 KG/M2

## 2024-09-26 DIAGNOSIS — E05.90 SUBCLINICAL HYPERTHYROIDISM: Primary | ICD-10-CM

## 2024-09-26 DIAGNOSIS — E04.2 MULTINODULAR THYROID: ICD-10-CM

## 2024-09-26 PROCEDURE — 3044F HG A1C LEVEL LT 7.0%: CPT | Mod: CPTII,,, | Performed by: STUDENT IN AN ORGANIZED HEALTH CARE EDUCATION/TRAINING PROGRAM

## 2024-09-26 PROCEDURE — 99213 OFFICE O/P EST LOW 20 MIN: CPT | Mod: PBBFAC | Performed by: STUDENT IN AN ORGANIZED HEALTH CARE EDUCATION/TRAINING PROGRAM

## 2024-09-26 PROCEDURE — 1159F MED LIST DOCD IN RCRD: CPT | Mod: CPTII,,, | Performed by: STUDENT IN AN ORGANIZED HEALTH CARE EDUCATION/TRAINING PROGRAM

## 2024-09-26 PROCEDURE — 4010F ACE/ARB THERAPY RXD/TAKEN: CPT | Mod: CPTII,,, | Performed by: STUDENT IN AN ORGANIZED HEALTH CARE EDUCATION/TRAINING PROGRAM

## 2024-09-26 PROCEDURE — 99999 PR PBB SHADOW E&M-EST. PATIENT-LVL III: CPT | Mod: PBBFAC,,, | Performed by: STUDENT IN AN ORGANIZED HEALTH CARE EDUCATION/TRAINING PROGRAM

## 2024-09-26 PROCEDURE — 99214 OFFICE O/P EST MOD 30 MIN: CPT | Mod: S$PBB,,, | Performed by: STUDENT IN AN ORGANIZED HEALTH CARE EDUCATION/TRAINING PROGRAM

## 2024-09-26 PROCEDURE — 3008F BODY MASS INDEX DOCD: CPT | Mod: CPTII,,, | Performed by: STUDENT IN AN ORGANIZED HEALTH CARE EDUCATION/TRAINING PROGRAM

## 2024-10-01 ENCOUNTER — OFFICE VISIT (OUTPATIENT)
Dept: PHYSICAL MEDICINE AND REHAB | Facility: CLINIC | Age: 44
End: 2024-10-01
Payer: MEDICAID

## 2024-10-01 VITALS — RESPIRATION RATE: 14 BRPM | BODY MASS INDEX: 32.76 KG/M2 | HEIGHT: 65 IN | WEIGHT: 196.63 LBS

## 2024-10-01 DIAGNOSIS — G57.52 TARSAL TUNNEL SYNDROME, LEFT: ICD-10-CM

## 2024-10-01 DIAGNOSIS — M72.2 PLANTAR FASCIITIS: ICD-10-CM

## 2024-10-01 PROCEDURE — 95885 MUSC TST DONE W/NERV TST LIM: CPT | Mod: 26,S$PBB,, | Performed by: PHYSICAL MEDICINE & REHABILITATION

## 2024-10-01 PROCEDURE — 95909 NRV CNDJ TST 5-6 STUDIES: CPT | Mod: 26,S$PBB,, | Performed by: PHYSICAL MEDICINE & REHABILITATION

## 2024-10-01 PROCEDURE — 99999 PR PBB SHADOW E&M-EST. PATIENT-LVL III: CPT | Mod: PBBFAC,,, | Performed by: PHYSICAL MEDICINE & REHABILITATION

## 2024-10-01 PROCEDURE — 99213 OFFICE O/P EST LOW 20 MIN: CPT | Mod: PBBFAC | Performed by: PHYSICAL MEDICINE & REHABILITATION

## 2024-10-01 PROCEDURE — 99499 UNLISTED E&M SERVICE: CPT | Mod: S$PBB,,, | Performed by: PHYSICAL MEDICINE & REHABILITATION

## 2024-10-01 PROCEDURE — 95885 MUSC TST DONE W/NERV TST LIM: CPT | Mod: PBBFAC | Performed by: PHYSICAL MEDICINE & REHABILITATION

## 2024-10-01 PROCEDURE — 95909 NRV CNDJ TST 5-6 STUDIES: CPT | Mod: PBBFAC | Performed by: PHYSICAL MEDICINE & REHABILITATION

## 2024-10-01 NOTE — PROGRESS NOTES
[facility]   [address]  [place] [address]  Phone: [phone]        Full Name: Bianca Amato YOB: 1980  Patient ID: [id number]      Visit Date: 10/1/2024 09:14  Age: 44 Years 7 Months Old  Examining Physician: Kelly Rubin M.D.  Referring Physician:   Reason for Referral: lower extr    Chief Complaint   Patient presents with    Foot Pain     HPI: This is a 44 y.o.  female being seen in clinic today for evaluation of left foot achy pain and numbness.  Her symptoms are at the bottom of her foot and ankle and can occur at rest or activity.  She has some issues in the right foot at times.  Position change only provides some relief.      History obtained from patient    Past family, medical, social, and surgical history reviewed in chart    Review of Systems:     General- denies lethargy, weight change, fever, chills  Head/neck- denies swallowing difficulties  ENT- denies hearing changes  Cardiovascular-denies chest pain  Pulmonary- denies shortness of breath  GI- denies constipation or bowel incontinence  - denies bladder incontinence  Skin- denies wounds or rashes  Musculoskeletal- denies weakness, +pain  Neurologic- + numbness and tingling  Psychiatric- denies depressive or psychotic features, denies anxiety  Lymphatic-denies swelling  Endocrine- denies hypoglycemic symptoms/DM history  All other pertinent systems negative     Physical Examination:  General: Well developed, well nourished female, NAD  HEENT:NCAT EOMI bilaterally   Pulmonary:Normal respirations    Spinal Examination: CERVICAL  Active ROM is within normal limits.  Inspection: No deformity of spinal alignment.      Spinal Examination: LUMBAR or THORACIC  Active ROM is within normal limits.  Inspection: No deformity of spinal alignment.    Palpation: No vertebral tenderness to percussion.    Slr neg bilaterally    Bilateral Upper and Lower Extremities:  Pulses are 2+ at radial  bilaterally.  Shoulder/Elbow/Wrist/Hand ROM wnl  Hip/Knee/Ankle ROM wnl  Bilateral Extremities show normal capillary refill.  No signs of cyanosis, rubor, edema, skin changes, or dysvascular changes of appendages.  Nails appear intact.    Neurological Exam:  Cranial Nerves:  II-XII grossly intact    Manual Muscle Testing: (Motor 5=normal)  5/5 strength bilateral lower extremities  Ttp at left med ankle over tarsal tunnel  No focal atrophy is noted of either lower extremity.    Bilateral Reflexes:  Clonus neg bilaterally  Sensation: tested to light touch  - intact in legs   Gait: Narrow base and good arm swing.      Entire procedure explained to patient prior to proceeding.  Verbal consent obtained      SNC      Nerve / Sites Rec. Site Onset Lat Peak Lat Amp Segments Distance Peak Diff Velocity     ms ms µV  mm ms m/s   L Sural - Ankle (Calf)      Calf Ankle 3.1 4.0 12.2 Calf - Ankle 140  46   L Superficial peroneal - Ankle      Lat leg Ankle 2.7 3.2 17.4 Lat leg - Ankle 140  53   L Medial plantar, Lateral plantar - Ankle (Medial, lateral sole)      Medial plantar 1  3.1 3.7 12.5 Medial plantar 1 - G1 140  46      Medial plantar 2  3.4 4.3 16.3 Medial plantar 2 - 1 140 0.6 448   R Medial plantar, Lateral plantar - Ankle (Medial, lateral sole)      Medial plantar 1  2.7 3.5 32.0 Medial plantar 1 - G1 140  52      Medial plantar 2  2.7 3.5 22.8 Medial plantar 2 - 1 140 0.0 2688       MNC      Nerve / Sites Muscle Latency Amplitude Duration Rel Amp Segments Distance Lat Diff Velocity     ms mV ms %  mm ms m/s   L Peroneal - EDB      Ankle EDB 3.5 3.5 4.7 100 Ankle - EDB 80        Fibular head EDB 9.7 3.1 5.6 89.7 Fibular head - Ankle 310 6.1 50      Pop fossa EDB 11.0 2.8 5.1 91 Pop fossa - Fibular head 70 1.4 52   L Tibial - AH      Ankle AH 3.1 6.9 5.6 100 Ankle - Ankle 80        Pop fossa AH 13.4 6.1 5.6 87.7 Pop fossa - Ankle 410 10.3 40       EMG         EMG Summary Table     Spontaneous MUAP Recruitment   Muscle  IA Fib PSW Fasc Other Dur. Dur Amp Dur Polys Pattern Effort   L. Gastrocnemius (Medial head) N None None None . _NFT_ _NFT_ N N N N Max   L. Extensor digitorum brevis N None None None . _NFT_ _NFT_ N N N N Max   L. Abductor hallucis N None None None . _NFT_ _NFT_ N N N N Max                            INTERPRETATION  -Left superficial peroneal sensory nerve conduction study showed normal peak latency and amplitude  -Left sural sensory nerve conduction study showed normal peak latency and amplitude  -Left peroneal motor nerve conduction study showed normal latency, amplitude, and conduction velocity  -Left tibial motor nerve conduction study showed normal latency, amplitude, and conduction velocity  -Left medial and lateral plantar sensory showed prolonged peak latency and normal amplitude. Right medial and lateral plantar sensory showed normal peak latency and amplitude  -Needle EMG examination performed to above mentioned muscles       IMPRESSION  1. ABNORMAL study   2. There is electrodiagnostic evidence of a tarsal tunnel syndrome across the left ankle. There was no evidence of a lumbar radiculopathy of the left L5, S1 nerve roots    PLAN  1. Follow up with referring provider: Dr. Raffi Garcia  2. Handouts on tarsal tunnel syndrome provided  3. This study is good for one year. If symptoms worsen or do not improve, please re-consult.    Kelly Rubin M.D.  Physical Medicine and Rehab

## 2024-10-04 DIAGNOSIS — G57.52 TARSAL TUNNEL SYNDROME, LEFT: Primary | ICD-10-CM

## 2024-10-04 RX ORDER — GABAPENTIN 300 MG/1
300 CAPSULE ORAL DAILY
Qty: 30 CAPSULE | Refills: 0 | Status: SHIPPED | OUTPATIENT
Start: 2024-10-04 | End: 2024-11-03

## 2024-10-07 ENCOUNTER — HOSPITAL ENCOUNTER (OUTPATIENT)
Dept: RADIOLOGY | Facility: HOSPITAL | Age: 44
Discharge: HOME | End: 2024-10-07
Attending: STUDENT IN AN ORGANIZED HEALTH CARE EDUCATION/TRAINING PROGRAM
Payer: MEDICAID

## 2024-10-07 DIAGNOSIS — E04.2 MULTINODULAR THYROID: ICD-10-CM

## 2024-10-07 DIAGNOSIS — E05.90 SUBCLINICAL HYPERTHYROIDISM: ICD-10-CM

## 2024-10-07 PROCEDURE — 78014 THYROID IMAGING W/BLOOD FLOW: CPT | Mod: 26,,, | Performed by: STUDENT IN AN ORGANIZED HEALTH CARE EDUCATION/TRAINING PROGRAM

## 2024-10-07 PROCEDURE — A9516 IODINE I-123 SOD IODIDE MIC: HCPCS | Performed by: STUDENT IN AN ORGANIZED HEALTH CARE EDUCATION/TRAINING PROGRAM

## 2024-10-07 PROCEDURE — 78014 THYROID IMAGING W/BLOOD FLOW: CPT | Mod: TC

## 2024-10-07 RX ORDER — SODIUM IODIDE I 123 200 UCI/1
246 CAPSULE, GELATIN COATED ORAL
Status: COMPLETED | OUTPATIENT
Start: 2024-10-07 | End: 2024-10-07

## 2024-10-07 RX ADMIN — SODIUM IODIDE I 123 246 MICROCI: 200 CAPSULE, GELATIN COATED ORAL at 09:10

## 2024-10-08 ENCOUNTER — HOSPITAL ENCOUNTER (OUTPATIENT)
Dept: RADIOLOGY | Facility: HOSPITAL | Age: 44
Discharge: HOME | End: 2024-10-08
Attending: STUDENT IN AN ORGANIZED HEALTH CARE EDUCATION/TRAINING PROGRAM
Payer: MEDICAID

## 2024-10-15 ENCOUNTER — OFFICE VISIT (OUTPATIENT)
Dept: CARDIOLOGY | Facility: CLINIC | Age: 44
End: 2024-10-15
Payer: MEDICAID

## 2024-10-15 VITALS
RESPIRATION RATE: 16 BRPM | SYSTOLIC BLOOD PRESSURE: 128 MMHG | OXYGEN SATURATION: 100 % | HEIGHT: 65 IN | BODY MASS INDEX: 32.65 KG/M2 | DIASTOLIC BLOOD PRESSURE: 100 MMHG | HEART RATE: 83 BPM | WEIGHT: 196 LBS

## 2024-10-15 DIAGNOSIS — R94.31 ABNORMAL EKG: ICD-10-CM

## 2024-10-15 DIAGNOSIS — R94.31 NONSPECIFIC ABNORMAL ELECTROCARDIOGRAM (ECG) (EKG): ICD-10-CM

## 2024-10-15 DIAGNOSIS — G47.33 OSA ON CPAP: ICD-10-CM

## 2024-10-15 DIAGNOSIS — R07.9 CHEST PAIN, UNSPECIFIED TYPE: ICD-10-CM

## 2024-10-15 DIAGNOSIS — R73.03 PREDIABETES: ICD-10-CM

## 2024-10-15 DIAGNOSIS — E61.1 IRON DEFICIENCY: ICD-10-CM

## 2024-10-15 DIAGNOSIS — I10 ESSENTIAL HYPERTENSION: Primary | ICD-10-CM

## 2024-10-15 DIAGNOSIS — J45.20 MILD INTERMITTENT ASTHMA WITHOUT COMPLICATION: ICD-10-CM

## 2024-10-15 PROCEDURE — 3008F BODY MASS INDEX DOCD: CPT | Mod: CPTII,,, | Performed by: INTERNAL MEDICINE

## 2024-10-15 PROCEDURE — 3074F SYST BP LT 130 MM HG: CPT | Mod: CPTII,,, | Performed by: INTERNAL MEDICINE

## 2024-10-15 PROCEDURE — 1160F RVW MEDS BY RX/DR IN RCRD: CPT | Mod: CPTII,,, | Performed by: INTERNAL MEDICINE

## 2024-10-15 PROCEDURE — 3044F HG A1C LEVEL LT 7.0%: CPT | Mod: CPTII,,, | Performed by: INTERNAL MEDICINE

## 2024-10-15 PROCEDURE — 99214 OFFICE O/P EST MOD 30 MIN: CPT | Mod: S$PBB,,, | Performed by: INTERNAL MEDICINE

## 2024-10-15 PROCEDURE — G2211 COMPLEX E/M VISIT ADD ON: HCPCS | Mod: S$PBB,,, | Performed by: INTERNAL MEDICINE

## 2024-10-15 PROCEDURE — 99214 OFFICE O/P EST MOD 30 MIN: CPT | Mod: PBBFAC | Performed by: INTERNAL MEDICINE

## 2024-10-15 PROCEDURE — 99999 PR PBB SHADOW E&M-EST. PATIENT-LVL IV: CPT | Mod: PBBFAC,,, | Performed by: INTERNAL MEDICINE

## 2024-10-15 PROCEDURE — 3080F DIAST BP >= 90 MM HG: CPT | Mod: CPTII,,, | Performed by: INTERNAL MEDICINE

## 2024-10-15 PROCEDURE — 4010F ACE/ARB THERAPY RXD/TAKEN: CPT | Mod: CPTII,,, | Performed by: INTERNAL MEDICINE

## 2024-10-15 PROCEDURE — 1159F MED LIST DOCD IN RCRD: CPT | Mod: CPTII,,, | Performed by: INTERNAL MEDICINE

## 2024-10-15 RX ORDER — AMLODIPINE BESYLATE 10 MG/1
10 TABLET ORAL DAILY
COMMUNITY

## 2024-10-15 RX ORDER — AMLODIPINE BESYLATE 10 MG/1
10 TABLET ORAL DAILY
Qty: 90 TABLET | Refills: 3 | Status: SHIPPED | OUTPATIENT
Start: 2024-10-15 | End: 2025-10-10

## 2024-10-15 RX ORDER — LOSARTAN POTASSIUM 100 MG/1
100 TABLET ORAL NIGHTLY
Qty: 90 TABLET | Refills: 1 | Status: SHIPPED | OUTPATIENT
Start: 2024-10-15 | End: 2025-10-10

## 2024-10-15 RX ORDER — HYDROCHLOROTHIAZIDE 25 MG/1
25 TABLET ORAL DAILY
Qty: 90 TABLET | Refills: 3 | Status: SHIPPED | OUTPATIENT
Start: 2024-10-15 | End: 2025-10-15

## 2024-10-21 ENCOUNTER — OFFICE VISIT (OUTPATIENT)
Dept: OTOLARYNGOLOGY | Facility: CLINIC | Age: 44
End: 2024-10-21
Payer: MEDICAID

## 2024-10-21 DIAGNOSIS — E05.90 SUBCLINICAL HYPERTHYROIDISM: Primary | ICD-10-CM

## 2024-10-21 DIAGNOSIS — E05.20 TOXIC MULTINODULAR GOITER: ICD-10-CM

## 2024-10-21 PROCEDURE — 99214 OFFICE O/P EST MOD 30 MIN: CPT | Mod: 95,,, | Performed by: STUDENT IN AN ORGANIZED HEALTH CARE EDUCATION/TRAINING PROGRAM

## 2024-10-21 PROCEDURE — 4010F ACE/ARB THERAPY RXD/TAKEN: CPT | Mod: CPTII,95,, | Performed by: STUDENT IN AN ORGANIZED HEALTH CARE EDUCATION/TRAINING PROGRAM

## 2024-10-21 PROCEDURE — 3044F HG A1C LEVEL LT 7.0%: CPT | Mod: CPTII,95,, | Performed by: STUDENT IN AN ORGANIZED HEALTH CARE EDUCATION/TRAINING PROGRAM

## 2024-10-21 NOTE — PROGRESS NOTES
Visit type: Virtual visit with synchronous audio and video  The patient location is: [facility]   Total time spent with patient: ~10min  Each patient to whom he or she provides medical services by telemedicine is:  (1) informed of the relationship between the physician and patient and the respective role of any other health care provider with respect to management of the patient; and (2) notified that he or she may decline to receive medical services by telemedicine and may withdraw from such care at any time.    Chief complaint:    No chief complaint on file.        Referring Provider:  No referring provider defined for this encounter.    History of present illness:     Ms. Amato is a 44 y.o.  presenting for evaluation of thyroid nodule/s.     She states that she has not noted a mass in her neck.       Denies pain.       She denies sore throat, dysphagia, otalgia, voice change, hemoptysis.      No family/personal history of thyroid cancer.     No personal history of head and neck cancer/head and neck radiation.    No personal history of hypo- or hyperthyroidism.    She is gaining weight.     History      Past Medical History:   Past Medical History:   Diagnosis Date    Carpal tunnel syndrome     bilateral    Hypertension     Iron deficiency anemia     Mild intermittent asthma     .          Past Surgical History:  Past Surgical History:   Procedure Laterality Date    COLONOSCOPY N/A 4/28/2023    Procedure: COLONOSCOPY;  Surgeon: Maximo Cox MD;  Location: [facility];  Service: Endoscopy;  Laterality: N/A;    EYE FOREIGN BODY REMOVAL Right     EYE SURGERY  2009    Im not sure what year    HYSTERECTOMY  2020    OOPHORECTOMY      ROBOT-ASSISTED LAPAROSCOPIC ABDOMINAL HYSTERECTOMY USING DA ALYSA XI N/A 12/06/2019    Procedure: XI ROBOTIC HYSTERECTOMY;  Surgeon: VENESSA Davidson MD;  Location: [facility] OR;  Service: OB/GYN;  Laterality: N/A;    ROBOT-ASSISTED SURGICAL REMOVAL OF FALLOPIAN TUBE USING DA ALYSA  XI Bilateral 12/06/2019    Procedure: XI ROBOTIC SALPINGECTOMY;  Surgeon: VENESSA Davidson MD;  Location: [facility] OR;  Service: OB/GYN;  Laterality: Bilateral;    SURGICAL REMOVAL OF MASS OF AXILLA Right 11/12/2018    Procedure: EXCISION, MASS, AXILLARY;  Surgeon: Alis Tian MD;  Location: [facility] OR;  Service: General;  Laterality: Right;    TUBAL LIGATION              Medications:  She  has a current medication list which includes the following prescription(s): albuterol, amlodipine, amlodipine, clotrimazole, gabapentin, hydrochlorothiazide, ketoconazole, losartan, and nebulizer and compressor.      Allergies:   Review of patient's allergies indicates:   Allergen Reactions    Latex Rash and Shortness Of Breath    Latex, natural rubber Hives    Penicillins Hives, Rash and Shortness Of Breath    Sulfa (sulfonamide antibiotics) Hives, Swelling, Rash and Shortness Of Breath         Family history: family history includes Asthma in her father; COPD in her father; Diabetes in her mother; Hypertension in her father and mother; Sarcoidosis in her mother.             Social History          Alcohol use:  reports no history of alcohol use.            Tobacco:  reports that she has never smoked. She has never used smokeless tobacco.           Physical Examination     General: No acute distress    Voice: no dysphonia, no dysarthria      Head/Face: Normocephalic, atraumatic    Ears:     Right ear: No gross deformity    Left ear: No gross deformity      Nose: No gross deformity or lesions      Mouth/Oropharynx: Lips without any lesions      Neurologic: Moving all extremities without gross abnormality. House-Brackmann 1/6       Data reviewed      Pathology      Right inferior - FLUS  Left - benign    Laboratory    Lab Results   Component Value Date    TSH 0.343 (L) 07/03/2024        Lab Results   Component Value Date    CALCIUM 9.4 07/03/2024        Imaging    I have independently reviewed the following imaging with the  findings noted below:      US thyroid    Righrt inferior nodule - hypoechoic, irregular, possible ETE, calcification      NM thyroid scan   1. Elevated 24 uptake.  2. Focus of increased uptake in the mid to upper pole of the right thyroid and lower pole of the left thyroid.  Findings suggestive of hyperfunctioning nodules versus multinodular goiter.    Impression/Plan     1. Subclinical hyperthyroidism    2. Toxic multinodular goiter         FNA consistent with FLUS for the right and benign on the left in setting of multinodular toxic thyroid.    We will refer to endocrinology for medical management of her subclinical hyperthyroidism and I would like their opinion regarding management of this right lobe nodule, which returned as FLUS, but was hot on NM scan.     Typically we would want to treat the hyperthyroidism first and then consider re-biopsy, as hot nodules are very rarely cancer, but this nodule does have high-risk features.     If surgical management is recommenced upfront, we will plan for total thyroidectomy.      Eduard Quintero MD  [facility]   [facility]  [address].  [place] [id number]  P: [phone]  F: [phone]         no

## 2024-10-29 DIAGNOSIS — G57.52 TARSAL TUNNEL SYNDROME, LEFT: ICD-10-CM

## 2024-10-29 RX ORDER — GABAPENTIN 300 MG/1
300 CAPSULE ORAL DAILY
Qty: 30 CAPSULE | Refills: 0 | Status: SHIPPED | OUTPATIENT
Start: 2024-10-29 | End: 2024-12-01

## 2024-12-11 DIAGNOSIS — G57.52 TARSAL TUNNEL SYNDROME, LEFT: ICD-10-CM

## 2024-12-11 RX ORDER — GABAPENTIN 300 MG/1
300 CAPSULE ORAL DAILY
Qty: 30 CAPSULE | Refills: 0 | Status: SHIPPED | OUTPATIENT
Start: 2024-12-11 | End: 2025-01-10

## 2025-01-02 DIAGNOSIS — G57.52 TARSAL TUNNEL SYNDROME, LEFT: ICD-10-CM

## 2025-01-02 RX ORDER — GABAPENTIN 300 MG/1
300 CAPSULE ORAL DAILY
Qty: 30 CAPSULE | Refills: 0 | Status: CANCELLED | OUTPATIENT
Start: 2025-01-02 | End: 2025-02-01

## 2025-01-03 ENCOUNTER — LAB VISIT (OUTPATIENT)
Dept: LAB | Facility: HOSPITAL | Age: 45
End: 2025-01-03
Attending: PHYSICIAN ASSISTANT
Payer: MEDICAID

## 2025-01-03 DIAGNOSIS — I10 ESSENTIAL HYPERTENSION: ICD-10-CM

## 2025-01-03 DIAGNOSIS — E05.90 SUBCLINICAL HYPERTHYROIDISM: ICD-10-CM

## 2025-01-03 DIAGNOSIS — R73.03 PREDIABETES: ICD-10-CM

## 2025-01-03 LAB
ALBUMIN SERPL BCP-MCNC: 3.7 G/DL (ref 3.5–5.2)
ALP SERPL-CCNC: 76 U/L (ref 40–150)
ALT SERPL W/O P-5'-P-CCNC: 13 U/L (ref 10–44)
ANION GAP SERPL CALC-SCNC: 10 MMOL/L (ref 8–16)
AST SERPL-CCNC: 13 U/L (ref 10–40)
BILIRUB SERPL-MCNC: 0.2 MG/DL (ref 0.1–1)
BUN SERPL-MCNC: 11 MG/DL (ref 6–20)
CALCIUM SERPL-MCNC: 9.2 MG/DL (ref 8.7–10.5)
CHLORIDE SERPL-SCNC: 106 MMOL/L (ref 95–110)
CO2 SERPL-SCNC: 23 MMOL/L (ref 23–29)
CREAT SERPL-MCNC: 0.7 MG/DL (ref 0.5–1.4)
EST. GFR  (NO RACE VARIABLE): >60 ML/MIN/1.73 M^2
ESTIMATED AVG GLUCOSE: 120 MG/DL (ref 68–131)
GLUCOSE SERPL-MCNC: 84 MG/DL (ref 70–110)
HBA1C MFR BLD: 5.8 % (ref 4–5.6)
POTASSIUM SERPL-SCNC: 3.8 MMOL/L (ref 3.5–5.1)
PROT SERPL-MCNC: 7.3 G/DL (ref 6–8.4)
SODIUM SERPL-SCNC: 139 MMOL/L (ref 136–145)
T4 FREE SERPL-MCNC: 0.81 NG/DL (ref 0.71–1.51)
TSH SERPL DL<=0.005 MIU/L-ACNC: 0.33 UIU/ML (ref 0.4–4)

## 2025-01-03 PROCEDURE — 84443 ASSAY THYROID STIM HORMONE: CPT | Performed by: PHYSICIAN ASSISTANT

## 2025-01-03 PROCEDURE — 83036 HEMOGLOBIN GLYCOSYLATED A1C: CPT | Performed by: PHYSICIAN ASSISTANT

## 2025-01-03 PROCEDURE — 80053 COMPREHEN METABOLIC PANEL: CPT | Performed by: PHYSICIAN ASSISTANT

## 2025-01-03 PROCEDURE — 84439 ASSAY OF FREE THYROXINE: CPT | Performed by: PHYSICIAN ASSISTANT

## 2025-01-09 ENCOUNTER — OFFICE VISIT (OUTPATIENT)
Dept: INTERNAL MEDICINE | Facility: CLINIC | Age: 45
End: 2025-01-09
Payer: MEDICAID

## 2025-01-09 ENCOUNTER — OFFICE VISIT (OUTPATIENT)
Dept: OTOLARYNGOLOGY | Facility: CLINIC | Age: 45
End: 2025-01-09
Payer: MEDICAID

## 2025-01-09 VITALS
HEART RATE: 87 BPM | SYSTOLIC BLOOD PRESSURE: 128 MMHG | DIASTOLIC BLOOD PRESSURE: 88 MMHG | OXYGEN SATURATION: 98 % | HEIGHT: 65 IN | BODY MASS INDEX: 31.46 KG/M2 | WEIGHT: 188.81 LBS

## 2025-01-09 DIAGNOSIS — I10 ESSENTIAL HYPERTENSION: Primary | ICD-10-CM

## 2025-01-09 DIAGNOSIS — R73.03 PREDIABETES: ICD-10-CM

## 2025-01-09 DIAGNOSIS — H61.21 IMPACTED CERUMEN OF RIGHT EAR: Primary | ICD-10-CM

## 2025-01-09 DIAGNOSIS — E05.90 SUBCLINICAL HYPERTHYROIDISM: ICD-10-CM

## 2025-01-09 PROCEDURE — 3079F DIAST BP 80-89 MM HG: CPT | Mod: CPTII,,, | Performed by: FAMILY MEDICINE

## 2025-01-09 PROCEDURE — 3008F BODY MASS INDEX DOCD: CPT | Mod: CPTII,,, | Performed by: FAMILY MEDICINE

## 2025-01-09 PROCEDURE — 1159F MED LIST DOCD IN RCRD: CPT | Mod: CPTII,,, | Performed by: FAMILY MEDICINE

## 2025-01-09 PROCEDURE — 99213 OFFICE O/P EST LOW 20 MIN: CPT | Mod: PBBFAC,27 | Performed by: FAMILY MEDICINE

## 2025-01-09 PROCEDURE — 99999 PR PBB SHADOW E&M-EST. PATIENT-LVL II: CPT | Mod: PBBFAC,,, | Performed by: PHYSICIAN ASSISTANT

## 2025-01-09 PROCEDURE — G2211 COMPLEX E/M VISIT ADD ON: HCPCS | Mod: S$PBB,,, | Performed by: FAMILY MEDICINE

## 2025-01-09 PROCEDURE — 3074F SYST BP LT 130 MM HG: CPT | Mod: CPTII,,, | Performed by: FAMILY MEDICINE

## 2025-01-09 PROCEDURE — 69210 REMOVE IMPACTED EAR WAX UNI: CPT | Mod: PBBFAC

## 2025-01-09 PROCEDURE — 3044F HG A1C LEVEL LT 7.0%: CPT | Mod: CPTII,,, | Performed by: FAMILY MEDICINE

## 2025-01-09 PROCEDURE — 99212 OFFICE O/P EST SF 10 MIN: CPT | Mod: PBBFAC | Performed by: PHYSICIAN ASSISTANT

## 2025-01-09 PROCEDURE — 99499 UNLISTED E&M SERVICE: CPT | Mod: 25,S$PBB,, | Performed by: PHYSICIAN ASSISTANT

## 2025-01-09 PROCEDURE — 99214 OFFICE O/P EST MOD 30 MIN: CPT | Mod: S$PBB,,, | Performed by: FAMILY MEDICINE

## 2025-01-09 PROCEDURE — 99999 PR PBB SHADOW E&M-EST. PATIENT-LVL III: CPT | Mod: PBBFAC,,, | Performed by: FAMILY MEDICINE

## 2025-01-09 NOTE — PROGRESS NOTES
Subjective:   Cerumen impactions     Patient ID: Bianca Amato is a 44 y.o. female.    Chief Complaint:  Excessive ear wax     Bianca Amato is a 44 y.o. female here to see me today for evaluation of a possible wax impaction in bilateral ears.  She denies hearing loss, pain or drainage. This has been an issue in the past.  The patient has not been using any sort of ear drop to soften the wax.    HPI  Review of Systems   HENT: Negative for hearing loss, ear discharge, ear pain and tinnitus.        Objective:     Physical Exam   HENT:   Right Ear: External ear and ear canal normal.   Left Ear: External ear and ear canal normal.   Moderate amount of cerumen on RIGHT ear, removal described below       Procedure Note    CHIEF COMPLAINT:  Cerumen Impaction    Description:  The patient was seated in an exam chair.  An ear speculum was placed in the right EAC and was examined under the microscope.  Suction and/or loop curettes were used to remove a large cerumen impaction.  The tympanic membrane was visualized and was normal in appearance.  The left EAC was examined under the microscopy. The TM was intact and normal on this side as well.  The patient tolerated the procedure well.           Assessment:     1. Impacted cerumen of right ear        Plan:     1.  Cerumen impaction:  Removed today without difficulty.  I would recommend the use of a wax softening drop, either over the counter Debrox or mineral oil, on a weekly basis.  I also instructed the patient to avoid Qtips.

## 2025-01-09 NOTE — PATIENT INSTRUCTIONS
[facility]   [address]   [place] [id number] [phone]     Terry Johnson MD   [address]   [place] [address]   [phone] (Work)   [phone] (Fax)     [email or web address] ([email or web address])  Whole Food, Plant-Based Nutrition  Physical Activity  Stress Management  Avoidance of Risky Substances  Restorative Sleep  Social Connection

## 2025-01-09 NOTE — PROGRESS NOTES
Subjective:       Patient ID: Bianca Amato is a 44 y.o. female.    Chief Complaint: Follow-up    History of Present Illness    Patient presents to clinic today for followup of chronic conditions.  - Blood pressure well controlled. Pre-diabetes status has slightly increased but remains in the pre-diabetic range. She is in a subclinical hyperthyroid state and scheduled to see Dr. Terry Johnson, an endocrinologist at [facility] in [place], [place]. She has been placed on a waiting list for an earlier appointment if available.  - Patient reports muscle cramps at bedtime after starting Losartan 100mg nightly, with minimal water intake. She notes recent cravings for carbohydrates and sugary beverages, which she believes may have contributed to increased A1C level. She reports weight gain and noticeable changes in body composition.  - Patient reports constipation and a skin growth near her eye, consistent with a skin tag.  - Patient declines COVID boosters and reports inadequate water intake.  Patient is otherwise without concerns today.    ROS:  General: -fever, -chills, -fatigue, +WEIGHT GAIN, -weight loss  Eyes: -eye pain, -vision change  ENMT: -hearing loss, -ear pain, -nasal congestion, -rhinorrhea, -dental problem, -trouble swallowing  Cardiovascular: -chest pain, -palpitations, -lower extremity edema  Respiratory: -cough, -trouble breathing  Gastrointestinal: -abdominal pain, -abdominal swelling, -nausea, -vomiting, -diarrhea, +CONSTIPATION, -blood in stool  Genitourinary: -difficulty urinating, -genital problem  Musculoskeletal: -joint pain, -muscle aches, +MUSCLE CRAMPS  Integumentary: -rash  Lymphatics: -swollen glands, -easy bruising  Neurologic: -headache, -dizziness, -numbness, -weakness  Psychiatric: -anxiety, -depression, -sleep difficulty     Objective:     Vitals:    01/09/25 0841   BP: 128/88   BP Location: Left arm   Patient Position: Sitting   Pulse: 87   SpO2: 98%   Weight: 85.6 kg  "(188 lb 13.2 oz)   Height: 5' 5" (1.651 m)      Physical Exam  Vitals reviewed.   Constitutional:       General: She is not in acute distress.     Appearance: She is well-developed.   HENT:      Head: Normocephalic and atraumatic.   Eyes:      General: Lids are normal. No scleral icterus.     Extraocular Movements: Extraocular movements intact.      Conjunctiva/sclera: Conjunctivae normal.      Pupils: Pupils are equal, round, and reactive to light.   Pulmonary:      Effort: Pulmonary effort is normal.   Neurological:      Mental Status: She is alert and oriented to person, place, and time.      Cranial Nerves: No cranial nerve deficit.      Gait: Gait normal.   Psychiatric:         Mood and Affect: Mood and affect normal.           Lab Results   Component Value Date     01/03/2025    K 3.8 01/03/2025     01/03/2025    CO2 23 01/03/2025    GLU 84 01/03/2025    BUN 11 01/03/2025    CREATININE 0.7 01/03/2025    CALCIUM 9.2 01/03/2025    PROT 7.3 01/03/2025    ALBUMIN 3.7 01/03/2025    BILITOT 0.2 01/03/2025    ALKPHOS 76 01/03/2025    AST 13 01/03/2025    ALT 13 01/03/2025    EGFRNORACEVR >60.0 01/03/2025    ANIONGAP 10 01/03/2025       Lab Results   Component Value Date    TSH 0.327 (L) 01/03/2025    FREET4 0.81 01/03/2025       Lab Results   Component Value Date    HGBA1C 5.8 (H) 01/03/2025       Assessment:       1. Essential hypertension    2. Prediabetes    3. Subclinical hyperthyroidism        Plan:   1. Essential hypertension  -     Comprehensive Metabolic Panel; Future; Expected date: 07/09/2025  -     Lipid Panel; Future; Expected date: 07/09/2025  -     CBC Auto Differential; Future; Expected date: 07/09/2025    2. Prediabetes  -     Hemoglobin A1C; Future; Expected date: 07/09/2025    3. Subclinical hyperthyroidism  Overview:  Followed by Endocrinology, continue current treatment plan     Orders:  -     TSH; Future; Expected date: 07/09/2025      Assessment & Plan    PRE-DIABETES:   Evaluated " pre-diabetes status, noting slight increase in A1C but still within pre-diabetic range.   Discussed dietary habits and recommended changes to manage blood sugar.   Recommend reducing sugar intake, especially in coffee.   Suggested alternatives to sugary drinks.   Noted patient's weight gain and increased sugar cravings, potentially contributing to elevated A1C.   Discussed impact of processed foods, especially bread and sugary drinks, on blood sugar and cravings.    THYROID DISORDER:   Patient scheduled to see Dr. Terry Johnson at [facility] in [place], [place].    HYPERTENSION:   Assessed recent lab results, noting blood pressure is well-controlled.   Continued Losartan 100 mg at night and hydrochlorothiazide in the morning.   Evaluated patient's report of muscle cramps with nighttime Losartan, likely due to dehydration rather than medication side effect.    WEIGHT MANAGEMENT:   Discussed dietary habits contributing to weight gain.   Recommend reducing processed foods and sugars.    CONSTIPATION:   Explained connection between inadequate fluid intake and constipation.   Attributed constipation to poor hydration, not thyroid issues.   Recommend increasing fluid intake to address constipation.    SKIN GROWTH:   Evaluated skin growth near patient's eye, identified as a possible skin tag.   Recommend consultation with an ophthalmologist regarding the skin growth.    DEHYDRATION:   Recommend increasing fluid intake to address muscle cramps.   Explained importance of hydration for preventing muscle cramps.   Patient to increase fluid intake to address dehydration.   Provided information on hydration alternatives to water, including herbal teas and sparkling water.    FLU VACCINATION:   Administered flu vaccine during visit.    OTHER INSTRUCTIONS:   Provided a document on pillars of a healthy lifestyle.    Patient expressed understanding and agreement with plan.    Visit today included increased complexity  associated with the care of the episodic problem prediabetes, which was addressed while instituting co-management of the longitudinal care of the patient due to the serious and/or complex managed problem(s) .    I have evaluated and discussed management associated with medical care services that serve as the continuing focal point for all needed health care services and/or with medical care services that are part of ongoing care related to my patient's single, serious condition or a complex condition(s).    I am providing ongoing care and I am the primary care provider for this patient, and they are being managed, monitored, and/or observed for their chronic conditions over time.     I have addressed their ongoing health maintenance requirements and needs for all health care services and reviewed co-management plans provided by specialty providers when available.    There are no preventive care reminders to display for this patient.    Health Maintenance reviewed/updated.    Follow up in about 6 months (around 7/9/2025), or if symptoms worsen or fail to improve, for annual with reid Trevino PTA.    This note was generated with the assistance of ambient listening technology. Verbal consent was obtained by the patient and accompanying visitor(s) for the recording of patient appointment to facilitate this note. I attest to having reviewed and edited the generated note for accuracy, though some syntax or spelling errors may persist. Please contact the author of this note for any clarification.

## 2025-01-15 NOTE — PROGRESS NOTES
Physical Therapy Lymphedema Initial Evaluation  [facility]     Patient Name: Fátima Julien  : 1964  MRN: [id number]  Today's Date: 1/15/2025      Visit Date: 01/15/2025    Visit Dx:    ICD-10-CM ICD-9-CM   1. Malignant neoplasm of female breast, unspecified estrogen receptor status, unspecified laterality, unspecified site of breast  C50.919 174.9   2. Lymphedema  I89.0 457.1   3. S/P bilateral mastectomy  Z90.13 V45.71   4. At risk for lymphedema  Z91.89 V49.89       Patient Active Problem List   Diagnosis    Malignant neoplasm of female breast    Breast cancer        Past Medical History:   Diagnosis Date    Breast cancer     LEFT BREAST    Hyperlipidemia     Hypotension     PONV (postoperative nausea and vomiting)     SEVERE    Syncope     Thyroid nodule     Ulcerative colitis         Past Surgical History:   Procedure Laterality Date    BREAST BIOPSY Left     BREAST LUMPECTOMY Bilateral     BREAST RECONSTRUCTION Bilateral 10/31/2024    Procedure: BILATERAL GOLDILOCKS RECONSTRUCTION BILATERAL  WITH TISSUE EXPANDER PLACEMENT;  Surgeon: Adan Mayorga MD;  Location: [facility] OR [facility];  Service: Plastics;  Laterality: Bilateral;    CARPAL TUNNEL RELEASE Left     CARPAL TUNNEL RELEASE Right     COLONOSCOPY N/A     [facility] OVARIAN CYSTECTOMY      HYSTERECTOMY      INCISION AND DRAINAGE OF WOUND      KNEE ARTHROSCOPY Right     MASTECTOMY W/ SENTINEL NODE BIOPSY Left 10/31/2024    Procedure: bilateral skin sparing mastectomy, left sentinel lymph node biopsy;  Surgeon: Stacy Edouard MD;  Location: [facility];  Service: General;  Laterality: Left;    THYROIDECTOMY, PARTIAL         Visit Dx:    ICD-10-CM ICD-9-CM   1. Malignant neoplasm of female breast, unspecified estrogen receptor status, unspecified laterality, unspecified site of breast  C50.919 174.9   2. Lymphedema  I89.0 457.1   3. S/P bilateral mastectomy  Z90.13 V45.71   4. At risk for lymphedema   Subjective:   Patient ID:  Bianca Amato is a 44 y.o. female who presents for cardiac consult of No chief complaint on file.    Referring Physician: Vivi Chand MD   Reason for consult: chest pain    Follow-up  Associated symptoms include chest pain.     The patient came in today for cardiac consult of No chief complaint on file.      Bianca Amato is a 44 y.o. female pt with HTN, obesity, PreDM, asthma, anemia , DONNY presents for follow up CV eval.         4/27/21  BP elevated 124/100 today. She was recently started on CPAP for DONNY but hard to wear face mask, is a mouth breathing.   ECG - NSR,      7/11/24  Follow up with me since 4/2021. She had CV eval in Jan 2024 for breast reduction surgery.   BP and HR stable. BMI 31 - 190 lbs   Last week she was having chest pain and tingling went to ER.   ECG -NSR, poor RWP     10/15/24  ECG stress test 7/2024 neg for ischemia,11 mets, during recovery pt became diaphoretic/hypoglycemic, had HTN response.   ECHO 7/2024 with normal bi V function and valves, PASP 14 mmHg.     BP elevated 128/100. HR 83, BMI 32- 195 lbs   Has not been checking BP at home. Did not take BP meds this AM     Was a .     Results for orders placed during the hospital encounter of 07/24/24    Echo    Interpretation Summary    Left Ventricle: The left ventricle is normal in size. Ventricular mass is normal. Normal wall thickness. There is concentric remodeling. There is normal systolic function with a visually estimated ejection fraction of 60 - 65%. There is normal diastolic function.    Right Ventricle: Normal right ventricular cavity size. Wall thickness is normal. Systolic function is normal.    Pulmonary Artery: The estimated pulmonary artery systolic pressure is 14 mmHg.    IVC/SVC: Normal venous pressure at 3 mmHg.      Results for orders placed during the hospital encounter of 07/24/24    Exercise Stress - EKG    Interpretation Summary    The ECG portion of  Z91.89 V49.89        Patient History       Row Name 01/15/25 1000             History    Chief Complaint Joint stiffness;Muscle tenderness;Muscle weakness  -KA      Date Current Problem(s) Began 10/31/24  -KA      Brief Description of Current Complaint Pt diagnosed with L breast cancer in 2024, underwent B mastectomy with L SLNB (0/4).  Currently undergoing chemo due to high oncotype score, on session 2/4.  Did have seroma R axilla when drains were removed, feels a little bit on L side that has never been drained.  Has not noted any swelling in arms.  Pt works for [employer] works from home, modified duties.  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 No  -KA      Are you or can you be pregnant No  -KA         Pain     Pain Location Other (Comment)  Bodywide muscle aches  -KA      Pain at Present 0  -KA      Pain at Best 0  -KA      Pain at Worst 9  -KA      Pain Frequency Intermittent  -KA      Pain Description Aching;Headache  -KA      What Performance Factors Make the Current Problem(s) WORSE? Only with chemo  -KA      Is your sleep disturbed? No  -KA      Difficulties with ADL's? Limited with heavy lifting  -KA         Fall Risk Assessment    Any falls in the past year: No  -KA         Services    Prior Rehab/Home Health Experiences No  -KA      Are you currently receiving Home Health services No  -KA      Do you plan to receive Home Health services in the near future No  -KA         Daily Activities    Primary Language English  -KA      Are you able to read Yes  -KA      Are you able to write Yes  -KA      How does patient learn best? Listening;Reading;Demonstration;Pictures/Video  -KA      Does patient have problems with the following? Anxiety  -KA      Pt  the study is negative for ischemia.    The patient reported no chest pain during the stress test.    The exercise capacity was normal.    The patient exercised for 9 minutes 33 seconds on a Kamran protocol, corresponding to a functional capacity of 11METS, achieving a peak heart rate of 179 bpm, which is 108% of the age predicted maximum heart rate. Their exercise capacity was normal.    During recovery patient became diaphoretic, clammy, dizzy, hypotensive and hypoglycemic..   Food, drink and fluids were given to patient exit BG 99 /70 patient verbalized feeling better.    Stress ECG: There is hypertensive blood pressure response with stress.      Results for orders placed during the hospital encounter of 08/12/20   Echo Color Flow Doppler? Yes    Narrative · Concentric left ventricular hypertrophy.  · Normal left ventricular systolic function. The estimated ejection   fraction is 55%.  · Normal LV diastolic function.  · No wall motion abnormalities.  · Normal right ventricular systolic function.  · Normal central venous pressure (3 mmHg).  · The estimated PA systolic pressure is 14 mmHg.          Results for orders placed during the hospital encounter of 08/12/20   Exercise Stress - EKG    Narrative   The EKG portion of this study is negative for ischemia.    The patient reported no chest pain during the stress test.       HOLTER  Predominant Rhythm  Sinus rhythm with heart rates varying between 68 and 136 bpm with an average of 95 bpm.   PVC    Ventricular Arrhythmias  There were very rare PVCs totalling 149 and averaging 1.55 per hour.   PAC    Supraventricular Arrhythmias  There were very rare PACs totalling 28 and averaging 0.29 per hour.         Past Medical History:   Diagnosis Date    Carpal tunnel syndrome     bilateral    Hypertension     Iron deficiency anemia     Mild intermittent asthma        Past Surgical History:   Procedure Laterality Date    COLONOSCOPY N/A 4/28/2023    Procedure: COLONOSCOPY;   Surgeon: Maximo Cox MD;  Location: [facility];  Service: Endoscopy;  Laterality: N/A;    EYE FOREIGN BODY REMOVAL Right     EYE SURGERY  2009    Im not sure what year    HYSTERECTOMY  2020    OOPHORECTOMY      ROBOT-ASSISTED LAPAROSCOPIC ABDOMINAL HYSTERECTOMY USING DA ALYSA XI N/A 12/06/2019    Procedure: XI ROBOTIC HYSTERECTOMY;  Surgeon: VENESSA Davidson MD;  Location: [facility] OR;  Service: OB/GYN;  Laterality: N/A;    ROBOT-ASSISTED SURGICAL REMOVAL OF FALLOPIAN TUBE USING DA ALYSA XI Bilateral 12/06/2019    Procedure: XI ROBOTIC SALPINGECTOMY;  Surgeon: VENESSA Davidson MD;  Location: [facility] OR;  Service: OB/GYN;  Laterality: Bilateral;    SURGICAL REMOVAL OF MASS OF AXILLA Right 11/12/2018    Procedure: EXCISION, MASS, AXILLARY;  Surgeon: Alis Tian MD;  Location: [facility] OR;  Service: General;  Laterality: Right;    TUBAL LIGATION         Social History     Tobacco Use    Smoking status: Never    Smokeless tobacco: Never   Substance Use Topics    Alcohol use: No    Drug use: No       Family History   Problem Relation Name Age of Onset    Diabetes Mother Ying Amato     Sarcoidosis Mother Ying Amato     Hypertension Mother Ying Amato     COPD Father Commack Fisher     Hypertension Father Commack Fisher     Asthma Father Commack Fisher     Breast cancer Neg Hx      Colon cancer Neg Hx      Ovarian cancer Neg Hx      Thrombosis Neg Hx         Patient's Medications   New Prescriptions    No medications on file   Previous Medications    ALBUTEROL (VENTOLIN HFA) 90 MCG/ACTUATION INHALER    Inhale 2 puffs into the lungs every 4 (four) hours as needed for Wheezing or Shortness of Breath.    AMLODIPINE (NORVASC) 10 MG TABLET    Take 1 tablet (10 mg total) by mouth once daily.    AMLODIPINE (NORVASC) 10 MG TABLET    Take 10 mg by mouth once daily.    CLOTRIMAZOLE (LOTRIMIN) 1 % CREAM    Apply topically 2 (two) times daily. Until rash is clear for 7 days    GABAPENTIN (NEURONTIN) 300 MG CAPSULE     Participated in POC and Goals Yes  -KA         Safety    Are you being hurt, hit, or frightened by anyone at home or in your life? No  -KA      Are you being neglected by a caregiver No  -KA      Have you had any of the following issues with Anxiety  -KA                User Key  (r) = Recorded By, (t) = Taken By, (c) = Cosigned By      Initials Name Provider Type    Keya Giraldo, JOHANNE Physical Therapist                     Lymphedema       Row Name 01/15/25 1000             Subjective Pain    Able to rate subjective pain? yes  -KA      Pre-Treatment Pain Level 0  -KA      Post-Treatment Pain Level 0  -KA      Subjective Pain Comment --  -KA         Lymphedema Assessment    Lymphedema Classification LUE:;secondary;at risk/stage 0  -KA      Lymphedema Cancer Related Sx bilateral;simple mastectomy;left;sentinel node biopsy  -KA      Lymphedema Surgery Comments 10/31/2024  -KA      Lymph Nodes Removed # 4  -KA      Positive Lymph Nodes # 0  -KA      Stage of Cancer Stage I  -KA      Chemo Received yes  -KA      Chemo Treatments #/Timeframe 2/4  -KA      Adverse Chemo Reactions/Complication Muscle aches  -KA      Radiation Therapy Received no  -KA      Infections or Cellulitis? no  -KA      Lymphedema Assessment Comments No obvious edema at this time  -KA         Posture/Observations    Posture/Observations Comments WNL  -KA         General ROM    GENERAL ROM COMMENTS BUE WFL  -KA         MMT (Manual Muscle Testing)    General MMT Comments BUE WFL  -KA         Lymphedema Edema Assessment    Edema Assessment Comment no obvious edema, negative for MARCELINA and seroma at this time  -KA         Skin Changes/Observations    Skin Observations Comment normal; incisions are healing well  -KA         Lymphedema Sensation    Lymphedema Sensation Comments normal  -KA         Lymphedema Pulses/Capillary Refill    Lymph Pulses Capillary Refill Comments normal  -KA         Lymphedema Measurements    Measurement Type(s) Quick Girth   "Take 1 capsule (300 mg total) by mouth once daily.    HYDROCHLOROTHIAZIDE (HYDRODIURIL) 25 MG TABLET    Take 1 tablet (25 mg total) by mouth once daily.    KETOCONAZOLE (NIZORAL) 2 % CREAM    Apply topically to affected area(s) 2 (two) times daily.    LOSARTAN (COZAAR) 100 MG TABLET    Take 1 tablet (100 mg total) by mouth once daily.    NEBULIZER AND COMPRESSOR ECHO    Use as directed every 4 hours as needed   Modified Medications    No medications on file   Discontinued Medications    No medications on file       Review of Systems   Constitutional: Negative.    HENT: Negative.     Eyes: Negative.    Respiratory: Negative.     Cardiovascular:  Positive for chest pain.   Gastrointestinal: Negative.    Genitourinary: Negative.    Musculoskeletal: Negative.    Skin: Negative.    Neurological: Negative.    Endo/Heme/Allergies: Negative.    Psychiatric/Behavioral: Negative.     All 12 systems otherwise negative.      Wt Readings from Last 3 Encounters:   10/15/24 88.9 kg (195 lb 15.8 oz)   10/01/24 89.2 kg (196 lb 10.4 oz)   09/26/24 89.2 kg (196 lb 10.4 oz)     Temp Readings from Last 3 Encounters:   07/03/24 97.3 °F (36.3 °C) (Tympanic)   06/28/24 98.1 °F (36.7 °C) (Oral)   05/09/24 98.1 °F (36.7 °C) (Oral)     BP Readings from Last 3 Encounters:   10/15/24 (!) 128/100   07/24/24 (!) 147/52   07/24/24 126/88     Pulse Readings from Last 3 Encounters:   10/15/24 83   07/24/24 97   07/24/24 85       BP (!) 128/100   Pulse 83   Resp 16   Ht 5' 5" (1.651 m)   Wt 88.9 kg (195 lb 15.8 oz)   LMP 11/07/2019 (Approximate)   SpO2 100%   BMI 32.61 kg/m²     Objective:   Physical Exam  Vitals and nursing note reviewed.   Constitutional:       General: She is not in acute distress.     Appearance: She is well-developed. She is obese. She is not diaphoretic.   HENT:      Head: Normocephalic and atraumatic.      Nose: Nose normal.   Eyes:      General: No scleral icterus.     Conjunctiva/sclera: Conjunctivae normal.   Neck: " -KA      Quick Girth Areas Upper extremities  -KA         LUE Quick Girth (cm)    Axilla 34.6 cm  -KA      Mid upper arm 31.7 cm  -KA      Elbow 25.9 cm  -KA      Mid forearm 22.3 cm  -KA      Wrist crease 16.7 cm  -KA      Web space 18.8 cm  -KA      Met-heads 18.4 cm  -KA      LUE Quick Girth Total 168.4  -KA         RUE Quick Girth (cm)    Axilla 33.7 cm  -KA      Mid upper arm 30.8 cm  -KA      Elbow 25.8 cm  -KA      Mid forearm 23.5 cm  -KA      Wrist crease 16.8 cm  -KA      Web space 18.9 cm  -KA      Met-heads 18.8 cm  -KA      RUE Quick Girth Total 168.3  -KA         Compression/Skin Care    Compression/Skin Care Comments discussed compression garment for prevention  -KA         L-Dex Bioimpedence Screening    L-Dex Measurement Extremity LUE  -KA      L-Dex Patient Position Standing  -KA      L-Dex UE Dominate Side Right  -KA      L-Dex UE At Risk Side Left  -KA      L-Dex UE Pre Surgical Value No  -KA      L-Dex UE Score 6  -KA      L-Dex UE Baseline Score 6  -KA      L-Dex UE Value Change 0  -KA      L-Dex UE Comment WNLs  -KA      $ L-Dex Charge yes  -KA                User Key  (r) = Recorded By, (t) = Taken By, (c) = Cosigned By      Initials Name Provider Type    Keya Giraldo, PT Physical Therapist                                    Therapy Education  Education Details: reviewed s/s of lymphedema, steps to prevention, bioimpedance results and interpretation, use of compression sleeve.  Recommending Jobst Pham Lite Medium Regular with silicone top band CCL1, will place order for 2 garments through [employer] per her request.  Given: Symptoms/condition management, Edema management  Program: Reinforced  How Provided: Verbal  Provided to: Patient  Level of Understanding: Verbalized  83865 - PT Self Care/Mgmt Minutes: 10       OP Exercises       Row Name 01/15/25 1000             Subjective Pain    Able to rate subjective pain? yes  -KA      Pre-Treatment Pain Level 0  -KA            Thyroid: No thyromegaly.      Vascular: No JVD.   Cardiovascular:      Rate and Rhythm: Normal rate and regular rhythm.      Heart sounds: S1 normal and S2 normal. Murmur heard.      No friction rub. No gallop. No S3 or S4 sounds.   Pulmonary:      Effort: Pulmonary effort is normal. No respiratory distress.      Breath sounds: Normal breath sounds. No stridor. No wheezing or rales.   Chest:      Chest wall: No tenderness.   Abdominal:      General: Bowel sounds are normal. There is no distension.      Palpations: Abdomen is soft. There is no mass.      Tenderness: There is no abdominal tenderness. There is no rebound.   Genitourinary:     Comments: Deferred  Musculoskeletal:         General: No tenderness or deformity. Normal range of motion.      Cervical back: Normal range of motion and neck supple.   Lymphadenopathy:      Cervical: No cervical adenopathy.   Skin:     General: Skin is warm and dry.      Coloration: Skin is not pale.      Findings: No erythema or rash.   Neurological:      Mental Status: She is alert and oriented to person, place, and time.      Motor: No abnormal muscle tone.      Coordination: Coordination normal.   Psychiatric:         Behavior: Behavior normal.         Thought Content: Thought content normal.         Judgment: Judgment normal.         Lab Results   Component Value Date     07/03/2024    K 4.3 07/03/2024     07/03/2024    CO2 23 07/03/2024    BUN 9 07/03/2024    CREATININE 0.7 07/03/2024    GLU 85 07/03/2024    HGBA1C 5.6 07/03/2024    AST 18 07/15/2024    ALT 24 07/15/2024    ALBUMIN 3.8 07/15/2024    PROT 7.0 07/15/2024    BILITOT 0.3 07/15/2024    WBC 6.07 07/03/2024    HGB 12.6 07/03/2024    HCT 38.6 07/03/2024    MCV 89 07/03/2024     07/03/2024    TSH 0.343 (L) 07/03/2024    CHOL 173 07/03/2024    HDL 43 07/03/2024    LDLCALC 102.8 07/03/2024    TRIG 136 07/03/2024    BNP <10 (L) 11/20/2021    BNP <10 07/18/2020     Assessment:      1. Essential  Post-Treatment Pain Level 0  -KA      Subjective Pain Comment --  -KA                User Key  (r) = Recorded By, (t) = Taken By, (c) = Cosigned By      Initials Name Provider Type    Keya Giraldo, PT Physical Therapist                                 PT OP Goals       Row Name 01/15/25 1300          Long Term Goals    LTG Date to Achieve 04/15/25  -KA     LTG 1 Pt will maintain bioimpedance L-Dex WNLs  -KA     LTG 1 Progress New  -KA     LTG 1 Progress Comments WNL today at 6.0  -KA     LTG 2 Pt will obtain properly fitting compression arm sleeve and verbalize wear schedule.  -KA     LTG 2 Progress New  -KA     LTG 3 Pt will verbalize s/s of lymphedema and steps for prevention.  -KA     LTG 3 Progress New  -KA        Time Calculation    PT Goal Re-Cert Due Date 04/15/25  -OSVALDO               User Key  (r) = Recorded By, (t) = Taken By, (c) = Cosigned By      Initials Name Provider Type    Keya Giraldo, PT Physical Therapist                     PT Assessment/Plan       Row Name 01/15/25 1340 01/15/25 1322       PT Assessment    Functional Limitations -- Other (comment)  Limited with lifting due to PICC line  -KA    Impairments -- Impaired lymphatic circulation;Sensation  -KA    Assessment Comments Fátima Julien is a 60 y.o. female recently diagnosed with Left Breast Cancer, presents to therapy in stable condition, s/p Bilateral Simple Mastectomywith Left [place] Node Biopsy with immediate reconstruction with expanders placd bilaterally 0/4 (+) Left.  Fátima Julien is at increased risk of post Mastectomy lymphedema syndrome Left Upper Extremity due to Chemotherapy  Breast surgery Lymph Node Removal . She presents with post-operative normal range of motion, flexibility, strength, function.  Has been having body aches associated with chemotherapy, has numbness over most of the breast Currently, negative s/s of lymphedema, infection, seroma, or axillary cording. We did complete a baseline post  hypertension    2. Prediabetes    3. DONNY on CPAP    4. Chest pain, unspecified type    5. Abnormal EKG    6. Mild intermittent asthma without complication    7. Iron deficiency    8. Nonspecific abnormal electrocardiogram (ECG) (EKG)            Plan:   1. Chest pain with abnormal ECG - poor RWP, with palpitations   - heart racing at times  - ECG stress test 7/2024 neg for ischemia,11 mets, during recovery pt became diaphoretic/hypoglycemic, had HTN response.   - ECHO 7/2024 with normal bi V function and valves, PASP 14 mmHg.   - prior Holter - negative  - need to r/o non cardiac etiologies     2. HTN - elevated  - tirate meds   - did not take meds this AM - will change losartan to QHS    3. Obesity, PreDM  BMI 31 - 190 lbs   BMI 32- 195 lbs   -needs weight loss    4. Asthma with DONNY  - cont tx per PCP  - needs to use  CPAP    5. Fe def anemia  - improved now with diet  - had iron infusions in the past     Visit today included increased complexity associated with the care of the episodic problem chest pain addressed and managing the longitudinal care of the patient due to the serious and/or complex managed problem(s) .      Thank you for allowing me to participate in this patient's care. Please do not hesitate to contact me with any questions or concerns. Consult note has been forwarded to the referral physician.              operative bioimpedance assessment, with current L-Dex score of 6.0 , which is WNL.  She did lift weights regularly prior to surgery and does travel by plane, recommending Jobst Pham Lite arm sleeve CCL1 with silicone top band size Medium Regular to reduce risk of Chronic lymphedema, recommending 2 garments so she has one to wash and one to wear.   Fátima Julien will benefit from skilled formal Breast Care Physical Therapy at this time to address listed dysfunctions and to follow for lymphedema prevention and surveillance.  -KA --    Rehab Potential Good  -KA --    Patient/caregiver participated in establishment of treatment plan and goals Yes  -KA --    Patient would benefit from skilled therapy intervention Yes  -KA --       PT Plan    PT Frequency Other (comment)  -KA --    Predicted Duration of Therapy Intervention (PT) Re-evaluate in 3 months unless indicated sooner  -KA --    Planned CPT's? PT EVAL LOW COMPLEXITY: 68162;PT RE-EVAL: 65257;PT THER PROC EA 15 MIN: 66856;PT THER ACT EA 15 MIN: 22831;PT MANUAL THERAPY EA 15 MIN: 45449;PT NEUROMUSC RE-EDUCATION EA 15 MIN: 34471;PT SELF CARE/HOME MGMT/TRAIN EA 15: 49686;PT BIS XTRACELL FLUID ANALYSIS: 61786  -KA --    PT Plan Comments Re-evaluate in 3 months unless indicated sooner, order prevention arm sleeves for use during exercise and air travel.  -KA --              User Key  (r) = Recorded By, (t) = Taken By, (c) = Cosigned By      Initials Name Provider Type    Keya Giraldo, PT Physical Therapist                                 Time Calculation:   Start Time: 1015  Stop Time: 1100  Time Calculation (min): 45 min  Timed Charges  62087 - PT Self Care/Mgmt Minutes: 10  Untimed Charges  PT Eval/Re-eval Minutes: 30  93580 - PT Bioimpedence Rx Minutes: 5  Total Minutes  Timed Charges Total Minutes: 10  Untimed Charges Total Minutes: 35   Total Minutes: 45   Therapy Charges for Today       Code Description Service Date Service Provider Modifiers Qty     69127771970 HC PT BIS XTRACELL FLUID ANALYSIS 1/15/2025 Keya Hoover, PT  1    28996633221 HC PT SELF CARE/MGMT/TRAIN EA 15 MIN 1/15/2025 Keya Hoover, PT GP 1    46064116939 HC PT EVAL LOW COMPLEXITY 2 1/15/2025 Keya Hoover, PT GP 1                      Keya Hoover, PT  1/15/2025

## 2025-01-17 DIAGNOSIS — G57.52 TARSAL TUNNEL SYNDROME, LEFT: ICD-10-CM

## 2025-01-17 RX ORDER — GABAPENTIN 300 MG/1
300 CAPSULE ORAL DAILY
Qty: 30 CAPSULE | Refills: 0 | Status: SHIPPED | OUTPATIENT
Start: 2025-01-17 | End: 2025-02-26

## 2025-01-28 NOTE — PROGRESS NOTES
Subjective:      Patient ID: Bianca Amato is a 44 y.o. female.    Chief Complaint: Asthma, Sleep apnea    Interval Hx 1/29/25:    Mrs. Amato presents today for follow up asthma and sleep apnea. She was last seen 1/2024- referred for MAD evaluation.     Feels like she has adjusted better to mask. Was having an issue where she felt like she was swallowing too much air, but that has resolved.  Maybe wheezing with heavy exertion, like going up and down stairs.     Pulmonary Interventions:  - Accuneb- only uses when sick  - Albuterol HFA- using 3x/week, mostly before she goes to bed        HPI 1/18/24:    43 year old female with history of HTN, BARBARA, DONNY (aPAP), asthma (followed by Jolynn Trinidad NP) who presents to Pulmonary clinic for pre-op pulmonary evaluation. Her pre-op testing was performed 1/16- same day she presented to internal medicine with cough and sore throat (flu and strep negative,home covid neg). No fever, no shortness of breath or wheezing. At baseline doesn't routinely use GEN. Has been coughing with URI and using albuterol pump and neb over the past several days.  Feeling better today.  No chest tightness, feels like she is breathing fine.      Pre-Op Testing:   - FeNO 1/16/24- 76ppb (done same day as dx viral URI which can alter result)  - 6MW 1/16/24- Resting sat 99%, Range 96-99%, walked 555m, 89% predicted, no pauses  - ABG 1/16/24- 7.397/43/92/26  - CXR 1/16/24- clear chest, no acute findings     Pulmonary Interventions:   Accuneb- twice today  Albuterol HFA- once today    Review of Systems   Respiratory:  Positive for wheezing, dyspnea on extertion and somnolence.      Objective:     Physical Exam   Constitutional: She is oriented to person, place, and time. She appears well-developed and well-nourished. She is obese.   Cardiovascular: Normal rate and regular rhythm.   Pulmonary/Chest: Normal expansion, effort normal and breath sounds normal.   Neurological: She is alert and oriented to  "person, place, and time.   Skin: Skin is warm and dry.     Personal Diagnostic Review  As Above      1/9/2025     8:41 AM 10/15/2024     8:11 AM 10/1/2024     9:17 AM 9/26/2024     9:07 AM 8/15/2024     8:05 AM 7/24/2024     2:26 PM 7/24/2024     1:51 PM   Pulmonary Function Tests   SpO2 98 % 100 %        Height 5' 5" (1.651 m) 5' 5" (1.651 m) 5' 5" (1.651 m)  5' 5" (1.651 m) 5' 5" (1.651 m) 5' 5" (1.651 m)   Weight 85.6 kg (188 lb 13.2 oz) 88.9 kg (195 lb 15.8 oz) 89.2 kg (196 lb 10.4 oz) 89.2 kg (196 lb 10.4 oz) 86.2 kg (190 lb 0.6 oz) 86.2 kg (190 lb) 86.2 kg (190 lb)   BMI (Calculated) 31.4 32.6 32.7  31.6 31.6 31.6        Assessment:     No diagnosis found.     Outpatient Encounter Medications as of 1/29/2025   Medication Sig Dispense Refill    albuterol (VENTOLIN HFA) 90 mcg/actuation inhaler Inhale 2 puffs into the lungs every 4 (four) hours as needed for Wheezing or Shortness of Breath. 18 g 11    amLODIPine (NORVASC) 10 MG tablet Take 10 mg by mouth once daily.      amLODIPine (NORVASC) 10 MG tablet Take 1 tablet (10 mg total) by mouth once daily. 90 tablet 3    clotrimazole (LOTRIMIN) 1 % cream Apply topically 2 (two) times daily. Until rash is clear for 7 days 90 g 5    gabapentin (NEURONTIN) 300 MG capsule Take 1 capsule (300 mg total) by mouth once daily. 30 capsule 0    hydroCHLOROthiazide (HYDRODIURIL) 25 MG tablet Take 1 tablet (25 mg total) by mouth once daily. 90 tablet 3    ketoconazole (NIZORAL) 2 % cream Apply topically to affected area(s) 2 (two) times daily. 30 g 2    losartan (COZAAR) 100 MG tablet Take 1 tablet (100 mg total) by mouth every evening. 90 tablet 1    nebulizer and compressor Meagan Use as directed every 4 hours as needed 1 each 0    [DISCONTINUED] gabapentin (NEURONTIN) 300 MG capsule Take 1 capsule (300 mg total) by mouth once daily. 30 capsule 0     No facility-administered encounter medications on file as of 1/29/2025.     No orders of the defined types were placed in this " encounter.        Plan:     Problem List Items Addressed This Visit       Mild intermittent asthma without complication - Primary     - offered controller ics-laba, patient prefers to stick with what she has  - continue albuterol as needed         Relevant Orders    Fraction of  Nitric Oxide    DONNY on CPAP     - no definite excuse or clear barrier to use at this time, encouraged her to begin using nightly  - reviewed insurance guidelines            Follow up in 6 months with FeNO or sooner as needed.

## 2025-01-29 ENCOUNTER — OFFICE VISIT (OUTPATIENT)
Dept: PULMONOLOGY | Facility: CLINIC | Age: 45
End: 2025-01-29
Payer: MEDICAID

## 2025-01-29 VITALS
BODY MASS INDEX: 32.21 KG/M2 | HEIGHT: 65 IN | DIASTOLIC BLOOD PRESSURE: 100 MMHG | WEIGHT: 193.31 LBS | OXYGEN SATURATION: 100 % | HEART RATE: 90 BPM | RESPIRATION RATE: 16 BRPM | SYSTOLIC BLOOD PRESSURE: 140 MMHG

## 2025-01-29 DIAGNOSIS — J45.20 MILD INTERMITTENT ASTHMA WITHOUT COMPLICATION: Primary | ICD-10-CM

## 2025-01-29 DIAGNOSIS — G47.33 OSA ON CPAP: ICD-10-CM

## 2025-01-29 PROCEDURE — 3080F DIAST BP >= 90 MM HG: CPT | Mod: CPTII,,, | Performed by: HOSPITALIST

## 2025-01-29 PROCEDURE — 99999 PR PBB SHADOW E&M-EST. PATIENT-LVL IV: CPT | Mod: PBBFAC,,, | Performed by: HOSPITALIST

## 2025-01-29 PROCEDURE — 3077F SYST BP >= 140 MM HG: CPT | Mod: CPTII,,, | Performed by: HOSPITALIST

## 2025-01-29 PROCEDURE — 3008F BODY MASS INDEX DOCD: CPT | Mod: CPTII,,, | Performed by: HOSPITALIST

## 2025-01-29 PROCEDURE — 3044F HG A1C LEVEL LT 7.0%: CPT | Mod: CPTII,,, | Performed by: HOSPITALIST

## 2025-01-29 PROCEDURE — 1160F RVW MEDS BY RX/DR IN RCRD: CPT | Mod: CPTII,,, | Performed by: HOSPITALIST

## 2025-01-29 PROCEDURE — 99213 OFFICE O/P EST LOW 20 MIN: CPT | Mod: S$PBB,,, | Performed by: HOSPITALIST

## 2025-01-29 PROCEDURE — 99214 OFFICE O/P EST MOD 30 MIN: CPT | Mod: PBBFAC | Performed by: HOSPITALIST

## 2025-01-29 PROCEDURE — 1159F MED LIST DOCD IN RCRD: CPT | Mod: CPTII,,, | Performed by: HOSPITALIST

## 2025-01-29 RX ORDER — FLUTICASONE PROPIONATE AND SALMETEROL 250; 50 UG/1; UG/1
1 POWDER RESPIRATORY (INHALATION) 2 TIMES DAILY
Qty: 60 EACH | Refills: 1 | Status: SHIPPED | OUTPATIENT
Start: 2025-01-29 | End: 2025-01-29

## 2025-01-29 NOTE — ASSESSMENT & PLAN NOTE
- offered controller ics-laba, patient prefers to stick with what she has  - continue albuterol as needed

## 2025-01-29 NOTE — ASSESSMENT & PLAN NOTE
- no definite excuse or clear barrier to use at this time, encouraged her to begin using nightly  - reviewed insurance guidelines

## 2025-02-03 ENCOUNTER — HOSPITAL ENCOUNTER (OUTPATIENT)
Dept: RADIOLOGY | Facility: HOSPITAL | Age: 45
Discharge: HOME | End: 2025-02-03
Attending: FAMILY MEDICINE
Payer: MEDICAID

## 2025-02-03 DIAGNOSIS — Z12.31 OTHER SCREENING MAMMOGRAM: ICD-10-CM

## 2025-02-03 PROCEDURE — 77063 BREAST TOMOSYNTHESIS BI: CPT | Mod: 26,,, | Performed by: RADIOLOGY

## 2025-02-03 PROCEDURE — 77063 BREAST TOMOSYNTHESIS BI: CPT | Mod: TC

## 2025-02-03 PROCEDURE — 77067 SCR MAMMO BI INCL CAD: CPT | Mod: 26,,, | Performed by: RADIOLOGY

## 2025-02-19 ENCOUNTER — OFFICE VISIT (OUTPATIENT)
Dept: PODIATRY | Facility: CLINIC | Age: 45
End: 2025-02-19
Payer: MEDICAID

## 2025-02-19 DIAGNOSIS — G57.52 TARSAL TUNNEL SYNDROME, LEFT: ICD-10-CM

## 2025-02-19 DIAGNOSIS — M25.572 PAIN IN JOINT INVOLVING LEFT ANKLE AND FOOT: ICD-10-CM

## 2025-02-19 DIAGNOSIS — L60.3 ONYCHODYSTROPHY: ICD-10-CM

## 2025-02-19 DIAGNOSIS — G57.52 TARSAL TUNNEL SYNDROME OF LEFT SIDE: Primary | ICD-10-CM

## 2025-02-19 DIAGNOSIS — B35.1 ONYCHOMYCOSIS: ICD-10-CM

## 2025-02-19 DIAGNOSIS — M79.675 PAIN OF LEFT GREAT TOE: ICD-10-CM

## 2025-02-19 PROCEDURE — 99213 OFFICE O/P EST LOW 20 MIN: CPT | Mod: PBBFAC | Performed by: PODIATRIST

## 2025-02-19 PROCEDURE — 11730 AVULSION NAIL PLATE SIMPLE 1: CPT | Mod: TA,PBBFAC | Performed by: PODIATRIST

## 2025-02-19 RX ORDER — GABAPENTIN 300 MG/1
300 CAPSULE ORAL DAILY
Qty: 90 CAPSULE | Refills: 0 | Status: SHIPPED | OUTPATIENT
Start: 2025-02-19 | End: 2025-05-20

## 2025-02-19 NOTE — PROGRESS NOTES
Subjective:       Patient ID: Bianca Amato is a 45 y.o. female.    Chief Complaint: Foot Pain (Foot pain, rates pain 2, nondiabetic )    HPI: Bianca Amato presents to the office with complaints of pains to the left great  toe, due to ingrowing. States mild drainage. States swelling, redness and moderate to severe pains. Symptoms have been on going for several days and are worsening. States difficulties with walking as a result of pains. Walking and standing, particularly with shoe gear, exacerbates the ailment. Pains are sharp in nature and are rated at approx. 8/10. Patient's Primary Care Provider is Vivi Chand MD. Also presents to follow up concerning LLE TTS. Has had EMG NCV.    Review of patient's allergies indicates:   Allergen Reactions    Latex Rash and Shortness Of Breath    Latex, natural rubber Hives    Penicillins Hives, Rash and Shortness Of Breath    Sulfa (sulfonamide antibiotics) Hives, Swelling, Rash and Shortness Of Breath       Past Medical History:   Diagnosis Date    Carpal tunnel syndrome     bilateral    Hypertension     Iron deficiency anemia     Mild intermittent asthma        Family History   Problem Relation Name Age of Onset    Diabetes Mother Ying Amato     Sarcoidosis Mother Ying Amato     Hypertension Mother Ying Amato     COPD Father Casselton Fisher     Hypertension Father Nathan Fisher     Asthma Father Nathan Fisher     Breast cancer Neg Hx      Colon cancer Neg Hx      Ovarian cancer Neg Hx      Thrombosis Neg Hx         Social History[1]    Past Surgical History:   Procedure Laterality Date    COLONOSCOPY N/A 04/28/2023    Procedure: COLONOSCOPY;  Surgeon: Maximo Cox MD;  Location: [facility];  Service: Endoscopy;  Laterality: N/A;    EYE FOREIGN BODY REMOVAL Right     EYE SURGERY  2009    Im not sure what year    HYSTERECTOMY  2020    OOPHORECTOMY      REDUCTION OF BOTH BREASTS Bilateral 2024    ROBOT-ASSISTED LAPAROSCOPIC ABDOMINAL  HYSTERECTOMY USING DA ALYSA XI N/A 12/06/2019    Procedure: XI ROBOTIC HYSTERECTOMY;  Surgeon: VENESSA Davidson MD;  Location: [facility] OR;  Service: OB/GYN;  Laterality: N/A;    ROBOT-ASSISTED SURGICAL REMOVAL OF FALLOPIAN TUBE USING DA ALYSA XI Bilateral 12/06/2019    Procedure: XI ROBOTIC SALPINGECTOMY;  Surgeon: VENESSA Davidson MD;  Location: [facility] OR;  Service: OB/GYN;  Laterality: Bilateral;    SURGICAL REMOVAL OF MASS OF AXILLA Right 11/12/2018    Procedure: EXCISION, MASS, AXILLARY;  Surgeon: Alis Tian MD;  Location: [facility] OR;  Service: General;  Laterality: Right;    TOTAL REDUCTION MAMMOPLASTY      TUBAL LIGATION         Review of Systems   Constitutional:  Negative for chills, fatigue and fever.   HENT:  Negative for hearing loss.    Eyes:  Negative for photophobia and visual disturbance.   Respiratory:  Negative for cough, chest tightness, shortness of breath and wheezing.    Cardiovascular:  Negative for chest pain and palpitations.   Gastrointestinal:  Negative for constipation, diarrhea, nausea and vomiting.   Endocrine: Negative for cold intolerance and heat intolerance.   Genitourinary:  Negative for flank pain.   Musculoskeletal:  Positive for gait problem. Negative for neck pain and neck stiffness.   Neurological:  Negative for light-headedness and headaches.        Neuritis      Psychiatric/Behavioral:  Negative for sleep disturbance.        Objective:   LMP 11/07/2019 (Approximate)     Physical Exam  LOWER EXTREMITY PHYSICAL EXAMINATION  VASCULAR: On the left foot, the dorsalis pedis pulse is 2/4 and the posterior tibial pulse is 2/4. Capillary refill time is less than 3 seconds. Hair growth is present on the dorsum of the foot and at the digits. Proximal to distal temperature is warm to warm.    DERMATOLOGY: Ingrowing of the left foot medial, lateral and proximal nail border of the great toe. The nail is incurvated into the skin of the affected border, causing pains, which are  moderate in nature. There is edema. There is no cellulitis noted. There is no drainage. No fluctuance. Granuloma formation is absent. The nail plate is thickened and dystrophic and fungal.    ORTHOPEDIC: Manual Muscle Testing is 5/5 in all planes on the left, without pains, with and without resistance. Gait pattern is slightly antalgic.    NEUROLOGY: Positive Tinel's Sign and Valleix Sign. Neurological sensations with compression of the area of Perez's Nerve in the area of the Abductor Hallucis muscle belly.       Assessment:     1. Tarsal tunnel syndrome of left side    2. Pain in joint involving left ankle and foot    3. Onychomycosis    4. Pain of left great toe    5. Onychodystrophy    6. Tarsal tunnel syndrome, left        Plan:     Tarsal tunnel syndrome of left side  -     gabapentin (NEURONTIN) 300 MG capsule; Take 1 capsule (300 mg total) by mouth once daily.  Dispense: 90 capsule; Refill: 0  -     Ambulatory Referral/Consult to Physical Therapy/Occupational Therapy; Future; Expected date: 02/26/2025    Pain in joint involving left ankle and foot  -     gabapentin (NEURONTIN) 300 MG capsule; Take 1 capsule (300 mg total) by mouth once daily.  Dispense: 90 capsule; Refill: 0  -     Ambulatory Referral/Consult to Physical Therapy/Occupational Therapy; Future; Expected date: 02/26/2025    Onychomycosis    Pain of left great toe    Onychodystrophy    Tarsal tunnel syndrome, left  -     gabapentin (NEURONTIN) 300 MG capsule; Take 1 capsule (300 mg total) by mouth once daily.  Dispense: 90 capsule; Refill: 0      A TIME-OUT was completed. Oral, written and verbal consent were obtained from patient, prior to procedure being performed as discussed below.    The left 1st toe was/were anesthetized with a mixture of  2.5cc Lidocaine w/o Epinephrine and 2.5cc Marcaine w/ Epinephrine.     The area was then prepped appropriately with betadine paint. Following this, a [place] Elevator was utilized to free up the medial and  lateral nail borders as well as the nail plate itself, from the surrounding soft tissues.  Next, a Platypus Nail Pulling Forcep was used to slowly free the nail from the eponychium.    Once freed from the soft tissue structures, the nail bed was visualized and no defects or lacerations were noted. No osseous exposure is noted. There is scant sanguinous drainage. No purulence is noted. A curette was then utilized to remove any and all debris from either nail fold. Sterile Adaptic, antibiotic cream and a sterile bandage with Coban was placed on the digit.      Patient was informed of the possible complications related to nail regrowth, e.g., nail dystrophy or onychomycosis. Patient was given written and oral instructions for care including the office phone number if any questions or concerns arise.      Patient to start daily application of topical ABx. ointment with a bandage.     Patient to follow up in approx. 10 to 14 days, if needed.    Patient is educated as to the use and the effectiveness of Gabapentin, as well as the potential side effects, which may include, but is not limited to, mood or behavior changes, anxiety, depression, feelings of agitation or hostility or restlessness, hyperactive (mentally or physically), thoughts of suicide or hurting oneself, increased seizures, fever, swollen glands, body aches, flu symptoms, drowsiness, skin rash, easy bruising or bleeding, severe tingling, numbness, pain, muscle weakness, upper stomach pain, loss of appetite, dark urine, jaundice (yellowing of the skin or eyes), chest pain, irregular heart rhythm, feeling short of breath, confusion, nausea and vomiting, swelling, rapid weight gain, urinating less than usual or not at all, new or worsening cough, fever, trouble breathing, rapid back and forth movement of your eyes. With the initial dosage, please take at night prior to bedtime until getting used to its potential drowsy effect.    Start PT for the LLE TTS.      The procedure of (LLE Tarsal Tunnel Release) was thoroughly explained to the patient. Its necessity and/or purpose and the implications therein were outlined, including any pertinent advantages and/or disadvantages, and possible complications, if any. Possible complications include recurrence of pathology and/or deformity, infection (cellulitis, drainage, purulence, malodor, etc...), pain, numbness, neuritis, edema, burning, loss of function, need for further surgery, possible need for removal of any implanted hardware, soft tissue contracture and/or scarring, etc... No guarantees were given and/or implied. Post-operative expectations and weightbearing protocol is thoroughly explained to the patient, who acknowledges understanding.         Future Appointments   Date Time Provider Department Center   3/11/2025  9:00 AM De Childs MD [facility]ARDIO  [facility]   7/2/2025  8:20 AM LABORATORY, JOCELYNE ALLEN [facility] [place]   7/9/2025 10:00 AM Rosamaria Delcid PA-C ON IM  [facility]   7/10/2025  8:00 AM Luana Mera PA-C [facility]  [facility]   7/29/2025  9:45 AM PULMONARY LAB, [facility] [place]   7/29/2025 10:00 AM Natalie Elias PA-C [facility] [place]          [1]   Social History  Socioeconomic History    Marital status: Single    Number of children: 4   Occupational History    Occupation:      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   Tobacco Use    Smoking status: Never    Smokeless tobacco: Never   Substance and Sexual Activity    Alcohol use: No    Drug use: No    Sexual activity: Yes     Partners: Male     Birth control/protection: None     Comment: Sometimes used condoms     Social Drivers of Health     Financial Resource Strain: Low Risk  (1/4/2024)    Overall Financial Resource Strain (CARDIA)     Difficulty of Paying Living Expenses: Not hard at all   Recent Concern: Financial Resource Strain - High Risk (10/25/2023)    Overall Financial Resource Strain (CARDIA)      Difficulty of Paying Living Expenses: Very hard   Food Insecurity: No Food Insecurity (1/4/2024)    Hunger Vital Sign     Worried About Running Out of Food in the Last Year: Never true     Ran Out of Food in the Last Year: Never true   Transportation Needs: No Transportation Needs (1/4/2024)    PRAPARE - Transportation     Lack of Transportation (Medical): No     Lack of Transportation (Non-Medical): No   Physical Activity: Sufficiently Active (1/4/2024)    Exercise Vital Sign     Days of Exercise per Week: 7 days     Minutes of Exercise per Session: 70 min   Recent Concern: Physical Activity - Insufficiently Active (10/25/2023)    Exercise Vital Sign     Days of Exercise per Week: 3 days     Minutes of Exercise per Session: 10 min   Stress: No Stress Concern Present (1/4/2024)    [employer] [place] [employer] - Occupational Stress Questionnaire     Feeling of Stress : Not at all   Housing Stability: Low Risk  (1/4/2024)    Housing Stability Vital Sign     Unable to Pay for Housing in the Last Year: No     Number of Places Lived in the Last Year: 0     Unstable Housing in the Last Year: No

## 2025-03-10 DIAGNOSIS — I10 ESSENTIAL HYPERTENSION: Primary | ICD-10-CM

## 2025-03-11 ENCOUNTER — HOSPITAL ENCOUNTER (OUTPATIENT)
Dept: CARDIOLOGY | Facility: HOSPITAL | Age: 45
Discharge: HOME | End: 2025-03-11
Attending: INTERNAL MEDICINE
Payer: MEDICAID

## 2025-03-11 ENCOUNTER — OFFICE VISIT (OUTPATIENT)
Dept: CARDIOLOGY | Facility: CLINIC | Age: 45
End: 2025-03-11
Payer: MEDICAID

## 2025-03-11 VITALS
HEIGHT: 65 IN | WEIGHT: 193.44 LBS | HEART RATE: 96 BPM | OXYGEN SATURATION: 100 % | RESPIRATION RATE: 16 BRPM | SYSTOLIC BLOOD PRESSURE: 131 MMHG | BODY MASS INDEX: 32.23 KG/M2 | DIASTOLIC BLOOD PRESSURE: 90 MMHG

## 2025-03-11 DIAGNOSIS — E61.1 IRON DEFICIENCY: ICD-10-CM

## 2025-03-11 DIAGNOSIS — R07.9 CHEST PAIN, UNSPECIFIED TYPE: Primary | ICD-10-CM

## 2025-03-11 DIAGNOSIS — G47.33 OSA ON CPAP: ICD-10-CM

## 2025-03-11 DIAGNOSIS — R94.31 NONSPECIFIC ABNORMAL ELECTROCARDIOGRAM (ECG) (EKG): ICD-10-CM

## 2025-03-11 DIAGNOSIS — I10 ESSENTIAL HYPERTENSION: ICD-10-CM

## 2025-03-11 DIAGNOSIS — R94.31 ABNORMAL EKG: ICD-10-CM

## 2025-03-11 DIAGNOSIS — J45.20 MILD INTERMITTENT ASTHMA WITHOUT COMPLICATION: ICD-10-CM

## 2025-03-11 DIAGNOSIS — R73.03 PREDIABETES: ICD-10-CM

## 2025-03-11 LAB
OHS QRS DURATION: 66 MS
OHS QTC CALCULATION: 408 MS

## 2025-03-11 PROCEDURE — 3008F BODY MASS INDEX DOCD: CPT | Mod: CPTII,,, | Performed by: INTERNAL MEDICINE

## 2025-03-11 PROCEDURE — 99214 OFFICE O/P EST MOD 30 MIN: CPT | Mod: S$PBB,,, | Performed by: INTERNAL MEDICINE

## 2025-03-11 PROCEDURE — 3075F SYST BP GE 130 - 139MM HG: CPT | Mod: CPTII,,, | Performed by: INTERNAL MEDICINE

## 2025-03-11 PROCEDURE — G2211 COMPLEX E/M VISIT ADD ON: HCPCS | Mod: S$PBB,,, | Performed by: INTERNAL MEDICINE

## 2025-03-11 PROCEDURE — 1159F MED LIST DOCD IN RCRD: CPT | Mod: CPTII,,, | Performed by: INTERNAL MEDICINE

## 2025-03-11 PROCEDURE — 3044F HG A1C LEVEL LT 7.0%: CPT | Mod: CPTII,,, | Performed by: INTERNAL MEDICINE

## 2025-03-11 PROCEDURE — 93005 ELECTROCARDIOGRAM TRACING: CPT

## 2025-03-11 PROCEDURE — 99999 PR PBB SHADOW E&M-EST. PATIENT-LVL III: CPT | Mod: PBBFAC,,, | Performed by: INTERNAL MEDICINE

## 2025-03-11 PROCEDURE — 3080F DIAST BP >= 90 MM HG: CPT | Mod: CPTII,,, | Performed by: INTERNAL MEDICINE

## 2025-03-11 PROCEDURE — 99213 OFFICE O/P EST LOW 20 MIN: CPT | Mod: PBBFAC,25 | Performed by: INTERNAL MEDICINE

## 2025-03-11 PROCEDURE — 93010 ELECTROCARDIOGRAM REPORT: CPT | Mod: ,,, | Performed by: INTERNAL MEDICINE

## 2025-03-11 PROCEDURE — 1160F RVW MEDS BY RX/DR IN RCRD: CPT | Mod: CPTII,,, | Performed by: INTERNAL MEDICINE

## 2025-03-11 RX ORDER — DOXAZOSIN 2 MG/1
2 TABLET ORAL NIGHTLY
Qty: 90 TABLET | Refills: 1 | Status: SHIPPED | OUTPATIENT
Start: 2025-03-11 | End: 2026-03-11

## 2025-03-11 RX ORDER — HYDROCHLOROTHIAZIDE 25 MG/1
25 TABLET ORAL DAILY
Qty: 90 TABLET | Refills: 3 | Status: SHIPPED | OUTPATIENT
Start: 2025-03-11 | End: 2026-03-11

## 2025-03-11 RX ORDER — AMLODIPINE BESYLATE 10 MG/1
10 TABLET ORAL DAILY
Qty: 90 TABLET | Refills: 3 | Status: SHIPPED | OUTPATIENT
Start: 2025-03-11 | End: 2026-03-06

## 2025-03-11 RX ORDER — LOSARTAN POTASSIUM 100 MG/1
100 TABLET ORAL NIGHTLY
Qty: 90 TABLET | Refills: 1 | Status: SHIPPED | OUTPATIENT
Start: 2025-03-11 | End: 2026-03-06

## 2025-03-11 NOTE — PROGRESS NOTES
Subjective:   Patient ID:  Bianca Amato is a 45 y.o. female who presents for cardiac consult of No chief complaint on file.    Referring Physician: Vivi Chand MD   Reason for consult: chest pain    Follow-up  Associated symptoms include chest pain.     The patient came in today for cardiac consult of No chief complaint on file.      Bianca Amato is a 45 y.o. female pt with HTN, obesity, PreDM, asthma, anemia , DONNY presents for follow up CV eval.         4/27/21  BP elevated 124/100 today. She was recently started on CPAP for DONNY but hard to wear face mask, is a mouth breathing.   ECG - NSR,      7/11/24  Follow up with me since 4/2021. She had CV eval in Jan 2024 for breast reduction surgery.   BP and HR stable. BMI 31 - 190 lbs   Last week she was having chest pain and tingling went to ER.   ECG -NSR, poor RWP     10/15/24  ECG stress test 7/2024 neg for ischemia,11 mets, during recovery pt became diaphoretic/hypoglycemic, had HTN response.   ECHO 7/2024 with normal bi V function and valves, PASP 14 mmHg.     BP elevated 128/100. HR 83, BMI 32- 195 lbs   Has not been checking BP at home. Did not take BP meds this AM     3/11/25  BP elevated 131/90. HR 96. BMI 32 - 193 lbs  Had big toe nail removed last week has some pain.     ECG - NSR, LAE, poor RWP    Was a .     Results for orders placed during the hospital encounter of 07/24/24    Echo    Interpretation Summary    Left Ventricle: The left ventricle is normal in size. Ventricular mass is normal. Normal wall thickness. There is concentric remodeling. There is normal systolic function with a visually estimated ejection fraction of 60 - 65%. There is normal diastolic function.    Right Ventricle: Normal right ventricular cavity size. Wall thickness is normal. Systolic function is normal.    Pulmonary Artery: The estimated pulmonary artery systolic pressure is 14 mmHg.    IVC/SVC: Normal venous pressure at 3  mmHg.      Results for orders placed during the hospital encounter of 07/24/24    Exercise Stress - EKG    Interpretation Summary    The ECG portion of the study is negative for ischemia.    The patient reported no chest pain during the stress test.    The exercise capacity was normal.    The patient exercised for 9 minutes 33 seconds on a Kamran protocol, corresponding to a functional capacity of 11METS, achieving a peak heart rate of 179 bpm, which is 108% of the age predicted maximum heart rate. Their exercise capacity was normal.    During recovery patient became diaphoretic, clammy, dizzy, hypotensive and hypoglycemic..   Food, drink and fluids were given to patient exit BG 99 /70 patient verbalized feeling better.    Stress ECG: There is hypertensive blood pressure response with stress.      Results for orders placed during the hospital encounter of 08/12/20   Echo Color Flow Doppler? Yes    Narrative · Concentric left ventricular hypertrophy.  · Normal left ventricular systolic function. The estimated ejection   fraction is 55%.  · Normal LV diastolic function.  · No wall motion abnormalities.  · Normal right ventricular systolic function.  · Normal central venous pressure (3 mmHg).  · The estimated PA systolic pressure is 14 mmHg.          Results for orders placed during the hospital encounter of 08/12/20   Exercise Stress - EKG    Narrative   The EKG portion of this study is negative for ischemia.    The patient reported no chest pain during the stress test.       HOLTER  Predominant Rhythm  Sinus rhythm with heart rates varying between 68 and 136 bpm with an average of 95 bpm.   PVC    Ventricular Arrhythmias  There were very rare PVCs totalling 149 and averaging 1.55 per hour.   PAC    Supraventricular Arrhythmias  There were very rare PACs totalling 28 and averaging 0.29 per hour.         Past Medical History:   Diagnosis Date    Carpal tunnel syndrome     bilateral    Hypertension     Iron  deficiency anemia     Mild intermittent asthma        Past Surgical History:   Procedure Laterality Date    COLONOSCOPY N/A 04/28/2023    Procedure: COLONOSCOPY;  Surgeon: Maximo Cox MD;  Location: [facility];  Service: Endoscopy;  Laterality: N/A;    EYE FOREIGN BODY REMOVAL Right     EYE SURGERY  2009    Im not sure what year    HYSTERECTOMY  2020    OOPHORECTOMY      REDUCTION OF BOTH BREASTS Bilateral 2024    ROBOT-ASSISTED LAPAROSCOPIC ABDOMINAL HYSTERECTOMY USING DA ALYSA XI N/A 12/06/2019    Procedure: XI ROBOTIC HYSTERECTOMY;  Surgeon: VENESSA Davidson MD;  Location: [facility];  Service: OB/GYN;  Laterality: N/A;    ROBOT-ASSISTED SURGICAL REMOVAL OF FALLOPIAN TUBE USING DA ALYSA XI Bilateral 12/06/2019    Procedure: XI ROBOTIC SALPINGECTOMY;  Surgeon: VENESSA Davidson MD;  Location: [facility];  Service: OB/GYN;  Laterality: Bilateral;    SURGICAL REMOVAL OF MASS OF AXILLA Right 11/12/2018    Procedure: EXCISION, MASS, AXILLARY;  Surgeon: Alis Tian MD;  Location: [facility];  Service: General;  Laterality: Right;    TOTAL REDUCTION MAMMOPLASTY      TUBAL LIGATION         Social History     Tobacco Use    Smoking status: Never    Smokeless tobacco: Never   Substance Use Topics    Alcohol use: No    Drug use: No       Family History   Problem Relation Name Age of Onset    Diabetes Mother Ying Amato     Sarcoidosis Mother Ying Amato     Hypertension Mother Ying Amato     COPD Father Nathan Natalia     Hypertension Father [place] Fisher     Asthma Father [place] Fisher     Breast cancer Neg Hx      Colon cancer Neg Hx      Ovarian cancer Neg Hx      Thrombosis Neg Hx         Patient's Medications   New Prescriptions    No medications on file   Previous Medications    ALBUTEROL (VENTOLIN HFA) 90 MCG/ACTUATION INHALER    Inhale 2 puffs into the lungs every 4 (four) hours as needed for Wheezing or Shortness of Breath.    AMLODIPINE (NORVASC) 10 MG TABLET    Take 10 mg by mouth once daily.     "AMLODIPINE (NORVASC) 10 MG TABLET    Take 1 tablet (10 mg total) by mouth once daily.    CLOTRIMAZOLE (LOTRIMIN) 1 % CREAM    Apply topically 2 (two) times daily. Until rash is clear for 7 days    GABAPENTIN (NEURONTIN) 300 MG CAPSULE    Take 1 capsule (300 mg total) by mouth once daily.    HYDROCHLOROTHIAZIDE (HYDRODIURIL) 25 MG TABLET    Take 1 tablet (25 mg total) by mouth once daily.    KETOCONAZOLE (NIZORAL) 2 % CREAM    Apply topically to affected area(s) 2 (two) times daily.    LOSARTAN (COZAAR) 100 MG TABLET    Take 1 tablet (100 mg total) by mouth every evening.    NEBULIZER AND COMPRESSOR ECHO    Use as directed every 4 hours as needed   Modified Medications    No medications on file   Discontinued Medications    No medications on file       Review of Systems   Constitutional: Negative.    HENT: Negative.     Eyes: Negative.    Respiratory: Negative.     Cardiovascular:  Positive for chest pain.   Gastrointestinal: Negative.    Genitourinary: Negative.    Musculoskeletal: Negative.    Skin: Negative.    Neurological: Negative.    Endo/Heme/Allergies: Negative.    Psychiatric/Behavioral: Negative.     All 12 systems otherwise negative.      Wt Readings from Last 3 Encounters:   03/11/25 87.8 kg (193 lb 7.3 oz)   01/29/25 87.7 kg (193 lb 5.5 oz)   01/09/25 85.6 kg (188 lb 13.2 oz)     Temp Readings from Last 3 Encounters:   07/03/24 97.3 °F (36.3 °C) (Tympanic)   06/28/24 98.1 °F (36.7 °C) (Oral)   05/09/24 98.1 °F (36.7 °C) (Oral)     BP Readings from Last 3 Encounters:   03/11/25 (!) 131/90   01/29/25 (!) 140/100   01/09/25 128/88     Pulse Readings from Last 3 Encounters:   03/11/25 96   01/29/25 90   01/09/25 87       BP (!) 131/90   Pulse 96   Resp 16   Ht 5' 5" (1.651 m)   Wt 87.8 kg (193 lb 7.3 oz)   LMP 11/07/2019 (Approximate)   SpO2 100%   BMI 32.19 kg/m²     Objective:   Physical Exam  Vitals and nursing note reviewed.   Constitutional:       General: She is not in acute distress.     " Appearance: She is well-developed. She is obese. She is not diaphoretic.   HENT:      Head: Normocephalic and atraumatic.      Nose: Nose normal.   Eyes:      General: No scleral icterus.     Conjunctiva/sclera: Conjunctivae normal.   Neck:      Thyroid: No thyromegaly.      Vascular: No JVD.   Cardiovascular:      Rate and Rhythm: Normal rate and regular rhythm.      Heart sounds: S1 normal and S2 normal. Murmur heard.      No friction rub. No gallop. No S3 or S4 sounds.   Pulmonary:      Effort: Pulmonary effort is normal. No respiratory distress.      Breath sounds: Normal breath sounds. No stridor. No wheezing or rales.   Chest:      Chest wall: No tenderness.   Abdominal:      General: Bowel sounds are normal. There is no distension.      Palpations: Abdomen is soft. There is no mass.      Tenderness: There is no abdominal tenderness. There is no rebound.   Genitourinary:     Comments: Deferred  Musculoskeletal:         General: No tenderness or deformity. Normal range of motion.      Cervical back: Normal range of motion and neck supple.   Lymphadenopathy:      Cervical: No cervical adenopathy.   Skin:     General: Skin is warm and dry.      Coloration: Skin is not pale.      Findings: No erythema or rash.   Neurological:      Mental Status: She is alert and oriented to person, place, and time.      Motor: No abnormal muscle tone.      Coordination: Coordination normal.   Psychiatric:         Behavior: Behavior normal.         Thought Content: Thought content normal.         Judgment: Judgment normal.         Lab Results   Component Value Date     01/03/2025    K 3.8 01/03/2025     01/03/2025    CO2 23 01/03/2025    BUN 11 01/03/2025    CREATININE 0.7 01/03/2025    GLU 84 01/03/2025    HGBA1C 5.8 (H) 01/03/2025    AST 13 01/03/2025    ALT 13 01/03/2025    ALBUMIN 3.7 01/03/2025    PROT 7.3 01/03/2025    BILITOT 0.2 01/03/2025    WBC 6.07 07/03/2024    HGB 12.6 07/03/2024    HCT 38.6 07/03/2024     MCV 89 07/03/2024     07/03/2024    TSH 0.327 (L) 01/03/2025    CHOL 173 07/03/2024    HDL 43 07/03/2024    LDLCALC 102.8 07/03/2024    TRIG 136 07/03/2024    BNP <10 (L) 11/20/2021    BNP <10 07/18/2020     Assessment:      1. Chest pain, unspecified type    2. Essential hypertension    3. DONNY on CPAP    4. Prediabetes    5. Abnormal EKG    6. Mild intermittent asthma without complication    7. Iron deficiency    8. Nonspecific abnormal electrocardiogram (ECG) (EKG)              Plan:   1. Chest pain with abnormal ECG - poor RWP, with palpitations   - heart racing at times  - ECG stress test 7/2024 neg for ischemia,11 mets, during recovery pt became diaphoretic/hypoglycemic, had HTN response.   - ECHO 7/2024 with normal bi V function and valves, PASP 14 mmHg.   - prior Holter - negative  - need to r/o non cardiac etiologies     2. HTN - elevated  - tirate meds   - add Cardura 2mg QHS     3. Obesity, PreDM  BMI 31 - 190 lbs   BMI 32- 195 lbs   BMI 32 - 193 lbs  -needs weight loss    4. Asthma with DONNY  - cont tx per PCP  - needs to use CPAP - still not great    5. Fe def anemia  - improved now with diet  - had iron infusions in the past     Visit today included increased complexity associated with the care of the episodic problem chest pain addressed and managing the longitudinal care of the patient due to the serious and/or complex managed problem(s) .      Thank you for allowing me to participate in this patient's care. Please do not hesitate to contact me with any questions or concerns. Consult note has been forwarded to the referral physician.

## 2025-03-18 ENCOUNTER — CLINICAL SUPPORT (OUTPATIENT)
Dept: REHABILITATION | Facility: HOSPITAL | Age: 45
End: 2025-03-18
Payer: MEDICAID

## 2025-03-18 DIAGNOSIS — G57.52 TARSAL TUNNEL SYNDROME OF LEFT SIDE: ICD-10-CM

## 2025-03-18 DIAGNOSIS — M25.572 PAIN IN JOINT INVOLVING LEFT ANKLE AND FOOT: ICD-10-CM

## 2025-03-18 PROCEDURE — 97161 PT EVAL LOW COMPLEX 20 MIN: CPT | Mod: PN

## 2025-03-23 NOTE — PROGRESS NOTES
Outpatient Rehab    Physical Therapy Evaluation    Patient Name: Bianca Amato  MRN: [id number]  YOB: 1980  Encounter Date: 3/18/2025    Therapy Diagnosis:   Encounter Diagnoses   Name Primary?    Tarsal tunnel syndrome of left side     Pain in joint involving left ankle and foot      Physician: Raffi Garcia DPM    Physician Orders: Eval and Treat  Medical Diagnosis: Tarsal tunnel syndrome of left side  Pain in joint involving left ankle and foot    Visit # / Visits Authorized:  1 / 1  Insurance Authorization Period: 2/19/2025 to 2/19/2026  Date of Evaluation: 3/18/2025  Plan of Care Certification: 3/18/2025 to 5/13/2025      Time In: 0830   Time Out: 0900  Total Time: 30   Total Billable Time: 30 minutes     Intake Outcome Measure for FOTO Survey    Therapist reviewed FOTO scores for Bianca Amato on 3/18/2025.   FOTO report - see Media section or FOTO account episode details.     Intake Score: 52%         Subjective   History of Present Illness  Bianca is a 45 y.o. female who reports to physical therapy with a chief concern of left foot pain.                 History of Present Condition/Illness: Left foot pain since 2016.  Foot doctor examined and said her nerves were messed up.  Sharp pain at night when laying in bed and then when standing after sitting a while.    Pain     Patient reports a current pain level of 0/10. Pain at best is reported as 0/10. Pain at worst is reported as 10/10.   Location: left foot  Clinical Progression (since onset): Stable  Pain Qualities: Aching  Pain-Relieving Factors: Activity modification  Pain-Aggravating Factors: Standing, Walking         Employment      Unable due to foot pain      Past Medical History/Physical Systems Review:   Bianca Amato  has a past medical history of Carpal tunnel syndrome, Hypertension, Iron deficiency anemia, and Mild intermittent asthma.    Bianca Amato  has a past surgical history that includes Tubal ligation;  Surgical removal of mass of axilla (Right, 11/12/2018); Eye foreign body removal (Right); Robot-assisted laparoscopic abdominal hysterectomy using da Kaushik Xi (N/A, 12/06/2019); Robot-assisted surgical removal of fallopian tube using da Kaushik Xi (Bilateral, 12/06/2019); Hysterectomy (2020); Eye surgery (2009); Oophorectomy; Colonoscopy (N/A, 04/28/2023); Reduction of both breasts (Bilateral, 2024); and Total Reduction Mammoplasty.    Bianca has a current medication list which includes the following prescription(s): albuterol, amlodipine, amlodipine, clotrimazole, doxazosin, gabapentin, hydrochlorothiazide, ketoconazole, losartan, and nebulizer and compressor.    Review of patient's allergies indicates:   Allergen Reactions    Latex Rash and Shortness Of Breath    Latex, natural rubber Hives    Penicillins Hives, Rash and Shortness Of Breath    Sulfa (sulfonamide antibiotics) Hives, Swelling, Rash and Shortness Of Breath        Objective       Ankle/Foot Range of Motion   Right Ankle/Foot   Active (deg) Passive (deg) Pain   Dorsiflexion (KE) 10           Left Ankle/Foot   Active (deg) Passive (deg) Pain   Dorsiflexion (KE) 5           Knee to wall 6 cm right and 1 cm left    Treatment:  Therapeutic Exercise  TE 1: SEated calf stretch with towel knees bent and straight  TE 2: Seated plantar fascia stretch      Assessment & Plan   Assessment  Bianca presents with a condition of Low complexity.   Presentation of Symptoms: Stable       Functional Limitations: Activity tolerance, Pain with ADLs/IADLs, Gait limitations  Impairments: Abnormal gait    Prognosis: Good    Plan  From a physical therapy perspective, the patient would benefit from: Skilled Rehab Services    Planned therapy interventions include: Therapeutic exercise, Therapeutic activities, Neuromuscular re-education, Manual therapy, and Gait training.    Planned modalities to include: Thermotherapy (hot pack).        Visit Frequency: 2 times Per Week for 10  Weeks.       This plan was discussed with Patient.   Discussion participants: Agreed Upon Plan of Care             Patient's spiritual, cultural, and educational needs considered and patient agreeable to plan of care and goals.           Goals:   Active       Functional outcome:       Pt will improve FOTO disability score to 63% disability or less in order to improve overall QOL and return to PLOF.   (Progressing)       Start:  03/18/25    Expected End:  05/13/25               Home program       The patient will be independent in home program (Progressing)       Start:  03/18/25    Expected End:  04/15/25               Pain       Patient will report pain of 5/10 max demonstrating a reduction of overall pain (Progressing)       Start:  03/18/25    Expected End:  04/15/25               Pain       Patient will report pain of 1/10 demonstrating a reduction of overall pain (Progressing)       Start:  03/18/25    Expected End:  05/13/25               Range of Motion       Patient will achieve bilateral knee to wall equally at 10 cm (Progressing)       Start:  03/18/25    Expected End:  05/13/25                Wisam Magana, PT

## 2025-03-25 ENCOUNTER — CLINICAL SUPPORT (OUTPATIENT)
Dept: REHABILITATION | Facility: HOSPITAL | Age: 45
End: 2025-03-25
Payer: MEDICAID

## 2025-03-25 DIAGNOSIS — M25.572 PAIN IN JOINT INVOLVING LEFT ANKLE AND FOOT: Primary | ICD-10-CM

## 2025-03-25 PROCEDURE — 97112 NEUROMUSCULAR REEDUCATION: CPT | Mod: PN

## 2025-03-25 PROCEDURE — 97110 THERAPEUTIC EXERCISES: CPT | Mod: PN

## 2025-03-25 NOTE — PROGRESS NOTES
Outpatient Rehab    Physical Therapy Visit    Patient Name: Bianca Amato  MRN: [id number]  YOB: 1980  Encounter Date: 3/25/2025    Therapy Diagnosis:   Encounter Diagnosis   Name Primary?    Pain in joint involving left ankle and foot Yes     Physician: Raffi Garcia DPM    Physician Orders: Eval and Treat  Medical Diagnosis: Tarsal tunnel syndrome of left side  Pain in joint involving left ankle and foot     Visit # / Visits Authorized:  1 / 1     1 /15  Insurance Authorization Period: 2/19/2025 to 2/19/2026  Date of Evaluation: 3/18/2025  Plan of Care Certification: 3/18/2025 to 5/13/2025       PT/PTA:     Number of PTA visits since last PT visit:   Time In: 0700   Time Out: 0753  Total Time: 53   Total Billable Time:  53 minutes    FOTO:  Intake Score: 52%  Survey Score 1:  %  Survey Score 2:  %         Subjective   Ankle pain.  Pain reported as 0/10.      Objective            Treatment:       CPT Intervention  Left tarsal tunnel syndrome Duration / Intensity      X TE Bike 5 minutes   X NMR Shuttle 4 bands 20 minutes   X NME Standing supported rocker board X30   X TE Heel raises 2x15   X NMR Toe yoga X20   X TE Ankle DF/PF/INV RTB 3x20   X TE Hamstring stretches with strap 3x20 seconds   X NMR Bridges 3x10   X NMR Clamshell 3x10 RTB   X NMR Prone hip extension 2x10   X TE Standing calf stretch on wedge 3x20 seconds       Manual         ICE?                                     PLAN           Time Entry(in minutes):  Neuromuscular Re-Education Time Entry: 30  Therapeutic Exercise Time Entry: 25    Assessment & Plan   Assessment: The patient was instructed to improve ankle ROM as well as LE strength.  She requires moderate to minimal cues.  Evaluation/Treatment Tolerance: Patient tolerated treatment well    Patient will continue to benefit from skilled outpatient physical therapy to address the deficits listed in the problem list box on initial evaluation, provide pt/family education and to  maximize pt's level of independence in the home and community environment.     Patient's spiritual, cultural, and educational needs considered and patient agreeable to plan of care and goals.           Plan:      Goals:   Active       Functional outcome:       Pt will improve FOTO disability score to 63% disability or less in order to improve overall QOL and return to PLOF.   (Progressing)       Start:  03/18/25    Expected End:  05/13/25               Home program       The patient will be independent in home program (Progressing)       Start:  03/18/25    Expected End:  04/15/25               Pain       Patient will report pain of 5/10 max demonstrating a reduction of overall pain (Progressing)       Start:  03/18/25    Expected End:  04/15/25               Pain       Patient will report pain of 1/10 demonstrating a reduction of overall pain (Progressing)       Start:  03/18/25    Expected End:  05/13/25               Range of Motion       Patient will achieve bilateral knee to wall equally at 10 cm (Progressing)       Start:  03/18/25    Expected End:  05/13/25                Wisam Magana, PT

## 2025-03-28 PROBLEM — M25.572 PAIN IN JOINT INVOLVING LEFT ANKLE AND FOOT: Status: ACTIVE | Noted: 2025-03-28

## 2025-04-01 ENCOUNTER — CLINICAL SUPPORT (OUTPATIENT)
Dept: REHABILITATION | Facility: HOSPITAL | Age: 45
End: 2025-04-01
Payer: MEDICAID

## 2025-04-01 DIAGNOSIS — M25.572 PAIN IN JOINT INVOLVING LEFT ANKLE AND FOOT: Primary | ICD-10-CM

## 2025-04-01 PROCEDURE — 97110 THERAPEUTIC EXERCISES: CPT | Mod: PN

## 2025-04-01 PROCEDURE — 97112 NEUROMUSCULAR REEDUCATION: CPT | Mod: PN

## 2025-04-01 NOTE — PROGRESS NOTES
Outpatient Rehab    Physical Therapy Visit    Patient Name: Bianca Amato  MRN: [id number]  YOB: 1980  Encounter Date: 4/1/2025    Therapy Diagnosis:   Encounter Diagnosis   Name Primary?    Pain in joint involving left ankle and foot Yes     Physician: Raffi Garcia DPM    Physician Orders: Eval and Treat  Medical Diagnosis: Tarsal tunnel syndrome of left side  Pain in joint involving left ankle and foot     Visit # / Visits Authorized:  1 / 1     2 /15  Insurance Authorization Period: 2/19/2025 to 2/19/2026  Date of Evaluation: 3/18/2025  Plan of Care Certification: 3/18/2025 to 5/13/2025       PT/PTA:     Number of PTA visits since last PT visit:   Time In: 0705   Time Out: 0758  Total Time: 53   Total Billable Time: 53 minutes    FOTO:  Intake Score: 52%  Survey Score 1:  %  Survey Score 2:  %         Subjective   The patient reports no current pain..  Pain reported as 0/10.      Objective            Treatment:       CPT Intervention  Left tarsal tunnel syndrome Duration / Intensity      X TE Bike 5 minutes   X NMR Shuttle 4 bands 20 minutes   X NME Standing supported rocker board X30   X TE Heel raises 2x15   X NMR Toe yoga X20   X TE Ankle DF/PF/INV RTB 3x20   X TE Hamstring stretches with strap 3x20 seconds   X NMR Bridges 3x10   X NMR Clamshell 3x10 RTB   X NMR Prone hip extension 2x10   X TE Standing calf stretch on wedge 3x20 seconds       Manual         ICE?                                     PLAN           Time Entry(in minutes):  Neuromuscular Re-Education Time Entry: 30  Therapeutic Exercise Time Entry: 23    Assessment & Plan   Assessment: The patient reports no pain today.  She has difficulty with toe yoga and ankle inversion/eversion movements.  She has improved passive ankle dorsiflexion.  Evaluation/Treatment Tolerance: Patient tolerated treatment well    Patient will continue to benefit from skilled outpatient physical therapy to address the deficits listed in the problem  list box on initial evaluation, provide pt/family education and to maximize pt's level of independence in the home and community environment.     Patient's spiritual, cultural, and educational needs considered and patient agreeable to plan of care and goals.           Plan: Outpatient Physical Therapys to include any combination of the following interventions: virtual visits, dry needling, modalities, electrical stimulation (IFC, Pre-Mod, Attended with Functional Dry Needling), Manual Therapy, Moist Heat/ Ice, Neuromuscular Re-ed, Patient Education, Self Care, Therapeutic Exercise, Functional Training, and Therapeutic Activites    Goals:   Active       Functional outcome:       Pt will improve FOTO disability score to 63% disability or less in order to improve overall QOL and return to PLOF.   (Progressing)       Start:  03/18/25    Expected End:  05/13/25               Home program       The patient will be independent in home program (Progressing)       Start:  03/18/25    Expected End:  04/15/25               Pain       Patient will report pain of 5/10 max demonstrating a reduction of overall pain (Progressing)       Start:  03/18/25    Expected End:  04/15/25               Pain       Patient will report pain of 1/10 demonstrating a reduction of overall pain (Progressing)       Start:  03/18/25    Expected End:  05/13/25               Range of Motion       Patient will achieve bilateral knee to wall equally at 10 cm (Progressing)       Start:  03/18/25    Expected End:  05/13/25                Wisam Magana, PT

## 2025-04-07 ENCOUNTER — CLINICAL SUPPORT (OUTPATIENT)
Dept: REHABILITATION | Facility: HOSPITAL | Age: 45
End: 2025-04-07
Payer: MEDICAID

## 2025-04-07 DIAGNOSIS — M25.572 PAIN IN JOINT INVOLVING LEFT ANKLE AND FOOT: Primary | ICD-10-CM

## 2025-04-07 PROCEDURE — 97112 NEUROMUSCULAR REEDUCATION: CPT | Mod: PN

## 2025-04-07 PROCEDURE — 97110 THERAPEUTIC EXERCISES: CPT | Mod: PN

## 2025-04-08 ENCOUNTER — CLINICAL SUPPORT (OUTPATIENT)
Dept: REHABILITATION | Facility: HOSPITAL | Age: 45
End: 2025-04-08
Payer: MEDICAID

## 2025-04-08 DIAGNOSIS — M25.572 PAIN IN JOINT INVOLVING LEFT ANKLE AND FOOT: Primary | ICD-10-CM

## 2025-04-08 PROCEDURE — 97110 THERAPEUTIC EXERCISES: CPT | Mod: PN

## 2025-04-08 PROCEDURE — 97112 NEUROMUSCULAR REEDUCATION: CPT | Mod: PN

## 2025-04-13 NOTE — PROGRESS NOTES
Outpatient Rehab    Physical Therapy Visit    Patient Name: Bianca Amato  MRN: [id number]  YOB: 1980  Encounter Date: 4/7/2025    Therapy Diagnosis:   No diagnosis found.    Physician: Raffi Garcia DPM    Physician Orders: Eval and Treat  Medical Diagnosis: Tarsal tunnel syndrome of left side  Pain in joint involving left ankle and foot     Visit # / Visits Authorized:  1 / 1     4 /15  Insurance Authorization Period: 2/19/2025 to 2/19/2026  Date of Evaluation: 3/18/2025  Plan of Care Certification: 3/18/2025 to 5/13/2025       PT/PTA:     Number of PTA visits since last PT visit:   Time In: 0800   Time Out: 0853  Total Time: 53   Total Billable Time: 53 minutes    FOTO:  Intake Score: 52%  Survey Score 1:  %  Survey Score 2:  %         Subjective   The patient reports no current pain..  Pain reported as 0/10.      Objective            Treatment:       CPT Intervention  Left tarsal tunnel syndrome Duration / Intensity      X TE Bike 5 minutes   X NMR Shuttle 4 bands 20 minutes   X NME Standing supported rocker board X30   X TE Heel raises 2x15   X NMR Toe yoga X20   X TE Ankle DF/PF/INV RTB 3x20   X TE Hamstring stretches with strap 3x20 seconds   X NMR Bridges 3x10   X NMR Clamshell 3x10 RTB   X NMR Prone hip extension 2x10   X TE Standing calf stretch on wedge 3x20 seconds       Manual         ICE?                                     PLAN           Time Entry(in minutes):  Neuromuscular Re-Education Time Entry: 30  Therapeutic Exercise Time Entry: 23    Assessment & Plan   Assessment: The patient reports decresed pain and has improved motion.  Evaluation/Treatment Tolerance: Patient tolerated treatment well    Patient will continue to benefit from skilled outpatient physical therapy to address the deficits listed in the problem list box on initial evaluation, provide pt/family education and to maximize pt's level of independence in the home and community environment.     Patient's  spiritual, cultural, and educational needs considered and patient agreeable to plan of care and goals.           Plan: Outpatient Physical Therapys to include any combination of the following interventions: virtual visits, dry needling, modalities, electrical stimulation (IFC, Pre-Mod, Attended with Functional Dry Needling), Manual Therapy, Moist Heat/ Ice, Neuromuscular Re-ed, Patient Education, Self Care, Therapeutic Exercise, Functional Training, and Therapeutic Activites    Goals:   Active       Functional outcome:       Pt will improve FOTO disability score to 63% disability or less in order to improve overall QOL and return to PLOF.   (Progressing)       Start:  03/18/25    Expected End:  05/13/25               Home program       The patient will be independent in home program (Progressing)       Start:  03/18/25    Expected End:  04/15/25               Pain       Patient will report pain of 5/10 max demonstrating a reduction of overall pain (Progressing)       Start:  03/18/25    Expected End:  04/15/25               Pain       Patient will report pain of 1/10 demonstrating a reduction of overall pain (Progressing)       Start:  03/18/25    Expected End:  05/13/25               Range of Motion       Patient will achieve bilateral knee to wall equally at 10 cm (Progressing)       Start:  03/18/25    Expected End:  05/13/25                Wisam Magana, PT

## 2025-04-14 NOTE — PROGRESS NOTES
Outpatient Rehab    Physical Therapy Visit    Patient Name: Bianca Amato  MRN: [id number]  YOB: 1980  Encounter Date: 4/8/2025    Therapy Diagnosis:   Encounter Diagnosis   Name Primary?    Pain in joint involving left ankle and foot Yes       Physician: Raffi Garcia DPM    Physician Orders: Eval and Treat  Medical Diagnosis: Tarsal tunnel syndrome of left side  Pain in joint involving left ankle and foot     Visit # / Visits Authorized:  1 / 1     4 /15  Insurance Authorization Period: 2/19/2025 to 2/19/2026  Date of Evaluation: 3/18/2025  Plan of Care Certification: 3/18/2025 to 5/13/2025       PT/PTA:     Number of PTA visits since last PT visit:   Time In: 0710   Time Out: 0803  Total Time: 53   Total Billable Time: 53 minutes    FOTO:  Intake Score: 52%  Survey Score 1:  %  Survey Score 2:  %         Subjective   The patient reports no current pain..  Pain reported as 0/10.      Objective            Treatment:       CPT Intervention  Left tarsal tunnel syndrome Duration / Intensity      X TE Bike 5 minutes   X NMR Shuttle 4 bands 20 minutes   X NME Standing supported rocker board X30   X TE Heel raises 2x15   X NMR Toe yoga X20   X TE Ankle DF/PF/INV RTB 3x20   X TE Hamstring stretches with strap 3x20 seconds   X NMR Bridges 3x10   X NMR Clamshell 3x10 RTB   X NMR Prone hip extension 2x10   X TE Standing calf stretch on wedge 3x20 seconds       Manual         ICE?                                     PLAN           Time Entry(in minutes):  Neuromuscular Re-Education Time Entry: 30  Therapeutic Exercise Time Entry: 23    Assessment & Plan   Assessment: The patients pain is more limited today than all evaluation to approximately 85 to 90° of shoulder flexion in supine. She complains of pain with flexion and abduction of the shoulder. Shes working to improve pain-free range of motion to begin working on strength to improve rotator cuff and postural muscle strength.       Patient will  continue to benefit from skilled outpatient physical therapy to address the deficits listed in the problem list box on initial evaluation, provide pt/family education and to maximize pt's level of independence in the home and community environment.     Patient's spiritual, cultural, and educational needs considered and patient agreeable to plan of care and goals.           Plan: Outpatient Physical Therapys to include any combination of the following interventions: virtual visits, dry needling, modalities, electrical stimulation (IFC, Pre-Mod, Attended with Functional Dry Needling), Manual Therapy, Moist Heat/ Ice, Neuromuscular Re-ed, Patient Education, Self Care, Therapeutic Exercise, Functional Training, and Therapeutic Activites    Goals:   Active       Functional outcome:       Pt will improve FOTO disability score to 63% disability or less in order to improve overall QOL and return to PLOF.   (Progressing)       Start:  03/18/25    Expected End:  05/13/25               Home program       The patient will be independent in home program (Progressing)       Start:  03/18/25    Expected End:  04/15/25               Pain       Patient will report pain of 5/10 max demonstrating a reduction of overall pain (Progressing)       Start:  03/18/25    Expected End:  04/15/25               Pain       Patient will report pain of 1/10 demonstrating a reduction of overall pain (Progressing)       Start:  03/18/25    Expected End:  05/13/25               Range of Motion       Patient will achieve bilateral knee to wall equally at 10 cm (Progressing)       Start:  03/18/25    Expected End:  05/13/25                Wisam Magana, PT

## 2025-04-15 ENCOUNTER — CLINICAL SUPPORT (OUTPATIENT)
Dept: REHABILITATION | Facility: HOSPITAL | Age: 45
End: 2025-04-15
Payer: MEDICAID

## 2025-04-15 DIAGNOSIS — M25.572 PAIN IN JOINT INVOLVING LEFT ANKLE AND FOOT: Primary | ICD-10-CM

## 2025-04-15 PROCEDURE — 97110 THERAPEUTIC EXERCISES: CPT | Mod: PN

## 2025-04-15 PROCEDURE — 97112 NEUROMUSCULAR REEDUCATION: CPT | Mod: PN

## 2025-04-20 NOTE — PROGRESS NOTES
Outpatient Rehab    Physical Therapy Visit    Patient Name: Bianca Amato  MRN: [id number]  YOB: 1980  Encounter Date: 4/15/2025    Therapy Diagnosis:   Encounter Diagnosis   Name Primary?    Pain in joint involving left ankle and foot Yes         Physician: Raffi Garcia DPM    Physician Orders: Eval and Treat  Medical Diagnosis: Tarsal tunnel syndrome of left side  Pain in joint involving left ankle and foot     Visit # / Visits Authorized:  1 / 1     4 /15  Insurance Authorization Period: 2/19/2025 to 2/19/2026  Date of Evaluation: 3/18/2025  Plan of Care Certification: 3/18/2025 to 5/13/2025       PT/PTA:     Number of PTA visits since last PT visit:   Time In: 0645   Time Out: 0740  Total Time: 55   Total Billable Time: 55 minutes    FOTO:  Intake Score: 52%  Survey Score 1:  %  Survey Score 2:  %         Subjective   The patient reports no current pain..         Objective            Treatment:       CPT Intervention  Left tarsal tunnel syndrome Duration / Intensity      X TE Bike 5 minutes   X NMR Shuttle 4 bands 20 minutes   X NME Standing supported rocker board X30   X TE Heel raises 2x15   X NMR Toe yoga X20   X TE Ankle DF/PF/INV RTB 3x20   X TE Hamstring stretches with strap 3x20 seconds   X NMR Bridges 3x10   X NMR Clamshell 3x10 RTB   X NMR Prone hip extension 2x10   X TE Standing calf stretch on wedge 3x20 seconds       Manual         ICE?                                     PLAN           Time Entry(in minutes):  Neuromuscular Re-Education Time Entry: 30  Therapeutic Exercise Time Entry: 25    Assessment & Plan   Assessment: The patients pain is more limited today than all evaluation to approximately 85 to 90° of shoulder flexion in supine. She complains of pain with flexion and abduction of the shoulder. Shes working to improve pain-free range of motion to begin working on strength to improve rotator cuff and postural muscle strength.       Patient will continue to benefit  from skilled outpatient physical therapy to address the deficits listed in the problem list box on initial evaluation, provide pt/family education and to maximize pt's level of independence in the home and community environment.     Patient's spiritual, cultural, and educational needs considered and patient agreeable to plan of care and goals.           Plan: Outpatient Physical Therapys to include any combination of the following interventions: virtual visits, dry needling, modalities, electrical stimulation (IFC, Pre-Mod, Attended with Functional Dry Needling), Manual Therapy, Moist Heat/ Ice, Neuromuscular Re-ed, Patient Education, Self Care, Therapeutic Exercise, Functional Training, and Therapeutic Activites    Goals:   Active       Functional outcome:       Pt will improve FOTO disability score to 63% disability or less in order to improve overall QOL and return to PLOF.   (Progressing)       Start:  03/18/25    Expected End:  05/13/25               Home program       The patient will be independent in home program (Progressing)       Start:  03/18/25    Expected End:  04/15/25               Pain       Patient will report pain of 5/10 max demonstrating a reduction of overall pain (Progressing)       Start:  03/18/25    Expected End:  04/15/25               Pain       Patient will report pain of 1/10 demonstrating a reduction of overall pain (Progressing)       Start:  03/18/25    Expected End:  05/13/25               Range of Motion       Patient will achieve bilateral knee to wall equally at 10 cm (Progressing)       Start:  03/18/25    Expected End:  05/13/25                Wisam Magana, PT

## 2025-04-21 ENCOUNTER — CLINICAL SUPPORT (OUTPATIENT)
Dept: REHABILITATION | Facility: HOSPITAL | Age: 45
End: 2025-04-21
Payer: MEDICAID

## 2025-04-21 DIAGNOSIS — M25.572 PAIN IN JOINT INVOLVING LEFT ANKLE AND FOOT: Primary | ICD-10-CM

## 2025-04-21 PROCEDURE — 97110 THERAPEUTIC EXERCISES: CPT | Mod: PN

## 2025-04-22 ENCOUNTER — CLINICAL SUPPORT (OUTPATIENT)
Dept: REHABILITATION | Facility: HOSPITAL | Age: 45
End: 2025-04-22
Payer: MEDICAID

## 2025-04-22 DIAGNOSIS — M25.572 PAIN IN JOINT INVOLVING LEFT ANKLE AND FOOT: Primary | ICD-10-CM

## 2025-04-22 PROCEDURE — 97110 THERAPEUTIC EXERCISES: CPT | Mod: PN

## 2025-04-26 NOTE — PROGRESS NOTES
Outpatient Rehab    Physical Therapy Visit    Patient Name: Bianca Amato  MRN: [id number]  YOB: 1980  Encounter Date: 4/21/2025    Therapy Diagnosis:   No diagnosis found.        Physician: Raffi Garcia DPM    Physician Orders: Eval and Treat  Medical Diagnosis: Tarsal tunnel syndrome of left side  Pain in joint involving left ankle and foot     Visit # / Visits Authorized:  1 / 1    7 /15  Insurance Authorization Period: 2/19/2025 to 2/19/2026  Date of Evaluation: 3/18/2025  Plan of Care Certification: 3/18/2025 to 5/13/2025       PT/PTA:     Number of PTA visits since last PT visit:   Time In: 0706   Time Out: 0800  Total Time: 54   Total Billable Time: 54 minutes    FOTO:  Intake Score: 52%  Survey Score 1:  %  Survey Score 2:  %         Subjective   The patient reports no current pain..  Pain reported as 0/10.      Objective            Treatment:       CPT Intervention  Left tarsal tunnel syndrome Duration / Intensity      X TE Bike 5 minutes   X NMR Shuttle 4 bands 20 minutes   X NME Standing supported rocker board X30   X TE Heel raises 2x15   X NMR Toe yoga X20   X TE Ankle DF/PF/INV RTB 3x20   X TE Hamstring stretches with strap 3x20 seconds   X NMR Bridges 3x10   X NMR Clamshell 3x10 RTB   X NMR Prone hip extension 2x10   X TE Standing calf stretch on wedge 3x20 seconds       Manual         ICE?                                     PLAN           Time Entry(in minutes):  Neuromuscular Re-Education Time Entry: 30  Therapeutic Exercise Time Entry: 24    Assessment & Plan   Assessment: [facility].       Patient will continue to benefit from skilled outpatient physical therapy to address the deficits listed in the problem list box on initial evaluation, provide pt/family education and to maximize pt's level of independence in the home and community environment.     Patient's spiritual, cultural, and educational needs considered and  patient agreeable to plan of care and goals.           Plan: Outpatient Physical Therapys to include any combination of the following interventions: virtual visits, dry needling, modalities, electrical stimulation (IFC, Pre-Mod, Attended with Functional Dry Needling), Manual Therapy, Moist Heat/ Ice, Neuromuscular Re-ed, Patient Education, Self Care, Therapeutic Exercise, Functional Training, and Therapeutic Activites    Goals:   Active       Functional outcome:       Pt will improve FOTO disability score to 63% disability or less in order to improve overall QOL and return to PLOF.   (Progressing)       Start:  03/18/25    Expected End:  05/13/25               Home program       The patient will be independent in home program (Progressing)       Start:  03/18/25    Expected End:  04/15/25               Pain       Patient will report pain of 5/10 max demonstrating a reduction of overall pain (Progressing)       Start:  03/18/25    Expected End:  04/15/25               Pain       Patient will report pain of 1/10 demonstrating a reduction of overall pain (Progressing)       Start:  03/18/25    Expected End:  05/13/25               Range of Motion       Patient will achieve bilateral knee to wall equally at 10 cm (Progressing)       Start:  03/18/25    Expected End:  05/13/25                Wisam Magana, PT

## 2025-04-27 NOTE — PROGRESS NOTES
Outpatient Rehab    Physical Therapy Visit    Patient Name: Bianca Amato  MRN: [id number]  YOB: 1980  Encounter Date: 4/22/2025    Therapy Diagnosis:   Encounter Diagnosis   Name Primary?    Pain in joint involving left ankle and foot Yes           Physician: Raffi Garcia DPM    Physician Orders: Eval and Treat  Medical Diagnosis: Tarsal tunnel syndrome of left side  Pain in joint involving left ankle and foot     Visit # / Visits Authorized:  1 / 1    7 /15  Insurance Authorization Period: 2/19/2025 to 2/19/2026  Date of Evaluation: 3/18/2025  Plan of Care Certification: 3/18/2025 to 5/13/2025       PT/PTA:     Number of PTA visits since last PT visit:   Time In: 0702   Time Out: 0755  Total Time: 53   Total Billable Time: 53 minutes    FOTO:  Intake Score: 52%  Survey Score 1:  %  Survey Score 2:  %         Subjective   The patient reports no current pain..         Objective            Treatment:       CPT Intervention  Left tarsal tunnel syndrome Duration / Intensity      X TE Bike 5 minutes   X NMR Shuttle 4 bands 20 minutes   X NME Standing supported rocker board X30   X TE Heel raises 2x15   X NMR Toe yoga X20   X TE Ankle DF/PF/INV RTB 3x20   X TE Hamstring stretches with strap 3x20 seconds   X NMR Bridges 3x10   X NMR Clamshell 3x10 RTB   X NMR Prone hip extension 2x10   X TE Standing calf stretch on wedge 3x20 seconds       Manual         ICE?                                     PLAN           Time Entry(in minutes):  Neuromuscular Re-Education Time Entry: 30  Therapeutic Exercise Time Entry: 23    Assessment & Plan   Assessment: The patient has no pain.  She was innstructed in and performed ankle strengthening and stability activity.       Patient will continue to benefit from skilled outpatient physical therapy to address the deficits listed in the problem list box on initial evaluation, provide pt/family education and to maximize pt's level of independence in the home and  community environment.     Patient's spiritual, cultural, and educational needs considered and patient agreeable to plan of care and goals.           Plan: Outpatient Physical Therapys to include any combination of the following interventions: virtual visits, dry needling, modalities, electrical stimulation (IFC, Pre-Mod, Attended with Functional Dry Needling), Manual Therapy, Moist Heat/ Ice, Neuromuscular Re-ed, Patient Education, Self Care, Therapeutic Exercise, Functional Training, and Therapeutic Activites    Goals:   Active       Functional outcome:       Pt will improve FOTO disability score to 63% disability or less in order to improve overall QOL and return to PLOF.   (Progressing)       Start:  03/18/25    Expected End:  05/13/25               Home program       The patient will be independent in home program (Progressing)       Start:  03/18/25    Expected End:  04/15/25               Pain       Patient will report pain of 5/10 max demonstrating a reduction of overall pain (Progressing)       Start:  03/18/25    Expected End:  04/15/25               Pain       Patient will report pain of 1/10 demonstrating a reduction of overall pain (Progressing)       Start:  03/18/25    Expected End:  05/13/25               Range of Motion       Patient will achieve bilateral knee to wall equally at 10 cm (Progressing)       Start:  03/18/25    Expected End:  05/13/25                Wisam Magana, PT

## 2025-05-19 ENCOUNTER — CLINICAL SUPPORT (OUTPATIENT)
Dept: REHABILITATION | Facility: HOSPITAL | Age: 45
End: 2025-05-19
Payer: MEDICAID

## 2025-05-19 ENCOUNTER — OFFICE VISIT (OUTPATIENT)
Dept: PODIATRY | Facility: CLINIC | Age: 45
End: 2025-05-19
Payer: MEDICAID

## 2025-05-19 VITALS — HEIGHT: 65 IN | WEIGHT: 193.56 LBS | BODY MASS INDEX: 32.25 KG/M2

## 2025-05-19 DIAGNOSIS — M25.572 PAIN IN JOINT INVOLVING LEFT ANKLE AND FOOT: ICD-10-CM

## 2025-05-19 DIAGNOSIS — G57.52 TARSAL TUNNEL SYNDROME OF LEFT SIDE: Primary | ICD-10-CM

## 2025-05-19 DIAGNOSIS — M25.572 PAIN IN JOINT INVOLVING LEFT ANKLE AND FOOT: Primary | ICD-10-CM

## 2025-05-19 PROCEDURE — 1159F MED LIST DOCD IN RCRD: CPT | Mod: CPTII,,, | Performed by: PODIATRIST

## 2025-05-19 PROCEDURE — 4010F ACE/ARB THERAPY RXD/TAKEN: CPT | Mod: CPTII,,, | Performed by: PODIATRIST

## 2025-05-19 PROCEDURE — 97110 THERAPEUTIC EXERCISES: CPT | Mod: PN

## 2025-05-19 PROCEDURE — 3008F BODY MASS INDEX DOCD: CPT | Mod: CPTII,,, | Performed by: PODIATRIST

## 2025-05-19 PROCEDURE — 99213 OFFICE O/P EST LOW 20 MIN: CPT | Mod: S$PBB,,, | Performed by: PODIATRIST

## 2025-05-19 PROCEDURE — 3044F HG A1C LEVEL LT 7.0%: CPT | Mod: CPTII,,, | Performed by: PODIATRIST

## 2025-05-19 PROCEDURE — 99999 PR PBB SHADOW E&M-EST. PATIENT-LVL III: CPT | Mod: PBBFAC,,, | Performed by: PODIATRIST

## 2025-05-19 PROCEDURE — 99213 OFFICE O/P EST LOW 20 MIN: CPT | Mod: PBBFAC | Performed by: PODIATRIST

## 2025-05-19 PROCEDURE — 1160F RVW MEDS BY RX/DR IN RCRD: CPT | Mod: CPTII,,, | Performed by: PODIATRIST

## 2025-05-19 NOTE — PROGRESS NOTES
Subjective:       Patient ID: Bianca Amato is a 45 y.o. female.    Chief Complaint: Follow-up (Follow up on Tarsal tunnel syndrome, no pain, pt is wearing tennis shoes and socks, nondiabetic )    Follow-up  Pertinent negatives include no chest pain, chills, coughing, fatigue, fever, headaches, nausea, neck pain or vomiting.     Bianca Amato presents to the office today concerning LLE TTS. Does continue PT/OT. States no pains at present. States around 60% improvement since starting PT/OT.     Review of patient's allergies indicates:   Allergen Reactions    Latex Rash and Shortness Of Breath    Latex, natural rubber Hives    Penicillins Hives, Rash and Shortness Of Breath    Sulfa (sulfonamide antibiotics) Hives, Swelling, Rash and Shortness Of Breath       Past Medical History:   Diagnosis Date    Carpal tunnel syndrome     bilateral    Hypertension     Iron deficiency anemia     Mild intermittent asthma        Family History   Problem Relation Name Age of Onset    Diabetes Mother Ying Amato     Sarcoidosis Mother Ying Amato     Hypertension Mother Ying Amato     COPD Father Nathan Fisher     Hypertension Father Nathan Natalia     Asthma Father Thornton Natalia     Breast cancer Neg Hx      Colon cancer Neg Hx      Ovarian cancer Neg Hx      Thrombosis Neg Hx         Social History[1]    Past Surgical History:   Procedure Laterality Date    COLONOSCOPY N/A 04/28/2023    Procedure: COLONOSCOPY;  Surgeon: Maximo Cox MD;  Location: [facility] ENDO;  Service: Endoscopy;  Laterality: N/A;    EYE FOREIGN BODY REMOVAL Right     EYE SURGERY  2009    Im not sure what year    HYSTERECTOMY  2020    OOPHORECTOMY      REDUCTION OF BOTH BREASTS Bilateral 2024    ROBOT-ASSISTED LAPAROSCOPIC ABDOMINAL HYSTERECTOMY USING DA ALYSA XI N/A 12/06/2019    Procedure: XI ROBOTIC HYSTERECTOMY;  Surgeon: VENESSA Davidson MD;  Location: [facility] OR;  Service: OB/GYN;  Laterality: N/A;     "ROBOT-ASSISTED SURGICAL REMOVAL OF FALLOPIAN TUBE USING DA ALYSA XI Bilateral 12/06/2019    Procedure: XI ROBOTIC SALPINGECTOMY;  Surgeon: VENESSA Davidson MD;  Location: [facility] OR;  Service: OB/GYN;  Laterality: Bilateral;    SURGICAL REMOVAL OF MASS OF AXILLA Right 11/12/2018    Procedure: EXCISION, MASS, AXILLARY;  Surgeon: Alis Tian MD;  Location: [facility] OR;  Service: General;  Laterality: Right;    TOTAL REDUCTION MAMMOPLASTY      TUBAL LIGATION         Review of Systems   Constitutional:  Negative for chills, fatigue and fever.   HENT:  Negative for hearing loss.    Eyes:  Negative for photophobia and visual disturbance.   Respiratory:  Negative for cough, chest tightness, shortness of breath and wheezing.    Cardiovascular:  Negative for chest pain and palpitations.   Gastrointestinal:  Negative for constipation, diarrhea, nausea and vomiting.   Endocrine: Negative for cold intolerance and heat intolerance.   Genitourinary:  Negative for flank pain.   Musculoskeletal:  Positive for gait problem. Negative for neck pain and neck stiffness.   Neurological:  Negative for light-headedness and headaches.   Psychiatric/Behavioral:  Negative for sleep disturbance.         Objective:   Ht 5' 5" (1.651 m)   Wt 87.8 kg (193 lb 9 oz)   LMP 11/07/2019 (Approximate)   BMI 32.21 kg/m²       Physical Exam  Constitutional:       Appearance: She is normal weight.   Eyes:      Conjunctiva/sclera: Conjunctivae normal.   Skin:     General: Skin is warm and dry.      Capillary Refill: Capillary refill takes less than 2 seconds.   Neurological:      General: No focal deficit present.      Mental Status: She is alert and oriented to person, place, and time. Mental status is at baseline.      Sensory: No sensory deficit.      Motor: No weakness.      Gait: Gait normal.   Psychiatric:         Mood and Affect: Mood normal.         Behavior: Behavior normal.         Thought Content: Thought content normal.         " Judgment: Judgment normal.         Assessment:     1. Tarsal tunnel syndrome of left side    2. Pain in joint involving left ankle and foot        Plan:     Tarsal tunnel syndrome of left side    Pain in joint involving left ankle and foot      Thorough discussion is had with the patient today, concerning the diagnosis, its etiology, and the treatment algorithm at present.     Continue PT/OT.    EMG/NCV is reviewed in detail with the patient. All questions and concerns regarding findings and its/their implications are outlined and discussed.    NSAIDs and/or Tylenol as needed.    Patient should ambulate with proper and supportive shoe gear.  These are shoes with firm and robust arch support; medial counter.  Shoes which only bend at the metatarsophalangeal joint and which are rigid in the midfoot and hindfoot. Running type or cross training type shoes gear which are designed for pronation control is best.           Future Appointments   Date Time Provider Department Center   5/20/2025  7:30 AM Wisam Magana PT [facility]   6/24/2025  8:40 AM De Childs MD [facility]   7/2/2025  8:20 AM LABORATORY, [facility]   7/9/2025 10:00 AM Rosamaria Delcid PA-C [facility]   7/10/2025  8:00 AM Luana Mera PA-C [facility]   7/29/2025  9:45 AM PULMONARY LAB, [facility] [place]   7/29/2025 10:00 AM Natalie Elias PA-C [facility] [place]              [1]  Social History  Socioeconomic History    Marital status: Single    Number of children: 4   Occupational History    Occupation:      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   Tobacco Use    Smoking status: Never    Smokeless tobacco: Never   Substance and Sexual Activity    Alcohol use: No    Drug use: No    Sexual activity: Yes     Partners: Male     Birth control/protection: None     Comment: Sometimes used condoms     Social Drivers of Health     Financial Resource  Strain: Low Risk  (1/4/2024)    Overall Financial Resource Strain (CARDIA)     Difficulty of Paying Living Expenses: Not hard at all   Recent Concern: Financial Resource Strain - High Risk (10/25/2023)    Overall Financial Resource Strain (CARDIA)     Difficulty of Paying Living Expenses: Very hard   Food Insecurity: No Food Insecurity (1/4/2024)    Hunger Vital Sign     Worried About Running Out of Food in the Last Year: Never true     Ran Out of Food in the Last Year: Never true   Transportation Needs: No Transportation Needs (1/4/2024)    PRAPARE - Transportation     Lack of Transportation (Medical): No     Lack of Transportation (Non-Medical): No   Physical Activity: Sufficiently Active (1/4/2024)    Exercise Vital Sign     Days of Exercise per Week: 7 days     Minutes of Exercise per Session: 70 min   Recent Concern: Physical Activity - Insufficiently Active (10/25/2023)    Exercise Vital Sign     Days of Exercise per Week: 3 days     Minutes of Exercise per Session: 10 min   Stress: No Stress Concern Present (1/4/2024)    [employer] - Occupational Stress Questionnaire     Feeling of Stress : Not at all   Housing Stability: Low Risk  (1/4/2024)    Housing Stability Vital Sign     Unable to Pay for Housing in the Last Year: No     Number of Places Lived in the Last Year: 0     Unstable Housing in the Last Year: No

## 2025-05-19 NOTE — PROGRESS NOTES
Outpatient Rehab    Physical Therapy Progress Note : Updated Plan of Care    Patient Name: Bianca Amato  MRN: [id number]  YOB: 1980  Encounter Date: 5/19/2025    Therapy Diagnosis:   Encounter Diagnosis   Name Primary?    Pain in joint involving left ankle and foot Yes     Physician: Raffi Garcia DPM    Physician Orders: Eval and Treat  Medical Diagnosis: Tarsal tunnel syndrome of left side  Pain in joint involving left ankle and foot    Visit # / Visits Authorized:  8 / 15  Insurance Authorization Period: 2/24/2025 to 12/31/2025  Date of Evaluation: 3/18/2025  Plan of Care Certification: 3/18/2025 to 5/13/2025     PT/PTA:     Number of PTA visits since last PT visit:   Time In: 0710   Time Out: 0800  Total Time (in minutes): 50   Total Billable Time (in minutes): 50    FOTO:  Intake Score: 52%  Survey Score 2:  %  Survey Score 3:  %    Precautions:       Subjective   The patient reports no pain today but states she has episodes..  Pain reported as 0/10.      Objective       Ankle/Foot Range of Motion   Right Ankle/Foot   Active (deg) Passive (deg) Pain   Dorsiflexion (KE) 10           Left Ankle/Foot   Active (deg) Passive (deg) Pain   Dorsiflexion (KE) 10            Treatment:       CPT Intervention  Left tarsal tunnel syndrome Duration / Intensity      X TE Bike 5 minutes   X NMR Shuttle 4 bands 20 minutes   X NME Standing supported rocker board X30   X TE Heel raises 2x15   X NMR Standing hip abduction     X NMR Standing hip extension     X TE Ankle DF/PF/INV RTB 3x20   X TE Hamstring stretches with strap 3x20 seconds   X NMR Bridges 3x10   X NMR Clamshell 3x10 RTB   X NMR Prone hip extension 2x10   X NMR Prone TKE 3x10   X TE Standing calf stretch on wedge 3x20 seconds       Manual         ICE?       PLAN           Time Entry(in minutes):  Neuromuscular Re-Education Time Entry: 25  Therapeutic Exercise Time Entry: 25    Assessment & Plan        Patient will continue to benefit from  skilled outpatient physical therapy to address the deficits listed in the problem list box on initial evaluation, provide pt/family education and to maximize pt's level of independence in the home and community environment.     Patient's spiritual, cultural, and educational needs considered and patient agreeable to plan of care and goals.           Goals:   Active       Functional outcome:       Pt will improve FOTO disability score to 63% disability or less in order to improve overall QOL and return to PLOF.   (Progressing)       Start:  03/18/25    Expected End:  05/13/25               Home program       The patient will be independent in home program (Progressing)       Start:  03/18/25    Expected End:  04/15/25               Pain       Patient will report pain of 5/10 max demonstrating a reduction of overall pain (Progressing)       Start:  03/18/25    Expected End:  04/15/25               Pain       Patient will report pain of 1/10 demonstrating a reduction of overall pain (Progressing)       Start:  03/18/25    Expected End:  05/13/25               Range of Motion       Patient will achieve bilateral knee to wall equally at 10 cm (Progressing)       Start:  03/18/25    Expected End:  05/13/25                Wisam Magana, PT

## 2025-05-20 ENCOUNTER — CLINICAL SUPPORT (OUTPATIENT)
Dept: REHABILITATION | Facility: HOSPITAL | Age: 45
End: 2025-05-20
Payer: MEDICAID

## 2025-05-20 DIAGNOSIS — M25.572 PAIN IN JOINT INVOLVING LEFT ANKLE AND FOOT: Primary | ICD-10-CM

## 2025-05-20 PROCEDURE — 97112 NEUROMUSCULAR REEDUCATION: CPT | Mod: PN

## 2025-05-20 PROCEDURE — 97110 THERAPEUTIC EXERCISES: CPT | Mod: PN

## 2025-05-20 NOTE — PROGRESS NOTES
Outpatient Rehab    Physical Therapy Visit    Patient Name: Bianca Amato  MRN: [id number]  YOB: 1980  Encounter Date: 5/20/2025    Therapy Diagnosis:   Encounter Diagnosis   Name Primary?    Pain in joint involving left ankle and foot Yes           Physician: Raffi Garcia DPM    Physician Orders: Eval and Treat  Medical Diagnosis: Tarsal tunnel syndrome of left side  Pain in joint involving left ankle and foot     Visit # / Visits Authorized:  1 / 1    9 /15  Insurance Authorization Period: 2/19/2025 to 2/19/2026  Date of Evaluation: 3/18/2025  Plan of Care Certification: 3/18/2025 to 5/13/2025       PT/PTA:     Number of PTA visits since last PT visit:   Time In: 0715   Time Out: 0810  Total Time: 55   Total Billable Time: 55 minutes    FOTO:  Intake Score: 52%  Survey Score 1:  %  Survey Score 2:  %         Subjective   The patient reports no pain at this time, but does have pain with activity over the past week.  Pain reported as 0/10.      Objective        Knee to wall 7 cm right and 6.5 cm left    Treatment:       CPT Intervention  Left tarsal tunnel syndrome Duration / Intensity      X TE Bike 5 minutes   X NMR Shuttle 4 bands 20 minutes   X NME Standing supported rocker board X30   X TE Heel raises 2x15   X NMR Toe yoga X20   X TE Ankle DF/PF/INV RTB 3x20   X TE Hamstring stretches with strap 3x20 seconds   X NMR Bridges 3x10   X NMR Clamshell 3x10 RTB   X NMR Prone hip extension 2x10   X TE Standing calf stretch on wedge 3x20 seconds       Manual         ICE?                                     PLAN           Time Entry(in minutes):  Neuromuscular Re-Education Time Entry: 25  Therapeutic Exercise Time Entry: 30    Assessment & Plan   Assessment: The patient has been instructed in and performed ankle strengthening, stretching and range of motion and stability activities of the lower extremities. She has improved with significant decrease in pain and increase mobility. She is increased  her ankle Preston flexion with a knee to wall bilaterally of 6 to 7 cm  Evaluation/Treatment Tolerance: Patient tolerated treatment well    Patient will continue to benefit from skilled outpatient physical therapy to address the deficits listed in the problem list box on initial evaluation, provide pt/family education and to maximize pt's level of independence in the home and community environment.     Patient's spiritual, cultural, and educational needs considered and patient agreeable to plan of care and goals.           Plan: Outpatient Physical Therapys to include any combination of the following interventions: virtual visits, dry needling, modalities, electrical stimulation (IFC, Pre-Mod, Attended with Functional Dry Needling), Manual Therapy, Moist Heat/ Ice, Neuromuscular Re-ed, Patient Education, Self Care, Therapeutic Exercise, Functional Training, and Therapeutic Activites    Goals:   Active       Functional outcome:       Pt will improve FOTO disability score to 63% disability or less in order to improve overall QOL and return to PLOF.   (Progressing)       Start:  03/18/25    Expected End:  06/16/25               Range of Motion       Patient will achieve bilateral knee to wall equally at 10 cm (Progressing)       Start:  03/18/25    Expected End:  06/16/25              Resolved       Home program       The patient will be independent in home program (Met)       Start:  03/18/25    Expected End:  04/15/25    Resolved:  05/19/25            Pain       Patient will report pain of 5/10 max demonstrating a reduction of overall pain (Met)       Start:  03/18/25    Expected End:  04/15/25    Resolved:  05/19/25            Pain       Patient will report pain of 1/10 demonstrating a reduction of overall pain (Met)       Start:  03/18/25    Expected End:  05/13/25    Resolved:  05/19/25             Wisam Magana, PT

## 2025-06-02 ENCOUNTER — CLINICAL SUPPORT (OUTPATIENT)
Dept: REHABILITATION | Facility: HOSPITAL | Age: 45
End: 2025-06-02
Payer: MEDICAID

## 2025-06-02 DIAGNOSIS — M25.572 PAIN IN JOINT INVOLVING LEFT ANKLE AND FOOT: Primary | ICD-10-CM

## 2025-06-02 PROCEDURE — 97110 THERAPEUTIC EXERCISES: CPT | Mod: PN

## 2025-06-02 PROCEDURE — 97112 NEUROMUSCULAR REEDUCATION: CPT | Mod: PN

## 2025-06-03 ENCOUNTER — CLINICAL SUPPORT (OUTPATIENT)
Dept: REHABILITATION | Facility: HOSPITAL | Age: 45
End: 2025-06-03
Payer: MEDICAID

## 2025-06-03 DIAGNOSIS — M25.572 PAIN IN JOINT INVOLVING LEFT ANKLE AND FOOT: Primary | ICD-10-CM

## 2025-06-03 PROCEDURE — 97110 THERAPEUTIC EXERCISES: CPT | Mod: PN

## 2025-06-03 PROCEDURE — 97112 NEUROMUSCULAR REEDUCATION: CPT | Mod: PN

## 2025-06-08 NOTE — PROGRESS NOTES
"  Outpatient Rehab    Physical Therapy Visit    Patient Name: Bianca Amato  MRN: [id number]  YOB: 1980  Encounter Date: 6/2/2025    Therapy Diagnosis:   Encounter Diagnosis   Name Primary?    Pain in joint involving left ankle and foot Yes     Physician: Raffi Garcia DPM    Physician Orders: Eval and Treat  Medical Diagnosis: Tarsal tunnel syndrome of left side  Pain in joint involving left ankle and foot  Surgical Diagnosis: Not applicable for this Episode   Surgical Date: Not applicable for this Episode    Visit # / Visits Authorized:  11 / 15  Insurance Authorization Period: 2/24/2025 to 12/31/2025  Date of Evaluation: 3/18/2025  Plan of Care Certification: 5/19/2025 to 7/14/2025      PT/PTA:     Number of PTA visits since last PT visit:   Time In: 0720   Time Out: 0815  Total Time (in minutes): 55   Total Billable Time (in minutes): 55    FOTO:  Intake Score: 52%  Survey Score 2:  %  Survey Score 3:  %    Precautions:       Subjective   The patient reports no pain at this time.  Pain reported as 0/10.      Objective            Treatment:       CPT Intervention  Left tarsal tunnel syndrome Duration / Intensity      X TE Bike 5 minutes   X NMR Shuttle 5 bands 3 minutes   X NME Standing supported rocker board X30   X TE Heel raises 2x15   X NMR Standing hip abduction with yellow pole RTB 2x10   X NMR Standing hip extension with yellow pole RTB 2x10   X TE Hamstring stretches with strap 3x20 seconds    X NMR Bridges (SL) 3x10   X NMR Clamshell 3x10 RTB   X NMR Prone hip extension 2x10   X NMR Prone TKE 3x10   X TE Standing calf stretch on wedge 3x20 seconds   X   Sit to stand 18" 2x10   X   Matrix knee extension 15# 3x10   X   Matrix knee flexion 25# 3x10       Manual         ICE?       PLAN           Time Entry(in minutes):  PT Evaluation (Moderate) Time Entry: 825  Neuromuscular Re-Education Time Entry: 25  Therapeutic Exercise Time Entry: 30    Assessment & Plan   Assessment: The patient has " improved with physical therapy range of motion of the ankle as well as stability and performing standing activities, using single leg stance and increasing resistance and therapeutic exercises to improve mobility without pain.       The patient will continue to benefit from skilled outpatient physical therapy in order to address the deficits listed in the problem list on the initial evaluation, provide patient and family education, and maximize the patients level of independence in the home and community environments.     The patient's spiritual, cultural, and educational needs were considered, and the patient is agreeable to the plan of care and goals.           Plan: Outpatient Physical Therapys to include any combination of the following interventions: virtual visits, dry needling, modalities, electrical stimulation (IFC, Pre-Mod, Attended with Functional Dry Needling), Manual Therapy, Moist Heat/ Ice, Neuromuscular Re-ed, Patient Education, Self Care, Therapeutic Exercise, Functional Training, and Therapeutic Activites    Goals:   Active       Functional outcome:       Pt will improve FOTO disability score to 63% disability or less in order to improve overall QOL and return to PLOF.   (Progressing)       Start:  03/18/25    Expected End:  06/16/25               Range of Motion       Patient will achieve bilateral knee to wall equally at 10 cm (Progressing)       Start:  03/18/25    Expected End:  06/16/25              Resolved       Home program       The patient will be independent in home program (Met)       Start:  03/18/25    Expected End:  04/15/25    Resolved:  05/19/25            Pain       Patient will report pain of 5/10 max demonstrating a reduction of overall pain (Met)       Start:  03/18/25    Expected End:  04/15/25    Resolved:  05/19/25            Pain       Patient will report pain of 1/10 demonstrating a reduction of overall pain (Met)       Start:  03/18/25    Expected End:  05/13/25     Resolved:  05/19/25             Wisam Magana PT

## 2025-06-09 DIAGNOSIS — B35.3 TINEA PEDIS OF BOTH FEET: ICD-10-CM

## 2025-06-09 DIAGNOSIS — G57.52 TARSAL TUNNEL SYNDROME, LEFT: ICD-10-CM

## 2025-06-09 DIAGNOSIS — M25.572 PAIN IN JOINT INVOLVING LEFT ANKLE AND FOOT: ICD-10-CM

## 2025-06-09 DIAGNOSIS — G57.52 TARSAL TUNNEL SYNDROME OF LEFT SIDE: ICD-10-CM

## 2025-06-09 DIAGNOSIS — J45.20 MILD INTERMITTENT ASTHMA WITHOUT COMPLICATION: ICD-10-CM

## 2025-06-09 DIAGNOSIS — L30.4 INTERTRIGO: ICD-10-CM

## 2025-06-09 RX ORDER — NEBULIZER AND COMPRESSOR
1 EACH MISCELLANEOUS EVERY 4 HOURS PRN
Qty: 1 EACH | Refills: 0 | OUTPATIENT
Start: 2025-06-09

## 2025-06-09 RX ORDER — KETOCONAZOLE 20 MG/G
CREAM TOPICAL 2 TIMES DAILY
Qty: 30 G | Refills: 2 | Status: SHIPPED | OUTPATIENT
Start: 2025-06-09 | End: 2025-07-09

## 2025-06-09 RX ORDER — ALBUTEROL SULFATE 90 UG/1
2 INHALANT RESPIRATORY (INHALATION) EVERY 4 HOURS PRN
Qty: 18 G | Refills: 11 | Status: CANCELLED | OUTPATIENT
Start: 2025-06-09

## 2025-06-09 RX ORDER — GABAPENTIN 300 MG/1
300 CAPSULE ORAL DAILY
Qty: 90 CAPSULE | Refills: 0 | Status: SHIPPED | OUTPATIENT
Start: 2025-06-09 | End: 2025-09-07

## 2025-06-09 RX ORDER — CLOTRIMAZOLE 1 %
CREAM (GRAM) TOPICAL 2 TIMES DAILY
Qty: 90 G | Refills: 5 | OUTPATIENT
Start: 2025-06-09

## 2025-06-09 NOTE — PROGRESS NOTES
"  Outpatient Rehab    Physical Therapy Visit    Patient Name: Bianca Amato  MRN: [id number]  YOB: 1980  Encounter Date: 6/3/2025    Therapy Diagnosis:   Encounter Diagnosis   Name Primary?    Pain in joint involving left ankle and foot Yes     Physician: Raffi Garcia DPM    Physician Orders: Eval and Treat  Medical Diagnosis: Tarsal tunnel syndrome of left side  Pain in joint involving left ankle and foot  Surgical Diagnosis: Not applicable for this Episode   Surgical Date: Not applicable for this Episode    Visit # / Visits Authorized:  11 / 15  Insurance Authorization Period: 2/24/2025 to 12/31/2025  Date of Evaluation: 3/18/2025  Plan of Care Certification: 5/19/2025 to 7/14/2025      PT/PTA:     Number of PTA visits since last PT visit:   Time In: 0725   Time Out: 0820  Total Time (in minutes): 55   Total Billable Time (in minutes): 55    FOTO:  Intake Score: 52%  Survey Score 2:  %  Survey Score 3:  %    Precautions:       Subjective   The patient does report minimal pain at times..         Objective            Treatment:       CPT Intervention  Left tarsal tunnel syndrome Duration / Intensity      X TE Bike 5 minutes   X NMR Shuttle 5 bands 3 minutes   X NME Standing supported rocker board X30   X TE Heel raises 2x15   X NMR Standing hip abduction with yellow pole RTB 2x10   X NMR Standing hip extension with yellow pole RTB 2x10   X TE Hamstring stretches with strap 3x20 seconds    X NMR Bridges (SL) 3x10   X NMR Clamshell 3x10 RTB   X NMR Prone hip extension 2x10   X NMR Prone TKE 3x10   X TE Standing calf stretch on wedge 3x20 seconds   X   Sit to stand 18" 2x10   X   Matrix knee extension 15# 3x10   X   Matrix knee flexion 25# 3x10       Manual         ICE?       PLAN           Time Entry(in minutes):  Neuromuscular Re-Education Time Entry: 25  Therapeutic Exercise Time Entry: 30    Assessment & Plan   Assessment: The patient is progressing ankle stability with challenged exercise " exercises and standing to improve single leg stance to reduce risk of injury in the future.       The patient will continue to benefit from skilled outpatient physical therapy in order to address the deficits listed in the problem list on the initial evaluation, provide patient and family education, and maximize the patients level of independence in the home and community environments.     The patient's spiritual, cultural, and educational needs were considered, and the patient is agreeable to the plan of care and goals.           Plan: Outpatient Physical Therapys to include any combination of the following interventions: virtual visits, dry needling, modalities, electrical stimulation (IFC, Pre-Mod, Attended with Functional Dry Needling), Manual Therapy, Moist Heat/ Ice, Neuromuscular Re-ed, Patient Education, Self Care, Therapeutic Exercise, Functional Training, and Therapeutic Activites    Goals:   Active       Functional outcome:       Pt will improve FOTO disability score to 63% disability or less in order to improve overall QOL and return to PLOF.   (Progressing)       Start:  03/18/25    Expected End:  06/16/25               Range of Motion       Patient will achieve bilateral knee to wall equally at 10 cm (Progressing)       Start:  03/18/25    Expected End:  06/16/25              Resolved       Home program       The patient will be independent in home program (Met)       Start:  03/18/25    Expected End:  04/15/25    Resolved:  05/19/25            Pain       Patient will report pain of 5/10 max demonstrating a reduction of overall pain (Met)       Start:  03/18/25    Expected End:  04/15/25    Resolved:  05/19/25            Pain       Patient will report pain of 1/10 demonstrating a reduction of overall pain (Met)       Start:  03/18/25    Expected End:  05/13/25    Resolved:  05/19/25             Wisam Magana, PT

## 2025-06-10 ENCOUNTER — CLINICAL SUPPORT (OUTPATIENT)
Dept: REHABILITATION | Facility: HOSPITAL | Age: 45
End: 2025-06-10
Payer: MEDICAID

## 2025-06-10 DIAGNOSIS — M25.572 PAIN IN JOINT INVOLVING LEFT ANKLE AND FOOT: Primary | ICD-10-CM

## 2025-06-10 PROCEDURE — 97112 NEUROMUSCULAR REEDUCATION: CPT | Mod: PN

## 2025-06-10 PROCEDURE — 97110 THERAPEUTIC EXERCISES: CPT | Mod: PN

## 2025-06-19 NOTE — PROGRESS NOTES
"  Outpatient Rehab    Physical Therapy Visit    Patient Name: Bianca Amato  MRN: [id number]  YOB: 1980  Encounter Date: 6/10/2025    Therapy Diagnosis:   Encounter Diagnosis   Name Primary?    Pain in joint involving left ankle and foot Yes     Physician: Raffi Garcia DPM    Physician Orders: Eval and Treat  Medical Diagnosis: Tarsal tunnel syndrome of left side  Pain in joint involving left ankle and foot  Surgical Diagnosis: Not applicable for this Episode   Surgical Date: Not applicable for this Episode    Visit # / Visits Authorized:  12 / 15  Insurance Authorization Period: 2/24/2025 to 12/31/2025  Date of Evaluation: 3/18/2025  Plan of Care Certification: 5/19/2025 to 7/14/2025      PT/PTA:     Number of PTA visits since last PT visit:   Time In: 0715   Time Out: 0810  Total Time (in minutes): 55   Total Billable Time (in minutes): 55    FOTO:  Intake Score:  %  Survey Score 2: 63%  Survey Score 3:  %    Precautions:       Subjective   The patient does report minimal pain at times..  Pain reported as 1/10.      Objective            Treatment:       CPT Intervention  Left tarsal tunnel syndrome Duration / Intensity      X TE Bike 5 minutes   X NMR Shuttle 5 bands 3 minutes   X NME Standing supported rocker board X30   X TE Heel raises 2x15   X NMR Standing hip abduction with yellow pole RTB 2x10   X NMR Standing hip extension with yellow pole RTB 2x10   X TE Hamstring stretches with strap 3x20 seconds    X NMR Bridges (SL) 3x10   X NMR Clamshell 3x10 RTB   X NMR Prone hip extension 2x10   X NMR Prone TKE 3x10   X TE Standing calf stretch on wedge 3x20 seconds   X   Sit to stand 18" 2x10   X   Matrix knee extension 15# 3x10   X   Matrix knee flexion 25# 3x10       Manual         ICE?       PLAN           Time Entry(in minutes):  Neuromuscular Re-Education Time Entry: 25  Therapeutic Exercise Time Entry: 30    Assessment & Plan   Assessment: The patient is progressing ankle stability with " challenged exercise exercises and standing to improve single leg stance to reduce risk of injury in the future.       The patient will continue to benefit from skilled outpatient physical therapy in order to address the deficits listed in the problem list on the initial evaluation, provide patient and family education, and maximize the patients level of independence in the home and community environments.     The patient's spiritual, cultural, and educational needs were considered, and the patient is agreeable to the plan of care and goals.           Plan: Outpatient Physical Therapys to include any combination of the following interventions: virtual visits, dry needling, modalities, electrical stimulation (IFC, Pre-Mod, Attended with Functional Dry Needling), Manual Therapy, Moist Heat/ Ice, Neuromuscular Re-ed, Patient Education, Self Care, Therapeutic Exercise, Functional Training, and Therapeutic Activites    Goals:   Resolved       Functional outcome:       Pt will improve FOTO disability score to 63% disability or less in order to improve overall QOL and return to PLOF.   (Met)       Start:  03/18/25    Expected End:  06/16/25    Resolved:  06/18/25            Home program       The patient will be independent in home program (Met)       Start:  03/18/25    Expected End:  04/15/25    Resolved:  05/19/25            Pain       Patient will report pain of 5/10 max demonstrating a reduction of overall pain (Met)       Start:  03/18/25    Expected End:  04/15/25    Resolved:  05/19/25            Pain       Patient will report pain of 1/10 demonstrating a reduction of overall pain (Met)       Start:  03/18/25    Expected End:  05/13/25    Resolved:  05/19/25            Range of Motion       Patient will achieve bilateral knee to wall equally at 10 cm (Met)       Start:  03/18/25    Expected End:  06/16/25    Resolved:  06/18/25             Wisam Magana, PT

## 2025-07-02 NOTE — PROGRESS NOTES
Subjective:       Patient ID: Bianca Amato is a 39 y.o. female.    Chief Complaint: Cerumen Impaction    Patient is here to see me today for evaluation of a possible wax impaction in bilateral ears.  She has complaints of hearing loss in the affected ears, but denies pain or drainage.  This has not been an issue in the past.  The patient has been using any sort of ear drop to soften the wax.    Review of Systems   HENT: Positive for hearing loss.        Objective:      Physical Exam   HENT:   Cerumen impaction bilaterally         Procedure Note    CHIEF COMPLAINT:  Cerumen Impaction    Description:  The patient was seated in an exam chair.  An ear speculum was placed in the right EAC and was examined under the microscope.  Suction and/or loop curettes were used to remove a large cerumen impaction.  The tympanic membrane was visualized and was normal in appearance.  The procedure was repeated on the left side in a similar fashion.  The TM was intact and normal on this side as well.  The patient tolerated the procedure well.  Assessment:       1. Bilateral hearing loss due to cerumen impaction        Plan:        Cerumen impaction:  Removed today without difficulty.  I would recommend the use of a wax softening drop, either over the counter Debrox or mineral oil, on a weekly basis.  I also instructed the patient to avoid Qtips.  
Vaping/e-Cigarettes

## 2025-07-14 ENCOUNTER — TELEPHONE (OUTPATIENT)
Dept: CARDIOLOGY | Facility: CLINIC | Age: 45
End: 2025-07-14
Payer: MEDICAID

## 2025-07-14 NOTE — TELEPHONE ENCOUNTER
Copied from CRM #[id number]. Topic: Appointments - Appointment Access  >> Jul 14, 2025  8:21 AM Leonora wrote:  .Type:  Patient Requesting Call    Who Called:Bianca Amato  Would the patient rather a call back or a response via [facility]? Call  Best Call Back Number:[phone]  Additional Information: Patient called to reschedule appointment - Time no longer works

## 2025-07-29 ENCOUNTER — CLINICAL SUPPORT (OUTPATIENT)
Dept: PULMONOLOGY | Facility: CLINIC | Age: 45
End: 2025-07-29
Payer: MEDICAID

## 2025-07-29 ENCOUNTER — OFFICE VISIT (OUTPATIENT)
Dept: PULMONOLOGY | Facility: CLINIC | Age: 45
End: 2025-07-29
Payer: MEDICAID

## 2025-07-29 VITALS
SYSTOLIC BLOOD PRESSURE: 126 MMHG | BODY MASS INDEX: 29.86 KG/M2 | HEIGHT: 67 IN | HEART RATE: 73 BPM | DIASTOLIC BLOOD PRESSURE: 90 MMHG | RESPIRATION RATE: 11 BRPM | WEIGHT: 190.25 LBS | OXYGEN SATURATION: 100 %

## 2025-07-29 DIAGNOSIS — J45.20 MILD INTERMITTENT ASTHMA WITHOUT COMPLICATION: ICD-10-CM

## 2025-07-29 DIAGNOSIS — G47.33 OSA ON CPAP: Primary | ICD-10-CM

## 2025-07-29 PROCEDURE — 99999 PR PBB SHADOW E&M-EST. PATIENT-LVL IV: CPT | Mod: PBBFAC,,, | Performed by: HOSPITALIST

## 2025-07-29 PROCEDURE — 99214 OFFICE O/P EST MOD 30 MIN: CPT | Mod: PBBFAC | Performed by: HOSPITALIST

## 2025-07-29 PROCEDURE — 4010F ACE/ARB THERAPY RXD/TAKEN: CPT | Mod: CPTII,,, | Performed by: HOSPITALIST

## 2025-07-29 PROCEDURE — 95012 NITRIC OXIDE EXP GAS DETER: CPT | Mod: PBBFAC

## 2025-07-29 PROCEDURE — 3080F DIAST BP >= 90 MM HG: CPT | Mod: CPTII,,, | Performed by: HOSPITALIST

## 2025-07-29 PROCEDURE — 1159F MED LIST DOCD IN RCRD: CPT | Mod: CPTII,,, | Performed by: HOSPITALIST

## 2025-07-29 PROCEDURE — 1160F RVW MEDS BY RX/DR IN RCRD: CPT | Mod: CPTII,,, | Performed by: HOSPITALIST

## 2025-07-29 PROCEDURE — 3044F HG A1C LEVEL LT 7.0%: CPT | Mod: CPTII,,, | Performed by: HOSPITALIST

## 2025-07-29 PROCEDURE — 3074F SYST BP LT 130 MM HG: CPT | Mod: CPTII,,, | Performed by: HOSPITALIST

## 2025-07-29 PROCEDURE — 99213 OFFICE O/P EST LOW 20 MIN: CPT | Mod: S$PBB,,, | Performed by: HOSPITALIST

## 2025-07-29 PROCEDURE — 99999PBSHW PR PBB SHADOW TECHNICAL ONLY FILED TO HB: Mod: PBBFAC,,,

## 2025-07-29 PROCEDURE — 3008F BODY MASS INDEX DOCD: CPT | Mod: CPTII,,, | Performed by: HOSPITALIST

## 2025-07-29 RX ORDER — FLUTICASONE FUROATE AND VILANTEROL 100; 25 UG/1; UG/1
1 POWDER RESPIRATORY (INHALATION) DAILY
Qty: 60 EACH | Refills: 11 | Status: SHIPPED | OUTPATIENT
Start: 2025-07-29

## 2025-07-29 RX ORDER — ALBUTEROL SULFATE 90 UG/1
2 INHALANT RESPIRATORY (INHALATION) EVERY 4 HOURS PRN
Qty: 18 G | Refills: 11 | Status: SHIPPED | OUTPATIENT
Start: 2025-07-29

## 2025-07-29 RX ORDER — ALBUTEROL SULFATE 0.83 MG/ML
2.5 SOLUTION RESPIRATORY (INHALATION) EVERY 6 HOURS PRN
Qty: 180 ML | Refills: 3 | Status: SHIPPED | OUTPATIENT
Start: 2025-07-29 | End: 2026-07-29

## 2025-07-29 NOTE — ASSESSMENT & PLAN NOTE
Last office visit date: 10/5/23  Next appointment scheduled?: Yes   Number of refills given: 2   - encouraged to increase usage- commit to wearing every night for one week  - discussed increasing humidity on machine

## 2025-07-29 NOTE — PROGRESS NOTES
Subjective:      Patient ID: Bianca Amato is a 45 y.o. female.    Chief Complaint: Asthma, Rev Test, and Sleep Apnea    Interval Hx 7/29/25:    Bianca presents today for follow up asthma and sleep apnea. She was last seen 1/2025- continued on albuterol as needed, encouraged to increase PAP usage.     Tried to use PAP one night- Machine drying out mouth. Got supplies recently.   Some chest tightness last week and wheezing with exercise, didn't try to use Albuterol    Interim Testing:  - FeNO 7/29/25- 17ppb        Interval Hx 1/29/25:     Mrs. Amato presents today for follow up asthma and sleep apnea. She was last seen 1/2024- referred for MAD evaluation.      Feels like she has adjusted better to mask. Was having an issue where she felt like she was swallowing too much air, but that has resolved.  Maybe wheezing with heavy exertion, like going up and down stairs.      Pulmonary Interventions:  - Accuneb- only uses when sick  - Albuterol HFA- using 3x/week, mostly before she goes to bed    HPI 1/18/24:     43 year old female with history of HTN, BARBARA, DONNY (aPAP), asthma (followed by Jolynn Trinidad NP) who presents to Pulmonary clinic for pre-op pulmonary evaluation. Her pre-op testing was performed 1/16- same day she presented to internal medicine with cough and sore throat (flu and strep negative,home covid neg). No fever, no shortness of breath or wheezing. At baseline doesn't routinely use GEN. Has been coughing with URI and using albuterol pump and neb over the past several days.  Feeling better today.  No chest tightness, feels like she is breathing fine.      Pre-Op Testing:   - FeNO 1/16/24- 76ppb (done same day as dx viral URI which can alter result)  - 6MW 1/16/24- Resting sat 99%, Range 96-99%, walked 555m, 89% predicted, no pauses  - ABG 1/16/24- 7.397/43/92/26  - CXR 1/16/24- clear chest, no acute findings     Pulmonary Interventions:   Accuneb- twice today  Albuterol HFA- once today    Review of  "Systems   Respiratory:  Positive for snoring, wheezing, dyspnea on extertion and somnolence.      Objective:     Physical Exam   Constitutional: She is oriented to person, place, and time. She appears well-developed and well-nourished. She is not obese.   Cardiovascular: Normal rate and regular rhythm.   Pulmonary/Chest: Normal expansion, effort normal and breath sounds normal.   Neurological: She is alert and oriented to person, place, and time.   Skin: Skin is warm and dry.     Personal Diagnostic Review  As Above      7/29/2025     9:44 AM 5/19/2025     2:16 PM 3/11/2025     8:40 AM 1/29/2025     9:31 AM 1/9/2025     8:41 AM 10/15/2024     8:11 AM 10/1/2024     9:17 AM   Pulmonary Function Tests   SpO2   100 % 100 % 98 % 100 %    Height 5' 5" (1.651 m) 5' 5" (1.651 m) 5' 5" (1.651 m) 5' 5" (1.651 m) 5' 5" (1.651 m) 5' 5" (1.651 m) 5' 5" (1.651 m)   Weight  87.8 kg (193 lb 9 oz) 87.8 kg (193 lb 7.3 oz) 87.7 kg (193 lb 5.5 oz) 85.6 kg (188 lb 13.2 oz) 88.9 kg (195 lb 15.8 oz) 89.2 kg (196 lb 10.4 oz)   BMI (Calculated)  32.2 32.2 32.2 31.4 32.6 32.7        Assessment:     No diagnosis found.     Encounter Medications[1]  No orders of the defined types were placed in this encounter.    Plan:     Problem List Items Addressed This Visit       Mild intermittent asthma without complication    - continue albuterol as needed  - starting Breo, discussed swishing mouth out afterwards         Relevant Medications    albuterol (VENTOLIN HFA) 90 mcg/actuation inhaler    albuterol (PROVENTIL) 2.5 mg /3 mL (0.083 %) nebulizer solution    fluticasone furoate-vilanteroL (BREO ELLIPTA) 100-25 mcg/dose diskus inhaler    DONNY on CPAP - Primary    - encouraged to increase usage- commit to wearing every night for one week  - discussed increasing humidity on machine          Follow up in 3 months or sooner as needed.         [1]   Outpatient Encounter Medications as of 7/29/2025   Medication Sig Dispense Refill    albuterol (VENTOLIN " HFA) 90 mcg/actuation inhaler Inhale 2 puffs into the lungs every 4 (four) hours as needed for Wheezing or Shortness of Breath. 18 g 11    amLODIPine (NORVASC) 10 MG tablet Take 1 tablet (10 mg total) by mouth once daily. 90 tablet 3    doxazosin (CARDURA) 2 MG tablet Take 1 tablet (2 mg total) by mouth every evening. 90 tablet 1    gabapentin (NEURONTIN) 300 MG capsule Take 1 capsule (300 mg total) by mouth once daily. 90 capsule 0    hydroCHLOROthiazide (HYDRODIURIL) 25 MG tablet Take 1 tablet (25 mg total) by mouth once daily. 90 tablet 3    ketoconazole (NIZORAL) 2 % cream Apply topically to affected area(s) 2 (two) times daily. 30 g 2    losartan (COZAAR) 100 MG tablet Take 1 tablet (100 mg total) by mouth every evening. 90 tablet 1    nebulizer and compressor Meagan Use as directed every 4 hours as needed 1 each 0    amLODIPine (NORVASC) 10 MG tablet Take 10 mg by mouth once daily.      clotrimazole (LOTRIMIN) 1 % cream Apply topically 2 (two) times daily. Until rash is clear for 7 days 90 g 5     No facility-administered encounter medications on file as of 7/29/2025.

## 2025-07-29 NOTE — PROCEDURES
Clinical Guide to Interpretation or FeNO Levels :    FeNO  (ppb) LOW INTERMEDIATE HIGH   ADULT VALUES < 25 25-50          > 50   Th2-driven Inflammation Unlikely Likely Significant     Patients FeNO level at this visit : _17___ (ppb)    Interpretation of FeNO measurement in adults:  [x] FENO is less than 25 ppb implies non eosinophilic airway inflammation or the absence of airway inflammation.    Comment: Low FENO (<25 ppb) in adult asthmatics with persistent symptoms suggests other etiologies for these symptoms and a lower likelihood of benefit from adding or increasing inhaled glucocorticoids.    [] FENO between 25 and 50 ppb in adults should be interpreted cautiously with reference to the clinical situation (eg, symptomatic, on or off therapy, current smoking).    [] FENO greater than 50 ppb in adults  suggests eosinophilic airway inflammation   Comment: High FENO (>50 ppb) in adult asthmatics even with atypical symptoms suggests glucocorticoid responsiveness. High FENO (>50 ppb) can help identify poor adherence or uncontrolled inflammation in asthma patients with otherwise seemingly controlled asthma.    Discussion:  A FENO less than 25 ppb in adults and less than 20 ppb in children younger than 12 years of age implies noneosinophilic airway inflammation or the absence of airway inflammation.  A FENO greater than 50 ppb in adults or greater than 35 ppb in children suggests eosinophilic airway inflammation.   Values of FENO between 25 and 50 ppb in adults (20 to 35 ppb in children) should be interpreted cautiously with reference to the clinical situation (eg, symptomatic, on or off therapy, current smoking).  A rising FENO with a greater than 20 percent change and more than 25 ppb (20 ppb in children) from a previously stable level suggests increasing eosinophilic airway inflammation, but there are wide inter-individual differences.  A decrease in FENO greater than 20 percent for values over 50 ppb or more than  10 ppb for values less than 50 ppb may be clinically important.  ?FENO in other respiratory diseases - Several other diseases are associated with altered levels of exhaled NO: low levels of FENO have been noted in cystic fibrosis, current smoking, pulmonary hypertension, hypothermia, primary ciliary dyskinesia, and bronchopulmonary dysplasia. Elevated FENO has been noted in atopy, nonasthmatic eosinophilic bronchitis, COPD exacerbations, noncystic fibrosis bronchiectasis, and viral upper respiratory infections.    REFERENCE:  ATS Board of Directors, December 2004, and by the ERS Executive Committee, June 2004. ATS/ERS Recommendations for Standardized Procedures for the Online and Offline Measurement of Exhaled Lower Respiratory Nitric Oxide and Nasal Nitric Oxide. Guideline 2005

## 2025-08-26 DIAGNOSIS — I10 ESSENTIAL HYPERTENSION: ICD-10-CM

## 2025-08-27 RX ORDER — LOSARTAN POTASSIUM 100 MG/1
100 TABLET ORAL NIGHTLY
Qty: 90 TABLET | Refills: 1 | Status: SHIPPED | OUTPATIENT
Start: 2025-08-27 | End: 2026-08-22

## 2025-08-29 ENCOUNTER — PATIENT MESSAGE (OUTPATIENT)
Dept: ADMINISTRATIVE | Facility: HOSPITAL | Age: 45
End: 2025-08-29
Payer: MEDICAID

## 2025-08-29 VITALS — SYSTOLIC BLOOD PRESSURE: 128 MMHG | DIASTOLIC BLOOD PRESSURE: 82 MMHG

## 2025-09-03 RX ORDER — DOXAZOSIN 2 MG/1
2 TABLET ORAL NIGHTLY
Qty: 90 TABLET | Refills: 1 | Status: SHIPPED | OUTPATIENT
Start: 2025-09-03 | End: 2026-09-03

## (undated) DEVICE — SUT MONOCRYL 4.0 PS2 CP496G

## (undated) DEVICE — NDL PNEUMO INSUFFLATI 120MM

## (undated) DEVICE — POSITIONER HEAD DONUT 9IN FOAM

## (undated) DEVICE — CLOSURE SKIN STERI STRIP 1/2X4

## (undated) DEVICE — GAUZE SPONGE 4X4 12PLY

## (undated) DEVICE — IRRIGATOR ENDOSCOPY DISP.

## (undated) DEVICE — EVACUATOR KIT SMOKE PLUME AWAY

## (undated) DEVICE — SUT VICRYL PLUS 0 CT1 36IN

## (undated) DEVICE — SOL NS 1000CC

## (undated) DEVICE — SYR 50CC LL

## (undated) DEVICE — SUT ABS CLIP LAPRA-TY CTD

## (undated) DEVICE — UNDERGLOVE BIOGEL PI SZ 6.5 LF

## (undated) DEVICE — SYR 10CC LUER LOCK

## (undated) DEVICE — TIP RUMI BLUE DISPOSABLE 5/BX

## (undated) DEVICE — SUPPORT ULNA NERVE PROTECTOR

## (undated) DEVICE — OBTURATOR BLADELESS 8MM XI CLR

## (undated) DEVICE — SEE MEDLINE ITEM 157137

## (undated) DEVICE — GLOVE PROTEXIS HYDROGEL SZ7.5

## (undated) DEVICE — KIT NEEDLE GUIDE 18G

## (undated) DEVICE — KIT ANTIFOG

## (undated) DEVICE — SEE MEDLINE ITEM 146292

## (undated) DEVICE — GAUZE SPONGE PEANUT STRL

## (undated) DEVICE — OCCLUDER COLPO-PNEUMO STERILE

## (undated) DEVICE — COVER OVERHEAD SURG LT BLUE

## (undated) DEVICE — TROCAR ENDOPATH XCEL 5X100MM

## (undated) DEVICE — SEE MEDLINE ITEM 157117

## (undated) DEVICE — SEE MEDLINE ITEM 152622

## (undated) DEVICE — DRAPE COLUMN DAVINCI XI

## (undated) DEVICE — Device

## (undated) DEVICE — ADHESIVE MASTISOL VIAL 48/BX

## (undated) DEVICE — DRAPE STERI LONG

## (undated) DEVICE — SOL 9P NACL IRR PIC IL

## (undated) DEVICE — SEE MEDLINE ITEM 157027

## (undated) DEVICE — GLOVE SURGICAL LATEX SZ 7

## (undated) DEVICE — NDL SAFETY 25G X 1.5 ECLIPSE

## (undated) DEVICE — TIP RUMI GREEN DISPOSABLE6.7MM

## (undated) DEVICE — ELECTRODE REM PLYHSV RETURN 9

## (undated) DEVICE — SEE MEDLINE ITEM 157181

## (undated) DEVICE — SEE MEDLINE ITEM 154981

## (undated) DEVICE — SEAL UNIVERSAL 5MM-8MM XI

## (undated) DEVICE — SOL ELECTROLUBE ANTI-STIC

## (undated) DEVICE — COVER TIP CURVED SCISSORS XI

## (undated) DEVICE — APPLIER LIGACLIP SM 9.38IN

## (undated) DEVICE — SUT MONOCRYL 4-0 PS-1 UND

## (undated) DEVICE — CLIPPER BLADE MOD 4406 (CAREF)

## (undated) DEVICE — DRAPE LAVH LAPAROSCOPY W/FLUID

## (undated) DEVICE — GOWN SMARTGOWN LVL4 X-LONG XL

## (undated) DEVICE — ADHESIVE DERMABOND ADVANCED

## (undated) DEVICE — TUBING HEATED INSUFFLATOR

## (undated) DEVICE — SEE MEDLINE ITEM 152739

## (undated) DEVICE — APPLICATOR CHLORAPREP ORN 26ML

## (undated) DEVICE — MANIFOLD 4 PORT

## (undated) DEVICE — SYR 3CC LUER LOC

## (undated) DEVICE — GLOVE SURG BIOGEL LATEX SZ 7.5

## (undated) DEVICE — DRAPE ARM DAVINCI XI